# Patient Record
Sex: MALE | Race: WHITE | NOT HISPANIC OR LATINO | Employment: FULL TIME | URBAN - METROPOLITAN AREA
[De-identification: names, ages, dates, MRNs, and addresses within clinical notes are randomized per-mention and may not be internally consistent; named-entity substitution may affect disease eponyms.]

---

## 2017-01-06 ENCOUNTER — TELEPHONE (OUTPATIENT)
Dept: INTERNAL MEDICINE | Facility: CLINIC | Age: 52
End: 2017-01-06

## 2017-01-06 RX ORDER — CLARITHROMYCIN 500 MG/1
500 TABLET, COATED ORAL 2 TIMES DAILY
Qty: 14 TABLET | Refills: 0 | Status: SHIPPED | OUTPATIENT
Start: 2017-01-06 | End: 2017-01-13

## 2017-01-06 NOTE — TELEPHONE ENCOUNTER
----- Message from Omaira Garcias sent at 1/6/2017 11:50 AM EST -----  Contact: [phone]  Patient has been sick and was given a zpak. He is still having the same symptoms and would like something called in

## 2017-01-06 NOTE — TELEPHONE ENCOUNTER
Pharm called stating that the patient cannot take biaxin as this interferes with celexa and could cause heart palpitations. Per TOSIN okay to change to omnicef 300 mg bid for 10 days. Verbal given to pharm and they will fill this for the pt.

## 2017-01-17 ENCOUNTER — OFFICE VISIT (OUTPATIENT)
Dept: INTERNAL MEDICINE | Facility: CLINIC | Age: 52
End: 2017-01-17

## 2017-01-17 VITALS
BODY MASS INDEX: 34.88 KG/M2 | WEIGHT: 222.2 LBS | HEIGHT: 67 IN | SYSTOLIC BLOOD PRESSURE: 124 MMHG | DIASTOLIC BLOOD PRESSURE: 76 MMHG | TEMPERATURE: 97.2 F

## 2017-01-17 DIAGNOSIS — K21.9 GASTROESOPHAGEAL REFLUX DISEASE, ESOPHAGITIS PRESENCE NOT SPECIFIED: ICD-10-CM

## 2017-01-17 DIAGNOSIS — M19.90 ARTHRITIS: ICD-10-CM

## 2017-01-17 DIAGNOSIS — R05.9 COUGH: ICD-10-CM

## 2017-01-17 DIAGNOSIS — I10 ESSENTIAL HYPERTENSION: ICD-10-CM

## 2017-01-17 DIAGNOSIS — F41.8 DEPRESSION WITH ANXIETY: ICD-10-CM

## 2017-01-17 DIAGNOSIS — E11.9 CONTROLLED TYPE 2 DIABETES MELLITUS WITHOUT COMPLICATION, WITHOUT LONG-TERM CURRENT USE OF INSULIN (HCC): Primary | ICD-10-CM

## 2017-01-17 LAB
ALBUMIN SERPL-MCNC: 4.5 G/DL (ref 3.2–4.8)
ALBUMIN/GLOB SERPL: 1.5 G/DL (ref 1.5–2.5)
ALP SERPL-CCNC: 78 U/L (ref 25–100)
ALT SERPL W P-5'-P-CCNC: 32 U/L (ref 7–40)
ANION GAP SERPL CALCULATED.3IONS-SCNC: 16 MMOL/L (ref 3–11)
ARTICHOKE IGE QN: 155 MG/DL (ref 0–130)
AST SERPL-CCNC: 20 U/L (ref 0–33)
BASOPHILS # BLD AUTO: 0.03 10*3/MM3 (ref 0–0.2)
BASOPHILS NFR BLD AUTO: 0.3 % (ref 0–1)
BILIRUB SERPL-MCNC: 0.6 MG/DL (ref 0.3–1.2)
BUN BLD-MCNC: 18 MG/DL (ref 9–23)
BUN/CREAT SERPL: 20 (ref 7–25)
CALCIUM SPEC-SCNC: 10.1 MG/DL (ref 8.7–10.4)
CHLORIDE SERPL-SCNC: 100 MMOL/L (ref 99–109)
CHOLEST SERPL-MCNC: 239 MG/DL (ref 0–200)
CO2 SERPL-SCNC: 25 MMOL/L (ref 20–31)
CREAT BLD-MCNC: 0.9 MG/DL (ref 0.6–1.3)
DEPRECATED RDW RBC AUTO: 45.7 FL (ref 37–54)
EOSINOPHIL # BLD AUTO: 0.1 10*3/MM3 (ref 0.1–0.3)
EOSINOPHIL NFR BLD AUTO: 1.1 % (ref 0–3)
ERYTHROCYTE [DISTWIDTH] IN BLOOD BY AUTOMATED COUNT: 14.1 % (ref 11.3–14.5)
GFR SERPL CREATININE-BSD FRML MDRD: 89 ML/MIN/1.73
GLOBULIN UR ELPH-MCNC: 3 GM/DL
GLUCOSE BLD-MCNC: 192 MG/DL (ref 70–100)
HBA1C MFR BLD: 8.6 %
HCT VFR BLD AUTO: 40.6 % (ref 38.9–50.9)
HDLC SERPL-MCNC: 55 MG/DL (ref 40–60)
HGB BLD-MCNC: 14.2 G/DL (ref 13.1–17.5)
IMM GRANULOCYTES # BLD: 0.12 10*3/MM3 (ref 0–0.03)
IMM GRANULOCYTES NFR BLD: 1.3 % (ref 0–0.6)
LYMPHOCYTES # BLD AUTO: 1.85 10*3/MM3 (ref 0.6–4.8)
LYMPHOCYTES NFR BLD AUTO: 19.5 % (ref 24–44)
MCH RBC QN AUTO: 31.6 PG (ref 27–31)
MCHC RBC AUTO-ENTMCNC: 35 G/DL (ref 32–36)
MCV RBC AUTO: 90.2 FL (ref 80–99)
MONOCYTES # BLD AUTO: 0.86 10*3/MM3 (ref 0–1)
MONOCYTES NFR BLD AUTO: 9.1 % (ref 0–12)
NEUTROPHILS # BLD AUTO: 6.51 10*3/MM3 (ref 1.5–8.3)
NEUTROPHILS NFR BLD AUTO: 68.7 % (ref 41–71)
PLATELET # BLD AUTO: 249 10*3/MM3 (ref 150–450)
PMV BLD AUTO: 10.7 FL (ref 6–12)
POTASSIUM BLD-SCNC: 4.3 MMOL/L (ref 3.5–5.5)
PROT SERPL-MCNC: 7.5 G/DL (ref 5.7–8.2)
RBC # BLD AUTO: 4.5 10*6/MM3 (ref 4.2–5.76)
SODIUM BLD-SCNC: 141 MMOL/L (ref 132–146)
TRIGL SERPL-MCNC: 217 MG/DL (ref 0–150)
TSH SERPL DL<=0.05 MIU/L-ACNC: 0.65 MIU/ML (ref 0.35–5.35)
VIT B12 BLD-MCNC: >2000 PG/ML (ref 211–911)
WBC NRBC COR # BLD: 9.47 10*3/MM3 (ref 3.5–10.8)

## 2017-01-17 PROCEDURE — 99214 OFFICE O/P EST MOD 30 MIN: CPT | Performed by: FAMILY MEDICINE

## 2017-01-17 PROCEDURE — 85025 COMPLETE CBC W/AUTO DIFF WBC: CPT | Performed by: FAMILY MEDICINE

## 2017-01-17 PROCEDURE — 80053 COMPREHEN METABOLIC PANEL: CPT | Performed by: FAMILY MEDICINE

## 2017-01-17 PROCEDURE — 82607 VITAMIN B-12: CPT | Performed by: FAMILY MEDICINE

## 2017-01-17 PROCEDURE — 83036 HEMOGLOBIN GLYCOSYLATED A1C: CPT | Performed by: FAMILY MEDICINE

## 2017-01-17 PROCEDURE — 84443 ASSAY THYROID STIM HORMONE: CPT | Performed by: FAMILY MEDICINE

## 2017-01-17 PROCEDURE — 80061 LIPID PANEL: CPT | Performed by: FAMILY MEDICINE

## 2017-01-17 RX ORDER — LISINOPRIL 10 MG/1
10 TABLET ORAL DAILY
Qty: 30 TABLET | Refills: 5 | Status: SHIPPED | OUTPATIENT
Start: 2017-01-17 | End: 2017-07-26 | Stop reason: SDUPTHER

## 2017-01-17 RX ORDER — CITALOPRAM 20 MG/1
40 TABLET ORAL DAILY
Qty: 60 TABLET | Refills: 5 | Status: SHIPPED | OUTPATIENT
Start: 2017-01-17 | End: 2017-08-23 | Stop reason: SDUPTHER

## 2017-01-17 RX ORDER — MELOXICAM 15 MG/1
15 TABLET ORAL DAILY
Qty: 30 TABLET | Refills: 2 | Status: SHIPPED | OUTPATIENT
Start: 2017-01-17 | End: 2017-08-23 | Stop reason: SDUPTHER

## 2017-01-17 RX ORDER — LANSOPRAZOLE 30 MG/1
30 CAPSULE, DELAYED RELEASE ORAL DAILY
Qty: 30 CAPSULE | Refills: 2 | Status: SHIPPED | OUTPATIENT
Start: 2017-01-17 | End: 2017-06-26

## 2017-01-17 RX ORDER — MONTELUKAST SODIUM 10 MG/1
TABLET ORAL
Qty: 30 TABLET | Refills: 0 | Status: SHIPPED | OUTPATIENT
Start: 2017-01-17 | End: 2017-08-23

## 2017-01-17 NOTE — PROGRESS NOTES
Subjective   Malcolm Costa is a 51 y.o. male.     History of Present Illness   Here for routine 3-4mo recheck. Last seen 9/27/16 for recheck diabetes. Last routine 7/12/16. Labs 11/3/15 demo normal CBC, CMP (glu 161), B12 slightly high 1063, advised to stop supplement) and TSH. Lipid was borderline with , tg 117, HDL 50, - pt advised diet. Had vit D 8.8 ([id number]) and was advised 5000 IU qd. A1C was 6.3. A1C done 3/1/16 was 6.6 and 7/12/16 was 8.6. Lipid done 7/12/16 demo , tg 145, HDL 47m . EKG 7/7/15 was normal other than low voltage (improved from 9/13/14 with ?Q waves).     1. ENDO- diabetes, currently on kombiglyze. No known diabetic sequelae. Last eye exam 6/9/15. Baseline A1C was 7.4 with subsequent A1C at goal 11/3/15 at 6.3 and 3/1/16 at 6.6.Pt does rare FS. However, he was seen 7/2016 with recently elevated sugars 210 fasting, 342 after lunch and 240 after dinner; done the day prior to the visit for the visit; no symptoms. Was continued on kombiglyze with the addition of invokana. On recheck he was doing better: fasting FS improved from 300's to 185-261, between meals 146-322, pp 177-233. Glu trending downward except when pt ate ice cream. Last A1C 7/2016 was 8.6. Was advised to avoid this. Today he reports he stopped doing FS in December. No blurry vision, polydipsia/ polyuria or numbness in his extremities.       2. ORTHO- OA with hand, neck and shoulder pain. Also has right hip pain when driving. Did well with Mobic. Today he reports he is still doing well with complete pain control as long as he takes the mobic qd.      3.CV- HTN and borderline hyperlipid, currently on lisinopril. No known cardio sequelae. Today pt reports he is fasting for labs.    4. PSYCH- depression, anxiety and insomnia. Hx EtOH abuse. Pt now post inpt rehab 2015. No EtOH since 1/2015. Mood has been at goal on celexa 40. Today he reports he is still doing well and is 2yr no EtOH.    5. GI- GERD,  currently on qd prevacid. Has remained on this due to NSAID. Today he reports no breakthru symptoms.    6. RESP- today pt reports he has been sick for about a month. Went to the walk in 1/1/17 and was diagnosed with sinusitis and treated with a zpac. Is still having left ear congestion with muffled hearing and a cough. Phlegm is white. On discussion pt dips tobacco.    The following portions of the patient's history were reviewed and updated as appropriate: current medications, past family history, past medical history, past social history, past surgical history and problem list.    Review of Systems   HENT: Positive for congestion and ear pain (cannot hear out of left ear).    Respiratory: Positive for cough.    Cardiovascular: Negative for chest pain.   Gastrointestinal: Negative for abdominal distention and abdominal pain.   Skin: Negative for color change.   Neurological: Negative for tremors, speech difficulty and headaches.   Psychiatric/Behavioral: Negative for agitation and confusion.   All other systems reviewed and are negative.        Current Outpatient Prescriptions:   •  Canagliflozin (INVOKANA) 300 MG tablet, Take 300 mg by mouth Daily., Disp: 30 tablet, Rfl: 2  •  citalopram (CeleXA) 20 MG tablet, Take 2 tablets by mouth Daily., Disp: 60 tablet, Rfl: 5  •  glucose blood test strip, Use as instructed, Disp: 50 each, Rfl: 12  •  hydrOXYzine (ATARAX) 25 MG tablet, Take 1 tablet by mouth daily as needed., Disp: , Rfl:   •  Lancets (ACCU-CHEK SAFE-T PRO) lancets, For use with glucometer with FS once daily, Disp: 50 each, Rfl: 5  •  lansoprazole (PREVACID) 30 MG capsule, Take 1 capsule by mouth Daily., Disp: 30 capsule, Rfl: 2  •  lisinopril (PRINIVIL,ZESTRIL) 10 MG tablet, Take 1 tablet by mouth Daily., Disp: 30 tablet, Rfl: 5  •  meloxicam (MOBIC) 15 MG tablet, Take 1 tablet by mouth Daily., Disp: 30 tablet, Rfl: 2  •  montelukast (SINGULAIR) 10 MG tablet, Take 1 tab po qd prn cough or congestion, Disp:  "30 tablet, Rfl: 0  •  SAXagliptin-MetFORMIN ER (KOMBIGLYZE XR) 5-1000 MG tablet sustained-release 24 hour, Take 1 tablet by mouth Daily., Disp: 30 tablet, Rfl: 2    Objective     Visit Vitals   • /76   • Temp 97.2 °F (36.2 °C)   • Ht 67\" (170.2 cm)   • Wt 222 lb 3.2 oz (101 kg)   • BMI 34.8 kg/m2       Physical Exam   Constitutional: He is oriented to person, place, and time. He appears well-developed and well-nourished.   HENT:   Right Ear: Tympanic membrane and ear canal normal.   Left Ear: Tympanic membrane and ear canal normal.   Mouth/Throat: Oropharynx is clear and moist.   Eyes: Conjunctivae and EOM are normal. Pupils are equal, round, and reactive to light.   Neck: No thyromegaly present.   Cardiovascular: Normal rate and regular rhythm.    Pulmonary/Chest: Effort normal and breath sounds normal.   Neurological: He is alert and oriented to person, place, and time.   Skin: Skin is warm and dry.   Psychiatric: He has a normal mood and affect.   Vitals reviewed.      Assessment/Plan   Malcolm was seen today for follow-up.    Diagnoses and all orders for this visit:    Controlled type 2 diabetes mellitus without complication, without long-term current use of insulin  -     CBC & Differential  -     Comprehensive Metabolic Panel  -     Lipid Panel  -     Vitamin B12  -     TSH  -     POC Glycosylated Hemoglobin (Hb A1C)  -     Canagliflozin (INVOKANA) 300 MG tablet; Take 300 mg by mouth Daily.  -     lisinopril (PRINIVIL,ZESTRIL) 10 MG tablet; Take 1 tablet by mouth Daily.  -     meloxicam (MOBIC) 15 MG tablet; Take 1 tablet by mouth Daily.  -     SAXagliptin-MetFORMIN ER (KOMBIGLYZE XR) 5-1000 MG tablet sustained-release 24 hour; Take 1 tablet by mouth Daily.    Essential hypertension  -     lisinopril (PRINIVIL,ZESTRIL) 10 MG tablet; Take 1 tablet by mouth Daily.    Arthritis  -     meloxicam (MOBIC) 15 MG tablet; Take 1 tablet by mouth Daily.    Depression with anxiety  -     citalopram (CeleXA) 20 MG " tablet; Take 2 tablets by mouth Daily.    Gastroesophageal reflux disease, esophagitis presence not specified  -     lansoprazole (PREVACID) 30 MG capsule; Take 1 capsule by mouth Daily.    Cough  -     montelukast (SINGULAIR) 10 MG tablet; Take 1 tab po qd prn cough or congestion      1. ENDO- diabetes- will do A1C today but discussed that his sugar may be up some related to the recent URI. Pt is 8.6, same as in July. Discussed that previous was diet and current may be due to the prolonged URI. Will recheck in 3mo. If still over 8, will change his meds  2. CV- HTN- BP at goal. RF lisinopril today. Annual fasting labs today  3. GI- GERD- stable. RF  4. PSYCH- depression with anxiety- still at goal. RF today  5. ORTHO- arthritis- stable. RF mobic today  6. RESP- discussed post infectious cough. Will treat with singulair. Pt advised he needs to stop dip  7. RECHECK- 3mo

## 2017-01-17 NOTE — MR AVS SNAPSHOT
Malcolm Costa   1/17/2017 9:45 AM   Office Visit    Dept Phone:  [phone]   Encounter #:  [id number]    Provider:  Felicia Boothe MD   Department:  [facility] PRIMARY [facility]ARE                Your Full Care Plan              Today's Medication Changes          These changes are accurate as of: 1/17/17 11:22 AM.  If you have any questions, ask your nurse or doctor.               New Medication(s)Ordered:     montelukast 10 MG tablet   Commonly known as:  SINGULAIR   Take 1 tab po qd prn cough or congestion   Started by:  Felicia Boothe MD         Medication(s)that have changed:     lansoprazole 30 MG capsule   Commonly known as:  PREVACID   Take 1 capsule by mouth Daily.   What changed:  Another medication with the same name was removed. Continue taking this medication, and follow the directions you see here.   Changed by:  Felicia Boothe MD         Stop taking medication(s)listed here:     azithromycin 250 MG tablet   Commonly known as:  ZITHROMAX   Stopped by:  Felicia Boothe MD           PredniSONE 10 MG (21) tablet pack   Commonly known as:  DELTASONE   Stopped by:  Felicia Boothe MD                Where to Get Your Medications      These medications were sent to [facility] [address] [place] [address] - [phone]  - [phone] [place] [address]     Phone:  [phone]     Canagliflozin 300 MG tablet    citalopram 20 MG tablet    lansoprazole 30 MG capsule    lisinopril 10 MG tablet    meloxicam 15 MG tablet    montelukast 10 MG tablet    SAXagliptin-MetFORMIN ER 5-1000 MG tablet sustained-release 24 hour                  Your Updated Medication List          This list is accurate as of: 1/17/17 11:22 AM.  Always use your most recent med list.                accu-chek safe-t pro lancets   For use with glucometer with FS once daily       Canagliflozin 300 MG tablet   Commonly known as:   INVOKANA   Take 300 mg by mouth Daily.       citalopram 20 MG tablet   Commonly known as:  CeleXA   Take 2 tablets by mouth Daily.       glucose blood test strip   Use as instructed       hydrOXYzine 25 MG tablet   Commonly known as:  ATARAX       lansoprazole 30 MG capsule   Commonly known as:  PREVACID   Take 1 capsule by mouth Daily.       lisinopril 10 MG tablet   Commonly known as:  PRINIVIL,ZESTRIL   Take 1 tablet by mouth Daily.       meloxicam 15 MG tablet   Commonly known as:  MOBIC   Take 1 tablet by mouth Daily.       montelukast 10 MG tablet   Commonly known as:  SINGULAIR   Take 1 tab po qd prn cough or congestion       SAXagliptin-MetFORMIN ER 5-1000 MG tablet sustained-release 24 hour   Commonly known as:  KOMBIGLYZE XR   Take 1 tablet by mouth Daily.               We Performed the Following     CBC & Differential     CBC Auto Differential     Comprehensive Metabolic Panel     Lipid Panel     POC Glycosylated Hemoglobin (Hb A1C)     TSH     Vitamin B12       You Were Diagnosed With        Codes Comments    Controlled type 2 diabetes mellitus without complication, without long-term current use of insulin    -  Primary ICD-10-CM: E11.9  ICD-9-CM: 250.00     Essential hypertension     ICD-10-CM: I10  ICD-9-CM: 401.9     Arthritis     ICD-10-CM: M19.90  ICD-9-CM: 716.90     Depression with anxiety     ICD-10-CM: F41.8  ICD-9-CM: 300.4     Gastroesophageal reflux disease, esophagitis presence not specified     ICD-10-CM: K21.9  ICD-9-CM: 530.81     Cough     ICD-10-CM: R05  ICD-9-CM: 786.2       Instructions    1. ENDO- diabetes- will do A1C today but discussed that his sugar may be up some related to the recent URI.  2. CV- HTN- BP at goal. RF lisinopril today. Annual fasting labs today  3. GI- GERD- stable. RF  4. PSYCH- depression with anxiety- still at goal. RF today  5. ORTHO- arthritis- stable. RF mobic today  6. RESP- discussed post infectious cough. Will treat with singulair. Pt advised he needs to  "stop dip  7. RECHECK- 3mo     Patient Instructions History      Upcoming Appointments     Visit Type Date Time Department    FOLLOW UP 2017  9:45 AM [facility]      mydeco Signup     [facility] mydeco allows you to send messages to your doctor, view your test results, renew your prescriptions, schedule appointments, and more. To sign up, go to [employer] and click on the Sign Up Now link in the New User? box. Enter your mydeco Activation Code exactly as it appears below along with the last four digits of your Social Security Number and your Date of Birth () to complete the sign-up process. If you do not sign up before the expiration date, you must request a new code.    mydeco Activation Code: [email or web address]  Expires: 2017 11:22 AM    If you have questions, you can email [email or web address] or call [phone] to talk to our mydeco staff. Remember, mydeco is NOT to be used for urgent needs. For medical emergencies, dial 911.               Other Info from Your Visit           Allergies     Codeine      Shellfish-derived Products        Reason for Visit     Follow-up RECHECK DM      Vital Signs     Blood Pressure Temperature Height Weight Body Mass Index Smoking Status    124/76 97.2 °F (36.2 °C) 67\" (170.2 cm) 222 lb 3.2 oz (101 kg) 34.8 kg/m2 Never Smoker      Problems and Diagnoses Noted     Arthritis    Controlled type 2 diabetes mellitus without complication, without long-term current use of insulin    Depression with anxiety    High blood pressure    Acid reflux disease    Cough          Results         "

## 2017-04-20 ENCOUNTER — OFFICE VISIT (OUTPATIENT)
Dept: INTERNAL MEDICINE | Facility: CLINIC | Age: 52
End: 2017-04-20

## 2017-04-20 VITALS
WEIGHT: 219.2 LBS | HEIGHT: 67 IN | DIASTOLIC BLOOD PRESSURE: 82 MMHG | SYSTOLIC BLOOD PRESSURE: 124 MMHG | TEMPERATURE: 97.3 F | BODY MASS INDEX: 34.4 KG/M2

## 2017-04-20 DIAGNOSIS — E11.9 CONTROLLED TYPE 2 DIABETES MELLITUS WITHOUT COMPLICATION, WITHOUT LONG-TERM CURRENT USE OF INSULIN (HCC): Primary | ICD-10-CM

## 2017-04-20 LAB — HBA1C MFR BLD: 5.8 %

## 2017-04-20 PROCEDURE — 83036 HEMOGLOBIN GLYCOSYLATED A1C: CPT | Performed by: FAMILY MEDICINE

## 2017-04-20 PROCEDURE — 99213 OFFICE O/P EST LOW 20 MIN: CPT | Performed by: FAMILY MEDICINE

## 2017-04-20 NOTE — PROGRESS NOTES
Subjective   Malcolm Costa is a 52 y.o. male.     History of Present Illness   Here for routine 3mo recheck. Last seen 1/17/17 for routine.  Labs 1/17/17 demo normal CBC, CMP (except glu 192)and TSH. B12 was >2000 and pt was advised to stop supplement. A1C was 8.6. Lipid demo , tg 217, HDL 55, . A1C done 11/3/15 was 6.3. A1C done 3/1/16 was 6.6 and 7/12/16 was 8.6. Lipid done 7/12/16 demo , tg 145, HDL 47m . EKG 7/7/15 was normal other than low voltage (improved from 9/13/14 with ?Q waves).     1. ENDO- diabetes, currently on kombiglyze and invokana. No known diabetic sequelae. Last eye exam 6/9/15. Pt had been well controlled on kombiglyze with A1C raniging 6.3 to 6.6 until his visit 7/2016 when he had elevated sugars with 210 fasting, 342 after lunch and 240 after dinner abnd A1C 8.6. Was given the addition of invokana but A1C remained 8.6. Home glu were improving except when he ate ice cream. Was advised to work on diet. Today he reports he has working on his diet and exercise. Is using health smoothies tid. Brings log that demo fasting 130-146 (was 165 after easter), between meals , pp . Has lost 3 lb. Last eye exam 9/2016.      2. CV- HTN and borderline hyperlipid, currently on lisinopril. No known cardio sequelae.   3. ORTHO- OA with hand, neck and shoulder pain. Also has right hip pain when driving. Did well with Mobic  4. PSYCH- depression, anxiety and insomnia. Hx EtOH abuse. Pt now post inpt rehab 2015. No EtOH since 1/2015. Mood has been at goal on celexa 40.   5. GI- GERD, currently on qd prevacid. Has remained on this due to NSAID. Today he reports no breakthru symptoms.  6. RESP- allergies. Pt was treated at a walk in 1/1/17 with abx. Improved except for congestion. Was given singulair and advised to stop dipping.     The following portions of the patient's history were reviewed and updated as appropriate: current medications, past family history, past medical  "history, past social history, past surgical history and problem list.    Review of Systems   Cardiovascular: Negative for chest pain.   Gastrointestinal: Negative for abdominal distention and abdominal pain.   Skin: Negative for color change.   Neurological: Negative for tremors, speech difficulty and headaches.   Psychiatric/Behavioral: Negative for agitation and confusion.   All other systems reviewed and are negative.        Current Outpatient Prescriptions:   •  Canagliflozin (INVOKANA) 300 MG tablet, Take 300 mg by mouth Daily., Disp: 30 tablet, Rfl: 2  •  citalopram (CeleXA) 20 MG tablet, Take 2 tablets by mouth Daily., Disp: 60 tablet, Rfl: 5  •  glucose blood test strip, Use as instructed, Disp: 50 each, Rfl: 12  •  hydrOXYzine (ATARAX) 25 MG tablet, Take 1 tablet by mouth daily as needed., Disp: , Rfl:   •  Lancets (ACCU-CHEK SAFE-T PRO) lancets, For use with glucometer with FS once daily, Disp: 50 each, Rfl: 5  •  lansoprazole (PREVACID) 30 MG capsule, Take 1 capsule by mouth Daily., Disp: 30 capsule, Rfl: 2  •  lisinopril (PRINIVIL,ZESTRIL) 10 MG tablet, Take 1 tablet by mouth Daily., Disp: 30 tablet, Rfl: 5  •  meloxicam (MOBIC) 15 MG tablet, Take 1 tablet by mouth Daily., Disp: 30 tablet, Rfl: 2  •  montelukast (SINGULAIR) 10 MG tablet, Take 1 tab po qd prn cough or congestion, Disp: 30 tablet, Rfl: 0  •  SAXagliptin-MetFORMIN ER (KOMBIGLYZE XR) 5-1000 MG tablet sustained-release 24 hour, Take 1 tablet by mouth Daily., Disp: 30 tablet, Rfl: 2    Objective     /82  Temp 97.3 °F (36.3 °C)  Ht 67\" (170.2 cm)  Wt 219 lb 3.2 oz (99.4 kg)  BMI 34.33 kg/m2    Physical Exam   Constitutional: He is oriented to person, place, and time. He appears well-developed and well-nourished.   HENT:   Right Ear: Tympanic membrane and ear canal normal.   Left Ear: Tympanic membrane and ear canal normal.   Mouth/Throat: Oropharynx is clear and moist.   Eyes: Conjunctivae and EOM are normal. Pupils are equal, round, " and reactive to light.   Neck: No thyromegaly present.   Cardiovascular: Normal rate and regular rhythm.    Pulmonary/Chest: Effort normal and breath sounds normal.   Neurological: He is alert and oriented to person, place, and time.   Skin: Skin is warm and dry.   Diabetic foot exam reveals good pulses; normal skin with no skin breaks or rashes; skin dry between the toes, tight touch sensation intact. No onychomycosis.   Psychiatric: He has a normal mood and affect.   Vitals reviewed.      Assessment/Plan   Malcolm was seen today for follow-up.    Diagnoses and all orders for this visit:    Controlled type 2 diabetes mellitus without complication, without long-term current use of insulin  -     POC Glycosylated Hemoglobin (Hb A1C)      1. ENDO- diabetes- discussed his diet with him doing well with current. Will check his A1C today. Expect it is much improved based on his FS. May not be at goal yet as his FS have only been improved for the past mo. Foot exam today good. Pt will continue to do his eye exams every fall. A1C is 5.8.  2. RECHECK- 4mo. Will do A1C and possible in office lipid if DM at goal.

## 2017-04-20 NOTE — PATIENT INSTRUCTIONS
1. ENDO- diabetes- discussed his diet with him doing well with current. Will check his A1C today. Expect it is much improved based on his FS. May not be at goal yet as his FS have only been improved for the past mo. Foot exam today good. Pt will continue to do his eye exams every fall.  2. RECHECK- 4mo. Will do A1C and possible in office lipid if DM at goal.

## 2017-06-02 DIAGNOSIS — E11.9 DIABETES TYPE 2, CONTROLLED (HCC): ICD-10-CM

## 2017-06-05 RX ORDER — SAXAGLIPTIN AND METFORMIN HYDROCHLORIDE 5; 1000 MG/1; MG/1
TABLET, FILM COATED, EXTENDED RELEASE ORAL
Qty: 30 TABLET | Refills: 0 | Status: SHIPPED | OUTPATIENT
Start: 2017-06-05 | End: 2017-07-06 | Stop reason: SDUPTHER

## 2017-06-26 RX ORDER — LANSOPRAZOLE 30 MG/1
CAPSULE, DELAYED RELEASE ORAL
Qty: 30 CAPSULE | Refills: 0 | Status: SHIPPED | OUTPATIENT
Start: 2017-06-26 | End: 2017-08-07 | Stop reason: SDUPTHER

## 2017-07-06 DIAGNOSIS — E11.9 DIABETES TYPE 2, CONTROLLED (HCC): ICD-10-CM

## 2017-07-07 RX ORDER — SAXAGLIPTIN AND METFORMIN HYDROCHLORIDE 5; 1000 MG/1; MG/1
TABLET, FILM COATED, EXTENDED RELEASE ORAL
Qty: 30 TABLET | Refills: 0 | Status: SHIPPED | OUTPATIENT
Start: 2017-07-07 | End: 2017-08-07 | Stop reason: SDUPTHER

## 2017-07-26 DIAGNOSIS — E11.9 CONTROLLED TYPE 2 DIABETES MELLITUS WITHOUT COMPLICATION, WITHOUT LONG-TERM CURRENT USE OF INSULIN (HCC): ICD-10-CM

## 2017-07-26 DIAGNOSIS — M19.90 ARTHRITIS: ICD-10-CM

## 2017-07-26 DIAGNOSIS — I10 ESSENTIAL HYPERTENSION: ICD-10-CM

## 2017-07-26 RX ORDER — LISINOPRIL 10 MG/1
TABLET ORAL
Qty: 30 TABLET | Refills: 0 | Status: SHIPPED | OUTPATIENT
Start: 2017-07-26 | End: 2017-08-23 | Stop reason: SDUPTHER

## 2017-07-26 RX ORDER — MELOXICAM 15 MG/1
TABLET ORAL
Qty: 30 TABLET | Refills: 0 | Status: SHIPPED | OUTPATIENT
Start: 2017-07-26 | End: 2017-08-23

## 2017-08-07 DIAGNOSIS — E11.9 DIABETES TYPE 2, CONTROLLED (HCC): ICD-10-CM

## 2017-08-07 RX ORDER — CANAGLIFLOZIN 300 MG/1
TABLET, FILM COATED ORAL
Qty: 30 TABLET | Refills: 2 | Status: SHIPPED | OUTPATIENT
Start: 2017-08-07 | End: 2017-08-23

## 2017-08-07 RX ORDER — LANSOPRAZOLE 30 MG/1
CAPSULE, DELAYED RELEASE ORAL
Qty: 30 CAPSULE | Refills: 0 | Status: SHIPPED | OUTPATIENT
Start: 2017-08-07 | End: 2021-08-10

## 2017-08-07 RX ORDER — SAXAGLIPTIN AND METFORMIN HYDROCHLORIDE 5; 1000 MG/1; MG/1
TABLET, FILM COATED, EXTENDED RELEASE ORAL
Qty: 30 TABLET | Refills: 0 | Status: SHIPPED | OUTPATIENT
Start: 2017-08-07 | End: 2017-08-23 | Stop reason: SDUPTHER

## 2017-08-23 ENCOUNTER — OFFICE VISIT (OUTPATIENT)
Dept: INTERNAL MEDICINE | Facility: CLINIC | Age: 52
End: 2017-08-23

## 2017-08-23 VITALS
WEIGHT: 217.6 LBS | SYSTOLIC BLOOD PRESSURE: 126 MMHG | HEIGHT: 67 IN | BODY MASS INDEX: 34.15 KG/M2 | DIASTOLIC BLOOD PRESSURE: 76 MMHG | TEMPERATURE: 97.9 F

## 2017-08-23 DIAGNOSIS — E11.9 CONTROLLED TYPE 2 DIABETES MELLITUS WITHOUT COMPLICATION, WITHOUT LONG-TERM CURRENT USE OF INSULIN (HCC): Primary | ICD-10-CM

## 2017-08-23 DIAGNOSIS — M19.90 ARTHRITIS: ICD-10-CM

## 2017-08-23 DIAGNOSIS — K21.9 GASTROESOPHAGEAL REFLUX DISEASE, ESOPHAGITIS PRESENCE NOT SPECIFIED: ICD-10-CM

## 2017-08-23 DIAGNOSIS — F41.8 DEPRESSION WITH ANXIETY: ICD-10-CM

## 2017-08-23 DIAGNOSIS — I10 ESSENTIAL HYPERTENSION: ICD-10-CM

## 2017-08-23 LAB
GLUCOSE BLDC GLUCOMTR-MCNC: 129 MG/DL (ref 70–130)
HBA1C MFR BLD: 5.6 %

## 2017-08-23 PROCEDURE — 90471 IMMUNIZATION ADMIN: CPT | Performed by: FAMILY MEDICINE

## 2017-08-23 PROCEDURE — 90732 PPSV23 VACC 2 YRS+ SUBQ/IM: CPT | Performed by: FAMILY MEDICINE

## 2017-08-23 PROCEDURE — 82570 ASSAY OF URINE CREATININE: CPT | Performed by: FAMILY MEDICINE

## 2017-08-23 PROCEDURE — 99214 OFFICE O/P EST MOD 30 MIN: CPT | Performed by: FAMILY MEDICINE

## 2017-08-23 PROCEDURE — 82043 UR ALBUMIN QUANTITATIVE: CPT | Performed by: FAMILY MEDICINE

## 2017-08-23 PROCEDURE — 83036 HEMOGLOBIN GLYCOSYLATED A1C: CPT | Performed by: FAMILY MEDICINE

## 2017-08-23 PROCEDURE — 82962 GLUCOSE BLOOD TEST: CPT | Performed by: FAMILY MEDICINE

## 2017-08-23 RX ORDER — CITALOPRAM 20 MG/1
40 TABLET ORAL DAILY
Qty: 60 TABLET | Refills: 5 | Status: SHIPPED | OUTPATIENT
Start: 2017-08-23 | End: 2018-03-16 | Stop reason: SDUPTHER

## 2017-08-23 RX ORDER — MELOXICAM 15 MG/1
15 TABLET ORAL DAILY
Qty: 30 TABLET | Refills: 5 | Status: SHIPPED | OUTPATIENT
Start: 2017-08-23 | End: 2018-02-28 | Stop reason: SDUPTHER

## 2017-08-23 RX ORDER — LISINOPRIL 10 MG/1
10 TABLET ORAL DAILY
Qty: 30 TABLET | Refills: 5 | Status: SHIPPED | OUTPATIENT
Start: 2017-08-23 | End: 2018-02-28 | Stop reason: SDUPTHER

## 2017-08-23 NOTE — PATIENT INSTRUCTIONS
1. ENDO- diabetes- will do a glu and A1C in office today. Will do a urine micro albumin. Pneumovax today and then again at 64yo.   2. CV- HTN- BP at goal. RF meds. Will do labs at next routine visit.  3. ORTHO- arthritis- stable at goal. RF mobic.  4. PSYCH- depression with anxiety- discussed stress vs chemical imbalance. Current issues appear to be more situational. Advised to try exercising again as this can help across the board.   5. GI- GERD- stable. Pt reminded to remain at the lowest effective dose.   6. RECHECK- 4mo, fasting routine and CPE

## 2017-08-23 NOTE — PROGRESS NOTES
Subjective   Malcolm Costa is a 52 y.o. male.     History of Present Illness   Here for routine 4mo recheck. Last seen 4/20/17 for 3mo recheck. A1C done 4/20/17 was 5.8.  Labs 1/17/17 demo normal CBC, CMP (except glu 192) and TSH. B12 was >2000 and pt was advised to stop supplement. A1C was 8.6. Lipid demo , tg 217, HDL 55, . A1C done 11/3/15 was 6.3. A1C done 3/1/16 was 6.6 and 7/12/16 was 8.6. Lipid done 7/12/16 demo , tg 145, HDL 47m . EKG 7/7/15 was normal other than low voltage (improved from 9/13/14 with ?Q waves).     1. ENDO- diabetes, currently on kombiglyze and invokana. No known diabetic sequelae. Does eye exams every fall, last 9/2016. Had foot exam 4/20/17.  Pt had been well controlled on kombiglyze with A1C raniging 6.3 to 6.6 until his visit 7/2016 when he had elevated sugars with 210- 342 and A1C 8.6. Was given the addition of invokana but A1C remained 8.6. Home glu were improving except when he ate ice cream. Was advised to work on diet. At his last visit he was working on his diet and exercise. Home FS improved to  with A1C 5.8. Pt had also lost 3 lb. Today he reports no recent FS. No symptoms of blurry vision, muscle weakness of change in urination. He has lost 2 lb.       2. CV- HTN and borderline hyperlipid, currently on lisinopril. No known cardio sequelae. Today pt reports he is doing well with no CP, palpitations, dyspnea or edema.    3. ORTHO- OA with hand, neck and shoulder pain. Also has right hip pain when driving. Did well with Mobic. Today he reports this is still doing well.     4. PSYCH- depression, anxiety and insomnia. Hx EtOH abuse. Pt now post inpt rehab 2015. No EtOH since 1/2015. Mood has been at goal on celexa 40. Today pt reports that for the past couple of mos he has not gone to the gym or eaten as well. Business is slow during the summer and this is part of it. No persistent symptoms and no urge to drink.      5. GI- GERD, currently on qd  "prevacid. Has remained on this due to NSAID. Today he reports no breakthru symptoms. Is not using OTC.     6. RESP- allergies. Pt was treated at a walk in 1/1/17 with abx. Improved except for congestion. Was given singulair and advised to stop dipping. Today he reports the singulair did not help so he stopped it.    The following portions of the patient's history were reviewed and updated as appropriate: current medications, past family history, past medical history, past social history, past surgical history and problem list.    Review of Systems   Cardiovascular: Negative for chest pain.   Gastrointestinal: Negative for abdominal distention and abdominal pain.   Skin: Negative for color change.   Neurological: Negative for tremors, speech difficulty and headaches.   Psychiatric/Behavioral: Negative for agitation and confusion.   All other systems reviewed and are negative.        Current Outpatient Prescriptions:   •  Canagliflozin (INVOKANA) 300 MG tablet, Take 300 mg by mouth Daily., Disp: 30 tablet, Rfl: 3  •  citalopram (CeleXA) 20 MG tablet, Take 2 tablets by mouth Daily., Disp: 60 tablet, Rfl: 5  •  glucose blood test strip, Use as instructed, Disp: 50 each, Rfl: 12  •  hydrOXYzine (ATARAX) 25 MG tablet, Take 1 tablet by mouth daily as needed., Disp: , Rfl:   •  Lancets (ACCU-CHEK SAFE-T PRO) lancets, For use with glucometer with FS once daily, Disp: 50 each, Rfl: 5  •  lansoprazole (PREVACID) 30 MG capsule, TAKE ONE CAPSULE BY MOUTH ONCE DAILY, Disp: 30 capsule, Rfl: 0  •  lisinopril (PRINIVIL,ZESTRIL) 10 MG tablet, Take 1 tablet by mouth Daily., Disp: 30 tablet, Rfl: 5  •  meloxicam (MOBIC) 15 MG tablet, Take 1 tablet by mouth Daily., Disp: 30 tablet, Rfl: 5  •  SAXagliptin-MetFORMIN ER (KOMBIGLYZE XR) 5-1000 MG tablet sustained-release 24 hour, 1 tab po qd, Disp: 30 tablet, Rfl: 0    Objective     /76  Temp 97.9 °F (36.6 °C)  Ht 67\" (170.2 cm)  Wt 217 lb 9.6 oz (98.7 kg)  BMI 34.08 " kg/m2    Physical Exam   Constitutional: He is oriented to person, place, and time. He appears well-developed and well-nourished.   HENT:   Right Ear: Tympanic membrane and ear canal normal.   Left Ear: Tympanic membrane and ear canal normal.   Mouth/Throat: Oropharynx is clear and moist.   Eyes: Conjunctivae and EOM are normal. Pupils are equal, round, and reactive to light.   Neck: No thyromegaly present.   Cardiovascular: Normal rate and regular rhythm.    Pulmonary/Chest: Effort normal and breath sounds normal.   Neurological: He is alert and oriented to person, place, and time.   Skin: Skin is warm and dry.   Psychiatric: He has a normal mood and affect.   Vitals reviewed.      Assessment/Plan   Malcolm was seen today for follow-up.    Diagnoses and all orders for this visit:    Controlled type 2 diabetes mellitus without complication, without long-term current use of insulin  -     Microalbumin / Creatinine Urine Ratio  -     POC Glycosylated Hemoglobin (Hb A1C)  -     POC Glucose Fingerstick  -     Pneumococcal Polysaccharide Vaccine 23-Valent Greater Than or Equal To 1yo Subcutaneous / IM  -     Canagliflozin (INVOKANA) 300 MG tablet; Take 300 mg by mouth Daily.    Essential hypertension  -     lisinopril (PRINIVIL,ZESTRIL) 10 MG tablet; Take 1 tablet by mouth Daily.    Depression with anxiety  -     citalopram (CeleXA) 20 MG tablet; Take 2 tablets by mouth Daily.    Gastroesophageal reflux disease, esophagitis presence not specified    Arthritis  -     meloxicam (MOBIC) 15 MG tablet; Take 1 tablet by mouth Daily.    Other orders  -     SAXagliptin-MetFORMIN ER (KOMBIGLYZE XR) 5-1000 MG tablet sustained-release 24 hour; 1 tab po qd      1. ENDO- diabetes- will do a glu and A1C in office today (129, 5.6). Will do a urine micro albumin. Pneumovax today and then again at 66yo.   2. CV- HTN- BP at goal. RF meds. Will do labs at next routine visit.  3. ORTHO- arthritis- stable at goal. RF mobic.  4. PSYCH-  depression with anxiety- discussed stress vs chemical imbalance. Current issues appear to be more situational. Advised to try exercising again as this can help across the board.   5. GI- GERD- stable. Pt reminded to remain at the lowest effective dose.   6. RECHECK- 4mo, fasting routine and CPE

## 2017-08-25 LAB
CREAT 24H UR-MCNC: 58.1 MG/DL
MICROALB/CRT. RATIO UR: 42 MG/G CREAT (ref 0–30)
MICROALBUMIN UR-MCNC: 24.4 UG/ML

## 2017-10-09 RX ORDER — SAXAGLIPTIN AND METFORMIN HYDROCHLORIDE 5; 1000 MG/1; MG/1
TABLET, FILM COATED, EXTENDED RELEASE ORAL
Qty: 30 TABLET | Refills: 0 | Status: SHIPPED | OUTPATIENT
Start: 2017-10-09 | End: 2017-11-08 | Stop reason: SDUPTHER

## 2017-11-08 RX ORDER — SAXAGLIPTIN AND METFORMIN HYDROCHLORIDE 5; 1000 MG/1; MG/1
TABLET, FILM COATED, EXTENDED RELEASE ORAL
Qty: 30 TABLET | Refills: 0 | Status: SHIPPED | OUTPATIENT
Start: 2017-11-08 | End: 2017-12-09 | Stop reason: SDUPTHER

## 2017-12-11 RX ORDER — SAXAGLIPTIN AND METFORMIN HYDROCHLORIDE 5; 1000 MG/1; MG/1
TABLET, FILM COATED, EXTENDED RELEASE ORAL
Qty: 30 TABLET | Refills: 0 | Status: SHIPPED | OUTPATIENT
Start: 2017-12-11 | End: 2017-12-12 | Stop reason: SDUPTHER

## 2017-12-12 ENCOUNTER — OFFICE VISIT (OUTPATIENT)
Dept: INTERNAL MEDICINE | Facility: CLINIC | Age: 52
End: 2017-12-12

## 2017-12-12 VITALS
HEIGHT: 67 IN | SYSTOLIC BLOOD PRESSURE: 126 MMHG | DIASTOLIC BLOOD PRESSURE: 86 MMHG | BODY MASS INDEX: 35.35 KG/M2 | WEIGHT: 225.2 LBS | TEMPERATURE: 97.3 F

## 2017-12-12 DIAGNOSIS — E11.9 CONTROLLED TYPE 2 DIABETES MELLITUS WITHOUT COMPLICATION, WITHOUT LONG-TERM CURRENT USE OF INSULIN (HCC): ICD-10-CM

## 2017-12-12 DIAGNOSIS — Z00.00 ANNUAL PHYSICAL EXAM: Primary | ICD-10-CM

## 2017-12-12 DIAGNOSIS — F41.8 DEPRESSION WITH ANXIETY: ICD-10-CM

## 2017-12-12 LAB
25(OH)D3 SERPL-MCNC: 18.5 NG/ML
ALBUMIN SERPL-MCNC: 4.9 G/DL (ref 3.2–4.8)
ALBUMIN/GLOB SERPL: 1.6 G/DL (ref 1.5–2.5)
ALP SERPL-CCNC: 78 U/L (ref 25–100)
ALT SERPL W P-5'-P-CCNC: 22 U/L (ref 7–40)
ANION GAP SERPL CALCULATED.3IONS-SCNC: 10 MMOL/L (ref 3–11)
ARTICHOKE IGE QN: 181 MG/DL (ref 0–130)
AST SERPL-CCNC: 24 U/L (ref 0–33)
BASOPHILS # BLD AUTO: 0.03 10*3/MM3 (ref 0–0.2)
BASOPHILS NFR BLD AUTO: 0.3 % (ref 0–1)
BILIRUB SERPL-MCNC: 0.6 MG/DL (ref 0.3–1.2)
BUN BLD-MCNC: 18 MG/DL (ref 9–23)
BUN/CREAT SERPL: 18 (ref 7–25)
CALCIUM SPEC-SCNC: 10.1 MG/DL (ref 8.7–10.4)
CHLORIDE SERPL-SCNC: 100 MMOL/L (ref 99–109)
CHOLEST SERPL-MCNC: 224 MG/DL (ref 0–200)
CO2 SERPL-SCNC: 25 MMOL/L (ref 20–31)
CREAT BLD-MCNC: 1 MG/DL (ref 0.6–1.3)
DEPRECATED RDW RBC AUTO: 45.1 FL (ref 37–54)
EOSINOPHIL # BLD AUTO: 0.08 10*3/MM3 (ref 0–0.3)
EOSINOPHIL NFR BLD AUTO: 0.8 % (ref 0–3)
ERYTHROCYTE [DISTWIDTH] IN BLOOD BY AUTOMATED COUNT: 13.5 % (ref 11.3–14.5)
GFR SERPL CREATININE-BSD FRML MDRD: 78 ML/MIN/1.73
GLOBULIN UR ELPH-MCNC: 3.1 GM/DL
GLUCOSE BLD-MCNC: 186 MG/DL (ref 70–100)
HBA1C MFR BLD: 8 % (ref 4.8–5.6)
HCT VFR BLD AUTO: 45.1 % (ref 38.9–50.9)
HDLC SERPL-MCNC: 50 MG/DL (ref 40–60)
HGB BLD-MCNC: 15.5 G/DL (ref 13.1–17.5)
IMM GRANULOCYTES # BLD: 0.06 10*3/MM3 (ref 0–0.03)
IMM GRANULOCYTES NFR BLD: 0.6 % (ref 0–0.6)
LYMPHOCYTES # BLD AUTO: 1.8 10*3/MM3 (ref 0.6–4.8)
LYMPHOCYTES NFR BLD AUTO: 19.1 % (ref 24–44)
MCH RBC QN AUTO: 31.5 PG (ref 27–31)
MCHC RBC AUTO-ENTMCNC: 34.4 G/DL (ref 32–36)
MCV RBC AUTO: 91.7 FL (ref 80–99)
MONOCYTES # BLD AUTO: 0.73 10*3/MM3 (ref 0–1)
MONOCYTES NFR BLD AUTO: 7.7 % (ref 0–12)
NEUTROPHILS # BLD AUTO: 6.72 10*3/MM3 (ref 1.5–8.3)
NEUTROPHILS NFR BLD AUTO: 71.5 % (ref 41–71)
PLATELET # BLD AUTO: 189 10*3/MM3 (ref 150–450)
PMV BLD AUTO: 10.8 FL (ref 6–12)
POTASSIUM BLD-SCNC: 4.5 MMOL/L (ref 3.5–5.5)
PROT SERPL-MCNC: 8 G/DL (ref 5.7–8.2)
PSA SERPL-MCNC: 0.67 NG/ML (ref 0–4)
RBC # BLD AUTO: 4.92 10*6/MM3 (ref 4.2–5.76)
SODIUM BLD-SCNC: 135 MMOL/L (ref 132–146)
TRIGL SERPL-MCNC: 109 MG/DL (ref 0–150)
TSH SERPL DL<=0.05 MIU/L-ACNC: 0.76 MIU/ML (ref 0.35–5.35)
VIT B12 BLD-MCNC: >2000 PG/ML (ref 211–911)
WBC NRBC COR # BLD: 9.42 10*3/MM3 (ref 3.5–10.8)

## 2017-12-12 PROCEDURE — G0103 PSA SCREENING: HCPCS | Performed by: FAMILY MEDICINE

## 2017-12-12 PROCEDURE — 85025 COMPLETE CBC W/AUTO DIFF WBC: CPT | Performed by: FAMILY MEDICINE

## 2017-12-12 PROCEDURE — 90715 TDAP VACCINE 7 YRS/> IM: CPT | Performed by: FAMILY MEDICINE

## 2017-12-12 PROCEDURE — 82306 VITAMIN D 25 HYDROXY: CPT | Performed by: FAMILY MEDICINE

## 2017-12-12 PROCEDURE — 90686 IIV4 VACC NO PRSV 0.5 ML IM: CPT | Performed by: FAMILY MEDICINE

## 2017-12-12 PROCEDURE — 84443 ASSAY THYROID STIM HORMONE: CPT | Performed by: FAMILY MEDICINE

## 2017-12-12 PROCEDURE — 83036 HEMOGLOBIN GLYCOSYLATED A1C: CPT | Performed by: FAMILY MEDICINE

## 2017-12-12 PROCEDURE — 80061 LIPID PANEL: CPT | Performed by: FAMILY MEDICINE

## 2017-12-12 PROCEDURE — 90471 IMMUNIZATION ADMIN: CPT | Performed by: FAMILY MEDICINE

## 2017-12-12 PROCEDURE — 80053 COMPREHEN METABOLIC PANEL: CPT | Performed by: FAMILY MEDICINE

## 2017-12-12 PROCEDURE — 90472 IMMUNIZATION ADMIN EACH ADD: CPT | Performed by: FAMILY MEDICINE

## 2017-12-12 PROCEDURE — 82607 VITAMIN B-12: CPT | Performed by: FAMILY MEDICINE

## 2017-12-12 PROCEDURE — 99396 PREV VISIT EST AGE 40-64: CPT | Performed by: FAMILY MEDICINE

## 2017-12-12 RX ORDER — BUSPIRONE HYDROCHLORIDE 10 MG/1
TABLET ORAL
Qty: 60 TABLET | Refills: 0 | Status: SHIPPED | OUTPATIENT
Start: 2017-12-12 | End: 2018-04-10 | Stop reason: SDUPTHER

## 2017-12-12 NOTE — PATIENT INSTRUCTIONS
1. CPE- will do fasting screening labs today. Tdap (tetanus with whooping cough) today. Flu shot today.  2. ENDO- diabetes- will check an A1C today. If elevated, pt will work on diet first.  3. PSYCH- mood. Will add buspar as needed for anxiety.  4. RECHECK- 4mo routine

## 2017-12-12 NOTE — PROGRESS NOTES
Subjective   Malcolm Costa is a 52 y.o. male.   History of Present Illness   Here for CPE and routine 4mo. Last seen 8/23/17 for routine 4mo recheck. A1C done 8/23/17 was 5.6, done 4/20/17 was 5.8.  Labs 1/17/17 demo normal CBC, CMP (except glu 192) and TSH. B12 was >2000 and pt was advised to stop supplement. A1C was 8.6. Lipid demo , tg 217, HDL 55, . A1C done 3/1/16 was 6.6 and 7/12/16 was 8.6. EKG 7/7/15 was normal other than low voltage (improved from 9/13/14 with ?Q waves).     1. ENDO- diabetes, currently on kombiglyze and invokana. No known diabetic sequelae. Does eye exams every fall, last 9/2016. Had foot exam 4/20/17.  Had urine micro albumin 8/23/17. Pt had been well controlled on kombiglyze with A1C ranging 6.3 to 6.6 until his visit 7/2016 when he had elevated sugars of 210- 342 and A1C 8.6. Was given the addition of invokana but A1C remained 8.6 until he improved his diet (lost 5lb). Since then A1C was been 5.6- 5.8 with last 8/23/17 being 5.6. Today pt reports he is still following a careful diet or exercising. Has regained some weight. No FS.       2.  PSYCH- depression, anxiety and insomnia. Hx EtOH abuse. Pt now post inpt rehab 2015. No EtOH since 1/2/15. Mood has been at goal on celexa 40. No urge to drink at discussion 8/23/17.  Today pt reports he will get anxious at work at times, especially with the holidays.    3.CV- HTN and borderline hyperlipid, currently on lisinopril. No known cardio sequelae.   4. ORTHO- OA with hand, neck and shoulder pain. Also has right hip pain when driving. Did well with Mobic.   5. GI- GERD, currently on qd prevacid. Has remained on this due to NSAID.     6. CPE- Last done: none recently  CV- risk factors: see above.  GI- no rectal bleeding. No fam hx colon ca. Previous colonoscopy: 2012 with 1 polyp that was benign.  - no prostate or bladder c/o. Has nocturia x1 since 44yo. No ED. Strong fam hx prostate ca.  Last labs: fasting today for  "labs  Immunizations: Last TDaP: due. Pneumovax: 8/23/17. Previous zostavax: none. Flu  New concerns: none    The following portions of the patient's history were reviewed and updated as appropriate: current medications, past family history, past medical history, past social history, past surgical history and problem list.    Review of Systems   Cardiovascular: Negative for chest pain.   Gastrointestinal: Negative for abdominal distention and abdominal pain.   Skin: Negative for color change.   Neurological: Negative for tremors, speech difficulty and headaches.   Psychiatric/Behavioral: Negative for agitation and confusion.   All other systems reviewed and are negative.        Current Outpatient Prescriptions:   •  busPIRone (BUSPAR) 10 MG tablet, 1/2- 1 tab prn anxiety, Disp: 60 tablet, Rfl: 0  •  Canagliflozin (INVOKANA) 300 MG tablet, Take 300 mg by mouth Daily., Disp: 30 tablet, Rfl: 3  •  citalopram (CeleXA) 20 MG tablet, Take 2 tablets by mouth Daily., Disp: 60 tablet, Rfl: 5  •  glucose blood test strip, Use as instructed, Disp: 50 each, Rfl: 12  •  hydrOXYzine (ATARAX) 25 MG tablet, Take 1 tablet by mouth daily as needed., Disp: , Rfl:   •  Lancets (ACCU-CHEK SAFE-T PRO) lancets, For use with glucometer with FS once daily, Disp: 50 each, Rfl: 5  •  lansoprazole (PREVACID) 30 MG capsule, TAKE ONE CAPSULE BY MOUTH ONCE DAILY, Disp: 30 capsule, Rfl: 0  •  lisinopril (PRINIVIL,ZESTRIL) 10 MG tablet, Take 1 tablet by mouth Daily., Disp: 30 tablet, Rfl: 5  •  meloxicam (MOBIC) 15 MG tablet, Take 1 tablet by mouth Daily., Disp: 30 tablet, Rfl: 5  •  SAXagliptin-MetFORMIN ER (KOMBIGLYZE XR) 5-1000 MG tablet sustained-release 24 hour, Take 1 tablet by mouth Daily., Disp: 30 tablet, Rfl: 3      Objective     /86  Temp 97.3 °F (36.3 °C)  Ht 170.2 cm (67\")  Wt 102 kg (225 lb 3.2 oz)  BMI 35.27 kg/m2    Physical Exam   Constitutional: He is oriented to person, place, and time. He appears well-developed and " well-nourished.   HENT:   Head: Normocephalic.   Right Ear: Tympanic membrane and ear canal normal.   Left Ear: Tympanic membrane and ear canal normal.   Mouth/Throat: Oropharynx is clear and moist.   Eyes: Conjunctivae, EOM and lids are normal. Pupils are equal, round, and reactive to light.   Neck: Normal range of motion. Neck supple. No thyromegaly present.   Cardiovascular: Normal rate, regular rhythm and normal heart sounds.    Pulses:       Carotid pulses are 2+ on the right side, and 2+ on the left side.       Femoral pulses are 2+ on the right side, and 2+ on the left side.  Pulmonary/Chest: Effort normal and breath sounds normal.   Abdominal: Soft. Normal appearance and bowel sounds are normal. He exhibits no distension. There is no hepatosplenomegaly. There is no tenderness. Hernia confirmed negative in the right inguinal area and confirmed negative in the left inguinal area.   Musculoskeletal: Normal range of motion. He exhibits no edema or tenderness.   Lymphadenopathy:     He has no cervical adenopathy. No inguinal adenopathy noted on the right or left side.   Neurological: He is alert and oriented to person, place, and time.   Skin: Skin is warm and dry.   Psychiatric: He has a normal mood and affect. His behavior is normal.   Nursing note and vitals reviewed.      Reviewed the following with the patient: Discussion today with patient regarding current guidelines for timing/ frequency of colonoscopy and lab tests (including PSA).     Immunizations discussed today with current recommendations advised for DTaP, Zostavax, Pneumovax and Prevnar 13.    Appropriate diet and stretching discussed with handouts provided.        Assessment/Plan   Malcolm was seen today for annual exam.    Diagnoses and all orders for this visit:    Annual physical exam  -     CBC & Differential  -     Comprehensive Metabolic Panel  -     Lipid Panel  -     Vitamin B12  -     Vitamin D 25 Hydroxy  -     PSA Screen  -     TSH  -      CBC Auto Differential  -     Tdap Vaccine Greater Than or Equal To 8yo IM    Controlled type 2 diabetes mellitus without complication, without long-term current use of insulin  -     Hemoglobin A1c  -     Canagliflozin (INVOKANA) 300 MG tablet; Take 300 mg by mouth Daily.  -     SAXagliptin-MetFORMIN ER (KOMBIGLYZE XR) 5-1000 MG tablet sustained-release 24 hour; Take 1 tablet by mouth Daily.    Depression with anxiety  -     busPIRone (BUSPAR) 10 MG tablet; 1/2- 1 tab prn anxiety    Other orders  -     Flu Vaccine Quad PF 3YR+        1. CPE- will do fasting screening labs today. Tdap (tetanus with whooping cough) and flu shot today.  2. ENDO- diabetes- will check an A1C today. If elevated, pt will work on diet first.  3. RECHECK- 4mo routine

## 2018-02-28 DIAGNOSIS — M19.90 ARTHRITIS: ICD-10-CM

## 2018-02-28 DIAGNOSIS — I10 ESSENTIAL HYPERTENSION: ICD-10-CM

## 2018-02-28 RX ORDER — MELOXICAM 15 MG/1
TABLET ORAL
Qty: 30 TABLET | Refills: 5 | Status: SHIPPED | OUTPATIENT
Start: 2018-02-28 | End: 2018-07-24 | Stop reason: SDUPTHER

## 2018-02-28 RX ORDER — LISINOPRIL 10 MG/1
TABLET ORAL
Qty: 30 TABLET | Refills: 5 | Status: SHIPPED | OUTPATIENT
Start: 2018-02-28 | End: 2018-07-24 | Stop reason: SDUPTHER

## 2018-03-16 DIAGNOSIS — F41.8 DEPRESSION WITH ANXIETY: ICD-10-CM

## 2018-03-19 RX ORDER — CITALOPRAM 20 MG/1
TABLET ORAL
Qty: 60 TABLET | Refills: 5 | Status: SHIPPED | OUTPATIENT
Start: 2018-03-19 | End: 2018-07-24 | Stop reason: SDUPTHER

## 2018-04-10 ENCOUNTER — OFFICE VISIT (OUTPATIENT)
Dept: INTERNAL MEDICINE | Facility: CLINIC | Age: 53
End: 2018-04-10

## 2018-04-10 VITALS
SYSTOLIC BLOOD PRESSURE: 122 MMHG | TEMPERATURE: 97.5 F | HEIGHT: 67 IN | BODY MASS INDEX: 35.79 KG/M2 | WEIGHT: 228 LBS | DIASTOLIC BLOOD PRESSURE: 84 MMHG

## 2018-04-10 DIAGNOSIS — F41.8 DEPRESSION WITH ANXIETY: ICD-10-CM

## 2018-04-10 DIAGNOSIS — E78.00 PURE HYPERCHOLESTEROLEMIA: ICD-10-CM

## 2018-04-10 DIAGNOSIS — E11.9 CONTROLLED TYPE 2 DIABETES MELLITUS WITHOUT COMPLICATION, WITHOUT LONG-TERM CURRENT USE OF INSULIN (HCC): Primary | ICD-10-CM

## 2018-04-10 DIAGNOSIS — I10 ESSENTIAL HYPERTENSION: ICD-10-CM

## 2018-04-10 LAB
CHOLEST SERPL-MCNC: 252 MG/DL
EXPIRATION DATE: ABNORMAL
HBA1C MFR BLD: 7.7 %
HDLC SERPL-MCNC: 41 MG/DL
LDLC SERPL CALC-MCNC: ABNORMAL MG/DL
Lab: ABNORMAL
TRIGL SERPL-MCNC: 472 MG/DL

## 2018-04-10 PROCEDURE — 80061 LIPID PANEL: CPT | Performed by: FAMILY MEDICINE

## 2018-04-10 PROCEDURE — 83036 HEMOGLOBIN GLYCOSYLATED A1C: CPT | Performed by: FAMILY MEDICINE

## 2018-04-10 PROCEDURE — 99214 OFFICE O/P EST MOD 30 MIN: CPT | Performed by: FAMILY MEDICINE

## 2018-04-10 RX ORDER — BUSPIRONE HYDROCHLORIDE 10 MG/1
TABLET ORAL
Qty: 60 TABLET | Refills: 0 | Status: SHIPPED | OUTPATIENT
Start: 2018-04-10 | End: 2019-03-19 | Stop reason: SDUPTHER

## 2018-04-10 NOTE — PATIENT INSTRUCTIONS
1. ENDO- diabetes- A1C today 7.7. Discussed options. Will continue the invokana. Will stop the kombiglyze and start trulicity 1.5. If pt gets nausea, he is to call for a reduction in dose. Will recheck in 3mo. If needed, may eventually restart metformin separately. Pt will get his eye exam done. Will do foot exam today; pt advised to keep his feet warm.  2. CV- HTN, hyperlipid- BP at goal. lipid in office today demo , HDL 41, tg 472. Discussed that we will address his sugar and then recheck this. Lipid diet guidelines provided.  3. PSYCH- depression with anxiety- will RF buspar today. Discussed that if he starts to need this routinely, even after the weather improves, we will increase his celexa. No change in celexa dose at this time.  4. RECHECK- 3mo fasting (lipid and A1C)

## 2018-04-10 NOTE — PROGRESS NOTES
Subjective   Malcolm Costa is a 53 y.o. male.     History of Present Illness   Here for routine 4mo recheck. Last seen 12/12/17 for CPE and routine 4mo. Lab 12/12/17 demo normal CBC, CMP, TSH, B12 (high) and PSA. Had glu 186, A1C 8.0. Lipid demo , tg 109, HDL 50, . Had A1C done 8/23/17 was 5.6, done 4/20/17 was 5.8.  Labs 1/17/17 demo normal CBC, CMP (except glu 192) and TSH. B12 was >2000 and pt was advised to stop supplement. A1C was 8.6. Lipid demo , tg 217, HDL 55, . A1C done 3/1/16 was 6.6 and 7/12/16 was 8.6. EKG 7/7/15 was normal other than low voltage (improved from 9/13/14 with ?Q waves).     1. ENDO- diabetes, currently on kombiglyze and invokana. No known diabetic sequelae. Does eye exams every fall, last 9/2017. Had foot exam 4/20/17.  Had urine micro albumin 8/23/17. Pt had been well controlled on kombiglyze with A1C ranging 6.3 to 6.6 until 7/2016 when he had elevated sugars of 210- 342 and A1C 8.6. Was given the addition of invokana but A1C remained 8.6 until he improved his diet (lost 5lb). A1C then improved to 5.6- 5.8 with one 8/23/17 being 5.6. However, at his last visit he had regained weight and A1C was back up to 8.0. Pt to work on diet again. Today pt reports he has not really been working on his diet.         2.  PSYCH- depression, anxiety and insomnia. Hx EtOH abuse. Pt now post inpt rehab 2015. No EtOH since 1/2/15. Mood has been at goal on celexa 40. No urge to drink at discussion 8/23/17. At his last visit he was getting anxious at work on occasion. Was given buspar to use prn. Today pt reports he has needed the buspar recently due to the weather and sleep issues. Needs a RF; otherwise has felt well on celexa.     3.CV- HTN and previously borderline hyperlipid, currently on lisinopril. No known cardio sequelae. However, at his last visit lipid was up with ; pt to fast for recheck today as he likely will need a statin. Today pt reports he has not worked on  "diet. Both parents have high cholesterol.     4. ORTHO- OA with hand, neck and shoulder pain. Also has right hip pain when driving. Did well with Mobic.   5. GI- GERD, currently on qd prevacid. Has remained on this due to NSAID.     The following portions of the patient's history were reviewed and updated as appropriate: current medications, past family history, past medical history, past social history, past surgical history and problem list.    Review of Systems   Cardiovascular: Negative for chest pain.   Gastrointestinal: Negative for abdominal distention and abdominal pain.   Skin: Negative for color change.   Neurological: Negative for tremors, speech difficulty and headaches.   Psychiatric/Behavioral: Negative for agitation and confusion.   All other systems reviewed and are negative.        Current Outpatient Prescriptions:   •  busPIRone (BUSPAR) 10 MG tablet, 1/2- 1 tab prn anxiety, Disp: 60 tablet, Rfl: 0  •  Canagliflozin (INVOKANA) 300 MG tablet, Take 300 mg by mouth Daily., Disp: 30 tablet, Rfl: 3  •  citalopram (CeleXA) 20 MG tablet, TAKE TWO TABLETS BY MOUTH ONCE DAILY, Disp: 60 tablet, Rfl: 5  •  Dulaglutide 1.5 MG/0.5ML solution pen-injector, Inject 1.5 mg under the skin 1 (One) Time Per Week., Disp: 4 pen, Rfl: 2  •  glucose blood test strip, Use as instructed, Disp: 50 each, Rfl: 12  •  Lancets (ACCU-CHEK SAFE-T PRO) lancets, For use with glucometer with FS once daily, Disp: 50 each, Rfl: 5  •  lansoprazole (PREVACID) 30 MG capsule, TAKE ONE CAPSULE BY MOUTH ONCE DAILY, Disp: 30 capsule, Rfl: 0  •  lisinopril (PRINIVIL,ZESTRIL) 10 MG tablet, TAKE ONE TABLET BY MOUTH ONCE DAILY, Disp: 30 tablet, Rfl: 5  •  meloxicam (MOBIC) 15 MG tablet, TAKE ONE TABLET BY MOUTH ONCE DAILY, Disp: 30 tablet, Rfl: 5    Objective     /84   Temp 97.5 °F (36.4 °C)   Ht 170.2 cm (67\")   Wt 103 kg (228 lb)   BMI 35.71 kg/m²     Physical Exam   Constitutional: He is oriented to person, place, and time. He " appears well-developed and well-nourished.   HENT:   Right Ear: Tympanic membrane and ear canal normal.   Left Ear: Tympanic membrane and ear canal normal.   Mouth/Throat: Oropharynx is clear and moist.   Eyes: Conjunctivae and EOM are normal. Pupils are equal, round, and reactive to light.   Neck: No thyromegaly present.   Cardiovascular: Normal rate and regular rhythm.    Pulmonary/Chest: Effort normal and breath sounds normal.   Neurological: He is alert and oriented to person, place, and time.   Skin: Skin is warm and dry.   Diabetic foot exam reveals good pulses; normal skin with no skin breaks or rashes; skin dry between the toes, light touch sensation intact. No onychomycosis. Feet cold with bluish tint to toes   Psychiatric: He has a normal mood and affect.   Vitals reviewed.      Assessment/Plan   Malcolm was seen today for follow-up.    Diagnoses and all orders for this visit:    Controlled type 2 diabetes mellitus without complication, without long-term current use of insulin  -     Canagliflozin (INVOKANA) 300 MG tablet; Take 300 mg by mouth Daily.  -     Dulaglutide 1.5 MG/0.5ML solution pen-injector; Inject 1.5 mg under the skin 1 (One) Time Per Week.  -     POC Glycosylated Hemoglobin (Hb A1C)    Essential hypertension    Depression with anxiety  -     busPIRone (BUSPAR) 10 MG tablet; 1/2- 1 tab prn anxiety    Pure hypercholesterolemia  -     POC Lipid Panel        1. ENDO- diabetes- A1C today 7.7. Discussed options. Will continue the invokana. Will stop the kombiglyze and start trulicity 1.5. If pt gets nausea, he is to call for a reduction in dose. Will recheck in 3mo. If needed, may eventually restart metformin separately. Pt will get his eye exam done. Will do foot exam today; pt advised to keep his feet warm.  2. CV- HTN, hyperlipid- BP at goal. lipid in office today demo , HDL 41, tg 472. Discussed that we will address his sugar and then recheck this. Lipid diet guidelines provided.  3.  PSYCH- depression with anxiety- will RF buspar today. Discussed that if he starts to need this routinely, even after the weather improves, we will increase his celexa. No change in celexa dose at this time.  4. RECHECK- 3mo fasting (lipid and A1C)

## 2018-04-17 ENCOUNTER — TELEPHONE (OUTPATIENT)
Dept: INTERNAL MEDICINE | Facility: CLINIC | Age: 53
End: 2018-04-17

## 2018-04-18 NOTE — TELEPHONE ENCOUNTER
EL[facility]ABETM advising pt [facility] I told him that I would give him a call as soon as I have more information on that or if he needed samples to give me a call back and let me know.

## 2018-05-01 ENCOUNTER — TELEPHONE (OUTPATIENT)
Dept: INTERNAL MEDICINE | Facility: CLINIC | Age: 53
End: 2018-05-01

## 2018-06-25 DIAGNOSIS — E11.9 CONTROLLED TYPE 2 DIABETES MELLITUS WITHOUT COMPLICATION, WITHOUT LONG-TERM CURRENT USE OF INSULIN (HCC): ICD-10-CM

## 2018-06-25 NOTE — TELEPHONE ENCOUNTER
I spoke with pt and advised him that his Trulicity has finally been approved through insurance. Will send in rx now and pt to keep appt pending in July. Rx faxed to pharmacy.

## 2018-06-30 PROCEDURE — 87329 GIARDIA AG IA: CPT | Performed by: NURSE PRACTITIONER

## 2018-06-30 PROCEDURE — 87046 STOOL CULTR AEROBIC BACT EA: CPT | Performed by: NURSE PRACTITIONER

## 2018-06-30 PROCEDURE — 87493 C DIFF AMPLIFIED PROBE: CPT | Performed by: NURSE PRACTITIONER

## 2018-06-30 PROCEDURE — 87177 OVA AND PARASITES SMEARS: CPT | Performed by: NURSE PRACTITIONER

## 2018-06-30 PROCEDURE — 80074 ACUTE HEPATITIS PANEL: CPT | Performed by: NURSE PRACTITIONER

## 2018-06-30 PROCEDURE — 87045 FECES CULTURE AEROBIC BACT: CPT | Performed by: NURSE PRACTITIONER

## 2018-06-30 PROCEDURE — 87209 SMEAR COMPLEX STAIN: CPT | Performed by: NURSE PRACTITIONER

## 2018-07-02 ENCOUNTER — TELEPHONE (OUTPATIENT)
Dept: INTERNAL MEDICINE | Facility: CLINIC | Age: 53
End: 2018-07-02

## 2018-07-02 ENCOUNTER — TELEPHONE (OUTPATIENT)
Dept: URGENT CARE | Facility: CLINIC | Age: 53
End: 2018-07-02

## 2018-07-02 DIAGNOSIS — IMO0001 UNCONTROLLED TYPE 2 DIABETES MELLITUS WITHOUT COMPLICATION, WITH LONG-TERM CURRENT USE OF INSULIN: ICD-10-CM

## 2018-07-02 RX ORDER — LANCETS 23 GAUGE
EACH MISCELLANEOUS
Qty: 50 EACH | Refills: 5 | Status: SHIPPED | OUTPATIENT
Start: 2018-07-02 | End: 2021-08-12 | Stop reason: SDUPTHER

## 2018-07-02 NOTE — TELEPHONE ENCOUNTER
Pt called stating that he needs refill for lancets and test strips. Will send over whatever rx we have in pt chart.

## 2018-07-05 ENCOUNTER — TELEPHONE (OUTPATIENT)
Dept: URGENT CARE | Facility: CLINIC | Age: 53
End: 2018-07-05

## 2018-07-05 NOTE — TELEPHONE ENCOUNTER
Notified Mr. Costa of stool study results, states symptoms have resolved. Advised if symptoms return follow up with PCP, voiced understanding.

## 2018-07-24 ENCOUNTER — OFFICE VISIT (OUTPATIENT)
Dept: INTERNAL MEDICINE | Facility: CLINIC | Age: 53
End: 2018-07-24

## 2018-07-24 VITALS
BODY MASS INDEX: 35.52 KG/M2 | SYSTOLIC BLOOD PRESSURE: 126 MMHG | WEIGHT: 221 LBS | DIASTOLIC BLOOD PRESSURE: 74 MMHG | TEMPERATURE: 97.8 F | HEIGHT: 66 IN

## 2018-07-24 DIAGNOSIS — E11.9 CONTROLLED TYPE 2 DIABETES MELLITUS WITHOUT COMPLICATION, WITHOUT LONG-TERM CURRENT USE OF INSULIN (HCC): Primary | ICD-10-CM

## 2018-07-24 DIAGNOSIS — E78.00 PURE HYPERCHOLESTEROLEMIA: ICD-10-CM

## 2018-07-24 DIAGNOSIS — I10 ESSENTIAL HYPERTENSION: ICD-10-CM

## 2018-07-24 DIAGNOSIS — M19.90 ARTHRITIS: ICD-10-CM

## 2018-07-24 DIAGNOSIS — F41.8 DEPRESSION WITH ANXIETY: ICD-10-CM

## 2018-07-24 LAB
CHOLEST SERPL-MCNC: 208 MG/DL
EXPIRATION DATE: NORMAL
HBA1C MFR BLD: 6.3 %
HDLC SERPL-MCNC: 46 MG/DL
LDLC SERPL CALC-MCNC: 136 MG/DL
Lab: NORMAL
TRIGL SERPL-MCNC: 133 MG/DL

## 2018-07-24 PROCEDURE — 83036 HEMOGLOBIN GLYCOSYLATED A1C: CPT | Performed by: FAMILY MEDICINE

## 2018-07-24 PROCEDURE — 80061 LIPID PANEL: CPT | Performed by: FAMILY MEDICINE

## 2018-07-24 PROCEDURE — 82570 ASSAY OF URINE CREATININE: CPT | Performed by: FAMILY MEDICINE

## 2018-07-24 PROCEDURE — 99214 OFFICE O/P EST MOD 30 MIN: CPT | Performed by: FAMILY MEDICINE

## 2018-07-24 PROCEDURE — 82043 UR ALBUMIN QUANTITATIVE: CPT | Performed by: FAMILY MEDICINE

## 2018-07-24 RX ORDER — LISINOPRIL 10 MG/1
10 TABLET ORAL DAILY
Qty: 30 TABLET | Refills: 4 | Status: SHIPPED | OUTPATIENT
Start: 2018-07-24 | End: 2019-01-14 | Stop reason: SDUPTHER

## 2018-07-24 RX ORDER — CITALOPRAM 20 MG/1
40 TABLET ORAL DAILY
Qty: 60 TABLET | Refills: 4 | Status: SHIPPED | OUTPATIENT
Start: 2018-07-24 | End: 2019-03-19 | Stop reason: SDUPTHER

## 2018-07-24 RX ORDER — MELOXICAM 15 MG/1
15 TABLET ORAL DAILY
Qty: 30 TABLET | Refills: 4 | Status: SHIPPED | OUTPATIENT
Start: 2018-07-24 | End: 2019-01-14 | Stop reason: SDUPTHER

## 2018-07-24 NOTE — PROGRESS NOTES
Subjective   Malcolm Costa is a 53 y.o. male.     History of Present Illness   Here for 3mo DM. Last seen 4/10/18 for routine 4mo recheck. Last seen 12/12/17 for CPE and routine 4mo. Lab 4/10/18 demo A1C 7.7, , tg 472, HDL 41. Lab 12/12/17 demo normal CBC, CMP, TSH, B12 (high) and PSA. Had glu 186, A1C 8.0. Lipid demo , tg 109, HDL 50, . Had A1C done 8/23/17 was 5.6, done 4/20/17 was 5.8.  Labs 1/17/17 demo normal CBC, CMP (except glu 192) and TSH. B12 was >2000 and pt was advised to stop supplement. A1C was 8.6. Lipid demo , tg 217, HDL 55, . A1C done 3/1/16 was 6.6 and 7/12/16 was 8.6. EKG 7/7/15 was normal other than low voltage (improved from 9/13/14 with ?Q waves).     ENDO- diabetes  1. Date diagnosed:  2. Last eye exam:  9/2017                                                Diabetic Retinopathy: No  3. Last foot exam:   4/20/17                                              Peripheral Neuropathy: No  4. Last Urine Micro albumin:   8/23/17                              Diabetic Nephropathy: No  5. Last A1C: 7.7 on 4/10/18, 8.0 on 12/12/17, 5.6 on 8/23/17, 5.8 on 4/20/17  6. Pneumovax: 8/23/17  Pt currently on invokana and trulicty. Pt had been well controlled on kombiglyze with A1C ranging 6.3 to 6.6 until 7/2016 when he had elevated sugars of 210- 342 and A1C 8.6. Was given the addition of invokana but A1C remained 8.6 until he improved his diet (lost 5 lb). A1C then improved to 5.6- 5.8 until he regained weight and went back up to 8.0-7.7; was changed to invokana and trulicity. Today he reports his FS have been 125-190, lower reads before lunch and higher one after lunch. He has been having prolonged diarrhea and belching; especially since started trulicity. Went to UC and had neg w/u (including Hep A) and then the diarrhea resolved.         2.CV- HTN and previously borderline hyperlipid, currently on lisinopril. No known cardio sequelae. However, at his last visit lipid was up  with . His last lipid demo tg 472 while sugar up and he was advised recheck today. Today pt reports he is fasting.    3.   PSYCH- depression, anxiety and insomnia. Hx EtOH abuse. Pt now post inpt rehab 2015. No EtOH since 1/2/15. Mood has been at goal on celexa 40. No urge to drink at discussion 8/23/17. At his last visit he was getting anxious at work on occasion. Was given buspar to use prn and used it more with “gray days”.   Today he reports he is back down to 20mg on the celexa and doing well.    4. ORTHO- OA with hand, neck and shoulder pain. Also has right hip pain when driving. Did well with Mobic. Today he reports he tried stopping the mobic but had to restart it qd.   5. GI- GERD, currently on qd prevacid. Has remained on this due to NSAID.     The following portions of the patient's history were reviewed and updated as appropriate: current medications, past family history, past medical history, past social history, past surgical history and problem list.    Review of Systems   Cardiovascular: Negative for chest pain.   Gastrointestinal: Negative for abdominal distention and abdominal pain.   Skin: Negative for color change.   Neurological: Negative for tremors, speech difficulty and headaches.   Psychiatric/Behavioral: Negative for agitation and confusion.   All other systems reviewed and are negative.        Current Outpatient Prescriptions:   •  busPIRone (BUSPAR) 10 MG tablet, 1/2- 1 tab prn anxiety, Disp: 60 tablet, Rfl: 0  •  Canagliflozin (INVOKANA) 300 MG tablet, Take 300 mg by mouth Daily., Disp: 30 tablet, Rfl: 4  •  citalopram (CeleXA) 20 MG tablet, Take 2 tablets by mouth Daily., Disp: 60 tablet, Rfl: 4  •  Dulaglutide 1.5 MG/0.5ML solution pen-injector, Inject 1.5 mg under the skin 1 (One) Time Per Week., Disp: 4 pen, Rfl: 4  •  glucose blood test strip, Use as instructed, Disp: 50 each, Rfl: 12  •  Lancets (ACCU-CHEK SAFE-T PRO) lancets, For use with glucometer with FS once daily,  "Disp: 50 each, Rfl: 5  •  lansoprazole (PREVACID) 30 MG capsule, TAKE ONE CAPSULE BY MOUTH ONCE DAILY, Disp: 30 capsule, Rfl: 0  •  lisinopril (PRINIVIL,ZESTRIL) 10 MG tablet, Take 1 tablet by mouth Daily., Disp: 30 tablet, Rfl: 4  •  meloxicam (MOBIC) 15 MG tablet, Take 1 tablet by mouth Daily., Disp: 30 tablet, Rfl: 4    Objective     /74   Temp 97.8 °F (36.6 °C)   Ht 167 cm (65.75\")   Wt 100 kg (221 lb)   BMI 35.94 kg/m²     Physical Exam   Constitutional: He is oriented to person, place, and time. He appears well-developed and well-nourished.   HENT:   Right Ear: Tympanic membrane and ear canal normal.   Left Ear: Tympanic membrane and ear canal normal.   Mouth/Throat: Oropharynx is clear and moist.   Eyes: Pupils are equal, round, and reactive to light. Conjunctivae and EOM are normal.   Neck: No thyromegaly present.   Cardiovascular: Normal rate and regular rhythm.    Pulmonary/Chest: Effort normal and breath sounds normal.   Neurological: He is alert and oriented to person, place, and time.   Skin: Skin is warm and dry.   Diabetic foot exam reveals good pulses; normal skin with no skin breaks or rashes; skin dry between the toes, light touch sensation intact. No onychomycosis.   Psychiatric: He has a normal mood and affect.   Vitals reviewed.      Assessment/Plan   Malcolm was seen today for follow-up.    Diagnoses and all orders for this visit:    Controlled type 2 diabetes mellitus without complication, without long-term current use of insulin (CMS/[facility])  -     Canagliflozin (INVOKANA) 300 MG tablet; Take 300 mg by mouth Daily.  -     Dulaglutide 1.5 MG/0.5ML solution pen-injector; Inject 1.5 mg under the skin 1 (One) Time Per Week.  -     Microalbumin / Creatinine Urine Ratio - Urine, Clean Catch  -     POC Glycosylated Hemoglobin (Hb A1C)    Essential hypertension  -     lisinopril (PRINIVIL,ZESTRIL) 10 MG tablet; Take 1 tablet by mouth Daily.    Depression with anxiety  -     citalopram (CeleXA) 20 " MG tablet; Take 2 tablets by mouth Daily.    Arthritis  -     meloxicam (MOBIC) 15 MG tablet; Take 1 tablet by mouth Daily.    Pure hypercholesterolemia  -     POC Lipid Panel        1. ENDO- diabetes- A1C today 6.3. Foot exam today normal. Urine micro albumin today. Pt encouraged to get his eye exam done. Is given the wallet card for tracking today.  2. CV, HTN- hyperlipid- BP at goal. Recheck in office today with sugar improved demo , tg 133, HDL 46, .  3. PSYCH- depression with anxiety- mood at goal. RF today  4. ORTHO- arthritis. Stable. RF mobic today  5. RECHECK- 5mo CPE and routine

## 2018-07-24 NOTE — PATIENT INSTRUCTIONS
1. ENDO- diabetes- A1C today 6.3. Foot exam today normal. Urine micro albumin today. Pt encouraged to get his eye exam done. Is given the wallet card for tracking today.  2. CV, HTN- hyperlipid- BP at goal. Recheck in office today with sugar improved demo , tg 133, HDL 46, .  3. PSYCH- depression with anxiety- mood at goal. RF today  4. ORTHO- arthritis. Stable. RF mobic today  5. RECHECK- 5mo CPE and routine

## 2018-07-26 LAB
CREAT 24H UR-MCNC: 71.6 MG/DL
MICROALBUMIN UR-MCNC: 31.4 UG/ML
MICROALBUMIN/CREAT UR: 43.9 MG/G CREAT (ref 0–30)

## 2018-12-18 ENCOUNTER — OFFICE VISIT (OUTPATIENT)
Dept: INTERNAL MEDICINE | Facility: CLINIC | Age: 53
End: 2018-12-18

## 2018-12-18 VITALS
TEMPERATURE: 97.6 F | SYSTOLIC BLOOD PRESSURE: 124 MMHG | WEIGHT: 218.8 LBS | HEIGHT: 66 IN | DIASTOLIC BLOOD PRESSURE: 88 MMHG | BODY MASS INDEX: 35.17 KG/M2

## 2018-12-18 DIAGNOSIS — B96.89 ACUTE BACTERIAL SINUSITIS: ICD-10-CM

## 2018-12-18 DIAGNOSIS — J01.90 ACUTE BACTERIAL SINUSITIS: ICD-10-CM

## 2018-12-18 DIAGNOSIS — E11.9 CONTROLLED TYPE 2 DIABETES MELLITUS WITHOUT COMPLICATION, WITHOUT LONG-TERM CURRENT USE OF INSULIN (HCC): Primary | ICD-10-CM

## 2018-12-18 PROCEDURE — 99213 OFFICE O/P EST LOW 20 MIN: CPT | Performed by: FAMILY MEDICINE

## 2018-12-18 RX ORDER — CEPHALEXIN 250 MG/1
250 CAPSULE ORAL 2 TIMES DAILY
Qty: 20 CAPSULE | Refills: 0 | Status: SHIPPED | OUTPATIENT
Start: 2018-12-18 | End: 2019-03-19

## 2018-12-18 NOTE — PATIENT INSTRUCTIONS
1.ENDO- diabetes- will provide with samples of meds to get back on full treatment. Discussed options. If needed, will go to metformin and may consider glyburide. Discussed the concerns of low sugar with the glyburide, especially as he does not eat routinely. Advised to get the Relion glucometer and to do FS at alternating times. If he is not able to remain on current meds, will consider the metformin; will base this on FS.   2. RESP- sinsusitis- advised mucinex for the phlegm. Will also write for keflex which he can start now or in a few days.  3. RECHECK- 3mo CPE and routine

## 2018-12-18 NOTE — PROGRESS NOTES
Subjective   Malcolm Costa is a 53 y.o. male.   History of Present Illness   Here for CPE and routine DM; however pt is currently uninsured and prefers to defer the CPE. Last seen 7/24/18 for 3mo DM. Was seen 12/12/17 for CPE and routine 4mo. Lab 7/24/18 demo A1C 6.3 and lipid with , tg 133, HDL 46, . Lab 4/10/18 demo A1C 7.7, , tg 472, HDL 41. Lab 12/12/17 demo normal CBC, CMP, TSH, B12 (high) and PSA. Had glu 186, A1C 8.0. Lipid demo , tg 109, HDL 50, . Had A1C done 8/23/17 was 5.6, done 4/20/17 was 5.8.  Labs 1/17/17 demo normal CBC, CMP (except glu 192) and TSH. B12 was >2000 and pt was advised to stop supplement. A1C was 8.6. Lipid demo , tg 217, HDL 55, . A1C done 3/1/16 was 6.6 and 7/12/16 was 8.6. EKG 7/7/15 was normal other than low voltage (improved from 9/13/14 with ?Q waves).     ENDO- diabetes  1. Date diagnosed:  2. Last eye exam:  9/2017                                                Diabetic Retinopathy: No  3. Last foot exam:   7/24/18                                              Peripheral Neuropathy: No  4. Last Urine Micro albumin:   7/24/18                              Diabetic Nephropathy: mild  5. Last A1C: 7.7 on 4/10/18, 8.0 on 12/12/17, 5.6 on 8/23/17, 5.8 on 4/20/17  6. Pneumovax: 8/23/17    Pt currently on invokana and trulicty. Pt had been well controlled on kombiglyze with A1C ranging 6.3 to 6.6 until 7/2016 when he had elevated sugars of 210- 342 and A1C 8.6. Was given the addition of invokana but A1C remained 8.6 until he improved his diet (lost 5 lb). A1C then improved to 5.6- 5.8 until he regained weight and went back up to 8.0-7.7; was changed to invokana and trulicity. Today he reports his FS have been 125-190, lower reads before lunch and higher one after lunch. He has been having prolonged diarrhea and belching; especially since started trulicity. Went to UC and had neg w/u (including Hep A) and then the diarrhea resolved. Today pt  reports he has been out of meds for 2mo due to losing his medicaid.      2. RESP- today pt reports he has been sick x2. Was sick for 4 days, got better for 2 days and then came back again 3 days ago. Phlegm is alt clear and yellow.     3. CV- HTN and previously borderline hyperlipid, currently on lisinopril. No known cardio sequelae. Lipid at goal 7/24/18.  4.   PSYCH- depression, anxiety and insomnia. Hx EtOH abuse. Pt now post inpt rehab 2015. No EtOH since 1/2/15. Mood has been at goal on celexa. Was able to reduce from 40 to 20mg in 2018. Has buspar to add seasonally.  5. ORTHO- OA with hand, neck and shoulder pain. Also has right hip pain when driving. Did well with Mobic qd.  6. GI- GERD, currently on qd prevacid. Has remained on this due to NSAID.   7. CPE- previous 12/12/17 DEFERRED TODAY  CV- risk factors: see above.  GI- No fam hx colon ca. Previous colonoscopy: 2012 with 1 polyp that was benign.  - no prostate or bladder c/o. Has nocturia x1 since 44yo. No ED. Strong fam hx prostate ca.  RESP- never a smoker  Immunizations: Last TDaP: 7/24/17. Pneumovax: 8/23/17. Previous zostavax: none. Flu  New concerns: none    The following portions of the patient's history were reviewed and updated as appropriate: current medications, past family history, past medical history, past social history, past surgical history and problem list.    Review of Systems   Cardiovascular: Negative for chest pain.   Gastrointestinal: Negative for abdominal distention and abdominal pain.   Skin: Negative for color change.   Neurological: Negative for tremors, speech difficulty and headaches.   Psychiatric/Behavioral: Negative for agitation and confusion.   All other systems reviewed and are negative.        Current Outpatient Medications:   •  busPIRone (BUSPAR) 10 MG tablet, 1/2- 1 tab prn anxiety, Disp: 60 tablet, Rfl: 0  •  Canagliflozin (INVOKANA) 300 MG tablet, Take 300 mg by mouth Daily., Disp: 30 tablet, Rfl: 4  •   "cephalexin (KEFLEX) 250 MG capsule, Take 1 capsule by mouth 2 (Two) Times a Day., Disp: 20 capsule, Rfl: 0  •  citalopram (CeleXA) 20 MG tablet, Take 2 tablets by mouth Daily., Disp: 60 tablet, Rfl: 4  •  Dulaglutide 1.5 MG/0.5ML solution pen-injector, Inject 1.5 mg under the skin into the appropriate area as directed 1 (One) Time Per Week., Disp: 4 pen, Rfl: 4  •  glucose blood test strip, Use as instructed, Disp: 50 each, Rfl: 12  •  Lancets (ACCU-CHEK SAFE-T PRO) lancets, For use with glucometer with FS once daily, Disp: 50 each, Rfl: 5  •  lansoprazole (PREVACID) 30 MG capsule, TAKE ONE CAPSULE BY MOUTH ONCE DAILY, Disp: 30 capsule, Rfl: 0  •  lisinopril (PRINIVIL,ZESTRIL) 10 MG tablet, Take 1 tablet by mouth Daily., Disp: 30 tablet, Rfl: 4  •  meloxicam (MOBIC) 15 MG tablet, Take 1 tablet by mouth Daily., Disp: 30 tablet, Rfl: 4      Objective     /88   Temp 97.6 °F (36.4 °C)   Ht 167 cm (65.75\")   Wt 99.2 kg (218 lb 12.8 oz)   BMI 35.58 kg/m²     Physical Exam   Constitutional: He is oriented to person, place, and time. He appears well-developed and well-nourished.   HENT:   Head: Normocephalic.   Right Ear: Tympanic membrane and ear canal normal.   Left Ear: Tympanic membrane and ear canal normal.   Mouth/Throat: Oropharynx is clear and moist.   Eyes: Conjunctivae, EOM and lids are normal. Pupils are equal, round, and reactive to light.   Neck: Normal range of motion. Neck supple. No thyromegaly present.   Cardiovascular: Normal rate, regular rhythm and normal heart sounds.   Pulses:       Carotid pulses are 2+ on the right side, and 2+ on the left side.       Femoral pulses are 2+ on the right side, and 2+ on the left side.  Pulmonary/Chest: Effort normal and breath sounds normal.   Abdominal: Soft. Normal appearance and bowel sounds are normal. He exhibits no distension. There is no hepatosplenomegaly. There is no tenderness. Hernia confirmed negative in the right inguinal area and confirmed " negative in the left inguinal area.   Musculoskeletal: Normal range of motion. He exhibits no edema or tenderness.   Lymphadenopathy:     He has no cervical adenopathy. No inguinal adenopathy noted on the right or left side.   Neurological: He is alert and oriented to person, place, and time.   Skin: Skin is warm and dry.   Psychiatric: He has a normal mood and affect. His behavior is normal.   Nursing note and vitals reviewed.      Reviewed the following with the patient: Discussion today with patient regarding current guidelines for timing/ frequency of paps, mammograms, colonoscopy (or cologuard), bone density tests and lab tests.     Immunizations discussed today with current recommendations advised for DTaP, Zostavax, Pneumovax and Prevnar 13.    Appropriate diet and stretching discussed with handouts provided.      Assessment/Plan   Malcolm was seen today for annual exam.    Diagnoses and all orders for this visit:    Controlled type 2 diabetes mellitus without complication, without long-term current use of insulin (CMS/[facility])  -     Canagliflozin (INVOKANA) 300 MG tablet; Take 300 mg by mouth Daily.  -     Dulaglutide 1.5 MG/0.5ML solution pen-injector; Inject 1.5 mg under the skin into the appropriate area as directed 1 (One) Time Per Week.    Acute bacterial sinusitis  -     cephalexin (KEFLEX) 250 MG capsule; Take 1 capsule by mouth 2 (Two) Times a Day.        1.ENDO- diabetes- will provide with samples of meds to get back on full treatment. Discussed options. If needed, will go to metformin and may consider glyburide. Discussed the concerns of low sugar with the glyburide, especially as he does not eat routinely. Advised to get the Relion glucometer and to do FS at alternating times. If he is not able to remain on current meds, will consider the metformin; will base this on FS.   2. RESP- sinsusitis- advised mucinex for the phlegm. Will also write for keflex which he can start now or in a few days.  3.  RECHECK- 3mo CPE and routine

## 2018-12-20 NOTE — TELEPHONE ENCOUNTER
Pt insurance is saying that pt has not tried and failed metformin even though he has been on kombiglyze in the past. Pt given samples of trulicity until I can figure out everything with insurance.   
Pt wants you call him back. It is about getting a pa on a med. Pt said that the med is really working and helping him. Pt wants to no what is the next step if he can not get this med.  
Statement Selected

## 2019-01-14 DIAGNOSIS — M19.90 ARTHRITIS: ICD-10-CM

## 2019-01-14 DIAGNOSIS — I10 ESSENTIAL HYPERTENSION: ICD-10-CM

## 2019-01-14 RX ORDER — LISINOPRIL 10 MG/1
TABLET ORAL
Qty: 30 TABLET | Refills: 4 | Status: SHIPPED | OUTPATIENT
Start: 2019-01-14 | End: 2019-05-14 | Stop reason: SDUPTHER

## 2019-01-14 RX ORDER — MELOXICAM 15 MG/1
TABLET ORAL
Qty: 30 TABLET | Refills: 4 | Status: SHIPPED | OUTPATIENT
Start: 2019-01-14 | End: 2019-05-14 | Stop reason: SDUPTHER

## 2019-03-19 ENCOUNTER — OFFICE VISIT (OUTPATIENT)
Dept: INTERNAL MEDICINE | Facility: CLINIC | Age: 54
End: 2019-03-19

## 2019-03-19 VITALS
DIASTOLIC BLOOD PRESSURE: 76 MMHG | WEIGHT: 214.4 LBS | HEIGHT: 66 IN | TEMPERATURE: 97.6 F | BODY MASS INDEX: 34.46 KG/M2 | SYSTOLIC BLOOD PRESSURE: 138 MMHG

## 2019-03-19 DIAGNOSIS — E11.9 CONTROLLED TYPE 2 DIABETES MELLITUS WITHOUT COMPLICATION, WITHOUT LONG-TERM CURRENT USE OF INSULIN (HCC): Primary | ICD-10-CM

## 2019-03-19 DIAGNOSIS — F41.8 DEPRESSION WITH ANXIETY: ICD-10-CM

## 2019-03-19 PROCEDURE — 99213 OFFICE O/P EST LOW 20 MIN: CPT | Performed by: FAMILY MEDICINE

## 2019-03-19 RX ORDER — CANAGLIFLOZIN 300 MG/1
300 TABLET, FILM COATED ORAL DAILY
Qty: 30 TABLET | Refills: 4 | Status: SHIPPED | OUTPATIENT
Start: 2019-03-19 | End: 2019-09-27

## 2019-03-19 RX ORDER — BUSPIRONE HYDROCHLORIDE 10 MG/1
TABLET ORAL
Qty: 60 TABLET | Refills: 5 | Status: SHIPPED | OUTPATIENT
Start: 2019-03-19 | End: 2019-05-14 | Stop reason: SDUPTHER

## 2019-03-19 RX ORDER — CITALOPRAM 20 MG/1
40 TABLET ORAL DAILY
Qty: 60 TABLET | Refills: 5 | Status: SHIPPED | OUTPATIENT
Start: 2019-03-19 | End: 2019-05-14 | Stop reason: SDUPTHER

## 2019-03-19 RX ORDER — METFORMIN HYDROCHLORIDE 750 MG/1
1500 TABLET, EXTENDED RELEASE ORAL
Qty: 360 TABLET | Refills: 0 | Status: SHIPPED | OUTPATIENT
Start: 2019-03-19 | End: 2019-09-24 | Stop reason: SDUPTHER

## 2019-03-19 NOTE — PROGRESS NOTES
Subjective   Malcolm Costa is a 54 y.o. male.     History of Present Illness   Here for 3mo DM. Last seen 12/18/18, pt was uninusred with CPE deferred then and again today. Was seen 7/24/18 for 3mo DM. Was seen 12/12/17 for CPE and routine 4mo. Lab 7/24/18 demo A1C 6.3 and lipid with , tg 133, HDL 46, . Lab 4/10/18 demo A1C 7.7, , tg 472, HDL 41. Lab 12/12/17 demo normal CBC, CMP, TSH, B12 (high) and PSA. Had glu 186, A1C 8.0. Lipid demo , tg 109, HDL 50, . Had A1C done 8/23/17 was 5.6, done 4/20/17 was 5.8.  Labs 1/17/17 demo normal CBC, CMP (except glu 192) and TSH. B12 was >2000 and pt was advised to stop supplement. A1C was 8.6. Lipid demo , tg 217, HDL 55, . A1C done 3/1/16 was 6.6 and 7/12/16 was 8.6. EKG 7/7/15 was normal other than low voltage (improved from 9/13/14 with ?Q waves).     ENDO- diabetes  1. Date diagnosed:  2. Last eye exam:  9/2017                                                Diabetic Retinopathy: No  3. Last foot exam:   7/24/18                                              Peripheral Neuropathy: No  4. Last Urine Micro albumin:   7/24/18                              Diabetic Nephropathy: mild  5. Last A1C: 7.7 on 4/10/18, 8.0 on 12/12/17, 5.6 on 8/23/17, 5.8 on 4/20/17  6. Pneumovax: 8/23/17     Pt currently on invokana and trulicty. Pt had been well controlled on kombiglyze with A1C ranging 6.3 to 6.6 until 7/2016 when he had elevated sugars of 210- 342 and A1C 8.6. Was given the addition of invokana but A1C remained 8.6 until he improved his diet (lost 5 lb). A1C then improved to 5.6- 5.8 until he regained weight and went back up to 8.0-7.7; was changed to invokana and trulicity, given samples. Today he reports the trulicity made him very sick with N/V. Has lost another 5 lb to 214 lb.      2. CV- HTN and previously borderline hyperlipid, currently on lisinopril. No known cardio sequelae. Lipid at goal 7/24/18.  3. PSYCH- depression, anxiety  and insomnia. Hx EtOH abuse. Pt now post inpt rehab 2015. No EtOH since 1/2/15. Mood has been at goal on celexa. Was able to reduce from 40 to 20mg in 2018. Has buspar to add seasonally.  4. ORTHO- OA with hand, neck and shoulder pain. Also has right hip pain when driving. Did well with Mobic qd.  5. GI- GERD, currently on qd prevacid. Has remained on this due to NSAID.   6. CPE- previous 12/12/17 DEFERRED TODAY  CV- risk factors: see above.  GI- No fam hx colon ca. Previous colonoscopy: 2012 with 1 polyp that was benign.  - no prostate or bladder c/o. Has nocturia x1 since 44yo. No ED. Strong fam hx prostate ca.  RESP- never a smoker  Immunizations: Last TDaP: 7/24/17. Pneumovax: 8/23/17. Previous zostavax: none. Flu  New concerns: none    The following portions of the patient's history were reviewed and updated as appropriate: current medications, past family history, past medical history, past social history, past surgical history and problem list.    Review of Systems   Cardiovascular: Negative for chest pain.   Gastrointestinal: Negative for abdominal distention and abdominal pain.   Skin: Negative for color change.   Neurological: Negative for tremors, speech difficulty and headaches.   Psychiatric/Behavioral: Negative for agitation and confusion.   All other systems reviewed and are negative.        Current Outpatient Medications:   •  busPIRone (BUSPAR) 10 MG tablet, 1/2- 1 tab prn anxiety, Disp: 60 tablet, Rfl: 0  •  Canagliflozin (INVOKANA) 300 MG tablet, Take 300 mg by mouth Daily., Disp: 30 tablet, Rfl: 4  •  citalopram (CeleXA) 20 MG tablet, Take 2 tablets by mouth Daily., Disp: 60 tablet, Rfl: 4  •  glucose blood test strip, Use as instructed, Disp: 50 each, Rfl: 12  •  Lancets (ACCU-CHEK SAFE-T PRO) lancets, For use with glucometer with FS once daily, Disp: 50 each, Rfl: 5  •  lansoprazole (PREVACID) 30 MG capsule, TAKE ONE CAPSULE BY MOUTH ONCE DAILY, Disp: 30 capsule, Rfl: 0  •  lisinopril  "(PRINIVIL,ZESTRIL) 10 MG tablet, TAKE 1 TABLET BY MOUTH ONCE DAILY, Disp: 30 tablet, Rfl: 4  •  meloxicam (MOBIC) 15 MG tablet, TAKE 1 TABLET BY MOUTH ONCE DAILY, Disp: 30 tablet, Rfl: 4  •  metFORMIN ER (GLUCOPHAGE-XR) 750 MG 24 hr tablet, Take 2 tablets by mouth Daily With Breakfast., Disp: 360 tablet, Rfl: 0    Objective     /76   Temp 97.6 °F (36.4 °C)   Ht 167 cm (65.75\")   Wt 97.3 kg (214 lb 6.4 oz)   BMI 34.87 kg/m²     Physical Exam   Constitutional: He is oriented to person, place, and time. He appears well-developed and well-nourished.   HENT:   Right Ear: Tympanic membrane and ear canal normal.   Left Ear: Tympanic membrane and ear canal normal.   Mouth/Throat: Oropharynx is clear and moist.   Eyes: Conjunctivae and EOM are normal. Pupils are equal, round, and reactive to light.   Neck: No thyromegaly present.   Cardiovascular: Normal rate and regular rhythm.   Pulmonary/Chest: Effort normal and breath sounds normal.   Neurological: He is alert and oriented to person, place, and time.   Skin: Skin is warm and dry.   Psychiatric: He has a normal mood and affect.   Vitals reviewed.      Assessment/Plan   Malcolm was seen today for follow-up.    Diagnoses and all orders for this visit:    Controlled type 2 diabetes mellitus without complication, without long-term current use of insulin (CMS/[facility])  -     metFORMIN ER (GLUCOPHAGE-XR) 750 MG 24 hr tablet; Take 2 tablets by mouth Daily With Breakfast.        1. ENDO- diabetes- A1C today 9.3. Will change his meds to affordable and tolerable options. Will use metformin , taken 2 tabs once a day; to start with 1 a day and then increase to 2 a day as able (ie- limiting factor being side effect of diarrhea). Will also write for invokana with samples today and pt to apply for the patient assistance.   2. RECHECK- 2mo diabetes       "

## 2019-03-19 NOTE — PATIENT INSTRUCTIONS
1. ENDO- diabetes- A1C today 9.3. Will change his meds to affordable and tolerable options. Will use metformin , taken 2 tabs once a day; to start with 1 a day and then increase to 2 a day as able (ie- limiting factor being side effect of diarrhea). Will also write for invokana with samples today and pt to apply for the patient assistance.   2. RECHECK- 2mo diabetes

## 2019-05-14 DIAGNOSIS — F41.8 DEPRESSION WITH ANXIETY: ICD-10-CM

## 2019-05-14 DIAGNOSIS — I10 ESSENTIAL HYPERTENSION: ICD-10-CM

## 2019-05-14 DIAGNOSIS — M19.90 ARTHRITIS: ICD-10-CM

## 2019-05-14 RX ORDER — BUSPIRONE HYDROCHLORIDE 10 MG/1
TABLET ORAL
Qty: 60 TABLET | Refills: 5 | Status: SHIPPED | OUTPATIENT
Start: 2019-05-14 | End: 2020-09-14 | Stop reason: SDUPTHER

## 2019-05-14 RX ORDER — CITALOPRAM 20 MG/1
40 TABLET ORAL DAILY
Qty: 60 TABLET | Refills: 5 | Status: SHIPPED | OUTPATIENT
Start: 2019-05-14 | End: 2019-09-24 | Stop reason: SDUPTHER

## 2019-05-14 RX ORDER — LISINOPRIL 10 MG/1
10 TABLET ORAL DAILY
Qty: 30 TABLET | Refills: 4 | Status: SHIPPED | OUTPATIENT
Start: 2019-05-14 | End: 2019-09-24 | Stop reason: SDUPTHER

## 2019-05-14 RX ORDER — MELOXICAM 15 MG/1
15 TABLET ORAL DAILY
Qty: 30 TABLET | Refills: 4 | Status: SHIPPED | OUTPATIENT
Start: 2019-05-14 | End: 2019-09-24 | Stop reason: SDUPTHER

## 2019-09-24 ENCOUNTER — OFFICE VISIT (OUTPATIENT)
Dept: INTERNAL MEDICINE | Facility: CLINIC | Age: 54
End: 2019-09-24

## 2019-09-24 VITALS
RESPIRATION RATE: 20 BRPM | SYSTOLIC BLOOD PRESSURE: 130 MMHG | DIASTOLIC BLOOD PRESSURE: 88 MMHG | TEMPERATURE: 97.4 F | OXYGEN SATURATION: 98 % | HEART RATE: 87 BPM | WEIGHT: 218.38 LBS | HEIGHT: 66 IN | BODY MASS INDEX: 35.1 KG/M2

## 2019-09-24 DIAGNOSIS — E11.9 CONTROLLED TYPE 2 DIABETES MELLITUS WITHOUT COMPLICATION, WITHOUT LONG-TERM CURRENT USE OF INSULIN (HCC): ICD-10-CM

## 2019-09-24 DIAGNOSIS — F33.0 MILD EPISODE OF RECURRENT MAJOR DEPRESSIVE DISORDER (HCC): ICD-10-CM

## 2019-09-24 DIAGNOSIS — I10 ESSENTIAL HYPERTENSION: Primary | ICD-10-CM

## 2019-09-24 DIAGNOSIS — E11.9 TYPE 2 DIABETES MELLITUS WITHOUT COMPLICATION, WITHOUT LONG-TERM CURRENT USE OF INSULIN (HCC): ICD-10-CM

## 2019-09-24 DIAGNOSIS — M19.90 ARTHRITIS: ICD-10-CM

## 2019-09-24 PROCEDURE — 99214 OFFICE O/P EST MOD 30 MIN: CPT | Performed by: NURSE PRACTITIONER

## 2019-09-24 RX ORDER — METFORMIN HYDROCHLORIDE 750 MG/1
1500 TABLET, EXTENDED RELEASE ORAL
Qty: 60 TABLET | Refills: 5 | Status: SHIPPED | OUTPATIENT
Start: 2019-09-24 | End: 2020-09-14 | Stop reason: SDUPTHER

## 2019-09-24 RX ORDER — MELOXICAM 15 MG/1
15 TABLET ORAL DAILY
Qty: 30 TABLET | Refills: 5 | Status: SHIPPED | OUTPATIENT
Start: 2019-09-24 | End: 2020-03-23 | Stop reason: SDUPTHER

## 2019-09-24 RX ORDER — LISINOPRIL 10 MG/1
10 TABLET ORAL DAILY
Qty: 30 TABLET | Refills: 5 | Status: SHIPPED | OUTPATIENT
Start: 2019-09-24 | End: 2020-03-23 | Stop reason: SDUPTHER

## 2019-09-24 RX ORDER — METFORMIN HYDROCHLORIDE 750 MG/1
1500 TABLET, EXTENDED RELEASE ORAL
Qty: 60 TABLET | Refills: 5 | Status: SHIPPED | OUTPATIENT
Start: 2019-09-24 | End: 2019-09-24 | Stop reason: SDUPTHER

## 2019-09-24 RX ORDER — CITALOPRAM 20 MG/1
20 TABLET ORAL DAILY
Qty: 30 TABLET | Refills: 5 | Status: SHIPPED | OUTPATIENT
Start: 2019-09-24 | End: 2020-03-23 | Stop reason: SDUPTHER

## 2019-09-24 NOTE — PROGRESS NOTES
Subjective   Chief Complaint   Patient presents with   • Depression   • Hypertension   • Diabetes      Malcolm Costa is a 54 y.o. male here today for follow up on chronic issues. His diabetes has been stable and doing well in the past. He is taking metformin ER daily. He has not been checking blood sugars at home. He does not have insurance at this time and does not want to do any labs today. He denies any increase in thirst or urination. Blood pressures have been doing well on Lisinopril 10mg. He has been eating heart healthy diet. Depression and anxiety have been stable on celexa. He takes buspar as needed for anxiety.  He does not have to take this very often.  He denies any thoughts of self-harm or harming others.  He has some generalized muscular skeletal tenderness and pain related to osteoarthritis.  He takes meloxicam 15 mg daily that greatly helps his discomfort.  He denies any shortness of air or chest pain.    I have reviewed the following portions of the patient's history and confirmed they are accurate: allergies, current medications, past family history, past medical history, past social history, past surgical history, problem list and ROS     I have personally completed the patient's review of systems.    Review of Systems   Constitutional: Positive for fatigue. Negative for activity change, appetite change, chills, diaphoresis, fever, unexpected weight gain and unexpected weight loss.   HENT: Negative for ear discharge, ear pain, mouth sores, nosebleeds, sinus pressure, sneezing and sore throat.    Eyes: Negative for pain, discharge and itching.   Respiratory: Negative for cough, chest tightness, shortness of breath and wheezing.    Cardiovascular: Negative for chest pain, palpitations and leg swelling.   Gastrointestinal: Negative for abdominal pain, constipation, diarrhea, nausea and vomiting.   Endocrine: Negative for heat intolerance, polydipsia and polyphagia.   Genitourinary: Negative for  "dysuria, flank pain, frequency, hematuria and urgency.   Musculoskeletal: Positive for arthralgias, back pain and myalgias. Negative for gait problem, joint swelling, neck pain and neck stiffness.   Skin: Negative for color change, pallor and rash.   Allergic/Immunologic: Negative for immunocompromised state.   Neurological: Negative for seizures, speech difficulty, weakness and numbness.   Hematological: Negative for adenopathy.   Psychiatric/Behavioral: Negative for agitation, decreased concentration, sleep disturbance and depressed mood. The patient is not nervous/anxious.        Current Outpatient Medications on File Prior to Visit   Medication Sig   • busPIRone (BUSPAR) 10 MG tablet 1/2- 1 tab prn anxiety   • glucose blood test strip Use as instructed   • Lancets (ACCU-CHEK SAFE-T PRO) lancets For use with glucometer with FS once daily   • lansoprazole (PREVACID) 30 MG capsule TAKE ONE CAPSULE BY MOUTH ONCE DAILY   • [DISCONTINUED] INVOKANA 300 MG tablet Take 300 mg by mouth Daily.     No current facility-administered medications on file prior to visit.        Objective   Vitals:    09/24/19 0943   BP: 130/88   BP Location: Left arm   Patient Position: Sitting   Cuff Size: Adult   Pulse: 87   Resp: 20   Temp: 97.4 °F (36.3 °C)   TempSrc: Temporal   SpO2: 98%   Weight: 99.1 kg (218 lb 6 oz)   Height: 167 cm (65.75\")   PainSc: 0-No pain     Body mass index is 35.52 kg/m².    Physical Exam   Constitutional: He is oriented to person, place, and time. He appears well-developed and well-nourished.   HENT:   Head: Normocephalic and atraumatic.   Nose: Nose normal.   Eyes: EOM are normal. Pupils are equal, round, and reactive to light.   Neck: Trachea normal and normal range of motion. No thyromegaly present.   Cardiovascular: Normal rate, regular rhythm and normal heart sounds.   Pulmonary/Chest: Effort normal and breath sounds normal.   Abdominal: Soft. Bowel sounds are normal. There is no tenderness. "   Musculoskeletal: Normal range of motion. He exhibits tenderness.   Neurological: He is alert and oriented to person, place, and time. He has normal strength. GCS eye subscore is 4. GCS verbal subscore is 5. GCS motor subscore is 6.   Skin: Skin is warm and dry.   Psychiatric: He has a normal mood and affect. His speech is normal and behavior is normal. Thought content normal. Cognition and memory are normal.   Vitals reviewed.      Assessment/Plan   Malcolm was seen today for depression, hypertension and diabetes.    Diagnoses and all orders for this visit:    Essential hypertension  Chronic issue stable requiring continued medication management. Will continue heart healthy diet, increase water intake, increase physical activity as tolerated, and get adequate rest.  He will continue lisinopril 10 mg daily.  -     lisinopril (PRINIVIL,ZESTRIL) 10 MG tablet; Take 1 tablet by mouth Daily.    Type 2 diabetes mellitus without complication, without long-term current use of insulin (CMS/HCC)  Chronic issue stable requiring continued medication management. Will continue heart healthy diet, increase water intake, increase physical activity as tolerated, and get adequate rest.  Patient has glucometer and strips at home.  Highly suggested that he periodically take his blood sugars just to check levels.  Highly suggested that he padded pocket for A1c to help monitor sugars.  metFORMIN ER (GLUCOPHAGE-XR) 750 MG 24 hr tablet; Take 2 tablets by mouth Daily With Breakfast.    Mild episode of recurrent major depressive disorder (CMS/HCC)  Chronic issue unstable requiring medication management and monitoring. Will eat well balanced diet, increase water intake, increase physical activity during the day, and get adequate rest. Discussed relaxation and coping skills and exercises. Suggested self referral to behavioral therapist.   -     citalopram (CeleXA) 20 MG tablet; Take 1 tablet by mouth Daily.    Arthritis  Chronic issue unstable  but improves with medication managemnt. Will eat well balanced diet, increase water intake, increase physical activity, and get adequate rest. He will attempt portion control and weight loss.   -     meloxicam (MOBIC) 15 MG tablet; Take 1 tablet by mouth Daily.         Current Outpatient Medications:   •  busPIRone (BUSPAR) 10 MG tablet, 1/2- 1 tab prn anxiety, Disp: 60 tablet, Rfl: 5  •  citalopram (CeleXA) 20 MG tablet, Take 1 tablet by mouth Daily., Disp: 30 tablet, Rfl: 5  •  glucose blood test strip, Use as instructed, Disp: 50 each, Rfl: 12  •  Lancets (ACCU-CHEK SAFE-T PRO) lancets, For use with glucometer with FS once daily, Disp: 50 each, Rfl: 5  •  lansoprazole (PREVACID) 30 MG capsule, TAKE ONE CAPSULE BY MOUTH ONCE DAILY, Disp: 30 capsule, Rfl: 0  •  lisinopril (PRINIVIL,ZESTRIL) 10 MG tablet, Take 1 tablet by mouth Daily., Disp: 30 tablet, Rfl: 5  •  meloxicam (MOBIC) 15 MG tablet, Take 1 tablet by mouth Daily., Disp: 30 tablet, Rfl: 5  •  metFORMIN ER (GLUCOPHAGE-XR) 750 MG 24 hr tablet, Take 2 tablets by mouth Daily With Breakfast., Disp: 60 tablet, Rfl: 5       Plan of care reviewed with the patient at the conclusion of today's visit.  Education was provided regarding diagnosis, management, and any prescribed or recommended OTC medications.  Patient verbalized understanding of and agreement with management plan.     Return in about 6 months (around 3/24/2020), or if symptoms worsen or fail to improve.      Nancy Faith, APRN

## 2019-09-27 PROBLEM — F33.0 MILD EPISODE OF RECURRENT MAJOR DEPRESSIVE DISORDER (HCC): Status: ACTIVE | Noted: 2019-09-27

## 2020-03-23 DIAGNOSIS — M19.90 ARTHRITIS: ICD-10-CM

## 2020-03-23 DIAGNOSIS — F33.0 MILD EPISODE OF RECURRENT MAJOR DEPRESSIVE DISORDER (HCC): ICD-10-CM

## 2020-03-23 DIAGNOSIS — I10 ESSENTIAL HYPERTENSION: ICD-10-CM

## 2020-03-23 RX ORDER — MELOXICAM 15 MG/1
15 TABLET ORAL DAILY
Qty: 30 TABLET | Refills: 5 | Status: SHIPPED | OUTPATIENT
Start: 2020-03-23 | End: 2020-11-20 | Stop reason: SDUPTHER

## 2020-03-23 RX ORDER — CITALOPRAM 20 MG/1
20 TABLET ORAL DAILY
Qty: 30 TABLET | Refills: 5 | Status: SHIPPED | OUTPATIENT
Start: 2020-03-23 | End: 2020-09-14 | Stop reason: SDUPTHER

## 2020-03-23 RX ORDER — LISINOPRIL 10 MG/1
10 TABLET ORAL DAILY
Qty: 30 TABLET | Refills: 5 | Status: SHIPPED | OUTPATIENT
Start: 2020-03-23 | End: 2020-09-14 | Stop reason: SDUPTHER

## 2020-03-23 NOTE — TELEPHONE ENCOUNTER
Patient requested refills of citalopram (CeleXA) 20 MG tablet,  meloxicam (MOBIC) 15 MG tablet, and lisinopril (PRINIVIL,ZESTRIL) 10 MG tablet.    [facility]  in [place] confirmed    Please call and advise. Patient call back [phone]

## 2020-09-14 DIAGNOSIS — F33.0 MILD EPISODE OF RECURRENT MAJOR DEPRESSIVE DISORDER (HCC): ICD-10-CM

## 2020-09-14 DIAGNOSIS — I10 ESSENTIAL HYPERTENSION: ICD-10-CM

## 2020-09-14 DIAGNOSIS — E11.9 CONTROLLED TYPE 2 DIABETES MELLITUS WITHOUT COMPLICATION, WITHOUT LONG-TERM CURRENT USE OF INSULIN (HCC): ICD-10-CM

## 2020-09-14 DIAGNOSIS — F41.8 DEPRESSION WITH ANXIETY: ICD-10-CM

## 2020-09-14 RX ORDER — METFORMIN HYDROCHLORIDE 750 MG/1
1500 TABLET, EXTENDED RELEASE ORAL
Qty: 60 TABLET | Refills: 5 | Status: SHIPPED | OUTPATIENT
Start: 2020-09-14 | End: 2020-09-16 | Stop reason: SDUPTHER

## 2020-09-14 RX ORDER — LISINOPRIL 10 MG/1
10 TABLET ORAL DAILY
Qty: 30 TABLET | Refills: 5 | Status: SHIPPED | OUTPATIENT
Start: 2020-09-14 | End: 2021-03-23 | Stop reason: SDUPTHER

## 2020-09-14 RX ORDER — CITALOPRAM 20 MG/1
20 TABLET ORAL DAILY
Qty: 30 TABLET | Refills: 5 | Status: SHIPPED | OUTPATIENT
Start: 2020-09-14 | End: 2021-03-23

## 2020-09-14 RX ORDER — BUSPIRONE HYDROCHLORIDE 10 MG/1
TABLET ORAL
Qty: 60 TABLET | Refills: 5 | Status: SHIPPED | OUTPATIENT
Start: 2020-09-14 | End: 2021-03-23 | Stop reason: SDUPTHER

## 2020-09-14 NOTE — TELEPHONE ENCOUNTER
Caller: Igor Malcolm BOYD    Relationship: Self    Best call back number: [phone]       Medication needed:   Requested Prescriptions     Pending Prescriptions Disp Refills   • busPIRone (BUSPAR) 10 MG tablet 60 tablet 5     Si/2- 1 tab prn anxiety   • citalopram (CeleXA) 20 MG tablet 30 tablet 5     Sig: Take 1 tablet by mouth Daily.   • lisinopril (PRINIVIL,ZESTRIL) 10 MG tablet 30 tablet 5     Sig: Take 1 tablet by mouth Daily.   • metFORMIN ER (GLUCOPHAGE-XR) 750 MG 24 hr tablet 60 tablet 5     Sig: Take 2 tablets by mouth Daily With Breakfast.       When do you need the refill by: ASAP    What details did the patient provide when requesting the medication: PATIENT IS OUT OF THE metFORMIN ER (GLUCOPHAGE-XR) 750 MG 24 hr tablet    Does the patient have less than a 3 day supply:  [x] Yes  [] No    What is the patient's preferred pharmacy: [address] [place] [address] [phone] [facility] [phone] [facility]

## 2020-09-16 DIAGNOSIS — E11.9 CONTROLLED TYPE 2 DIABETES MELLITUS WITHOUT COMPLICATION, WITHOUT LONG-TERM CURRENT USE OF INSULIN (HCC): ICD-10-CM

## 2020-09-16 RX ORDER — METFORMIN HYDROCHLORIDE 750 MG/1
1500 TABLET, EXTENDED RELEASE ORAL
Qty: 60 TABLET | Refills: 0 | Status: SHIPPED | OUTPATIENT
Start: 2020-09-16 | End: 2021-03-23 | Stop reason: SDUPTHER

## 2020-09-16 RX ORDER — METFORMIN HYDROCHLORIDE 750 MG/1
1500 TABLET, EXTENDED RELEASE ORAL
Qty: 60 TABLET | Refills: 0 | Status: SHIPPED | OUTPATIENT
Start: 2020-09-16 | End: 2020-09-16 | Stop reason: SDUPTHER

## 2020-09-16 NOTE — TELEPHONE ENCOUNTER
Caller: Malcolm Costa    Relationship: Self    Best call back number: [phone]    Medication needed:   Requested Prescriptions     Pending Prescriptions Disp Refills   • metFORMIN ER (GLUCOPHAGE-XR) 750 MG 24 hr tablet 60 tablet 5     Sig: Take 2 tablets by mouth Daily With Breakfast.       When do you need the refill by: 09/16/20    What details did the patient provide when requesting the medication: patient has been out for over a week    Does the patient have less than a 3 day supply:  [x] Yes  [] No    What is the patient's preferred pharmacy: [address] [phone] [facility] [phone]

## 2020-11-20 ENCOUNTER — TELEPHONE (OUTPATIENT)
Dept: INTERNAL MEDICINE | Facility: CLINIC | Age: 55
End: 2020-11-20

## 2020-11-20 DIAGNOSIS — M19.90 ARTHRITIS: ICD-10-CM

## 2020-11-20 RX ORDER — MELOXICAM 15 MG/1
TABLET ORAL
Qty: 30 TABLET | Refills: 0 | OUTPATIENT
Start: 2020-11-20

## 2020-11-20 RX ORDER — MELOXICAM 15 MG/1
15 TABLET ORAL DAILY
Qty: 30 TABLET | Refills: 0 | Status: SHIPPED | OUTPATIENT
Start: 2020-11-20 | End: 2021-03-23 | Stop reason: SDUPTHER

## 2020-11-20 NOTE — TELEPHONE ENCOUNTER
PT CALLED STATED THAT HE CAN NOT GET AN APPT FOR THE MEDICATION REFILL RX  meloxicam (MOBIC) 15 MG tablet, BECAUSE HE DOES NOT HAVE ANY INSURANCE RIGHT NOW.  PT STATED THAT HE HAS BEEN ON RX FOR YEARS, IS COMPLETELY OUT OF THE RX AND IS SUFFERING.    PLEASE ADVISE.  CALL BACK:[phone]      [address]

## 2020-11-20 NOTE — TELEPHONE ENCOUNTER
I called in one month worth but he needs appt for further refills. This medication can hurt your kidneys and he is a diabetic which further puts him at risk. Last kidney function he had done was in 2017. He needs some blood work done asap.

## 2021-03-23 ENCOUNTER — TELEPHONE (OUTPATIENT)
Dept: INTERNAL MEDICINE | Facility: CLINIC | Age: 56
End: 2021-03-23

## 2021-03-23 ENCOUNTER — OFFICE VISIT (OUTPATIENT)
Dept: INTERNAL MEDICINE | Facility: CLINIC | Age: 56
End: 2021-03-23

## 2021-03-23 ENCOUNTER — LAB (OUTPATIENT)
Dept: LAB | Facility: HOSPITAL | Age: 56
End: 2021-03-23

## 2021-03-23 VITALS
BODY MASS INDEX: 37.92 KG/M2 | SYSTOLIC BLOOD PRESSURE: 148 MMHG | HEIGHT: 63 IN | WEIGHT: 214 LBS | DIASTOLIC BLOOD PRESSURE: 98 MMHG | HEART RATE: 100 BPM | RESPIRATION RATE: 18 BRPM | OXYGEN SATURATION: 98 %

## 2021-03-23 DIAGNOSIS — F41.8 DEPRESSION WITH ANXIETY: ICD-10-CM

## 2021-03-23 DIAGNOSIS — E11.9 CONTROLLED TYPE 2 DIABETES MELLITUS WITHOUT COMPLICATION, WITHOUT LONG-TERM CURRENT USE OF INSULIN (HCC): ICD-10-CM

## 2021-03-23 DIAGNOSIS — I10 ESSENTIAL HYPERTENSION: ICD-10-CM

## 2021-03-23 DIAGNOSIS — M19.90 ARTHRITIS: ICD-10-CM

## 2021-03-23 DIAGNOSIS — F41.9 ANXIETY: ICD-10-CM

## 2021-03-23 DIAGNOSIS — F33.0 MILD EPISODE OF RECURRENT MAJOR DEPRESSIVE DISORDER (HCC): ICD-10-CM

## 2021-03-23 DIAGNOSIS — F33.41 RECURRENT MAJOR DEPRESSIVE DISORDER, IN PARTIAL REMISSION (HCC): ICD-10-CM

## 2021-03-23 PROBLEM — E78.00 PURE HYPERCHOLESTEROLEMIA: Chronic | Status: ACTIVE | Noted: 2018-04-10

## 2021-03-23 LAB
DEPRECATED RDW RBC AUTO: 39.2 FL (ref 37–54)
ERYTHROCYTE [DISTWIDTH] IN BLOOD BY AUTOMATED COUNT: 12.1 % (ref 12.3–15.4)
HBA1C MFR BLD: 9.9 %
HCT VFR BLD AUTO: 44.1 % (ref 37.5–51)
HGB BLD-MCNC: 15.9 G/DL (ref 13–17.7)
MCH RBC QN AUTO: 32.1 PG (ref 26.6–33)
MCHC RBC AUTO-ENTMCNC: 36.1 G/DL (ref 31.5–35.7)
MCV RBC AUTO: 89.1 FL (ref 79–97)
PLATELET # BLD AUTO: 247 10*3/MM3 (ref 140–450)
PMV BLD AUTO: 10.9 FL (ref 6–12)
RBC # BLD AUTO: 4.95 10*6/MM3 (ref 4.14–5.8)
WBC # BLD AUTO: 8.95 10*3/MM3 (ref 3.4–10.8)

## 2021-03-23 PROCEDURE — 80061 LIPID PANEL: CPT | Performed by: NURSE PRACTITIONER

## 2021-03-23 PROCEDURE — 85027 COMPLETE CBC AUTOMATED: CPT | Performed by: NURSE PRACTITIONER

## 2021-03-23 PROCEDURE — 82043 UR ALBUMIN QUANTITATIVE: CPT | Performed by: NURSE PRACTITIONER

## 2021-03-23 PROCEDURE — 99214 OFFICE O/P EST MOD 30 MIN: CPT | Performed by: NURSE PRACTITIONER

## 2021-03-23 PROCEDURE — 84443 ASSAY THYROID STIM HORMONE: CPT | Performed by: NURSE PRACTITIONER

## 2021-03-23 PROCEDURE — 80053 COMPREHEN METABOLIC PANEL: CPT | Performed by: NURSE PRACTITIONER

## 2021-03-23 PROCEDURE — 36415 COLL VENOUS BLD VENIPUNCTURE: CPT

## 2021-03-23 PROCEDURE — 83036 HEMOGLOBIN GLYCOSYLATED A1C: CPT | Performed by: NURSE PRACTITIONER

## 2021-03-23 RX ORDER — LISINOPRIL 10 MG/1
10 TABLET ORAL DAILY
Qty: 30 TABLET | Refills: 3 | Status: SHIPPED | OUTPATIENT
Start: 2021-03-23 | End: 2021-04-21

## 2021-03-23 RX ORDER — MELOXICAM 15 MG/1
15 TABLET ORAL DAILY PRN
Qty: 30 TABLET | Refills: 0 | Status: SHIPPED | OUTPATIENT
Start: 2021-03-23 | End: 2021-04-23

## 2021-03-23 RX ORDER — CITALOPRAM 20 MG/1
20 TABLET ORAL DAILY
Qty: 30 TABLET | Refills: 5 | Status: CANCELLED | OUTPATIENT
Start: 2021-03-23

## 2021-03-23 RX ORDER — METFORMIN HYDROCHLORIDE 750 MG/1
1500 TABLET, EXTENDED RELEASE ORAL
Qty: 60 TABLET | Refills: 0 | Status: SHIPPED | OUTPATIENT
Start: 2021-03-23 | End: 2021-04-23

## 2021-03-23 RX ORDER — BUSPIRONE HYDROCHLORIDE 10 MG/1
TABLET ORAL
Qty: 60 TABLET | Refills: 3 | Status: SHIPPED | OUTPATIENT
Start: 2021-03-23 | End: 2021-04-22 | Stop reason: SDUPTHER

## 2021-03-23 RX ORDER — DULOXETIN HYDROCHLORIDE 30 MG/1
30 CAPSULE, DELAYED RELEASE ORAL DAILY
Qty: 30 CAPSULE | Refills: 1 | Status: SHIPPED | OUTPATIENT
Start: 2021-03-23 | End: 2021-04-21 | Stop reason: SDUPTHER

## 2021-03-23 NOTE — ASSESSMENT & PLAN NOTE
Patient's depression is recurrent and is mild without psychosis. Their depression is currently in partial remission and the condition is unchanged. This will be reassessed in 4 weeks. F/U as described:patient was prescribed an antidepressant medicine.  We will stop the Celexa and have him replace that with Cymbalta.  Adverse effects discussed.

## 2021-03-23 NOTE — ASSESSMENT & PLAN NOTE
Hypertension is unchanged.  We will start him back on lisinopril.  Have him monitor blood pressure.  Goal blood pressure less than 130/80.  Blood pressure will be reassessed in 4 weeks.

## 2021-03-23 NOTE — PROGRESS NOTES
Subjective   Malcolm Costa is a 56 y.o. male here to establish care.  Chief Complaint   Patient presents with   • Establish Care   • Foot Pain     bilateral foot pain ankles and aches-4-5 months   • Diabetes     last A1C on 11/28/18=6.3   • anxiety and depression   • Hypertension       Diabetes  He presents for his initial diabetic visit. He has type 2 diabetes mellitus. The initial diagnosis of diabetes was made 5 years ago. His disease course has been worsening. Hypoglycemia symptoms include nervousness/anxiousness. Pertinent negatives for hypoglycemia include no confusion or dizziness. Pertinent negatives for diabetes include no blurred vision, no chest pain, no fatigue, no foot paresthesias, no foot ulcerations, no polydipsia, no polyphagia, no polyuria, no visual change, no weakness and no weight loss. There are no hypoglycemic complications. Symptoms are stable. Diabetic complications include impotence and peripheral neuropathy. Pertinent negatives for diabetic complications include no autonomic neuropathy, CVA, heart disease, nephropathy, PVD or retinopathy. Risk factors for coronary artery disease include diabetes mellitus, dyslipidemia, male sex, hypertension and stress. When asked about current treatments, none (not taking his metformin) were reported. He is compliant with treatment some of the time. He is following a generally unhealthy diet. When asked about meal planning, he reported none. He has not had a previous visit with a dietitian. He rarely participates in exercise. Home blood sugar record trend: pt not moniotoring glucose. Has meter and supplies to do so  An ACE inhibitor/angiotensin II receptor blocker is being taken. He does not see a podiatrist.Eye exam is current (last week ).   he has been out of metformin for 3 months  Eye exam [facility]. last week. With Dr. Llamas.   Has lost about 10 lbs since January 2020 through eating healthier but he is not exercising    Hypertension  -chronic.  Uncontrolled.  He is not currently on his blood pressure medication.  Denies headaches, dizziness, visual disturbances, palpitations chest pain, dyspnea, TIA or CVA symptoms, leg pain/claudication symptoms, and edema.     We will continue meloxicam on a as needed basis.  Arthritis-    Has ankle pain and foot pain. Has some tingling in his feet.   Feels like tendons have been damaged.   Has tried 8 different pairs of shoes without relief. He is on feet all day long.   No injuries or traumas.   Has history of plantar fasciitis.   No swelling.  Toes are starting to be painful  Has chronically taken meloxicam for arthritis pains and reports this typically will work for him.  He has not had recent renal function tests.          Anxiety and depression -chronic.  Uncontrolled.  Currently on Celexa and BuSpar.  Does not feel as though these are working very well for him.  He denies any suicidal ideation.  He is stressed.  His Mother in hospital since Friday and she likely has multiple myeloma.   He feels anxious, worried about multiple different things with difficulty controlling the worry, has trouble relaxing, has some restlessness, irritability, and is afraid something awful may happen.    IRAIDA-7 score is a 10 in office.  -see flowsheet.    PHQ-2 Depression Screening  Little interest or pleasure in doing things? 0   Feeling down, depressed, or hopeless? 1   PHQ-2 Total Score 1       The following portions of the patient's history were reviewed and updated as appropriate: allergies, current medications, past family history, past medical history, past social history, past surgical history and problem list.    Review of Systems   Constitutional: Negative for activity change, appetite change, chills, diaphoresis, fatigue, fever, unexpected weight gain and unexpected weight loss.   HENT: Negative for voice change.    Eyes: Negative for blurred vision, double vision, pain and visual disturbance.   Respiratory:  Negative for cough, chest tightness, shortness of breath and wheezing.    Cardiovascular: Negative for chest pain, palpitations and leg swelling.   Gastrointestinal: Negative for abdominal distention, abdominal pain, constipation, diarrhea, nausea and vomiting.   Endocrine: Negative for cold intolerance, heat intolerance, polydipsia, polyphagia and polyuria.   Genitourinary: Positive for impotence. Negative for difficulty urinating, frequency and urgency.   Musculoskeletal: Positive for arthralgias and gait problem (foot pain  ). Negative for myalgias.   Skin: Negative for color change, dry skin and rash.   Neurological: Negative for dizziness, facial asymmetry, weakness, light-headedness, numbness, headache and confusion.   Hematological: Negative for adenopathy. Does not bruise/bleed easily.   Psychiatric/Behavioral: Positive for stress. Negative for decreased concentration, self-injury, sleep disturbance, suicidal ideas and depressed mood. The patient is nervous/anxious.            Allergies   Allergen Reactions   • Shellfish-Derived Products Anaphylaxis   • Codeine Nausea Only     Not sure of reaction but thinks it is just nausea     Past Medical History:   Diagnosis Date   • Allergic rhinitis    • Anxiety    • Dermatitis 8/2/2016   • Diabetes mellitus (CMS/[facility])    • Diverticulitis    • Gastroesophageal reflux disease 7/11/2016   • GERD (gastroesophageal reflux disease)    • History of alcohol abuse    • Hypertension    • Insomnia 7/11/2016   • Visual impairment 7/11/2016   • Vitamin D deficiency 7/11/2016     Past Surgical History:   Procedure Laterality Date   • APPENDECTOMY  1995   • HERNIA REPAIR  2010   • TONSILLECTOMY  1974     Family History   Problem Relation Age of Onset   • Thyroid disease Other    • Hypertension Mother    • Hypertension Father    • Alcohol abuse Father    • Hypertension Maternal Grandmother    • Hypertension Maternal Grandfather    • Diabetes Paternal Grandmother    • Hypertension  "Paternal Grandmother    • Hypertension Paternal Grandfather      Social History     Socioeconomic History   • Marital status: Single     Spouse name: Not on file   • Number of children: Not on file   • Years of education: Not on file   • Highest education level: Not on file   Tobacco Use   • Smoking status: Never Smoker   • Smokeless tobacco: Never Used   Substance and Sexual Activity   • Alcohol use: No     Comment: sober since 2015   • Drug use: No   • Sexual activity: Yes     Immunization History   Administered Date(s) Administered   • COVID-19 (PFIZER) 03/07/2021   • Flu Vaccine Quad PF >36MO 12/12/2017   • Hepatitis A 11/02/2018   • Pneumococcal Polysaccharide (PPSV23) 08/23/2017   • Tdap 12/12/2017       Current Outpatient Medications:   •  busPIRone (BUSPAR) 10 MG tablet, 1 tab  Daily if needed for anxiety, Disp: 60 tablet, Rfl: 3  •  glucose blood test strip, Use as instructed, Disp: 50 each, Rfl: 12  •  Lancets (ACCU-CHEK SAFE-T PRO) lancets, For use with glucometer with FS once daily, Disp: 50 each, Rfl: 5  •  lansoprazole (PREVACID) 30 MG capsule, TAKE ONE CAPSULE BY MOUTH ONCE DAILY, Disp: 30 capsule, Rfl: 0  •  lisinopril (PRINIVIL,ZESTRIL) 10 MG tablet, Take 1 tablet by mouth Daily., Disp: 30 tablet, Rfl: 3  •  metFORMIN ER (GLUCOPHAGE-XR) 750 MG 24 hr tablet, Take 2 tablets by mouth Daily With Breakfast., Disp: 60 tablet, Rfl: 0  •  DULoxetine (Cymbalta) 30 MG capsule, Take 1 capsule by mouth Daily., Disp: 30 capsule, Rfl: 1  •  meloxicam (MOBIC) 15 MG tablet, Take 1 tablet by mouth Daily As Needed for Moderate Pain . Must have appt for further refills., Disp: 30 tablet, Rfl: 0    Objective   Blood pressure 148/98, pulse 100, resp. rate 18, height 160.8 cm (63.3\"), weight 97.1 kg (214 lb), SpO2 98 %.  Physical Exam  Vitals and nursing note reviewed.   Constitutional:       General: He is not in acute distress.     Appearance: Normal appearance. He is well-developed. He is not diaphoretic.   HENT:      " Head: Normocephalic and atraumatic.   Eyes:      Conjunctiva/sclera: Conjunctivae normal.      Pupils: Pupils are equal, round, and reactive to light.   Neck:      Vascular: No JVD.   Cardiovascular:      Rate and Rhythm: Normal rate and regular rhythm.      Pulses:           Dorsalis pedis pulses are 1+ on the right side and 1+ on the left side.        Posterior tibial pulses are 1+ on the right side and 1+ on the left side.      Heart sounds: Normal heart sounds. No murmur heard.     Pulmonary:      Effort: Pulmonary effort is normal. No respiratory distress.      Breath sounds: Normal breath sounds.   Chest:      Chest wall: No tenderness.   Abdominal:      General: Bowel sounds are normal. There is no distension.      Palpations: Abdomen is soft.      Tenderness: There is no abdominal tenderness.   Musculoskeletal:      Cervical back: Normal range of motion.      Right foot: Normal range of motion. Prominent metatarsal heads present. No deformity.      Left foot: Normal range of motion. Prominent metatarsal heads present. No deformity.   Feet:      Right foot:      Protective Sensation: 8 sites tested. 8 sites sensed.      Skin integrity: Callus and dry skin present. No ulcer, blister, skin breakdown, erythema, warmth or fissure.      Left foot:      Protective Sensation: 8 sites tested. 8 sites sensed.      Skin integrity: Callus and dry skin present. No ulcer, blister, skin breakdown, erythema, warmth or fissure.      Comments: Diabetic Foot Exam Performed and Monofilament Test Performed    Skin:     General: Skin is warm and dry.      Coloration: Skin is not pale.      Findings: No erythema or rash.   Neurological:      Mental Status: He is alert and oriented to person, place, and time.      Coordination: Coordination normal.   Psychiatric:         Speech: Speech normal.         Behavior: Behavior normal.         Thought Content: Thought content normal.         Judgment: Judgment normal.          Assessment/Plan   Diagnoses and all orders for this visit:    1. Essential hypertension  Assessment & Plan:  Hypertension is unchanged.  We will start him back on lisinopril.  Have him monitor blood pressure.  Goal blood pressure less than 130/80.  Blood pressure will be reassessed in 4 weeks.    Orders:  -     lisinopril (PRINIVIL,ZESTRIL) 10 MG tablet; Take 1 tablet by mouth Daily.  Dispense: 30 tablet; Refill: 3  -     CBC (No Diff); Future  -     Comprehensive Metabolic Panel; Future  -     Lipid Panel; Future  -     TSH Rfx On Abnormal To Free T4; Future  -     MicroAlbumin, Urine, Random - Urine, Clean Catch; Future    2. Controlled type 2 diabetes mellitus without complication, without long-term current use of insulin (CMS/[facility])  Assessment & Plan:  Diabetes is worsening.   Reminded to bring in blood sugar diary at next visit.  Dietary recommendations for ADA diet.  Regular aerobic exercise.  Discussed ways to avoid symptomatic hypoglycemia.  Discussed foot care.  Reminded to get yearly retinal exam.  We will get him started back on his Metformin.  We will likely add a second agent once labs have been able to be reviewed.   Will also get her referred to podiatry today.  Diabetes will be reassessed At next appointment..    Orders:  -     metFORMIN ER (GLUCOPHAGE-XR) 750 MG 24 hr tablet; Take 2 tablets by mouth Daily With Breakfast.  Dispense: 60 tablet; Refill: 0  -     POC Glycosylated Hemoglobin (Hb A1C)  -     Ambulatory Referral to Podiatry  -     CBC (No Diff); Future  -     Comprehensive Metabolic Panel; Future  -     Lipid Panel; Future  -     TSH Rfx On Abnormal To Free T4; Future  -     MicroAlbumin, Urine, Random - Urine, Clean Catch; Future    3. Anxiety  Assessment & Plan:  Patient still very anxious/supported by IRAIDA-7 score in office.  We will continue BuSpar.  We will also change her Celexa over to Cymbalta.  Adverse effects and expectations discussed.    Orders:  -     busPIRone (BUSPAR)  10 MG tablet; 1 tab  Daily if needed for anxiety  Dispense: 60 tablet; Refill: 3  -     CBC (No Diff); Future  -     Comprehensive Metabolic Panel; Future  -     Lipid Panel; Future  -     TSH Rfx On Abnormal To Free T4; Future  -     MicroAlbumin, Urine, Random - Urine, Clean Catch; Future    4. Arthritis  Assessment & Plan:  Continue meloxicam on a as needed basis.  Use sparingly.  Adverse effects discussed.  We will recheck renal function today.    Orders:  -     meloxicam (MOBIC) 15 MG tablet; Take 1 tablet by mouth Daily As Needed for Moderate Pain . Must have appt for further refills.  Dispense: 30 tablet; Refill: 0  -     CBC (No Diff); Future  -     Comprehensive Metabolic Panel; Future  -     Lipid Panel; Future  -     TSH Rfx On Abnormal To Free T4; Future  -     MicroAlbumin, Urine, Random - Urine, Clean Catch; Future    5. Recurrent major depressive disorder, in partial remission (CMS/HCC)  Assessment & Plan:  Patient's depression is recurrent and is mild without psychosis. Their depression is currently in partial remission and the condition is unchanged. This will be reassessed in 4 weeks. F/U as described:patient was prescribed an antidepressant medicine.  We will stop the Celexa and have him replace that with Cymbalta.  Adverse effects discussed.    Orders:  -     DULoxetine (Cymbalta) 30 MG capsule; Take 1 capsule by mouth Daily.  Dispense: 30 capsule; Refill: 1  -     CBC (No Diff); Future  -     Comprehensive Metabolic Panel; Future  -     Lipid Panel; Future  -     TSH Rfx On Abnormal To Free T4; Future  -     MicroAlbumin, Urine, Random - Urine, Clean Catch; Future    Other orders  -     Cancel: citalopram (CeleXA) 20 MG tablet; Take 1 tablet by mouth Daily.  Dispense: 30 tablet; Refill: 5             Return in about 4 weeks (around 4/20/2021) for Recheck, collect labs today.  Plan of care discussed with pt. They verbalized understanding and agreement.       * Please note that portions of this  note were completed with a voice recognition program. Efforts were made to edit the dictation but occasionally words are erroneously transcribed.

## 2021-03-23 NOTE — ASSESSMENT & PLAN NOTE
Diabetes is worsening.   Reminded to bring in blood sugar diary at next visit.  Dietary recommendations for ADA diet.  Regular aerobic exercise.  Discussed ways to avoid symptomatic hypoglycemia.  Discussed foot care.  Reminded to get yearly retinal exam.  We will get him started back on his Metformin.  We will likely add a second agent once labs have been able to be reviewed.   Will also get her referred to podiatry today.  Diabetes will be reassessed At next appointment..

## 2021-03-23 NOTE — ASSESSMENT & PLAN NOTE
Patient still very anxious/supported by IRAIDA-7 score in office.  We will continue BuSpar.  We will also change her Celexa over to Cymbalta.  Adverse effects and expectations discussed.

## 2021-03-23 NOTE — ASSESSMENT & PLAN NOTE
Continue meloxicam on a as needed basis.  Use sparingly.  Adverse effects discussed.  We will recheck renal function today.

## 2021-03-23 NOTE — ASSESSMENT & PLAN NOTE
Lipid abnormalities are Unknown but historically elevated.  We will check lipids and will likely start him on statin therapy due to his diabetes.  Await results first..  Nutritional counseling was provided.  Lipids will be reassessed At the next routine follow-up.

## 2021-03-24 LAB
ALBUMIN SERPL-MCNC: 4.7 G/DL (ref 3.5–5.2)
ALBUMIN UR-MCNC: 19.4 MG/DL
ALBUMIN/GLOB SERPL: 1.6 G/DL
ALP SERPL-CCNC: 80 U/L (ref 39–117)
ALT SERPL W P-5'-P-CCNC: 16 U/L (ref 1–41)
ANION GAP SERPL CALCULATED.3IONS-SCNC: 15.4 MMOL/L (ref 5–15)
AST SERPL-CCNC: 10 U/L (ref 1–40)
BILIRUB SERPL-MCNC: 0.4 MG/DL (ref 0–1.2)
BUN SERPL-MCNC: 27 MG/DL (ref 6–20)
BUN/CREAT SERPL: 28.1 (ref 7–25)
CALCIUM SPEC-SCNC: 9.7 MG/DL (ref 8.6–10.5)
CHLORIDE SERPL-SCNC: 101 MMOL/L (ref 98–107)
CHOLEST SERPL-MCNC: 216 MG/DL (ref 0–200)
CO2 SERPL-SCNC: 20.6 MMOL/L (ref 22–29)
CREAT SERPL-MCNC: 0.96 MG/DL (ref 0.76–1.27)
GFR SERPL CREATININE-BSD FRML MDRD: 81 ML/MIN/1.73
GLOBULIN UR ELPH-MCNC: 2.9 GM/DL
GLUCOSE SERPL-MCNC: 334 MG/DL (ref 65–99)
HDLC SERPL-MCNC: 35 MG/DL (ref 40–60)
LDLC SERPL CALC-MCNC: 122 MG/DL (ref 0–100)
LDLC/HDLC SERPL: 3.26 {RATIO}
POTASSIUM SERPL-SCNC: 4.4 MMOL/L (ref 3.5–5.2)
PROT SERPL-MCNC: 7.6 G/DL (ref 6–8.5)
SODIUM SERPL-SCNC: 137 MMOL/L (ref 136–145)
TRIGL SERPL-MCNC: 335 MG/DL (ref 0–150)
TSH SERPL DL<=0.05 MIU/L-ACNC: 1.19 UIU/ML (ref 0.27–4.2)
VLDLC SERPL-MCNC: 59 MG/DL (ref 5–40)

## 2021-03-30 DIAGNOSIS — E11.9 CONTROLLED TYPE 2 DIABETES MELLITUS WITHOUT COMPLICATION, WITHOUT LONG-TERM CURRENT USE OF INSULIN (HCC): Primary | ICD-10-CM

## 2021-03-30 DIAGNOSIS — E78.2 MIXED HYPERLIPIDEMIA: ICD-10-CM

## 2021-03-30 RX ORDER — ATORVASTATIN CALCIUM 10 MG/1
10 TABLET, FILM COATED ORAL DAILY
Qty: 30 TABLET | Refills: 3 | Status: SHIPPED | OUTPATIENT
Start: 2021-03-30 | End: 2021-08-10 | Stop reason: SDUPTHER

## 2021-03-31 ENCOUNTER — TELEPHONE (OUTPATIENT)
Dept: INTERNAL MEDICINE | Facility: CLINIC | Age: 56
End: 2021-03-31

## 2021-04-21 ENCOUNTER — OFFICE VISIT (OUTPATIENT)
Dept: INTERNAL MEDICINE | Facility: CLINIC | Age: 56
End: 2021-04-21

## 2021-04-21 VITALS
OXYGEN SATURATION: 98 % | RESPIRATION RATE: 18 BRPM | TEMPERATURE: 97.1 F | DIASTOLIC BLOOD PRESSURE: 90 MMHG | HEART RATE: 79 BPM | BODY MASS INDEX: 38.98 KG/M2 | SYSTOLIC BLOOD PRESSURE: 158 MMHG | HEIGHT: 63 IN | WEIGHT: 220 LBS

## 2021-04-21 DIAGNOSIS — F33.41 RECURRENT MAJOR DEPRESSIVE DISORDER, IN PARTIAL REMISSION (HCC): ICD-10-CM

## 2021-04-21 DIAGNOSIS — I10 ESSENTIAL HYPERTENSION: ICD-10-CM

## 2021-04-21 DIAGNOSIS — E11.9 CONTROLLED TYPE 2 DIABETES MELLITUS WITHOUT COMPLICATION, WITHOUT LONG-TERM CURRENT USE OF INSULIN (HCC): ICD-10-CM

## 2021-04-21 DIAGNOSIS — M19.90 ARTHRITIS: ICD-10-CM

## 2021-04-21 DIAGNOSIS — F41.9 ANXIETY: ICD-10-CM

## 2021-04-21 DIAGNOSIS — E78.2 MIXED HYPERLIPIDEMIA: ICD-10-CM

## 2021-04-21 DIAGNOSIS — E11.65 UNCONTROLLED TYPE 2 DIABETES MELLITUS WITH HYPERGLYCEMIA (HCC): Primary | ICD-10-CM

## 2021-04-21 LAB — GLUCOSE BLDC GLUCOMTR-MCNC: 442 MG/DL (ref 70–130)

## 2021-04-21 PROCEDURE — 82962 GLUCOSE BLOOD TEST: CPT | Performed by: NURSE PRACTITIONER

## 2021-04-21 PROCEDURE — 99214 OFFICE O/P EST MOD 30 MIN: CPT | Performed by: NURSE PRACTITIONER

## 2021-04-21 RX ORDER — LISINOPRIL 20 MG/1
20 TABLET ORAL DAILY
Qty: 30 TABLET | Refills: 3 | Status: SHIPPED | OUTPATIENT
Start: 2021-04-21 | End: 2021-08-10 | Stop reason: SDUPTHER

## 2021-04-21 RX ORDER — DULOXETIN HYDROCHLORIDE 30 MG/1
30 CAPSULE, DELAYED RELEASE ORAL DAILY
Qty: 30 CAPSULE | Refills: 1 | Status: SHIPPED | OUTPATIENT
Start: 2021-04-21 | End: 2021-06-02

## 2021-04-21 NOTE — PATIENT INSTRUCTIONS
Carbohydrate Counting for Diabetes Mellitus, Adult  Carbohydrate counting is a method of keeping track of how many carbohydrates you eat. Eating carbohydrates naturally increases the amount of sugar (glucose) in the blood. Counting how many carbohydrates you eat improves your blood glucose control, which helps you manage your diabetes.  It is important to know how many carbohydrates you can safely have in each meal. This is different for every person. A dietitian can help you make a meal plan and calculate how many carbohydrates you should have at each meal and snack.  What foods contain carbohydrates?  Carbohydrates are found in the following foods:  · Grains, such as breads and cereals.  · Dried beans and soy products.  · Starchy vegetables, such as potatoes, peas, and corn.  · Fruit and fruit juices.  · Milk and yogurt.  · Sweets and snack foods, such as cake, cookies, candy, chips, and soft drinks.  How do I count carbohydrates in foods?  There are two ways to count carbohydrates in food. You can read food labels or learn standard serving sizes of foods. You can use either of the methods or a combination of both.  Using the Nutrition Facts label  The Nutrition Facts list is included on the labels of almost all packaged foods and beverages in the [place] It includes:  · The serving size.  · Information about nutrients in each serving, including the grams (g) of carbohydrate per serving.  To use the Nutrition Facts:  · Decide how many servings you will have.  · Multiply the number of servings by the number of carbohydrates per serving.  · The resulting number is the total amount of carbohydrates that you will be having.  Learning the standard serving sizes of foods  When you eat carbohydrate foods that are not packaged or do not include Nutrition Facts on the label, you need to measure the servings in order to count the amount of carbohydrates.  · Measure the foods that you will eat with a food scale or measuring  cup, if needed.  · Decide how many standard-size servings you will eat.  · Multiply the number of servings by 15. For foods that contain carbohydrates, one serving equals 15 g of carbohydrates.  ? For example, if you eat 2 cups or 10 oz (300 g) of strawberries, you will have eaten 2 servings and 30 g of carbohydrates (2 servings x 15 g = 30 g).  · For foods that have more than one food mixed, such as soups and casseroles, you must count the carbohydrates in each food that is included.  The following list contains standard serving sizes of common carbohydrate-rich foods. Each of these servings has about 15 g of carbohydrates:  · 1 slice of bread.  · 1 six-inch (15 cm) tortilla.  · ? cup or 2 oz (53 g) cooked rice or pasta.  · ½ cup or 3 oz (85 g) cooked or canned, drained and rinsed beans or lentils.  · ½ cup or 3 oz (85 g) starchy vegetable, such as peas, corn, or squash.  · ½ cup or 4 oz (120 g) hot cereal.  · ½ cup or 3 oz (85 g) boiled or mashed potatoes, or ¼ or 3 oz (85 g) of a large baked potato.  · ½ cup or 4 fl oz (118 mL) fruit juice.  · 1 cup or 8 fl oz (237 mL) milk.  · 1 small or 4 oz (106 g) apple.  · ½ or 2 oz (63 g) of a medium banana.  · 1 cup or 5 oz (150 g) strawberries.  · 3 cups or 1 oz (24 g) popped popcorn.  What is an example of carbohydrate counting?  To calculate the number of carbohydrates in this sample meal, follow the steps shown below.  Sample meal  · 3 oz (85 g) chicken breast.  · ? cup or 4 oz (106 g) brown rice.  · ½ cup or 3 oz (85 g) corn.  · 1 cup or 8 fl oz (237 mL) milk.  · 1 cup or 5 oz (150 g) strawberries with sugar-free whipped topping.  Carbohydrate calculation  1. Identify the foods that contain carbohydrates:  ? Rice.  ? Corn.  ? Milk.  ? Strawberries.  2. Calculate how many servings you have of each food:  ? 2 servings rice.  ? 1 serving corn.  ? 1 serving milk.  ? 1 serving strawberries.  3. Multiply each number of servings by 15 g:  ? 2 servings rice x 15 g = 30  g.  ? 1 serving corn x 15 g = 15 g.  ? 1 serving milk x 15 g = 15 g.  ? 1 serving strawberries x 15 g = 15 g.  4. Add together all of the amounts to find the total grams of carbohydrates eaten:  ? 30 g + 15 g + 15 g + 15 g = 75 g of carbohydrates total.  What are tips for following this plan?  Shopping  · Develop a meal plan and then make a shopping list.  · Buy fresh and frozen vegetables, fresh and frozen fruit, dairy, eggs, beans, lentils, and whole grains.  · Look at food labels. Choose foods that have more fiber and less sugar.  · Avoid processed foods and foods with added sugars.  Meal planning  · Aim to have the same amount of carbohydrates at each meal and for each snack time.  · Plan to have regular, balanced meals and snacks.  Where to find more information  · American Diabetes Association: www.diabetes.org  · Centers for Disease Control and Prevention: www.cdc.gov  Summary  · Carbohydrate counting is a method of keeping track of how many carbohydrates you eat.  · Eating carbohydrates naturally increases the amount of sugar (glucose) in the blood.  · Counting how many carbohydrates you eat improves your blood glucose control, which helps you manage your diabetes.  · A dietitian can help you make a meal plan and calculate how many carbohydrates you should have at each meal and snack.  This information is not intended to replace advice given to you by your health care provider. Make sure you discuss any questions you have with your health care provider.  Document Revised: 12/18/2020 Document Reviewed: 12/18/2020  [employer] Patient Education © 2021 [employer]

## 2021-04-21 NOTE — PROGRESS NOTES
Chief Complaint  Hypertension, Anxiety, Depression, Diabetes (has not been checking glucose very often.  ), and Foot Pain (foot is much better since going back on med)    Subjective          Malcolm Costa presents to [facility] for   History of Present Illness    HTN-uncontrolled.  Not checking BP at home.   Denies headaches, dizziness, visual disturbances, palpitations chest pain, dyspnea, TIA or CVA symptoms, leg pain/claudication symptoms, and edema.     T2DM- dx about 5 yr ago   Was off meds until establishing with me last month  Not checking glucose at home still  Glucose is 442 this am.   Had a smoothie with Ordonez, blueberries, food powder this a.m.  He just statrted the metformin 2 weeks ago. Has tolerated well.   a1c was 9.9% 3/2021    Anxiety and depression-chronic.  At his last visit we switched his medications.  Better since switching to the cymbalta from the Celexa.  Denies SI/HI    Foot pain- better since back on mobic PRN       Allergies   Allergen Reactions   • Shellfish-Derived Products Anaphylaxis   • Codeine Nausea Only     Not sure of reaction but thinks it is just nausea     Current Outpatient Medications on File Prior to Visit   Medication Sig Dispense Refill   • atorvastatin (LIPITOR) 10 MG tablet Take 1 tablet by mouth Daily. 30 tablet 3   • glucose blood test strip Use as instructed 50 each 12   • Lancets (ACCU-CHEK SAFE-T PRO) lancets For use with glucometer with FS once daily 50 each 5   • lansoprazole (PREVACID) 30 MG capsule TAKE ONE CAPSULE BY MOUTH ONCE DAILY 30 capsule 0     No current facility-administered medications on file prior to visit.         The following portions of the patient's history were reviewed and updated as appropriate: allergies, current medications, past family history, past medical history, past social history, past surgical history and problem list and are available for review within electronic record    Objective     Vital Signs:  "  /90   Pulse 79   Temp 97.1 °F (36.2 °C)   Resp 18   Ht 160.8 cm (63.3\")   Wt 99.8 kg (220 lb)   SpO2 98%   BMI 38.60 kg/m²         Physical Exam  Vitals and nursing note reviewed.   Constitutional:       General: He is not in acute distress.     Appearance: Normal appearance. He is well-developed. He is not diaphoretic.   HENT:      Head: Normocephalic and atraumatic.   Eyes:      Conjunctiva/sclera: Conjunctivae normal.      Pupils: Pupils are equal, round, and reactive to light.   Neck:      Vascular: No JVD.   Cardiovascular:      Rate and Rhythm: Normal rate and regular rhythm.      Pulses:           Dorsalis pedis pulses are 1+ on the right side and 1+ on the left side.        Posterior tibial pulses are 1+ on the right side and 1+ on the left side.      Heart sounds: Normal heart sounds. No murmur heard.     Pulmonary:      Effort: Pulmonary effort is normal. No respiratory distress.      Breath sounds: Normal breath sounds.   Chest:      Chest wall: No tenderness.   Abdominal:      General: Bowel sounds are normal. There is no distension.      Palpations: Abdomen is soft.      Tenderness: There is no abdominal tenderness.   Musculoskeletal:      Cervical back: Normal range of motion.   Skin:     General: Skin is warm and dry.      Coloration: Skin is not pale.      Findings: No erythema or rash.   Neurological:      Mental Status: He is alert and oriented to person, place, and time.      Coordination: Coordination normal.   Psychiatric:         Speech: Speech normal.         Behavior: Behavior normal.         Thought Content: Thought content normal.         Judgment: Judgment normal.          Result Review :                         Assessment and Plan      Diagnoses and all orders for this visit:    1. Uncontrolled type 2 diabetes mellitus with hyperglycemia (CMS/HCC) (Primary)  Assessment & Plan:  Diabetes is unchanged.   Reminded to bring in blood sugar diary at next visit.  Dietary " recommendations for ADA diet.  Regular aerobic exercise.  Discussed ways to avoid symptomatic hypoglycemia.  Januvia added.  Continue Metformin.  Diabetes will be reassessed Next regular follow-up.    Orders:  -     POCT Glucose  -     lisinopril (PRINIVIL,ZESTRIL) 20 MG tablet; Take 1 tablet by mouth Daily.  Dispense: 30 tablet; Refill: 3  -     SITagliptin (Januvia) 100 MG tablet; Take 1 tablet by mouth Daily.  Dispense: 30 tablet; Refill: 3    2. Mixed hyperlipidemia    3. Essential hypertension  Assessment & Plan:  Hypertension is unchanged.  Dietary sodium restriction.  Weight loss.  Regular aerobic exercise.  Medication changes per orders.  Blood pressure will be reassessed at the next regular appointment.    Orders:  -     lisinopril (PRINIVIL,ZESTRIL) 20 MG tablet; Take 1 tablet by mouth Daily.  Dispense: 30 tablet; Refill: 3    4. Anxiety  Assessment & Plan:  Symptoms significantly improved since switching medications.  We will plan to continue Cymbalta and BuSpar.  Will reassess at next regular follow-up.    Orders:  -     DULoxetine (Cymbalta) 30 MG capsule; Take 1 capsule by mouth Daily.  Dispense: 30 capsule; Refill: 1    5. Recurrent major depressive disorder, in partial remission (CMS/HCC)  Assessment & Plan:  Patient's depression is recurrent and is mild without psychosis. Their depression is currently in partial remission and the condition is improving with treatment. This will be reassessed at the next regular appointment. F/U as described:patient will continue current medication therapy.    Orders:  -     DULoxetine (Cymbalta) 30 MG capsule; Take 1 capsule by mouth Daily.  Dispense: 30 capsule; Refill: 1    Diabetes education info in AVS and diabetes handbook to pt today.         Follow Up     Patient was given instructions and counseling regarding his condition or for health maintenance advice. Please see specific information pulled into the AVS if appropriate.   Any new medication's adverse  effects have been discussed in detail with patient  Patient was encouraged to keep me informed of any acute changes, lack of improvement, or any new concerning symptoms.    Return in about 2 months (around 6/21/2021).    * Please note that portions of this note were completed with a voice recognition program. Efforts were made to edit the dictation but occasionally words are erroneously transcribed.

## 2021-04-22 NOTE — TELEPHONE ENCOUNTER
Caller: Malcolm Costa    Relationship: Self    Best call back number: [phone]     Medication needed:   Requested Prescriptions     Pending Prescriptions Disp Refills   • meloxicam (MOBIC) 15 MG tablet [Pharmacy Med Name: Meloxicam 15 MG Oral Tablet] 30 tablet 0     Sig: TAKE 1 TABLET BY MOUTH ONCE DAILY. MUST MAKE APPOINTMENT FOR FUTURE REFILLS   • metFORMIN ER (GLUCOPHAGE-XR) 750 MG 24 hr tablet [Pharmacy Med Name: metFORMIN HCl  MG Oral Tablet Extended Release 24 Hour] 60 tablet 0     Sig: TAKE 2 TABLETS BY MOUTH ONCE DAILY WITH BREAKFAST   • busPIRone (BUSPAR) 10 MG tablet 60 tablet 3     Si tab  Daily if needed for anxiety       When do you need the refill by: TODAY    What additional details did the patient provide when requesting the medication: OUT OF MELOXICAM. PHARMACY HAD NOT RECEIVED PRESCRIPTIONS AFTER APPOINTMENT YESTERDAY    ALSO STATED THAT THE MAYELAUVIA WAS GOING TO COST $450 SO WILL NOT BE GETTING THAT AND WOULD LIKE TO KNOW IF THERE IS A LESS EXPENSIVE OPTION THAT HIS INSURANCE WOULD COVER?        Does the patient have less than a 3 day supply:  [x] Yes  [] No    What is the patient's preferred pharmacy: [address] [phone] [facility] [phone] FX

## 2021-04-23 RX ORDER — BUSPIRONE HYDROCHLORIDE 10 MG/1
TABLET ORAL
Qty: 60 TABLET | Refills: 3 | Status: SHIPPED | OUTPATIENT
Start: 2021-04-23 | End: 2021-08-10 | Stop reason: SDUPTHER

## 2021-04-23 RX ORDER — METFORMIN HYDROCHLORIDE 750 MG/1
TABLET, EXTENDED RELEASE ORAL
Qty: 60 TABLET | Refills: 3 | Status: SHIPPED | OUTPATIENT
Start: 2021-04-23 | End: 2021-08-10 | Stop reason: SDUPTHER

## 2021-04-23 RX ORDER — MELOXICAM 15 MG/1
15 TABLET ORAL DAILY PRN
Qty: 30 TABLET | Refills: 3 | Status: SHIPPED | OUTPATIENT
Start: 2021-04-23 | End: 2021-09-07

## 2021-04-23 NOTE — TELEPHONE ENCOUNTER
PN that meds have been sent in.  He asked about the Januvia and the cost.  ZI let him know I will work on a PA.  Until then he may want to check [facility] and check their website to see if there are any discount cards for this. He stated that he would look into this

## 2021-04-30 ENCOUNTER — TELEPHONE (OUTPATIENT)
Dept: INTERNAL MEDICINE | Facility: CLINIC | Age: 56
End: 2021-04-30

## 2021-04-30 RX ORDER — ALOGLIPTIN 25 MG/1
25 TABLET, FILM COATED ORAL DAILY
Qty: 30 TABLET | Refills: 3 | Status: SHIPPED | OUTPATIENT
Start: 2021-04-30 | End: 2021-08-10

## 2021-04-30 NOTE — ASSESSMENT & PLAN NOTE
Diabetes is unchanged.   Reminded to bring in blood sugar diary at next visit.  Dietary recommendations for ADA diet.  Regular aerobic exercise.  Discussed ways to avoid symptomatic hypoglycemia.  Januvia added.  Continue Metformin.  Diabetes will be reassessed Next regular follow-up.

## 2021-04-30 NOTE — TELEPHONE ENCOUNTER
Caller: Malcolm Costa    Relationship: Self    Best call back number:[phone]     What was the call regarding: PATIENT CALLED TO CHECK ON THE STATUS OF A NEW DIABETIC MEDICATION THAT WAS AFFORDABLE.    Do you require a callback: YES

## 2021-04-30 NOTE — ASSESSMENT & PLAN NOTE
Symptoms significantly improved since switching medications.  We will plan to continue Cymbalta and BuSpar.  Will reassess at next regular follow-up.

## 2021-04-30 NOTE — ASSESSMENT & PLAN NOTE
Hypertension is unchanged.  Dietary sodium restriction.  Weight loss.  Regular aerobic exercise.  Medication changes per orders.  Blood pressure will be reassessed at the next regular appointment.

## 2021-05-11 ENCOUNTER — APPOINTMENT (OUTPATIENT)
Dept: GENERAL RADIOLOGY | Facility: HOSPITAL | Age: 56
End: 2021-05-11

## 2021-05-11 ENCOUNTER — HOSPITAL ENCOUNTER (EMERGENCY)
Facility: HOSPITAL | Age: 56
Discharge: HOME | End: 2021-05-11
Attending: EMERGENCY MEDICINE | Admitting: EMERGENCY MEDICINE

## 2021-05-11 VITALS
OXYGEN SATURATION: 95 % | WEIGHT: 205 LBS | SYSTOLIC BLOOD PRESSURE: 145 MMHG | DIASTOLIC BLOOD PRESSURE: 87 MMHG | BODY MASS INDEX: 32.95 KG/M2 | RESPIRATION RATE: 18 BRPM | HEIGHT: 66 IN | HEART RATE: 100 BPM | TEMPERATURE: 99.1 F

## 2021-05-11 DIAGNOSIS — R73.9 HYPERGLYCEMIA: Primary | ICD-10-CM

## 2021-05-11 DIAGNOSIS — S16.1XXA STRAIN OF NECK MUSCLE, INITIAL ENCOUNTER: ICD-10-CM

## 2021-05-11 LAB
ALBUMIN SERPL-MCNC: 4 G/DL (ref 3.5–5.2)
ALBUMIN/GLOB SERPL: 1.5 G/DL
ALP SERPL-CCNC: 78 U/L (ref 39–117)
ALT SERPL W P-5'-P-CCNC: 16 U/L (ref 1–41)
ANION GAP SERPL CALCULATED.3IONS-SCNC: 12 MMOL/L (ref 5–15)
AST SERPL-CCNC: 16 U/L (ref 1–40)
B-OH-BUTYR SERPL-SCNC: 0.63 MMOL/L (ref 0.02–0.27)
BACTERIA UR QL AUTO: ABNORMAL /HPF
BASOPHILS # BLD AUTO: 0.02 10*3/MM3 (ref 0–0.2)
BASOPHILS NFR BLD AUTO: 0.4 % (ref 0–1.5)
BILIRUB SERPL-MCNC: 0.3 MG/DL (ref 0–1.2)
BILIRUB UR QL STRIP: NEGATIVE
BUN SERPL-MCNC: 17 MG/DL (ref 6–20)
BUN/CREAT SERPL: 19.3 (ref 7–25)
CALCIUM SPEC-SCNC: 8.8 MG/DL (ref 8.6–10.5)
CHLORIDE SERPL-SCNC: 97 MMOL/L (ref 98–107)
CLARITY UR: CLEAR
CO2 SERPL-SCNC: 21 MMOL/L (ref 22–29)
COLOR UR: YELLOW
CREAT SERPL-MCNC: 0.88 MG/DL (ref 0.76–1.27)
DEPRECATED RDW RBC AUTO: 42.9 FL (ref 37–54)
EOSINOPHIL # BLD AUTO: 0.03 10*3/MM3 (ref 0–0.4)
EOSINOPHIL NFR BLD AUTO: 0.5 % (ref 0.3–6.2)
ERYTHROCYTE [DISTWIDTH] IN BLOOD BY AUTOMATED COUNT: 13.2 % (ref 12.3–15.4)
FLUAV RNA RESP QL NAA+PROBE: NOT DETECTED
FLUBV RNA RESP QL NAA+PROBE: NOT DETECTED
GFR SERPL CREATININE-BSD FRML MDRD: 90 ML/MIN/1.73
GLOBULIN UR ELPH-MCNC: 2.6 GM/DL
GLUCOSE BLDC GLUCOMTR-MCNC: 240 MG/DL (ref 70–130)
GLUCOSE SERPL-MCNC: 230 MG/DL (ref 65–99)
GLUCOSE UR STRIP-MCNC: ABNORMAL MG/DL
HCT VFR BLD AUTO: 40.1 % (ref 37.5–51)
HGB BLD-MCNC: 13.7 G/DL (ref 13–17.7)
HGB UR QL STRIP.AUTO: NEGATIVE
HOLD SPECIMEN: NORMAL
HYALINE CASTS UR QL AUTO: ABNORMAL /LPF
IMM GRANULOCYTES # BLD AUTO: 0.04 10*3/MM3 (ref 0–0.05)
IMM GRANULOCYTES NFR BLD AUTO: 0.7 % (ref 0–0.5)
KETONES UR QL STRIP: ABNORMAL
LEUKOCYTE ESTERASE UR QL STRIP.AUTO: NEGATIVE
LYMPHOCYTES # BLD AUTO: 0.86 10*3/MM3 (ref 0.7–3.1)
LYMPHOCYTES NFR BLD AUTO: 15.4 % (ref 19.6–45.3)
MCH RBC QN AUTO: 30.3 PG (ref 26.6–33)
MCHC RBC AUTO-ENTMCNC: 34.2 G/DL (ref 31.5–35.7)
MCV RBC AUTO: 88.7 FL (ref 79–97)
MONOCYTES # BLD AUTO: 0.57 10*3/MM3 (ref 0.1–0.9)
MONOCYTES NFR BLD AUTO: 10.2 % (ref 5–12)
NEUTROPHILS NFR BLD AUTO: 4.08 10*3/MM3 (ref 1.7–7)
NEUTROPHILS NFR BLD AUTO: 72.8 % (ref 42.7–76)
NITRITE UR QL STRIP: NEGATIVE
NRBC BLD AUTO-RTO: 0 /100 WBC (ref 0–0.2)
PH UR STRIP.AUTO: <=5 [PH] (ref 5–8)
PLATELET # BLD AUTO: 142 10*3/MM3 (ref 140–450)
PMV BLD AUTO: 10.2 FL (ref 6–12)
POTASSIUM SERPL-SCNC: 4.1 MMOL/L (ref 3.5–5.2)
PROT SERPL-MCNC: 6.6 G/DL (ref 6–8.5)
PROT UR QL STRIP: ABNORMAL
RBC # BLD AUTO: 4.52 10*6/MM3 (ref 4.14–5.8)
RBC # UR: ABNORMAL /HPF
REF LAB TEST METHOD: ABNORMAL
SARS-COV-2 RNA RESP QL NAA+PROBE: NOT DETECTED
SODIUM SERPL-SCNC: 130 MMOL/L (ref 136–145)
SP GR UR STRIP: 1.02 (ref 1–1.03)
SQUAMOUS #/AREA URNS HPF: ABNORMAL /HPF
UROBILINOGEN UR QL STRIP: ABNORMAL
WBC # BLD AUTO: 5.6 10*3/MM3 (ref 3.4–10.8)
WBC UR QL AUTO: ABNORMAL /HPF
WHOLE BLOOD HOLD SPECIMEN: NORMAL
WHOLE BLOOD HOLD SPECIMEN: NORMAL

## 2021-05-11 PROCEDURE — 85025 COMPLETE CBC W/AUTO DIFF WBC: CPT

## 2021-05-11 PROCEDURE — 87636 SARSCOV2 & INF A&B AMP PRB: CPT | Performed by: NURSE PRACTITIONER

## 2021-05-11 PROCEDURE — 96374 THER/PROPH/DIAG INJ IV PUSH: CPT

## 2021-05-11 PROCEDURE — 25010000002 KETOROLAC TROMETHAMINE PER 15 MG: Performed by: NURSE PRACTITIONER

## 2021-05-11 PROCEDURE — 82962 GLUCOSE BLOOD TEST: CPT

## 2021-05-11 PROCEDURE — 99283 EMERGENCY DEPT VISIT LOW MDM: CPT

## 2021-05-11 PROCEDURE — 80053 COMPREHEN METABOLIC PANEL: CPT

## 2021-05-11 PROCEDURE — 71045 X-RAY EXAM CHEST 1 VIEW: CPT

## 2021-05-11 PROCEDURE — 81001 URINALYSIS AUTO W/SCOPE: CPT | Performed by: EMERGENCY MEDICINE

## 2021-05-11 PROCEDURE — 82010 KETONE BODYS QUAN: CPT

## 2021-05-11 RX ORDER — IBUPROFEN 800 MG/1
800 TABLET ORAL
Qty: 15 TABLET | Refills: 0 | Status: SHIPPED | OUTPATIENT
Start: 2021-05-11 | End: 2021-08-10

## 2021-05-11 RX ORDER — SODIUM CHLORIDE 0.9 % (FLUSH) 0.9 %
10 SYRINGE (ML) INJECTION AS NEEDED
Status: DISCONTINUED | OUTPATIENT
Start: 2021-05-11 | End: 2021-05-11 | Stop reason: HOSPADM

## 2021-05-11 RX ORDER — CYCLOBENZAPRINE HCL 10 MG
10 TABLET ORAL 3 TIMES DAILY PRN
Qty: 15 TABLET | Refills: 0 | Status: SHIPPED | OUTPATIENT
Start: 2021-05-11 | End: 2021-05-18

## 2021-05-11 RX ORDER — KETOROLAC TROMETHAMINE 15 MG/ML
15 INJECTION, SOLUTION INTRAMUSCULAR; INTRAVENOUS ONCE
Status: COMPLETED | OUTPATIENT
Start: 2021-05-11 | End: 2021-05-11

## 2021-05-11 RX ADMIN — KETOROLAC TROMETHAMINE 15 MG: 15 INJECTION, SOLUTION INTRAMUSCULAR; INTRAVENOUS at 02:50

## 2021-05-11 RX ADMIN — SODIUM CHLORIDE 1000 ML: 9 INJECTION, SOLUTION INTRAVENOUS at 02:50

## 2021-05-17 ENCOUNTER — LAB (OUTPATIENT)
Dept: LAB | Facility: HOSPITAL | Age: 56
End: 2021-05-17

## 2021-05-17 ENCOUNTER — TELEMEDICINE (OUTPATIENT)
Dept: INTERNAL MEDICINE | Facility: CLINIC | Age: 56
End: 2021-05-17

## 2021-05-17 DIAGNOSIS — R10.9 FLANK PAIN: ICD-10-CM

## 2021-05-17 DIAGNOSIS — R68.83 CHILLS: ICD-10-CM

## 2021-05-17 DIAGNOSIS — R53.81 MALAISE: ICD-10-CM

## 2021-05-17 DIAGNOSIS — R11.0 NAUSEA: ICD-10-CM

## 2021-05-17 DIAGNOSIS — R53.81 MALAISE: Primary | ICD-10-CM

## 2021-05-17 LAB
BILIRUB BLD-MCNC: NEGATIVE MG/DL
CLARITY, POC: CLEAR
COLOR UR: ABNORMAL
EXPIRATION DATE: NORMAL
FLUAV AG NPH QL: NEGATIVE
FLUBV AG NPH QL: NEGATIVE
GLUCOSE UR STRIP-MCNC: NEGATIVE MG/DL
INTERNAL CONTROL: NORMAL
KETONES UR QL: ABNORMAL
LEUKOCYTE EST, POC: NEGATIVE
Lab: 2780
NITRITE UR-MCNC: NEGATIVE MG/ML
PH UR: 6 [PH] (ref 5–8)
PROT UR STRIP-MCNC: ABNORMAL MG/DL
RBC # UR STRIP: NEGATIVE /UL
SP GR UR: 1.03 (ref 1–1.03)
UROBILINOGEN UR QL: NORMAL

## 2021-05-17 PROCEDURE — 87804 INFLUENZA ASSAY W/OPTIC: CPT | Performed by: NURSE PRACTITIONER

## 2021-05-17 PROCEDURE — U0004 COV-19 TEST NON-CDC HGH THRU: HCPCS | Performed by: NURSE PRACTITIONER

## 2021-05-17 PROCEDURE — 81003 URINALYSIS AUTO W/O SCOPE: CPT | Performed by: NURSE PRACTITIONER

## 2021-05-17 PROCEDURE — 99213 OFFICE O/P EST LOW 20 MIN: CPT | Performed by: NURSE PRACTITIONER

## 2021-05-17 RX ORDER — ONDANSETRON 4 MG/1
4 TABLET, ORALLY DISINTEGRATING ORAL EVERY 8 HOURS PRN
Qty: 30 TABLET | Refills: 0 | Status: SHIPPED | OUTPATIENT
Start: 2021-05-17 | End: 2021-08-10

## 2021-05-17 NOTE — PROGRESS NOTES
You have chosen to receive care through a telehealth visit.  Do you consent to use a video/audio connection for your medical care today? Yes  This was an audio and video enabled telemedicine encounter. Difficulty with video so portions of visit completed via telephone    Subjective   Malcolm Costa is a 56 y.o. male.     Chief Complaint   Patient presents with   • Illness       History of Present Illness      went to ER last Sunday at [facility] for complaints of elevated glucose, flank pain, myalgias, neck pain, and headache.  He is also had chills but has not had fever.  He has no shortness of breath or chest pain.  He did have negative covid test. He is not sure if he has had any covid exposures.     Me that he feels horrible with body aches and lower right side/back pain   He is really nauseated and feels like he is going to pass out at times.   feels lethargic. Sleeping more. Feels better after sleeping.   fever of 99 went to ER but has not checked since then   Has some foul/strong oder to urine   no cough  Some mild diarrhea.       The following portions of the patient's history were reviewed and updated as appropriate: allergies, current medications, past family history, past medical history, past social history, past surgical history and problem list.        Review of Systems   Constitutional: Positive for chills, fatigue and fever. Negative for appetite change (decreased) and unexpected weight loss.   Eyes: Negative for blurred vision, double vision, pain and visual disturbance.   Respiratory: Negative for cough, chest tightness, shortness of breath and wheezing.    Cardiovascular: Negative for chest pain, palpitations and leg swelling.   Gastrointestinal: Positive for diarrhea and nausea. Negative for abdominal pain, constipation and vomiting.   Genitourinary: Positive for flank pain. Negative for difficulty urinating, dysuria, frequency and urgency.   Musculoskeletal: Positive for back pain and  myalgias. Negative for arthralgias.   Skin: Negative for color change and rash.   Neurological: Negative for dizziness, weakness, light-headedness, headache and confusion.   Hematological: Negative for adenopathy. Does not bruise/bleed easily.           No outpatient medications have been marked as taking for the 5/17/21 encounter (Telemedicine) with Sita Chase APRN.     Allergies   Allergen Reactions   • Shellfish-Derived Products Anaphylaxis   • Codeine Nausea Only     Not sure of reaction but thinks it is just nausea           Objective   Physical Exam   Constitutional: He is oriented to person, place, and time. No distress.   Cardiovascular: Normal pulse (patient directed exam).      Pulmonary/Chest:  No respiratory distress.  Neurological: He is alert and oriented to person, place, and time.   Psychiatric: He is attentive.         There were no vitals filed for this visit.  There is no height or weight on file to calculate BMI.        Assessment/Plan   Diagnoses and all orders for this visit:    1. Malaise (Primary)  -     COVID-19 PCR, [facility], NP SWAB IN [employer] VIRAL TRANSPORT MEDIA 24-30 HR TAT - Swab, Nasopharynx; Future  -     POCT Influenza A/B  -     POC Urinalysis Dipstick, Automated  -     azithromycin (Zithromax Z-Brenton) 250 MG tablet; Take 2 tablets the first day, then 1 tablet daily for 4 days.  Dispense: 6 tablet; Refill: 0    2. Nausea  -     ondansetron ODT (Zofran ODT) 4 MG disintegrating tablet; Place 1 tablet on the tongue Every 8 (Eight) Hours As Needed for Nausea or Vomiting.  Dispense: 30 tablet; Refill: 0  -     COVID-19 PCR, [facility], NP SWAB IN [employer] VIRAL TRANSPORT MEDIA 24-30 HR TAT - Swab, Nasopharynx; Future  -     POCT Influenza A/B  -     POC Urinalysis Dipstick, Automated  -     azithromycin (Zithromax Z-Brenton) 250 MG tablet; Take 2 tablets the first day, then 1 tablet daily for 4 days.  Dispense: 6 tablet; Refill: 0    3. Chills  -     COVID-19 PCR, [facility], NP SWAB IN  [employer] VIRAL TRANSPORT MEDIA 24-30 HR TAT - Swab, Nasopharynx; Future  -     POCT Influenza A/B  -     POC Urinalysis Dipstick, Automated  -     azithromycin (Zithromax Z-Brenton) 250 MG tablet; Take 2 tablets the first day, then 1 tablet daily for 4 days.  Dispense: 6 tablet; Refill: 0    4. Flank pain  -     POC Urinalysis Dipstick, Automated      Urine dip, Covid test, and flu swab were all normal.  We will treat with as needed Zofran.   I called the patient the day after the visit to further evaluate him.  He is starting to feel slightly better.  He is tolerating more p.o. intake.  He has not checked his temperature but does still have some occasional chills.  But He feels as though he needs antibiotics so we will go ahead and send in a azithromycin to do some broad-spectrum coverage for likely bacterial infection.  If he is not feeling better in the next couple days he will let me know and we can evaluate further.  If symptoms worsen at all he will let me know immediately or present to the ER.  Encourage rest, fluids, and he can take his as needed meloxicam for the back pain.  Advised to stop cyclobenzaprine as this can augment the effects of Cymbalta.           Return if symptoms worsen or fail to improve.    I have reviewed the limitations of a telehealth visit (such as lack of a physical exam and unable to obtain vital signs) and advised the patient that they may need to follow up for an office visit should their symptoms or concerns persist, worsen, or change.  Patient was encouraged to keep me informed of any acute changes, lack of improvement, or any new concerning symptoms.   I discussed my findings,recommendations, and plan of care was with the patient. They verbalized understanding and agreement.        * Please note that portions of this note were completed with a voice recognition program. Efforts were made to edit the dictation but occasionally words are erroneously transcribed.

## 2021-05-18 ENCOUNTER — TELEPHONE (OUTPATIENT)
Dept: INTERNAL MEDICINE | Facility: CLINIC | Age: 56
End: 2021-05-18

## 2021-05-18 LAB — SARS-COV-2 RNA NOSE QL NAA+PROBE: NOT DETECTED

## 2021-05-18 RX ORDER — AZITHROMYCIN 250 MG/1
TABLET, FILM COATED ORAL
Qty: 6 TABLET | Refills: 0 | Status: SHIPPED | OUTPATIENT
Start: 2021-05-18 | End: 2021-06-02

## 2021-05-18 NOTE — TELEPHONE ENCOUNTER
----- Message from JOANA Ferris sent at 5/18/2021  1:01 PM EDT -----  Please let him know that his urine did not show any acute infection and he was negative for flu.  See other result message.

## 2021-05-18 NOTE — TELEPHONE ENCOUNTER
----- Message from JOANA Ferirs sent at 5/18/2021  1:00 PM EDT -----  Please let him know that his Covid test was negative.  How is he feeling now?  What symptoms?

## 2021-06-02 ENCOUNTER — TELEMEDICINE (OUTPATIENT)
Dept: INTERNAL MEDICINE | Facility: CLINIC | Age: 56
End: 2021-06-02

## 2021-06-02 DIAGNOSIS — F33.41 RECURRENT MAJOR DEPRESSIVE DISORDER, IN PARTIAL REMISSION (HCC): Primary | ICD-10-CM

## 2021-06-02 DIAGNOSIS — I10 ESSENTIAL HYPERTENSION: ICD-10-CM

## 2021-06-02 DIAGNOSIS — E11.65 UNCONTROLLED TYPE 2 DIABETES MELLITUS WITH HYPERGLYCEMIA (HCC): ICD-10-CM

## 2021-06-02 DIAGNOSIS — R11.2 NAUSEA AND VOMITING, INTRACTABILITY OF VOMITING NOT SPECIFIED, UNSPECIFIED VOMITING TYPE: ICD-10-CM

## 2021-06-02 PROBLEM — K57.92 DIVERTICULITIS OF INTESTINE: Status: ACTIVE | Noted: 2021-06-02

## 2021-06-02 PROBLEM — R21 CUTANEOUS ERUPTION: Status: ACTIVE | Noted: 2021-06-02

## 2021-06-02 PROBLEM — IMO0001 BLUES: Status: ACTIVE | Noted: 2021-06-02

## 2021-06-02 PROCEDURE — 99214 OFFICE O/P EST MOD 30 MIN: CPT | Performed by: PHYSICIAN ASSISTANT

## 2021-06-02 RX ORDER — CITALOPRAM 20 MG/1
20 TABLET ORAL DAILY
Qty: 30 TABLET | Refills: 2 | Status: SHIPPED | OUTPATIENT
Start: 2021-06-02 | End: 2021-08-10 | Stop reason: SDUPTHER

## 2021-06-02 RX ORDER — GLIPIZIDE 5 MG/1
5 TABLET ORAL EVERY MORNING
Qty: 30 TABLET | Refills: 0 | Status: SHIPPED | OUTPATIENT
Start: 2021-06-02 | End: 2021-08-10 | Stop reason: SDUPTHER

## 2021-06-02 NOTE — ASSESSMENT & PLAN NOTE
Hypertension is improving with treatment.  Continue current treatment regimen.  Blood pressure will be reassessed at the next regular appointment. Will cont lisinopril 20mg at this time, should get BP cuff at home and monitor.

## 2021-06-02 NOTE — ASSESSMENT & PLAN NOTE
Diabetes is unchanged.   Regular aerobic exercise.  Discussed ways to avoid symptomatic hypoglycemia.  Medication changes per orders.  Diabetes will be reassessed at f/u on 6.23.21.  We will DC alogliptin due to cost and possible side effects.  Will start glipizide as it should be very affordable.

## 2021-06-02 NOTE — ASSESSMENT & PLAN NOTE
Patient's depression is recurrent and is mild without psychosis. Their depression is currently in partial remission and the condition is improving with treatment. This will be reassessed at the next regular appointment. F/U as described:patient was prescribed an antidepressant medicine.   We will stop Cymbalta, restart Celexa 20mg

## 2021-06-02 NOTE — PROGRESS NOTES
You have chosen to receive care through a telehealth visit.  Do you consent to use a audio/video connection for your medical care today? Yes    This was an audio enabled telemedicine encounter.     Chief Complaint  Nausea    Subjective          History of Present Illness  Malcolm Costa presents to [facility] for   Nausea:  Started Cymbalta end of April along with Alogliptin. He thinks one or both of these meds may be to blame for his sx of nausea, fatigue, dizziness.  Nausea is coming and going.  There will be some days he is not nauseated at all and other days he feels like he cannot get out of bed.  He was having tingling in his hands and feet, bad nightmares with the Cymbalta, he stopped it 4 days ago and those symptoms have improved but now he has random shocks through his head since stopping Cymbalta.  He went to urgent treatment yesterday and they gave him Phenergan.  He has not vomited at all today and actually feels pretty good.  He is staying hydrated. He does not have any headaches.  He does not wake up with nausea, it does not wake him from sleep. No CP/SOA.  No blood in his vomit, no diarrhea, not having stomach pain.      Dep/Anx:  Feels like he did fine on the Celexa, we only switched him to Cymbalta to see if it would help with his neuropathy.  He has since restarted on Mobic and feels like that is controlling the pain in his feet just fine and he would like to restart Celexa.  He feels more foggy and unfocused since starting the Cymbalta and stopping his Celexa.  He was having a lot of fatigue and tiredness but that is improved some since stopping Cymbalta a few days ago.      DMII:  His sugars have been okay, they are around 200 which is good for him.  We started alogliptin recently but it was $200 for a bottle.  He paid that and start taking it but is wondering if that is causing some of his symptoms.  He would like to try a different medication for his diabetes that  might be cheaper.    HTN:  We recently increased lisinopril, he is not sure if that could be causing some of his symptoms.  When he has been seen for these problems his blood pressure is never too low.  He does not have a home blood pressure cuff but plans on getting 1 so he can monitor his blood pressure. No CP/SOA.      Review of Systems   Constitutional: Positive for fatigue. Negative for fever and unexpected weight loss.   Respiratory: Negative for cough, shortness of breath and wheezing.    Cardiovascular: Negative for chest pain and palpitations.   Gastrointestinal: Positive for nausea, vomiting and GERD. Negative for abdominal pain, constipation, diarrhea and indigestion.   Genitourinary: Negative for frequency, hematuria and urgency.   Musculoskeletal: Negative for neck pain and neck stiffness.   Neurological: Positive for dizziness and light-headedness. Negative for seizures, syncope, speech difficulty, numbness and headache.   Psychiatric/Behavioral: Positive for sleep disturbance and depressed mood. Negative for self-injury and suicidal ideas. The patient is nervous/anxious.        The following portions of the patient's history were reviewed and updated as appropriate: allergies, current medications, past family history, past medical history, past social history, past surgical history and problem list.    Allergies   Allergen Reactions   • Shellfish-Derived Products Anaphylaxis   • Codeine Nausea Only     Not sure of reaction but thinks it is just nausea     Current Outpatient Medications on File Prior to Visit   Medication Sig Dispense Refill   • Alogliptin Benzoate 25 MG tablet Take 1 tablet by mouth Daily. 30 tablet 3   • atorvastatin (LIPITOR) 10 MG tablet Take 1 tablet by mouth Daily. 30 tablet 3   • busPIRone (BUSPAR) 10 MG tablet 1 tab  Daily if needed for anxiety 60 tablet 3   • glucose blood test strip Use as instructed 50 each 12   • ibuprofen (ADVIL,MOTRIN) 800 MG tablet Take 1 tablet by mouth  3 (Three) Times a Day With Meals. 15 tablet 0   • Lancets (ACCU-CHEK SAFE-T PRO) lancets For use with glucometer with FS once daily 50 each 5   • lansoprazole (PREVACID) 30 MG capsule TAKE ONE CAPSULE BY MOUTH ONCE DAILY 30 capsule 0   • lisinopril (PRINIVIL,ZESTRIL) 20 MG tablet Take 1 tablet by mouth Daily. 30 tablet 3   • meloxicam (MOBIC) 15 MG tablet Take 1 tablet by mouth Daily As Needed for Moderate Pain . 30 tablet 3   • metFORMIN ER (GLUCOPHAGE-XR) 750 MG 24 hr tablet TAKE 2 TABLETS BY MOUTH ONCE DAILY WITH BREAKFAST 60 tablet 3   • ondansetron ODT (Zofran ODT) 4 MG disintegrating tablet Place 1 tablet on the tongue Every 8 (Eight) Hours As Needed for Nausea or Vomiting. 30 tablet 0   • promethazine (PHENERGAN) 25 MG suppository Insert 1 suppository into the rectum Every 4 (Four) Hours As Needed for Nausea or Vomiting for up to 2 days. 12 suppository 0   • promethazine (PHENERGAN) 25 MG tablet Take 1 tablet by mouth Every 6 (Six) Hours As Needed for Nausea or Vomiting for up to 2 days. 8 tablet 0   • [DISCONTINUED] azithromycin (Zithromax Z-Brenton) 250 MG tablet Take 2 tablets the first day, then 1 tablet daily for 4 days. 6 tablet 0   • [DISCONTINUED] DULoxetine (Cymbalta) 30 MG capsule Take 1 capsule by mouth Daily. 30 capsule 1     No current facility-administered medications on file prior to visit.     New Medications Ordered This Visit   Medications   • glipizide (Glucotrol) 5 MG tablet     Sig: Take 1 tablet by mouth Every Morning.     Dispense:  30 tablet     Refill:  0   • citalopram (CeleXA) 20 MG tablet     Sig: Take 1 tablet by mouth Daily.     Dispense:  30 tablet     Refill:  2       Social History     Tobacco Use   Smoking Status Never Smoker   Smokeless Tobacco Never Used        Objective   There were no vitals filed for this visit.  There is no height or weight on file to calculate BMI.    Physical Exam   Constitutional: No distress.   Pulmonary/Chest: Effort normal. No stridor.  He no audible  wheeze...  Neurological: He is alert.   Psychiatric: He has a normal mood and affect.       Result Review :{ Labs  Result Review  Imaging  Med Tab  Media :23}        Assessment and Plan    Diagnoses and all orders for this visit:    1. Recurrent major depressive disorder, in partial remission (CMS/HCC) (Primary)  Assessment & Plan:  Patient's depression is recurrent and is mild without psychosis. Their depression is currently in partial remission and the condition is improving with treatment. This will be reassessed at the next regular appointment. F/U as described:patient was prescribed an antidepressant medicine.   We will stop Cymbalta, restart Celexa 20mg    Orders:  -     citalopram (CeleXA) 20 MG tablet; Take 1 tablet by mouth Daily.  Dispense: 30 tablet; Refill: 2    2. Essential hypertension  Assessment & Plan:  Hypertension is improving with treatment.  Continue current treatment regimen.  Blood pressure will be reassessed at the next regular appointment. Will cont lisinopril 20mg at this time, should get BP cuff at home and monitor.      3. Uncontrolled type 2 diabetes mellitus with hyperglycemia (CMS/[facility])  Assessment & Plan:  Diabetes is unchanged.   Regular aerobic exercise.  Discussed ways to avoid symptomatic hypoglycemia.  Medication changes per orders.  Diabetes will be reassessed at f/u on 6.23.21.  We will DC alogliptin due to cost and possible side effects.  Will start glipizide as it should be very affordable.    Orders:  -     glipizide (Glucotrol) 5 MG tablet; Take 1 tablet by mouth Every Morning.  Dispense: 30 tablet; Refill: 0    4. Nausea and vomiting, intractability of vomiting not specified, unspecified vomiting type  Assessment & Plan:  We will stop Cymbalta, will stop alogliptin.  Stay hydrated, monitor sugar and blood pressure daily.  Can take antinausea medicine as needed.  If not having improvement in the next week needs to come back for follow-up sooner than next scheduled  follow-up.       Will change meds, adv to f/u if not seeing improvement in symptoms as he may need a physical exam and further work up with labs/imaging.         Follow Up   Return for Next scheduled follow up.     This visit has been rescheduled as a phone visit to comply with patient safety concerns in accordance with CDC recommendations. Total time of discussion was 23 minutes.    Follow up if symptoms worsen or persist or has new or concerning symptoms, go to ER for severe symptoms.   I discussed my findings, recommendations, and plan of care with the patient. Reviewed common medication effects and side effects and to report side effects immediately. Encouraged medication compliance and the importance of keeping scheduled follow up appointments. Pt verbalized understanding and agreement.    If labs or images are ordered we will contact you with the results within the next week.  If you have not heard from us after a week please call our office to inquire about the results.     I have reviewed the limitations of a telehealth visit (such as lack of a physical exam and unable to obtain vital signs) and advised the patient that they may need to follow up for an office visit should their symptoms or concerns persist, worsen, or change.    Kym Costa PA-C    * Please note that portions of this note may have been completed with a voice recognition program. Efforts were made to edit the dictation but occasionally words are erroneously transcribed.

## 2021-06-02 NOTE — ASSESSMENT & PLAN NOTE
We will stop Cymbalta, will stop alogliptin.  Stay hydrated, monitor sugar and blood pressure daily.  Can take antinausea medicine as needed.  If not having improvement in the next week needs to come back for follow-up sooner than next scheduled follow-up.

## 2021-06-16 NOTE — PATIENT INSTRUCTIONS
1. ENDO- diabetes- will do A1C today but discussed that his sugar may be up some related to the recent URI. Pt is 8.6, same as in July. Discussed that previous was diet and current may be due to the prolonged URI. Will recheck in 3mo. If still over 8, will change his meds  2. CV- HTN- BP at goal. RF lisinopril today. Annual fasting labs today  3. GI- GERD- stable. RF  4. PSYCH- depression with anxiety- still at goal. RF today  5. ORTHO- arthritis- stable. RF mobic today  6. RESP- discussed post infectious cough. Will treat with singulair. Pt advised he needs to stop dip  7. RECHECK- 3mo   Normal rate, regular rhythm.  Heart sounds S1, S2.  No murmurs, rubs or gallops.

## 2021-08-10 ENCOUNTER — OFFICE VISIT (OUTPATIENT)
Dept: INTERNAL MEDICINE | Facility: CLINIC | Age: 56
End: 2021-08-10

## 2021-08-10 VITALS
HEART RATE: 93 BPM | SYSTOLIC BLOOD PRESSURE: 134 MMHG | HEIGHT: 66 IN | RESPIRATION RATE: 16 BRPM | TEMPERATURE: 97.1 F | OXYGEN SATURATION: 99 % | WEIGHT: 217.8 LBS | DIASTOLIC BLOOD PRESSURE: 80 MMHG | BODY MASS INDEX: 35 KG/M2

## 2021-08-10 DIAGNOSIS — F33.41 RECURRENT MAJOR DEPRESSIVE DISORDER, IN PARTIAL REMISSION (HCC): ICD-10-CM

## 2021-08-10 DIAGNOSIS — F41.9 ANXIETY: ICD-10-CM

## 2021-08-10 DIAGNOSIS — E78.2 MIXED HYPERLIPIDEMIA: ICD-10-CM

## 2021-08-10 DIAGNOSIS — E11.65 UNCONTROLLED TYPE 2 DIABETES MELLITUS WITH HYPERGLYCEMIA (HCC): Primary | ICD-10-CM

## 2021-08-10 DIAGNOSIS — I10 ESSENTIAL HYPERTENSION: ICD-10-CM

## 2021-08-10 LAB
EXPIRATION DATE: NORMAL
HBA1C MFR BLD: 10.6 %
Lab: NORMAL

## 2021-08-10 PROCEDURE — 83036 HEMOGLOBIN GLYCOSYLATED A1C: CPT | Performed by: NURSE PRACTITIONER

## 2021-08-10 PROCEDURE — 99214 OFFICE O/P EST MOD 30 MIN: CPT | Performed by: NURSE PRACTITIONER

## 2021-08-10 RX ORDER — ATORVASTATIN CALCIUM 10 MG/1
10 TABLET, FILM COATED ORAL DAILY
Qty: 30 TABLET | Refills: 3 | Status: SHIPPED | OUTPATIENT
Start: 2021-08-10 | End: 2022-01-24 | Stop reason: SDUPTHER

## 2021-08-10 RX ORDER — INSULIN GLARGINE 100 [IU]/ML
10 INJECTION, SOLUTION SUBCUTANEOUS NIGHTLY
Qty: 10 ML | Refills: 2 | Status: SHIPPED | OUTPATIENT
Start: 2021-08-10 | End: 2021-08-13

## 2021-08-10 RX ORDER — GLIPIZIDE 5 MG/1
5 TABLET ORAL EVERY MORNING
Qty: 30 TABLET | Refills: 3 | Status: SHIPPED | OUTPATIENT
Start: 2021-08-10 | End: 2021-12-21

## 2021-08-10 RX ORDER — OMEPRAZOLE 20 MG/1
20 CAPSULE, DELAYED RELEASE ORAL DAILY
COMMUNITY
End: 2022-01-24 | Stop reason: SDUPTHER

## 2021-08-10 RX ORDER — BUSPIRONE HYDROCHLORIDE 10 MG/1
TABLET ORAL
Qty: 60 TABLET | Refills: 3 | Status: SHIPPED | OUTPATIENT
Start: 2021-08-10 | End: 2021-09-03 | Stop reason: SDUPTHER

## 2021-08-10 RX ORDER — METFORMIN HYDROCHLORIDE 750 MG/1
1500 TABLET, EXTENDED RELEASE ORAL
Qty: 60 TABLET | Refills: 3 | Status: SHIPPED | OUTPATIENT
Start: 2021-08-10 | End: 2022-01-24 | Stop reason: SDUPTHER

## 2021-08-10 RX ORDER — CITALOPRAM 20 MG/1
20 TABLET ORAL DAILY
Qty: 30 TABLET | Refills: 3 | Status: SHIPPED | OUTPATIENT
Start: 2021-08-10 | End: 2022-01-24 | Stop reason: SDUPTHER

## 2021-08-10 RX ORDER — LISINOPRIL 20 MG/1
20 TABLET ORAL DAILY
Qty: 30 TABLET | Refills: 3 | Status: SHIPPED | OUTPATIENT
Start: 2021-08-10 | End: 2021-10-05 | Stop reason: SDUPTHER

## 2021-08-10 NOTE — PROGRESS NOTES
"  Malcolm Costa presents to [facility] for     Chief Complaint  Chief Complaint   Patient presents with   • Diabetes     checking glucose at home, levels are elevated   • Hypertension   • Anxiety   • Depression         Subjective        History of Present Illness    Patient here today to follow-up for type 2 DIABETES.    Diagnosed about 5 years ago.  Medications:   Could not afford the alogliptin so he never got it.  He did follow-up with GARRY Barba who switched him to glipizide, has been out of it for 2 weeks.  Patient states they are otherwise compliant with medications and denies adverse effects.   Patient does check glucose at home. Running > 200  Current diet: in general, an \"unhealthy\" diet,   current exercise: none.   Denies headaches, dizziness, visual disturbances, chest pain, dyspnea, polyuria, polyphagia, paraesthesias, and hypoglycemic episodes.     Hypertension-chronic.  Well-controlled.  Currently on lisinopril.  Compliant with dosing and denies adverse effects.  Denies headaches, dizziness, visual disturbances, palpitations chest pain, dyspnea, TIA or CVA symptoms, leg pain/claudication symptoms, and edema.    Anxiety/depression-symptoms well managed with BuSpar and Celexa.  He had some problems with the Cymbalta.  He feels as though this caused some nausea, fatigue, and dizziness.  He did follow-up with GARRY Barba who switched him over to Celexa.  He has done well since making this transition.  No HI/SI.        Review of Systems   Constitutional: Negative for appetite change, diaphoresis, fatigue, fever, unexpected weight gain and unexpected weight loss.   Eyes: Negative for blurred vision, double vision, pain and visual disturbance.   Respiratory: Negative for cough, chest tightness, shortness of breath and wheezing.    Cardiovascular: Negative for chest pain, palpitations and leg swelling.   Gastrointestinal: Negative for abdominal distention, abdominal " pain, constipation, diarrhea, nausea and vomiting.   Endocrine: Positive for polydipsia and polyuria. Negative for polyphagia.   Genitourinary: Negative for difficulty urinating, frequency and urgency.   Musculoskeletal: Negative for arthralgias and myalgias.   Skin: Negative for color change and rash.   Neurological: Negative for dizziness, facial asymmetry, weakness, light-headedness, numbness, headache and confusion.   Hematological: Negative for adenopathy. Does not bruise/bleed easily.   Psychiatric/Behavioral: Positive for depressed mood. Negative for sleep disturbance. The patient is nervous/anxious.          Allergies   Allergen Reactions   • Shellfish-Derived Products Anaphylaxis   • Codeine Nausea Only     Not sure of reaction but thinks it is just nausea     Current Outpatient Medications on File Prior to Visit   Medication Sig Dispense Refill   • glucose blood test strip Use as instructed 50 each 12   • Lancets (ACCU-CHEK SAFE-T PRO) lancets For use with glucometer with FS once daily 50 each 5   • meloxicam (MOBIC) 15 MG tablet Take 1 tablet by mouth Daily As Needed for Moderate Pain . 30 tablet 3   • omeprazole (priLOSEC) 20 MG capsule Take 20 mg by mouth Daily.     • [DISCONTINUED] atorvastatin (LIPITOR) 10 MG tablet Take 1 tablet by mouth Daily. 30 tablet 3   • [DISCONTINUED] busPIRone (BUSPAR) 10 MG tablet 1 tab  Daily if needed for anxiety 60 tablet 3   • [DISCONTINUED] citalopram (CeleXA) 20 MG tablet Take 1 tablet by mouth Daily. 30 tablet 2   • [DISCONTINUED] glipizide (Glucotrol) 5 MG tablet Take 1 tablet by mouth Every Morning. 30 tablet 0   • [DISCONTINUED] lisinopril (PRINIVIL,ZESTRIL) 20 MG tablet Take 1 tablet by mouth Daily. 30 tablet 3   • [DISCONTINUED] metFORMIN ER (GLUCOPHAGE-XR) 750 MG 24 hr tablet TAKE 2 TABLETS BY MOUTH ONCE DAILY WITH BREAKFAST 60 tablet 3   • [DISCONTINUED] Alogliptin Benzoate 25 MG tablet Take 1 tablet by mouth Daily. 30 tablet 3   • [DISCONTINUED] ibuprofen  (ADVIL,MOTRIN) 800 MG tablet Take 1 tablet by mouth 3 (Three) Times a Day With Meals. 15 tablet 0   • [DISCONTINUED] lansoprazole (PREVACID) 30 MG capsule TAKE ONE CAPSULE BY MOUTH ONCE DAILY 30 capsule 0   • [DISCONTINUED] ondansetron ODT (Zofran ODT) 4 MG disintegrating tablet Place 1 tablet on the tongue Every 8 (Eight) Hours As Needed for Nausea or Vomiting. 30 tablet 0     No current facility-administered medications on file prior to visit.         The following portions of the patient's history were reviewed and updated as appropriate: allergies, current medications, past family history, past medical history, past social history, past surgical history and problem list and are available for review within electronic record    Objective     Result Review :     The following data was reviewed by: JOANA Lai on 08/10/2021:  CMP    CMP 3/23/21 5/11/21   Glucose 334 (A) 230 (A)   BUN 27 (A) 17   Creatinine 0.96 0.88   eGFR Non African Am 81 90   Sodium 137 130 (A)   Potassium 4.4 4.1   Chloride 101 97 (A)   Calcium 9.7 8.8   Albumin 4.70 4.00   Total Bilirubin 0.4 0.3   Alkaline Phosphatase 80 78   AST (SGOT) 10 16   ALT (SGPT) 16 16   (A) Abnormal value            CBC    CBC 3/23/21 5/11/21   WBC 8.95 5.60   RBC 4.95 4.52   Hemoglobin 15.9 13.7   Hematocrit 44.1 40.1   MCV 89.1 88.7   MCH 32.1 30.3   MCHC 36.1 (A) 34.2   RDW 12.1 (A) 13.2   Platelets 247 142   (A) Abnormal value            Lipid Panel    Lipid Panel 3/23/21   Total Cholesterol 216 (A)   Triglycerides 335 (A)   HDL Cholesterol 35 (A)   VLDL Cholesterol 59 (A)   LDL Cholesterol  122 (A)   LDL/HDL Ratio 3.26   (A) Abnormal value            TSH    TSH 3/23/21   TSH 1.190           A1C Last 3 Results    HGBA1C Last 3 Results 3/23/21 8/10/21   Hemoglobin A1C 9.9 10.6           Microalbumin    Microalbumin 3/23/21   Microalbumin, Urine 19.4                      Vital Signs:   /80   Pulse 93   Temp 97.1 °F (36.2 °C) (Infrared)   Resp 16  "  Ht 167.6 cm (65.98\")   Wt 98.8 kg (217 lb 12.8 oz)   SpO2 99%   BMI 35.17 kg/m²         Physical Exam  Vitals and nursing note reviewed.   Constitutional:       General: He is not in acute distress.     Appearance: Normal appearance. He is well-developed. He is not diaphoretic.   HENT:      Head: Normocephalic and atraumatic.   Eyes:      Conjunctiva/sclera: Conjunctivae normal.      Pupils: Pupils are equal, round, and reactive to light.   Neck:      Vascular: No JVD.   Cardiovascular:      Rate and Rhythm: Normal rate and regular rhythm.      Pulses:           Dorsalis pedis pulses are 1+ on the right side and 1+ on the left side.        Posterior tibial pulses are 1+ on the right side and 1+ on the left side.      Heart sounds: Normal heart sounds. No murmur heard.     Pulmonary:      Effort: Pulmonary effort is normal. No respiratory distress.      Breath sounds: Normal breath sounds.   Chest:      Chest wall: No tenderness.   Abdominal:      General: Bowel sounds are normal. There is no distension.      Palpations: Abdomen is soft.      Tenderness: There is no abdominal tenderness.   Musculoskeletal:      Cervical back: Normal range of motion.   Skin:     General: Skin is warm and dry.      Coloration: Skin is not pale.      Findings: No erythema or rash.   Neurological:      Mental Status: He is alert and oriented to person, place, and time.      Coordination: Coordination normal.   Psychiatric:         Speech: Speech normal.         Behavior: Behavior normal.         Thought Content: Thought content normal.         Judgment: Judgment normal.                 Assessment and Plan      Diagnoses and all orders for this visit:    1. Uncontrolled type 2 diabetes mellitus with hyperglycemia (CMS/[facility]) (Primary)  -     Cancel: POC Glycosylated Hemoglobin (Hb A1C)  -     glipizide (Glucotrol) 5 MG tablet; Take 1 tablet by mouth Every Morning.  Dispense: 30 tablet; Refill: 3  -     lisinopril (PRINIVIL,ZESTRIL) 20 " "MG tablet; Take 1 tablet by mouth Daily.  Dispense: 30 tablet; Refill: 3  -     metFORMIN ER (GLUCOPHAGE-XR) 750 MG 24 hr tablet; Take 2 tablets by mouth Daily With Breakfast.  Dispense: 60 tablet; Refill: 3  -     POC Glycosylated Hemoglobin (Hb A1C)  -     Ambulatory Referral to Endocrinology  -     insulin glargine (Lantus) 100 UNIT/ML injection; Inject 10 Units under the skin into the appropriate area as directed Every Night.  Dispense: 10 mL; Refill: 2  -     Insulin Syringe-Needle U-100 28G X 5/16\" 1 ML misc; 1 Device Every Night.  Dispense: 100 each; Refill: 3  Diabetes is uncontrolled.  We will continue the glipizide, Metformin, and add on Lantus.  I did discuss the potential for hypoglycemia with using glipizide and insulin together.  If he does not eat his meal he will hold his glipizide.  He is to monitor his fasting blood glucose daily and keep a log of this to bring back to his follow-up in 2 weeks.  He tells me that he knows what he needs to do diet and exercise wise.  He denies to see diabetes educator at this time.  Discussed the potential complications of long-term uncontrolled diabetes.  Provided with/reviewed [facility] diabetes handbook.  2. Mixed hyperlipidemia  -     atorvastatin (LIPITOR) 10 MG tablet; Take 1 tablet by mouth Daily.  Dispense: 30 tablet; Refill: 3  Stable.  Continue statin.  Low-fat low-cholesterol diet.  3. Anxiety  -     busPIRone (BUSPAR) 10 MG tablet; 1 tab  Daily if needed for anxiety  Dispense: 60 tablet; Refill: 3  Symptoms well managed.  Continue BuSpar  4. Recurrent major depressive disorder, in partial remission (CMS/[facility])  -     citalopram (CeleXA) 20 MG tablet; Take 1 tablet by mouth Daily.  Dispense: 30 tablet; Refill: 3  Depression is significantly improved.  It is recurrent and without psychosis.  He is doing well on the citalopram.  We will plan to continue with that.  We will reevaluate at next regular follow-up.  5. Essential hypertension  -     lisinopril " (PRINIVIL,ZESTRIL) 20 MG tablet; Take 1 tablet by mouth Daily.  Dispense: 30 tablet; Refill: 3  Hypertension is well controlled.  Continue lisinopril at current dose.    Follow Up     Patient was given instructions and counseling regarding his condition or for health maintenance advice. Please see specific information pulled into the AVS if appropriate.   Any new medication's adverse effects have been discussed in detail with patient  Patient was encouraged to keep me informed of any acute changes, lack of improvement, or any new concerning symptoms.    Return in about 2 weeks (around 8/24/2021) for Recheck and fasting labs .          * Please note that portions of this note were completed with a voice recognition program. Efforts were made to edit the dictation but occasionally words are erroneously transcribed.

## 2021-08-12 ENCOUNTER — TELEPHONE (OUTPATIENT)
Dept: INTERNAL MEDICINE | Facility: CLINIC | Age: 56
End: 2021-08-12

## 2021-08-12 DIAGNOSIS — E11.65 UNCONTROLLED TYPE 2 DIABETES MELLITUS WITH HYPERGLYCEMIA (HCC): ICD-10-CM

## 2021-08-12 RX ORDER — LANCETS 23 GAUGE
EACH MISCELLANEOUS
Qty: 50 EACH | Refills: 5 | Status: SHIPPED | OUTPATIENT
Start: 2021-08-12 | End: 2021-08-12

## 2021-08-12 NOTE — TELEPHONE ENCOUNTER
Called and spoke to pharmacy, [facility] pharmacy tech states that it isn't the syringes it is the Lantus insulin. Asked [facility] to switch to the Lantus Solostar to see if it is any cheaper. Asked him to have patient contact us if medication is still to expensive once the new prescription has been ran through insurance. He verbalized understanding and had no further questions.

## 2021-08-12 NOTE — TELEPHONE ENCOUNTER
PATIENT NEEDS A REFILL FOR HIS Lancets (ACCU-CHEK SAFE-T PRO) lancets    CONTACT:[phone]     PREFERRED PHARMACY: [address] - [phone] [facility] [phone]

## 2021-08-12 NOTE — TELEPHONE ENCOUNTER
Pharmacy Name:  [facility]         Pharmacy representative phone number: [phone]    What medication are you calling in regards to: ACCU CHECK SAFETY PRO LANCETS     What question does the pharmacy have: THE PHARMACY STATES THAT THE LANCETS ARE NOT COVERED AND COSTS OVER 2000 DOLLARS     Who is the provider that prescribed the medication: UTE LEONG

## 2021-08-12 NOTE — TELEPHONE ENCOUNTER
"PATIENT SAID THAT HIS MEDICATION Insulin Syringe-Needle U-100 28G X 5/16\" 1 ML misc IS TOO EXPENSIVE AND HE WANTS TO SEE IF THERE ARE ANY OTHER OPTIONS.    CONTACT:[phone]  " sponge only

## 2021-08-13 ENCOUNTER — TELEPHONE (OUTPATIENT)
Dept: INTERNAL MEDICINE | Facility: CLINIC | Age: 56
End: 2021-08-13

## 2021-08-13 DIAGNOSIS — E11.65 UNCONTROLLED TYPE 2 DIABETES MELLITUS WITH HYPERGLYCEMIA: Primary | Chronic | ICD-10-CM

## 2021-08-13 RX ORDER — INSULIN GLARGINE 100 [IU]/ML
10 INJECTION, SOLUTION SUBCUTANEOUS NIGHTLY
Qty: 3 ML | Refills: 2 | Status: SHIPPED | OUTPATIENT
Start: 2021-08-13 | End: 2021-08-13

## 2021-08-13 RX ORDER — HUMAN INSULIN 100 [IU]/ML
10 INJECTION, SUSPENSION SUBCUTANEOUS NIGHTLY
Qty: 10 ML | Refills: 0 | Status: SHIPPED | OUTPATIENT
Start: 2021-08-13 | End: 2021-09-03 | Stop reason: SDUPTHER

## 2021-08-13 NOTE — TELEPHONE ENCOUNTER
Pharmacy states lantus was about $204  basaglar wasn't covered  novolin n is $24.88-Lantus and Basaglar were discontinued.    Patient notified that new prescription for insulin was sent to the pharmacy.  He has test strips at home.  Patient will call when he needs new test strips.

## 2021-08-13 NOTE — TELEPHONE ENCOUNTER
Lantus is insulin and when I spoke to pharmacy yesterday it was almost $2,000 to . Please see if alternative can be sent in.

## 2021-08-13 NOTE — TELEPHONE ENCOUNTER
Caller: Malcolm Costa    Relationship: Self    Best call back number: [phone]    What is the best time to reach you: ANYTIME    Who are you requesting to speak with (clinical staff, provider,  specific staff member): PROVIDER    What was the call regarding: PATIENT IS HAVING TROUBLE GETTING HIS INSULIN AND TEST STRIPS. PATIENT WOULD LIKE TO DISCUSS ASAP    Do you require a callback: YES

## 2021-09-03 ENCOUNTER — OFFICE VISIT (OUTPATIENT)
Dept: INTERNAL MEDICINE | Facility: CLINIC | Age: 56
End: 2021-09-03

## 2021-09-03 ENCOUNTER — LAB (OUTPATIENT)
Dept: LAB | Facility: HOSPITAL | Age: 56
End: 2021-09-03

## 2021-09-03 VITALS
HEIGHT: 66 IN | HEART RATE: 84 BPM | OXYGEN SATURATION: 99 % | BODY MASS INDEX: 36 KG/M2 | DIASTOLIC BLOOD PRESSURE: 82 MMHG | SYSTOLIC BLOOD PRESSURE: 130 MMHG | WEIGHT: 224 LBS | TEMPERATURE: 97.5 F | RESPIRATION RATE: 18 BRPM

## 2021-09-03 DIAGNOSIS — E78.2 MIXED HYPERLIPIDEMIA: ICD-10-CM

## 2021-09-03 DIAGNOSIS — F41.9 ANXIETY: ICD-10-CM

## 2021-09-03 DIAGNOSIS — I10 ESSENTIAL HYPERTENSION: ICD-10-CM

## 2021-09-03 DIAGNOSIS — E11.65 UNCONTROLLED TYPE 2 DIABETES MELLITUS WITH HYPERGLYCEMIA (HCC): ICD-10-CM

## 2021-09-03 DIAGNOSIS — E11.65 UNCONTROLLED TYPE 2 DIABETES MELLITUS WITH HYPERGLYCEMIA (HCC): Primary | ICD-10-CM

## 2021-09-03 LAB
ALBUMIN SERPL-MCNC: 4.5 G/DL (ref 3.5–5.2)
ALBUMIN/GLOB SERPL: 1.6 G/DL
ALP SERPL-CCNC: 69 U/L (ref 39–117)
ALT SERPL W P-5'-P-CCNC: 14 U/L (ref 1–41)
ANION GAP SERPL CALCULATED.3IONS-SCNC: 10.8 MMOL/L (ref 5–15)
AST SERPL-CCNC: 13 U/L (ref 1–40)
BILIRUB SERPL-MCNC: 0.5 MG/DL (ref 0–1.2)
BUN SERPL-MCNC: 21 MG/DL (ref 6–20)
BUN/CREAT SERPL: 20.2 (ref 7–25)
CALCIUM SPEC-SCNC: 9.5 MG/DL (ref 8.6–10.5)
CHLORIDE SERPL-SCNC: 100 MMOL/L (ref 98–107)
CHOLEST SERPL-MCNC: 163 MG/DL (ref 0–200)
CO2 SERPL-SCNC: 25.2 MMOL/L (ref 22–29)
CREAT SERPL-MCNC: 1.04 MG/DL (ref 0.76–1.27)
DEPRECATED RDW RBC AUTO: 42.9 FL (ref 37–54)
ERYTHROCYTE [DISTWIDTH] IN BLOOD BY AUTOMATED COUNT: 13.1 % (ref 12.3–15.4)
EXPIRATION DATE: ABNORMAL
GFR SERPL CREATININE-BSD FRML MDRD: 74 ML/MIN/1.73
GLOBULIN UR ELPH-MCNC: 2.9 GM/DL
GLUCOSE BLDC GLUCOMTR-MCNC: 267 MG/DL (ref 70–130)
GLUCOSE SERPL-MCNC: 241 MG/DL (ref 65–99)
HCT VFR BLD AUTO: 40 % (ref 37.5–51)
HDLC SERPL-MCNC: 42 MG/DL (ref 40–60)
HGB BLD-MCNC: 13.8 G/DL (ref 13–17.7)
LDLC SERPL CALC-MCNC: 93 MG/DL (ref 0–100)
LDLC/HDLC SERPL: 2.11 {RATIO}
Lab: ABNORMAL
MCH RBC QN AUTO: 31.3 PG (ref 26.6–33)
MCHC RBC AUTO-ENTMCNC: 34.5 G/DL (ref 31.5–35.7)
MCV RBC AUTO: 90.7 FL (ref 79–97)
PLATELET # BLD AUTO: 173 10*3/MM3 (ref 140–450)
PMV BLD AUTO: 9.9 FL (ref 6–12)
POTASSIUM SERPL-SCNC: 4.6 MMOL/L (ref 3.5–5.2)
PROT SERPL-MCNC: 7.4 G/DL (ref 6–8.5)
RBC # BLD AUTO: 4.41 10*6/MM3 (ref 4.14–5.8)
SODIUM SERPL-SCNC: 136 MMOL/L (ref 136–145)
TRIGL SERPL-MCNC: 161 MG/DL (ref 0–150)
VLDLC SERPL-MCNC: 28 MG/DL (ref 5–40)
WBC # BLD AUTO: 5.07 10*3/MM3 (ref 3.4–10.8)

## 2021-09-03 PROCEDURE — 99214 OFFICE O/P EST MOD 30 MIN: CPT | Performed by: NURSE PRACTITIONER

## 2021-09-03 PROCEDURE — 80061 LIPID PANEL: CPT | Performed by: NURSE PRACTITIONER

## 2021-09-03 PROCEDURE — 80053 COMPREHEN METABOLIC PANEL: CPT | Performed by: NURSE PRACTITIONER

## 2021-09-03 PROCEDURE — 85027 COMPLETE CBC AUTOMATED: CPT | Performed by: NURSE PRACTITIONER

## 2021-09-03 RX ORDER — HUMAN INSULIN 100 [IU]/ML
INJECTION, SUSPENSION SUBCUTANEOUS
Qty: 10 ML | Refills: 2 | Status: SHIPPED | OUTPATIENT
Start: 2021-09-03 | End: 2021-10-05

## 2021-09-03 RX ORDER — BUSPIRONE HYDROCHLORIDE 10 MG/1
10 TABLET ORAL 2 TIMES DAILY
Qty: 60 TABLET | Refills: 3 | Status: SHIPPED | OUTPATIENT
Start: 2021-09-03 | End: 2022-01-24 | Stop reason: SDUPTHER

## 2021-09-03 NOTE — PROGRESS NOTES
Malcolm Costa presents to [facility] for     Chief Complaint  Chief Complaint   Patient presents with   • Diabetes     has been checking at home, brought log with him         Subjective        History of Present Illness    Diabetes- type 2,   glipizide, metformin,  Started on lantus 2 weeks ago.  Lantus was too expensive so he was changed over to NPH 10 units.  Glucose averaging 218-331 fasting in am.   Diet: has cut back on sugar intake  Lab Results   Component Value Date    HGBA1C 10.6 08/10/2021   Currently on BuSpar.    HTN-chronic.  BP improving with lisinopril.    Hyperlipidemia-chronic.  On atorvastatin now.  Tolerating well.      Anxious and stressed-a lot of stress at work.  Feels anxious quite often.  Denies feeling depressed.  No HI/SI.  Currently on buspar. Does not feel like it has done much to help yet      Review of Systems   Constitutional: Negative for fatigue, fever and unexpected weight loss.   Eyes: Negative for blurred vision, double vision, pain and visual disturbance.   Respiratory: Negative for cough, chest tightness, shortness of breath and wheezing.    Cardiovascular: Negative for chest pain, palpitations and leg swelling.   Gastrointestinal: Negative for abdominal pain, constipation, diarrhea, nausea and vomiting.   Genitourinary: Negative for difficulty urinating, frequency and urgency.   Musculoskeletal: Negative for arthralgias and myalgias.   Skin: Negative for color change and rash.   Neurological: Negative for dizziness, weakness, light-headedness, headache and confusion.   Hematological: Negative for adenopathy. Does not bruise/bleed easily.   Psychiatric/Behavioral: Positive for stress. The patient is nervous/anxious.          Allergies   Allergen Reactions   • Shellfish-Derived Products Anaphylaxis   • Codeine Nausea Only     Not sure of reaction but thinks it is just nausea     Current Outpatient Medications on File Prior to Visit   Medication Sig  "Dispense Refill   • atorvastatin (LIPITOR) 10 MG tablet Take 1 tablet by mouth Daily. 30 tablet 3   • citalopram (CeleXA) 20 MG tablet Take 1 tablet by mouth Daily. 30 tablet 3   • glipizide (Glucotrol) 5 MG tablet Take 1 tablet by mouth Every Morning. 30 tablet 3   • glucose blood test strip Use as instructed 50 each 12   • Insulin Syringe-Needle U-100 28G X 5/16\" 1 ML misc 1 Device Every Night. 100 each 3   • Lancets (accu-chek soft touch) lancets Used to test blood sugar for Diabetes E11.65 test up to twice a day 100 each 12   • lisinopril (PRINIVIL,ZESTRIL) 20 MG tablet Take 1 tablet by mouth Daily. 30 tablet 3   • meloxicam (MOBIC) 15 MG tablet Take 1 tablet by mouth Daily As Needed for Moderate Pain . 30 tablet 3   • metFORMIN ER (GLUCOPHAGE-XR) 750 MG 24 hr tablet Take 2 tablets by mouth Daily With Breakfast. 60 tablet 3   • omeprazole (priLOSEC) 20 MG capsule Take 20 mg by mouth Daily.     • [DISCONTINUED] busPIRone (BUSPAR) 10 MG tablet 1 tab  Daily if needed for anxiety 60 tablet 3   • [DISCONTINUED] insulin NPH (NovoLIN N) 100 UNIT/ML injection Inject 10 Units under the skin into the appropriate area as directed Every Night. 10 mL 0     No current facility-administered medications on file prior to visit.         The following portions of the patient's history were reviewed and updated as appropriate: allergies, current medications, past family history, past medical history, past social history, past surgical history and problem list and are available for review within electronic record    Objective     Result Review :                    Vital Signs:   /82   Pulse 84   Temp 97.5 °F (36.4 °C) (Infrared)   Resp 18   Ht 167.6 cm (65.98\")   Wt 102 kg (224 lb)   SpO2 99%   BMI 36.17 kg/m²         Physical Exam  Vitals and nursing note reviewed.   Constitutional:       General: He is not in acute distress.     Appearance: Normal appearance. He is well-developed. He is not diaphoretic.   HENT:      " Head: Normocephalic and atraumatic.   Eyes:      Conjunctiva/sclera: Conjunctivae normal.      Pupils: Pupils are equal, round, and reactive to light.   Neck:      Vascular: No JVD.   Cardiovascular:      Rate and Rhythm: Normal rate and regular rhythm.      Pulses:           Dorsalis pedis pulses are 2+ on the right side and 2+ on the left side.        Posterior tibial pulses are 2+ on the right side and 2+ on the left side.      Heart sounds: Normal heart sounds. No murmur heard.     Pulmonary:      Effort: Pulmonary effort is normal. No respiratory distress.      Breath sounds: Normal breath sounds.   Chest:      Chest wall: No tenderness.   Abdominal:      General: Bowel sounds are normal. There is no distension.      Palpations: Abdomen is soft.      Tenderness: There is no abdominal tenderness.   Musculoskeletal:      Cervical back: Normal range of motion.   Skin:     General: Skin is warm and dry.      Coloration: Skin is not pale.      Findings: No erythema or rash.   Neurological:      Mental Status: He is alert and oriented to person, place, and time.      Coordination: Coordination normal.   Psychiatric:         Speech: Speech normal.         Behavior: Behavior normal.         Thought Content: Thought content normal.         Judgment: Judgment normal.                 Assessment and Plan      Diagnoses and all orders for this visit:    1. Uncontrolled type 2 diabetes mellitus with hyperglycemia (CMS/HCC) (Primary)  -     POCT Glucose  -     CBC (No Diff); Future  -     Comprehensive Metabolic Panel; Future  -     Lipid Panel; Future  -     insulin NPH (NovoLIN N) 100 UNIT/ML injection; 10 units with am meal and 5 units with pm meal  Dispense: 10 mL; Refill: 2  Uncontrolled.   glucose 267 in office today.   increae N to 10 u am and 5 u pm.   ADA diet.     2. Mixed hyperlipidemia  -     CBC (No Diff); Future  -     Comprehensive Metabolic Panel; Future  -     Lipid Panel; Future  Low fat low cholesterol  diet.   Continue statin  3. Essential hypertension  -     CBC (No Diff); Future  -     Comprehensive Metabolic Panel; Future  -     Lipid Panel; Future  Improving . Continue lisinopril  4. Anxiety  -     busPIRone (BUSPAR) 10 MG tablet; Take 1 tablet by mouth 2 (two) times a day.  Dispense: 60 tablet; Refill: 3    Uncontrolled,  Increase buspar to 10mg  BID    Follow Up     Patient was given instructions and counseling regarding his condition or for health maintenance advice. Please see specific information pulled into the AVS if appropriate.   Any new medication's adverse effects have been discussed in detail with patient  Patient was encouraged to keep me informed of any acute changes, lack of improvement, or any new concerning symptoms.    No follow-ups on file.          * Please note that portions of this note were completed with a voice recognition program. Efforts were made to edit the dictation but occasionally words are erroneously transcribed.

## 2021-09-07 DIAGNOSIS — M19.90 ARTHRITIS: ICD-10-CM

## 2021-09-07 RX ORDER — MELOXICAM 15 MG/1
TABLET ORAL
Qty: 30 TABLET | Refills: 0 | Status: SHIPPED | OUTPATIENT
Start: 2021-09-07 | End: 2021-10-08

## 2021-09-13 ENCOUNTER — TELEPHONE (OUTPATIENT)
Dept: INTERNAL MEDICINE | Facility: CLINIC | Age: 56
End: 2021-09-13

## 2021-09-13 NOTE — TELEPHONE ENCOUNTER
----- Message from JOANA Ferris sent at 9/13/2021  7:54 AM EDT -----  Please let him know that his labs show improvement in his cholesterol.  His glucose is still elevated but I expect this to start coming down as we continue to adjust diabetic medications.  Have him continue to work on diabetic diet and increasing physical activity

## 2021-10-05 ENCOUNTER — OFFICE VISIT (OUTPATIENT)
Dept: INTERNAL MEDICINE | Facility: CLINIC | Age: 56
End: 2021-10-05

## 2021-10-05 VITALS
BODY MASS INDEX: 36.67 KG/M2 | TEMPERATURE: 97.5 F | DIASTOLIC BLOOD PRESSURE: 90 MMHG | HEART RATE: 95 BPM | RESPIRATION RATE: 18 BRPM | HEIGHT: 66 IN | WEIGHT: 228.2 LBS | OXYGEN SATURATION: 99 % | SYSTOLIC BLOOD PRESSURE: 140 MMHG

## 2021-10-05 DIAGNOSIS — F41.9 ANXIETY: ICD-10-CM

## 2021-10-05 DIAGNOSIS — E11.65 UNCONTROLLED TYPE 2 DIABETES MELLITUS WITH HYPERGLYCEMIA (HCC): Primary | ICD-10-CM

## 2021-10-05 DIAGNOSIS — I10 ESSENTIAL HYPERTENSION: ICD-10-CM

## 2021-10-05 DIAGNOSIS — Z23 FLU VACCINE NEED: ICD-10-CM

## 2021-10-05 LAB
EXPIRATION DATE: ABNORMAL
GLUCOSE BLDC GLUCOMTR-MCNC: 241 MG/DL (ref 70–130)
Lab: ABNORMAL

## 2021-10-05 PROCEDURE — 82947 ASSAY GLUCOSE BLOOD QUANT: CPT | Performed by: NURSE PRACTITIONER

## 2021-10-05 PROCEDURE — 99214 OFFICE O/P EST MOD 30 MIN: CPT | Performed by: NURSE PRACTITIONER

## 2021-10-05 PROCEDURE — 90471 IMMUNIZATION ADMIN: CPT | Performed by: NURSE PRACTITIONER

## 2021-10-05 PROCEDURE — 90686 IIV4 VACC NO PRSV 0.5 ML IM: CPT | Performed by: NURSE PRACTITIONER

## 2021-10-05 RX ORDER — LISINOPRIL 40 MG/1
40 TABLET ORAL DAILY
Qty: 30 TABLET | Refills: 4 | Status: SHIPPED | OUTPATIENT
Start: 2021-10-05 | End: 2022-01-24 | Stop reason: SDUPTHER

## 2021-10-05 RX ORDER — HUMAN INSULIN 100 [IU]/ML
INJECTION, SUSPENSION SUBCUTANEOUS
Qty: 10 ML | Refills: 2
Start: 2021-10-05 | End: 2022-01-24 | Stop reason: SDUPTHER

## 2021-10-05 NOTE — PROGRESS NOTES
Malcolm Costa presents to [facility] for     Chief Complaint  Chief Complaint   Patient presents with   • Diabetes     1 month follow-up, better since medication change but still over 200   • Anxiety         Subjective        History of Present Illness    T2 Diabetes   increased NPH to 10u am and 5 u pm.   a1c was 10.6% 8/10/2021  He tells me his glucose has been running mainly in the 200s.  He has had a regular 300 and been about 170 280 on 2 occasions.  He denies any hypoglycemic episodes.  Glucose 241 in office today.     Anxiety-related to the stress at work.  Work is still pretty stressful  Increased buspar at last visit. He is not taking the second dose every day.  he feels as though his anxiety is doing well with this.  He would like to continue taking it daily with occasionally taking the second dose and does not feel like he needs to increase the dose      HTN-    not checking BP at home   Currently on lisinopril 20 mg daily.  Compliant with dosing and denies adverse effects.  Denies headaches, dizziness, visual disturbances, palpitations, chest pain, dyspnea, TIA or CVA symptoms, leg pain/claudication symptoms, and edema.      Health Maintenance:   Due for flu vaccination-I discussed this with him and he would like to proceed with it today.    Review of Systems   Constitutional: Negative for fatigue, fever and unexpected weight loss.   Eyes: Negative for blurred vision, double vision, pain and visual disturbance.   Respiratory: Negative for cough, chest tightness, shortness of breath and wheezing.    Cardiovascular: Negative for chest pain, palpitations and leg swelling.   Gastrointestinal: Negative for abdominal pain, constipation, diarrhea, nausea and vomiting.   Genitourinary: Negative for difficulty urinating, frequency and urgency.   Musculoskeletal: Negative for arthralgias and myalgias.   Skin: Negative for color change and rash.   Neurological: Negative for dizziness,  "weakness, light-headedness, headache and confusion.   Hematological: Negative for adenopathy. Does not bruise/bleed easily.   Psychiatric/Behavioral: Positive for stress. The patient is nervous/anxious.          Allergies   Allergen Reactions   • Shellfish-Derived Products Anaphylaxis   • Codeine Nausea Only     Not sure of reaction but thinks it is just nausea     Current Outpatient Medications on File Prior to Visit   Medication Sig Dispense Refill   • atorvastatin (LIPITOR) 10 MG tablet Take 1 tablet by mouth Daily. 30 tablet 3   • busPIRone (BUSPAR) 10 MG tablet Take 1 tablet by mouth 2 (two) times a day. 60 tablet 3   • citalopram (CeleXA) 20 MG tablet Take 1 tablet by mouth Daily. 30 tablet 3   • glipizide (Glucotrol) 5 MG tablet Take 1 tablet by mouth Every Morning. 30 tablet 3   • glucose blood test strip Use as instructed 50 each 12   • Insulin Syringe-Needle U-100 28G X 5/16\" 1 ML misc 1 Device Every Night. 100 each 3   • Lancets (accu-chek soft touch) lancets Used to test blood sugar for Diabetes E11.65 test up to twice a day 100 each 12   • meloxicam (MOBIC) 15 MG tablet TAKE 1 TABLET BY MOUTH ONCE DAILY AS NEEDED FOR MODERATE PAIN 30 tablet 0   • metFORMIN ER (GLUCOPHAGE-XR) 750 MG 24 hr tablet Take 2 tablets by mouth Daily With Breakfast. 60 tablet 3   • omeprazole (priLOSEC) 20 MG capsule Take 20 mg by mouth Daily.     • [DISCONTINUED] insulin NPH (NovoLIN N) 100 UNIT/ML injection 10 units with am meal and 5 units with pm meal 10 mL 2   • [DISCONTINUED] lisinopril (PRINIVIL,ZESTRIL) 20 MG tablet Take 1 tablet by mouth Daily. 30 tablet 3     No current facility-administered medications on file prior to visit.         The following portions of the patient's history were reviewed and updated as appropriate: allergies, current medications, past family history, past medical history, past social history, past surgical history and problem list and are available for review within electronic " "record    Objective     Result Review :     The following data was reviewed by: JOANA Lai on 10/05/2021:  CMP    CMP 3/23/21 5/11/21 9/3/21   Glucose 334 (A) 230 (A) 241 (A)   BUN 27 (A) 17 21 (A)   Creatinine 0.96 0.88 1.04   eGFR Non African Am 81 90 74   Sodium 137 130 (A) 136   Potassium 4.4 4.1 4.6   Chloride 101 97 (A) 100   Calcium 9.7 8.8 9.5   Albumin 4.70 4.00 4.50   Total Bilirubin 0.4 0.3 0.5   Alkaline Phosphatase 80 78 69   AST (SGOT) 10 16 13   ALT (SGPT) 16 16 14   (A) Abnormal value            CBC    CBC 3/23/21 5/11/21 9/3/21   WBC 8.95 5.60 5.07   RBC 4.95 4.52 4.41   Hemoglobin 15.9 13.7 13.8   Hematocrit 44.1 40.1 40.0   MCV 89.1 88.7 90.7   MCH 32.1 30.3 31.3   MCHC 36.1 (A) 34.2 34.5   RDW 12.1 (A) 13.2 13.1   Platelets 247 142 173   (A) Abnormal value            A1C Last 3 Results    HGBA1C Last 3 Results 3/23/21 8/10/21   Hemoglobin A1C 9.9 10.6           Microalbumin    Microalbumin 3/23/21   Microalbumin, Urine 19.4                      Vital Signs:   /90   Pulse 95   Temp 97.5 °F (36.4 °C) (Infrared)   Resp 18   Ht 167.6 cm (65.98\")   Wt 104 kg (228 lb 3.2 oz)   SpO2 99%   BMI 36.85 kg/m²         Physical Exam  Vitals and nursing note reviewed.   Constitutional:       Appearance: Normal appearance. He is well-developed.   HENT:      Head: Normocephalic and atraumatic.   Eyes:      Conjunctiva/sclera: Conjunctivae normal.      Pupils: Pupils are equal, round, and reactive to light.   Cardiovascular:      Rate and Rhythm: Normal rate and regular rhythm.      Heart sounds: Normal heart sounds.   Pulmonary:      Effort: Pulmonary effort is normal. No respiratory distress.      Breath sounds: Normal breath sounds.   Abdominal:      General: Bowel sounds are normal. There is no distension.      Palpations: Abdomen is soft.      Tenderness: There is no abdominal tenderness.   Musculoskeletal:      Cervical back: Normal range of motion.   Skin:     General: Skin is warm " and dry.   Neurological:      Mental Status: He is alert and oriented to person, place, and time.   Psychiatric:         Attention and Perception: Attention and perception normal.         Mood and Affect: Mood and affect normal.         Speech: Speech normal.         Behavior: Behavior normal.         Thought Content: Thought content normal.         Judgment: Judgment normal.                 Assessment and Plan      Diagnoses and all orders for this visit:    1. Uncontrolled type 2 diabetes mellitus with hyperglycemia (HCC) (Primary)  -     POC Glucose, Blood  -     insulin NPH (NovoLIN N) 100 UNIT/ML injection; 15 units with am meal and 10 units with pm meal  Dispense: 10 mL; Refill: 2  -     lisinopril (PRINIVIL,ZESTRIL) 40 MG tablet; Take 1 tablet by mouth Daily.  Dispense: 30 tablet; Refill: 4  Diabetes is still uncontrolled based on glucose readings.  We will increase his NPH to 15 units in the morning and 10 units in the evening.  ADA diet.  Recheck A1c in 1 month.  2. Essential hypertension  -     lisinopril (PRINIVIL,ZESTRIL) 40 MG tablet; Take 1 tablet by mouth Daily.  Dispense: 30 tablet; Refill: 4  Hypertension is uncontrolled.  Will increase lisinopril to 40 mg daily.  Goal BP less than 130/80.  3. Anxiety  Doing well with the BuSpar.  Continue current dosing  4. Flu vaccine need  -     FluLaval/Fluarix >6 Months ([id number])        Follow Up     Patient was given instructions and counseling regarding his condition or for health maintenance advice. Please see specific information pulled into the AVS if appropriate.   Any new medication's adverse effects have been discussed in detail with patient  Patient was encouraged to keep me informed of any acute changes, lack of improvement, or any new concerning symptoms.    Return in about 4 weeks (around 11/2/2021) for chronic condition follow up.          * Please note that portions of this note were completed with a voice recognition program. Efforts were made  to edit the dictation but occasionally words are erroneously transcribed.

## 2021-10-07 DIAGNOSIS — M19.90 ARTHRITIS: ICD-10-CM

## 2021-10-08 RX ORDER — MELOXICAM 15 MG/1
TABLET ORAL
Qty: 30 TABLET | Refills: 0 | Status: SHIPPED | OUTPATIENT
Start: 2021-10-08 | End: 2021-11-30

## 2021-11-29 DIAGNOSIS — M19.90 ARTHRITIS: ICD-10-CM

## 2021-11-30 RX ORDER — MELOXICAM 15 MG/1
TABLET ORAL
Qty: 30 TABLET | Refills: 0 | Status: SHIPPED | OUTPATIENT
Start: 2021-11-30 | End: 2021-12-30

## 2021-11-30 NOTE — TELEPHONE ENCOUNTER
Rx Refill Note  Requested Prescriptions     Pending Prescriptions Disp Refills   • meloxicam (MOBIC) 15 MG tablet [Pharmacy Med Name: Meloxicam 15 MG Oral Tablet] 30 tablet 0     Sig: TAKE 1 TABLET BY MOUTH ONCE DAILY AS NEEDED FOR MODERATE PAIN      Last office visit with prescribing clinician: 10/5/2021      Next office visit with prescribing clinician: Visit date not found     Last Fill Date: 10/08/2021  Quantity: 30  Refills: 0    April TIA Nicole MA  11/30/21, 07:55 EST

## 2021-12-21 DIAGNOSIS — E11.65 UNCONTROLLED TYPE 2 DIABETES MELLITUS WITH HYPERGLYCEMIA (HCC): ICD-10-CM

## 2021-12-21 RX ORDER — GLIPIZIDE 5 MG/1
TABLET ORAL
Qty: 30 TABLET | Refills: 0 | Status: SHIPPED | OUTPATIENT
Start: 2021-12-21 | End: 2022-01-24 | Stop reason: SDUPTHER

## 2021-12-21 NOTE — TELEPHONE ENCOUNTER
Rx Refill Note  Requested Prescriptions     Pending Prescriptions Disp Refills   • glipizide (GLUCOTROL) 5 MG tablet [Pharmacy Med Name: glipiZIDE 5 MG Oral Tablet] 30 tablet 0     Sig: TAKE 1 TABLET BY MOUTH ONCE DAILY IN THE MORNING      Last office visit with prescribing clinician: 10/5/2021      Next office visit with prescribing clinician: Visit date not found     Last Fill Date: 08/10/2021  Quantity: 30  Refills: 3    April TIA Nicole MA  12/21/21, 10:51 EST     Patient was to follow up in one month from last OV to recheck A1C, appointments made and cancelled.

## 2021-12-30 DIAGNOSIS — M19.90 ARTHRITIS: ICD-10-CM

## 2021-12-30 RX ORDER — MELOXICAM 15 MG/1
TABLET ORAL
Qty: 30 TABLET | Refills: 0 | Status: SHIPPED | OUTPATIENT
Start: 2021-12-30 | End: 2022-01-24 | Stop reason: SDUPTHER

## 2022-01-24 ENCOUNTER — TELEMEDICINE (OUTPATIENT)
Dept: INTERNAL MEDICINE | Facility: CLINIC | Age: 57
End: 2022-01-24

## 2022-01-24 VITALS — SYSTOLIC BLOOD PRESSURE: 130 MMHG | DIASTOLIC BLOOD PRESSURE: 70 MMHG | TEMPERATURE: 98 F

## 2022-01-24 DIAGNOSIS — E11.65 UNCONTROLLED TYPE 2 DIABETES MELLITUS WITH HYPERGLYCEMIA: Primary | ICD-10-CM

## 2022-01-24 DIAGNOSIS — I10 ESSENTIAL HYPERTENSION: ICD-10-CM

## 2022-01-24 DIAGNOSIS — F41.9 ANXIETY: ICD-10-CM

## 2022-01-24 DIAGNOSIS — E78.00 PURE HYPERCHOLESTEROLEMIA: ICD-10-CM

## 2022-01-24 DIAGNOSIS — F33.41 RECURRENT MAJOR DEPRESSIVE DISORDER, IN PARTIAL REMISSION: ICD-10-CM

## 2022-01-24 DIAGNOSIS — M19.90 ARTHRITIS: ICD-10-CM

## 2022-01-24 DIAGNOSIS — K21.9 CHRONIC GERD: ICD-10-CM

## 2022-01-24 PROCEDURE — 99214 OFFICE O/P EST MOD 30 MIN: CPT | Performed by: NURSE PRACTITIONER

## 2022-01-24 RX ORDER — MELOXICAM 15 MG/1
15 TABLET ORAL DAILY PRN
Qty: 90 TABLET | Refills: 1 | Status: SHIPPED | OUTPATIENT
Start: 2022-01-24 | End: 2022-01-28 | Stop reason: SDUPTHER

## 2022-01-24 RX ORDER — GLIPIZIDE 5 MG/1
5 TABLET ORAL EVERY MORNING
Qty: 90 TABLET | Refills: 1 | Status: SHIPPED | OUTPATIENT
Start: 2022-01-24 | End: 2022-01-28 | Stop reason: SDUPTHER

## 2022-01-24 RX ORDER — LISINOPRIL 40 MG/1
40 TABLET ORAL DAILY
Qty: 90 TABLET | Refills: 1 | Status: SHIPPED | OUTPATIENT
Start: 2022-01-24 | End: 2022-01-28 | Stop reason: SDUPTHER

## 2022-01-24 RX ORDER — CITALOPRAM 20 MG/1
20 TABLET ORAL DAILY
Qty: 90 TABLET | Refills: 1 | Status: SHIPPED | OUTPATIENT
Start: 2022-01-24 | End: 2022-01-28 | Stop reason: SDUPTHER

## 2022-01-24 RX ORDER — OMEPRAZOLE 20 MG/1
20 CAPSULE, DELAYED RELEASE ORAL DAILY
Qty: 90 CAPSULE | Refills: 1 | Status: SHIPPED | OUTPATIENT
Start: 2022-01-24 | End: 2022-01-28 | Stop reason: SDUPTHER

## 2022-01-24 RX ORDER — HUMAN INSULIN 100 [IU]/ML
INJECTION, SUSPENSION SUBCUTANEOUS
Qty: 30 ML | Refills: 1
Start: 2022-01-24 | End: 2022-01-28 | Stop reason: SDUPTHER

## 2022-01-24 RX ORDER — ATORVASTATIN CALCIUM 10 MG/1
10 TABLET, FILM COATED ORAL DAILY
Qty: 90 TABLET | Refills: 1 | Status: SHIPPED | OUTPATIENT
Start: 2022-01-24 | End: 2022-01-28 | Stop reason: SDUPTHER

## 2022-01-24 RX ORDER — BUSPIRONE HYDROCHLORIDE 10 MG/1
10 TABLET ORAL 2 TIMES DAILY
Qty: 180 TABLET | Refills: 1 | Status: SHIPPED | OUTPATIENT
Start: 2022-01-24 | End: 2022-01-28 | Stop reason: SDUPTHER

## 2022-01-24 RX ORDER — METFORMIN HYDROCHLORIDE 750 MG/1
1500 TABLET, EXTENDED RELEASE ORAL
Qty: 180 TABLET | Refills: 1 | Status: SHIPPED | OUTPATIENT
Start: 2022-01-24 | End: 2022-01-28 | Stop reason: SDUPTHER

## 2022-01-24 NOTE — PROGRESS NOTES
You have chosen to receive care through a telehealth visit.  Do you consent to use a video/audio connection for your medical care today? Yes  This was an audio and video enabled telemedicine encounter.  Patient location : home    Subjective   Malcolm Costa is a 56 y.o. male.     Chief Complaint   Patient presents with   • Diabetes   • Anxiety   • Hypertension   • Hyperlipidemia       History of Present Illness       Type 2 diabetes-chronic.  At his last visit we increased NPH to 15u am and 10 u pm.   a1c was 10.6% 8/10/2021  He tells me his glucose has been running mainly less than 200 but staying in the upper 100s routinely.    At his last visit his glucoses were reading greater than 200 up to 300 so this is definitely improving.  He denies any hypoglycemic episodes.  Diet is unhealthy.  Exercise is minimal     Anxiety-related to the stress at work.  He reports that typically most of his stress comes in January and he feels like once he gets through January things will improve even more.  He is on citalopram and BuSpar.  He is only taking the BuSpar once a day and feels as though this is working well for him.  Denies HI/SI.    Hypertension-chronic.  BP running 130/70 at home.  He is on lisinopril.  He reports compliance and denies any adverse effects.  He denies any dizziness, visual disturbances, headaches, chest pain, dyspnea, palpitations, edema or or leg pain.       Hyperlipidemia-chronic.  On atorvastatin now.  Tolerating well.    GERD-chronic.  On PPI therapy.  Symptoms well controlled as long as he takes this.    Uses meloxicam-  If he runs out of the meloxicam   has a lot of foot pain and neuropathy.    The following portions of the patient's history were reviewed and updated as appropriate: allergies, current medications, past family history, past medical history, past social history, past surgical history and problem list.        Review of Systems   Constitutional: Negative for fatigue, fever and unexpected  "weight loss.   Eyes: Negative for blurred vision, double vision, pain and visual disturbance.   Respiratory: Negative for cough, chest tightness, shortness of breath and wheezing.    Cardiovascular: Negative for chest pain, palpitations and leg swelling.   Gastrointestinal: Negative for abdominal pain, constipation, diarrhea, nausea and vomiting.   Genitourinary: Negative for difficulty urinating, frequency and urgency.   Musculoskeletal: Positive for arthralgias ( Bilateral foot). Negative for myalgias.   Skin: Negative for color change and rash.   Neurological: Negative for dizziness, weakness, light-headedness, headache and confusion.   Hematological: Negative for adenopathy. Does not bruise/bleed easily.   Psychiatric/Behavioral: Positive for stress. The patient is nervous/anxious.            Outpatient Medications Marked as Taking for the 1/24/22 encounter (Telemedicine) with Sita Chase APRN   Medication Sig Dispense Refill   • atorvastatin (LIPITOR) 10 MG tablet Take 1 tablet by mouth Daily. 90 tablet 1   • busPIRone (BUSPAR) 10 MG tablet Take 1 tablet by mouth 2 (Two) Times a Day. 180 tablet 1   • citalopram (CeleXA) 20 MG tablet Take 1 tablet by mouth Daily. 90 tablet 1   • glipizide (GLUCOTROL) 5 MG tablet Take 1 tablet by mouth Every Morning. 90 tablet 1   • glucose blood test strip Use as instructed 50 each 12   • insulin NPH (NovoLIN N) 100 UNIT/ML injection 15 units with am meal and 15 units with pm meal 30 mL 1   • Insulin Syringe-Needle U-100 28G X 5/16\" 1 ML misc 1 Device 2 (Two) Times a Day. 300 each 3   • Lancets (accu-chek soft touch) lancets Used to test blood sugar for Diabetes E11.65 test up to twice a day 100 each 12   • lisinopril (PRINIVIL,ZESTRIL) 40 MG tablet Take 1 tablet by mouth Daily. 90 tablet 1   • meloxicam (MOBIC) 15 MG tablet Take 1 tablet by mouth Daily As Needed for Moderate Pain . 90 tablet 1   • metFORMIN ER (GLUCOPHAGE-XR) 750 MG 24 hr tablet Take 2 tablets by mouth " "Daily With Breakfast. 180 tablet 1   • omeprazole (priLOSEC) 20 MG capsule Take 1 capsule by mouth Daily. 90 capsule 1   • [DISCONTINUED] atorvastatin (LIPITOR) 10 MG tablet Take 1 tablet by mouth Daily. 30 tablet 3   • [DISCONTINUED] busPIRone (BUSPAR) 10 MG tablet Take 1 tablet by mouth 2 (two) times a day. 60 tablet 3   • [DISCONTINUED] citalopram (CeleXA) 20 MG tablet Take 1 tablet by mouth Daily. 30 tablet 3   • [DISCONTINUED] glipizide (GLUCOTROL) 5 MG tablet TAKE 1 TABLET BY MOUTH ONCE DAILY IN THE MORNING 30 tablet 0   • [DISCONTINUED] insulin NPH (NovoLIN N) 100 UNIT/ML injection 15 units with am meal and 10 units with pm meal 10 mL 2   • [DISCONTINUED] Insulin Syringe-Needle U-100 28G X 5/16\" 1 ML misc 1 Device Every Night. 100 each 3   • [DISCONTINUED] lisinopril (PRINIVIL,ZESTRIL) 40 MG tablet Take 1 tablet by mouth Daily. 30 tablet 4   • [DISCONTINUED] meloxicam (MOBIC) 15 MG tablet TAKE 1 TABLET BY MOUTH ONCE DAILY AS NEEDED FOR MODERATE PAIN 30 tablet 0   • [DISCONTINUED] metFORMIN ER (GLUCOPHAGE-XR) 750 MG 24 hr tablet Take 2 tablets by mouth Daily With Breakfast. 60 tablet 3   • [DISCONTINUED] omeprazole (priLOSEC) 20 MG capsule Take 20 mg by mouth Daily.       Allergies   Allergen Reactions   • Shellfish-Derived Products Anaphylaxis   • Codeine Nausea Only     Not sure of reaction but thinks it is just nausea       Lab Results   Component Value Date    HGBA1C 10.6 08/10/2021     Lab Results   Component Value Date    GLUCOSE 241 (H) 09/03/2021    BUN 21 (H) 09/03/2021    CREATININE 1.04 09/03/2021    EGFRIFNONA 74 09/03/2021    BCR 20.2 09/03/2021    K 4.6 09/03/2021    CO2 25.2 09/03/2021    CALCIUM 9.5 09/03/2021    ALBUMIN 4.50 09/03/2021    AST 13 09/03/2021    ALT 14 09/03/2021     Lab Results   Component Value Date    CHOL 163 09/03/2021    CHLPL 192 11/03/2015    TRIG 161 (H) 09/03/2021    HDL 42 09/03/2021    LDL 93 09/03/2021         Objective   Physical Exam   Constitutional: He is " "oriented to person, place, and time. He appears well-developed and well-nourished. No distress.   HENT:   Head: Normocephalic and atraumatic.   Right Ear: External ear normal.   Left Ear: External ear normal.   Mouth/Throat: Oropharynx is clear and moist.   Eyes: Right eye exhibits no discharge. Left eye exhibits no discharge. No scleral icterus.   Neck: Neck normal appearance.  Cardiovascular: Normal pulse (patient directed exam).      Pulmonary/Chest: Effort normal. No accessory muscle usage.  No respiratory distress.No use of oxygen by nasal cannula  Abdominal: Abdomen appears normal.   Neurological: He is alert and oriented to person, place, and time.   Skin: Skin is dry. He is not diaphoretic. No erythema. No pallor.   Psychiatric: He has a normal mood and affect. His speech is normal and behavior is normal. Judgment normal. He is attentive.         Vitals:    01/24/22 0903   BP: 130/70   Temp: 98 °F (36.7 °C)     There is no height or weight on file to calculate BMI.        Assessment/Plan   Diagnoses and all orders for this visit:    1. Uncontrolled type 2 diabetes mellitus with hyperglycemia (HCC) (Primary)  -     Hemoglobin A1c; Future  -     Lipid Panel; Future  -     Comprehensive Metabolic Panel; Future  -     glipizide (GLUCOTROL) 5 MG tablet; Take 1 tablet by mouth Every Morning.  Dispense: 90 tablet; Refill: 1  -     insulin NPH (NovoLIN N) 100 UNIT/ML injection; 15 units with am meal and 15 units with pm meal  Dispense: 30 mL; Refill: 1  -     metFORMIN ER (GLUCOPHAGE-XR) 750 MG 24 hr tablet; Take 2 tablets by mouth Daily With Breakfast.  Dispense: 180 tablet; Refill: 1  -     lisinopril (PRINIVIL,ZESTRIL) 40 MG tablet; Take 1 tablet by mouth Daily.  Dispense: 90 tablet; Refill: 1  -     Insulin Syringe-Needle U-100 28G X 5/16\" 1 ML misc; 1 Device 2 (Two) Times a Day.  Dispense: 300 each; Refill: 3  Diabetes is uncontrolled based off last A1c and home glucose readings.  We will increase his NPH to 15 " units twice daily.  Continue Metformin, and glipizide.  Encouraged ADA diet and routine physical activity.  He will stop by the office for labs sometime this week  2. Essential hypertension  -     Hemoglobin A1c; Future  -     Lipid Panel; Future  -     Comprehensive Metabolic Panel; Future  -     lisinopril (PRINIVIL,ZESTRIL) 40 MG tablet; Take 1 tablet by mouth Daily.  Dispense: 90 tablet; Refill: 1  Well-controlled per home BP readings.  Continue lisinopril.  Recheck labs as above.  3. Pure hypercholesterolemia  -     Hemoglobin A1c; Future  -     Lipid Panel; Future  -     Comprehensive Metabolic Panel; Future  -     atorvastatin (LIPITOR) 10 MG tablet; Take 1 tablet by mouth Daily.  Dispense: 90 tablet; Refill: 1  Stable on last labs with LDL of 93.  Will continue statin therapy  4. Anxiety  -     Hemoglobin A1c; Future  -     Comprehensive Metabolic Panel; Future  -     busPIRone (BUSPAR) 10 MG tablet; Take 1 tablet by mouth 2 (Two) Times a Day.  Dispense: 180 tablet; Refill: 1  -     citalopram (CeleXA) 20 MG tablet; Take 1 tablet by mouth Daily.  Dispense: 90 tablet; Refill: 1  Symptoms stable.  Continue citalopram and BuSpar.  5. Recurrent major depressive disorder, in partial remission (HCC)  -     Hemoglobin A1c; Future  -     Comprehensive Metabolic Panel; Future  -     citalopram (CeleXA) 20 MG tablet; Take 1 tablet by mouth Daily.  Dispense: 90 tablet; Refill: 1  Symptoms stable.  Continue citalopram.  6. Arthritis  -     meloxicam (MOBIC) 15 MG tablet; Take 1 tablet by mouth Daily As Needed for Moderate Pain .  Dispense: 90 tablet; Refill: 1  Meloxicam as needed.  Recheck renal function with labs.    7. Chronic GERD  -     omeprazole (priLOSEC) 20 MG capsule; Take 1 capsule by mouth Daily.  Dispense: 90 capsule; Refill: 1  Symptoms stable.  Continue PPI therapy.  May consider changing over to Pepcid at follow-up.           Return in about 3 months (around 4/24/2022) for chronic condition follow up,  and needs to return to office for labs ordered.    I have reviewed the limitations of a telehealth visit (such as lack of a physical exam and unable to obtain vital signs) and advised the patient that they may need to follow up for an office visit should their symptoms or concerns persist, worsen, or change.  Patient was encouraged to keep me informed of any acute changes, lack of improvement, or any new concerning symptoms.   I discussed my findings,recommendations, and plan of care was with the patient. They verbalized understanding and agreement.    Dictated Utilizing Dragon Dictation   Please note that portions of this note were completed with a voice recognition program.   Part of this note may be an electronic transcription/translation of spoken language to printed text using the Dragon Dictation System.

## 2022-01-28 DIAGNOSIS — F33.41 RECURRENT MAJOR DEPRESSIVE DISORDER, IN PARTIAL REMISSION: ICD-10-CM

## 2022-01-28 DIAGNOSIS — M19.90 ARTHRITIS: ICD-10-CM

## 2022-01-28 DIAGNOSIS — K21.9 CHRONIC GERD: ICD-10-CM

## 2022-01-28 DIAGNOSIS — E78.00 PURE HYPERCHOLESTEROLEMIA: ICD-10-CM

## 2022-01-28 DIAGNOSIS — I10 ESSENTIAL HYPERTENSION: ICD-10-CM

## 2022-01-28 DIAGNOSIS — F41.9 ANXIETY: ICD-10-CM

## 2022-01-28 DIAGNOSIS — E11.65 UNCONTROLLED TYPE 2 DIABETES MELLITUS WITH HYPERGLYCEMIA: ICD-10-CM

## 2022-01-28 RX ORDER — METFORMIN HYDROCHLORIDE 750 MG/1
1500 TABLET, EXTENDED RELEASE ORAL
Qty: 180 TABLET | Refills: 1 | Status: SHIPPED | OUTPATIENT
Start: 2022-01-28 | End: 2022-07-25 | Stop reason: SDUPTHER

## 2022-01-28 RX ORDER — MELOXICAM 15 MG/1
15 TABLET ORAL DAILY PRN
Qty: 90 TABLET | Refills: 1 | Status: SHIPPED | OUTPATIENT
Start: 2022-01-28 | End: 2022-09-02

## 2022-01-28 RX ORDER — ATORVASTATIN CALCIUM 10 MG/1
10 TABLET, FILM COATED ORAL DAILY
Qty: 90 TABLET | Refills: 1 | Status: SHIPPED | OUTPATIENT
Start: 2022-01-28 | End: 2022-04-25 | Stop reason: SDUPTHER

## 2022-01-28 RX ORDER — OMEPRAZOLE 20 MG/1
20 CAPSULE, DELAYED RELEASE ORAL DAILY
Qty: 90 CAPSULE | Refills: 1 | Status: SHIPPED | OUTPATIENT
Start: 2022-01-28

## 2022-01-28 RX ORDER — CITALOPRAM 20 MG/1
20 TABLET ORAL DAILY
Qty: 90 TABLET | Refills: 1 | Status: SHIPPED | OUTPATIENT
Start: 2022-01-28 | End: 2022-07-25 | Stop reason: SDUPTHER

## 2022-01-28 RX ORDER — LISINOPRIL 40 MG/1
40 TABLET ORAL DAILY
Qty: 90 TABLET | Refills: 1 | Status: SHIPPED | OUTPATIENT
Start: 2022-01-28 | End: 2022-07-25 | Stop reason: SDUPTHER

## 2022-01-28 RX ORDER — GLIPIZIDE 5 MG/1
5 TABLET ORAL EVERY MORNING
Qty: 90 TABLET | Refills: 1 | Status: SHIPPED | OUTPATIENT
Start: 2022-01-28 | End: 2022-07-25 | Stop reason: SDUPTHER

## 2022-01-28 RX ORDER — HUMAN INSULIN 100 [IU]/ML
INJECTION, SUSPENSION SUBCUTANEOUS
Qty: 30 ML | Refills: 1
Start: 2022-01-28 | End: 2022-05-13

## 2022-01-28 RX ORDER — BUSPIRONE HYDROCHLORIDE 10 MG/1
10 TABLET ORAL 2 TIMES DAILY
Qty: 180 TABLET | Refills: 1 | Status: SHIPPED | OUTPATIENT
Start: 2022-01-28 | End: 2022-07-25 | Stop reason: SDUPTHER

## 2022-01-28 NOTE — TELEPHONE ENCOUNTER
Called and spoke with patient, informed him that I did send over medications to the pharmacy again. He verbalized understanding and had no further questions.

## 2022-01-28 NOTE — TELEPHONE ENCOUNTER
Patient called stated pharmacy never received refills. Escibe states Pharmacy transmittion failed. Needs meds resent

## 2022-04-21 ENCOUNTER — LAB (OUTPATIENT)
Dept: LAB | Facility: HOSPITAL | Age: 57
End: 2022-04-21

## 2022-04-21 DIAGNOSIS — I10 ESSENTIAL HYPERTENSION: ICD-10-CM

## 2022-04-21 DIAGNOSIS — E11.65 UNCONTROLLED TYPE 2 DIABETES MELLITUS WITH HYPERGLYCEMIA: ICD-10-CM

## 2022-04-21 DIAGNOSIS — E78.00 PURE HYPERCHOLESTEROLEMIA: ICD-10-CM

## 2022-04-21 DIAGNOSIS — F41.9 ANXIETY: ICD-10-CM

## 2022-04-21 DIAGNOSIS — F33.41 RECURRENT MAJOR DEPRESSIVE DISORDER, IN PARTIAL REMISSION: ICD-10-CM

## 2022-04-21 LAB
ALBUMIN SERPL-MCNC: 5 G/DL (ref 3.5–5.2)
ALBUMIN/GLOB SERPL: 1.8 G/DL
ALP SERPL-CCNC: 57 U/L (ref 39–117)
ALT SERPL W P-5'-P-CCNC: 18 U/L (ref 1–41)
ANION GAP SERPL CALCULATED.3IONS-SCNC: 13.3 MMOL/L (ref 5–15)
AST SERPL-CCNC: 15 U/L (ref 1–40)
BILIRUB SERPL-MCNC: 0.7 MG/DL (ref 0–1.2)
BUN SERPL-MCNC: 28 MG/DL (ref 6–20)
BUN/CREAT SERPL: 22.8 (ref 7–25)
CALCIUM SPEC-SCNC: 9.4 MG/DL (ref 8.6–10.5)
CHLORIDE SERPL-SCNC: 99 MMOL/L (ref 98–107)
CHOLEST SERPL-MCNC: 178 MG/DL (ref 0–200)
CO2 SERPL-SCNC: 22.7 MMOL/L (ref 22–29)
CREAT SERPL-MCNC: 1.23 MG/DL (ref 0.76–1.27)
EGFRCR SERPLBLD CKD-EPI 2021: 68.5 ML/MIN/1.73
GLOBULIN UR ELPH-MCNC: 2.8 GM/DL
GLUCOSE SERPL-MCNC: 260 MG/DL (ref 65–99)
HBA1C MFR BLD: 9.6 % (ref 4.8–5.6)
HDLC SERPL-MCNC: 39 MG/DL (ref 40–60)
LDLC SERPL CALC-MCNC: 94 MG/DL (ref 0–100)
LDLC/HDLC SERPL: 2.19 {RATIO}
POTASSIUM SERPL-SCNC: 4.2 MMOL/L (ref 3.5–5.2)
PROT SERPL-MCNC: 7.8 G/DL (ref 6–8.5)
SODIUM SERPL-SCNC: 135 MMOL/L (ref 136–145)
TRIGL SERPL-MCNC: 268 MG/DL (ref 0–150)
VLDLC SERPL-MCNC: 45 MG/DL (ref 5–40)

## 2022-04-21 PROCEDURE — 83036 HEMOGLOBIN GLYCOSYLATED A1C: CPT | Performed by: NURSE PRACTITIONER

## 2022-04-21 PROCEDURE — 80053 COMPREHEN METABOLIC PANEL: CPT | Performed by: NURSE PRACTITIONER

## 2022-04-21 PROCEDURE — 80061 LIPID PANEL: CPT | Performed by: NURSE PRACTITIONER

## 2022-04-25 ENCOUNTER — OFFICE VISIT (OUTPATIENT)
Dept: INTERNAL MEDICINE | Facility: CLINIC | Age: 57
End: 2022-04-25

## 2022-04-25 ENCOUNTER — LAB (OUTPATIENT)
Dept: LAB | Facility: HOSPITAL | Age: 57
End: 2022-04-25

## 2022-04-25 VITALS
TEMPERATURE: 97.3 F | BODY MASS INDEX: 36.45 KG/M2 | WEIGHT: 226.8 LBS | HEART RATE: 91 BPM | DIASTOLIC BLOOD PRESSURE: 88 MMHG | HEIGHT: 66 IN | SYSTOLIC BLOOD PRESSURE: 150 MMHG | RESPIRATION RATE: 16 BRPM | OXYGEN SATURATION: 96 %

## 2022-04-25 DIAGNOSIS — E11.65 UNCONTROLLED TYPE 2 DIABETES MELLITUS WITH HYPERGLYCEMIA: Primary | ICD-10-CM

## 2022-04-25 DIAGNOSIS — K21.9 CHRONIC GERD: ICD-10-CM

## 2022-04-25 DIAGNOSIS — I10 ESSENTIAL HYPERTENSION: ICD-10-CM

## 2022-04-25 DIAGNOSIS — E78.00 PURE HYPERCHOLESTEROLEMIA: ICD-10-CM

## 2022-04-25 DIAGNOSIS — F41.9 ANXIETY: ICD-10-CM

## 2022-04-25 PROCEDURE — 99214 OFFICE O/P EST MOD 30 MIN: CPT | Performed by: NURSE PRACTITIONER

## 2022-04-25 PROCEDURE — 82043 UR ALBUMIN QUANTITATIVE: CPT | Performed by: NURSE PRACTITIONER

## 2022-04-25 PROCEDURE — 82570 ASSAY OF URINE CREATININE: CPT | Performed by: NURSE PRACTITIONER

## 2022-04-25 RX ORDER — AMLODIPINE BESYLATE 5 MG/1
5 TABLET ORAL DAILY
Qty: 90 TABLET | Refills: 1 | Status: SHIPPED | OUTPATIENT
Start: 2022-04-25 | End: 2022-05-17

## 2022-04-25 RX ORDER — ATORVASTATIN CALCIUM 10 MG/1
10 TABLET, FILM COATED ORAL DAILY
Qty: 90 TABLET | Refills: 1 | Status: SHIPPED | OUTPATIENT
Start: 2022-04-25 | End: 2022-07-25 | Stop reason: SDUPTHER

## 2022-04-25 RX ORDER — DULAGLUTIDE 1.5 MG/.5ML
1.5 INJECTION, SOLUTION SUBCUTANEOUS WEEKLY
Qty: 4 PEN | Refills: 3 | Status: SHIPPED | OUTPATIENT
Start: 2022-04-25 | End: 2022-07-25 | Stop reason: SDUPTHER

## 2022-04-26 LAB
ALBUMIN UR-MCNC: 62.1 MG/DL
CREAT UR-MCNC: 99.5 MG/DL
MICROALBUMIN/CREAT UR: 624.1 MG/G

## 2022-05-13 DIAGNOSIS — E11.65 UNCONTROLLED TYPE 2 DIABETES MELLITUS WITH HYPERGLYCEMIA: ICD-10-CM

## 2022-05-13 RX ORDER — HUMAN INSULIN 100 [IU]/ML
INJECTION, SUSPENSION SUBCUTANEOUS
Qty: 10 ML | Refills: 0 | Status: SHIPPED | OUTPATIENT
Start: 2022-05-13 | End: 2022-07-25 | Stop reason: SDUPTHER

## 2022-05-13 NOTE — TELEPHONE ENCOUNTER
Rx Refill Note  Requested Prescriptions     Pending Prescriptions Disp Refills   • insulin NPH (NovoLIN N ReliOn) 100 UNIT/ML injection [Pharmacy Med Name: NovoLIN N ReliOn 100 UNIT/ML Subcutaneous Suspension] 10 mL 0     Sig: INJECT 10 UNITS WITH MORNING MEAL AND 5 UNITS WITH EVENING MEAL      Last filled: 01/18/2022  Last office visit with prescribing clinician: 4/25/2022      Next office visit with prescribing clinician: 7/25/2022 April TIA Nicole MA  05/13/22, 11:16 EDT

## 2022-05-17 ENCOUNTER — CLINICAL SUPPORT (OUTPATIENT)
Dept: INTERNAL MEDICINE | Facility: CLINIC | Age: 57
End: 2022-05-17

## 2022-05-17 ENCOUNTER — TELEPHONE (OUTPATIENT)
Dept: INTERNAL MEDICINE | Facility: CLINIC | Age: 57
End: 2022-05-17

## 2022-05-17 VITALS — SYSTOLIC BLOOD PRESSURE: 142 MMHG | DIASTOLIC BLOOD PRESSURE: 76 MMHG

## 2022-05-17 RX ORDER — AMLODIPINE BESYLATE 10 MG/1
10 TABLET ORAL DAILY
Qty: 30 TABLET | Refills: 2 | Status: SHIPPED | OUTPATIENT
Start: 2022-05-17 | End: 2022-07-25 | Stop reason: SDUPTHER

## 2022-05-17 NOTE — TELEPHONE ENCOUNTER
Lets increase the amlodipine to 10 mg daily and see how he does with this over a few weeks.    new script sent. Please call and d/c old refills.

## 2022-07-25 ENCOUNTER — OFFICE VISIT (OUTPATIENT)
Dept: INTERNAL MEDICINE | Facility: CLINIC | Age: 57
End: 2022-07-25

## 2022-07-25 VITALS
HEIGHT: 66 IN | OXYGEN SATURATION: 98 % | TEMPERATURE: 98 F | HEART RATE: 87 BPM | RESPIRATION RATE: 18 BRPM | WEIGHT: 218 LBS | SYSTOLIC BLOOD PRESSURE: 132 MMHG | BODY MASS INDEX: 35.03 KG/M2 | DIASTOLIC BLOOD PRESSURE: 84 MMHG

## 2022-07-25 DIAGNOSIS — E78.00 PURE HYPERCHOLESTEROLEMIA: ICD-10-CM

## 2022-07-25 DIAGNOSIS — E11.9 TYPE 2 DIABETES MELLITUS WITHOUT COMPLICATION, WITHOUT LONG-TERM CURRENT USE OF INSULIN: Primary | ICD-10-CM

## 2022-07-25 DIAGNOSIS — I10 ESSENTIAL HYPERTENSION: ICD-10-CM

## 2022-07-25 DIAGNOSIS — K21.9 CHRONIC GERD: ICD-10-CM

## 2022-07-25 DIAGNOSIS — F41.9 ANXIETY: ICD-10-CM

## 2022-07-25 DIAGNOSIS — F33.41 RECURRENT MAJOR DEPRESSIVE DISORDER, IN PARTIAL REMISSION: ICD-10-CM

## 2022-07-25 DIAGNOSIS — Z23 NEED FOR VACCINATION: ICD-10-CM

## 2022-07-25 LAB
EXPIRATION DATE: NORMAL
HBA1C MFR BLD: 5.5 %
Lab: NORMAL

## 2022-07-25 PROCEDURE — 99214 OFFICE O/P EST MOD 30 MIN: CPT | Performed by: NURSE PRACTITIONER

## 2022-07-25 PROCEDURE — 91305 COVID-19 (PFIZER) 12+ YRS: CPT | Performed by: NURSE PRACTITIONER

## 2022-07-25 PROCEDURE — 90471 IMMUNIZATION ADMIN: CPT | Performed by: NURSE PRACTITIONER

## 2022-07-25 PROCEDURE — 0051A COVID-19 (PFIZER) 12+ YRS: CPT | Performed by: NURSE PRACTITIONER

## 2022-07-25 PROCEDURE — 90677 PCV20 VACCINE IM: CPT | Performed by: NURSE PRACTITIONER

## 2022-07-25 PROCEDURE — 83036 HEMOGLOBIN GLYCOSYLATED A1C: CPT | Performed by: NURSE PRACTITIONER

## 2022-07-25 RX ORDER — HUMAN INSULIN 100 [IU]/ML
INJECTION, SUSPENSION SUBCUTANEOUS
Qty: 10 ML | Refills: 0 | Status: SHIPPED | OUTPATIENT
Start: 2022-07-25

## 2022-07-25 RX ORDER — ATORVASTATIN CALCIUM 10 MG/1
10 TABLET, FILM COATED ORAL DAILY
Qty: 90 TABLET | Refills: 1 | Status: SHIPPED | OUTPATIENT
Start: 2022-07-25

## 2022-07-25 RX ORDER — LISINOPRIL 40 MG/1
40 TABLET ORAL DAILY
Qty: 90 TABLET | Refills: 1 | Status: SHIPPED | OUTPATIENT
Start: 2022-07-25

## 2022-07-25 RX ORDER — BUSPIRONE HYDROCHLORIDE 10 MG/1
10 TABLET ORAL 2 TIMES DAILY
Qty: 180 TABLET | Refills: 1 | Status: SHIPPED | OUTPATIENT
Start: 2022-07-25

## 2022-07-25 RX ORDER — DULAGLUTIDE 1.5 MG/.5ML
1.5 INJECTION, SOLUTION SUBCUTANEOUS WEEKLY
Qty: 4 PEN | Refills: 3 | Status: SHIPPED | OUTPATIENT
Start: 2022-07-25

## 2022-07-25 RX ORDER — GLIPIZIDE 5 MG/1
5 TABLET ORAL EVERY MORNING
Qty: 90 TABLET | Refills: 1 | Status: SHIPPED | OUTPATIENT
Start: 2022-07-25

## 2022-07-25 RX ORDER — AMLODIPINE BESYLATE 10 MG/1
10 TABLET ORAL DAILY
Qty: 90 TABLET | Refills: 1 | Status: SHIPPED | OUTPATIENT
Start: 2022-07-25

## 2022-07-25 RX ORDER — CITALOPRAM 20 MG/1
20 TABLET ORAL DAILY
Qty: 90 TABLET | Refills: 1 | Status: SHIPPED | OUTPATIENT
Start: 2022-07-25 | End: 2023-01-16

## 2022-07-25 RX ORDER — METFORMIN HYDROCHLORIDE 750 MG/1
1500 TABLET, EXTENDED RELEASE ORAL
Qty: 180 TABLET | Refills: 1 | Status: SHIPPED | OUTPATIENT
Start: 2022-07-25

## 2022-08-10 ENCOUNTER — PATIENT MESSAGE (OUTPATIENT)
Dept: INTERNAL MEDICINE | Facility: CLINIC | Age: 57
End: 2022-08-10

## 2022-08-18 NOTE — TELEPHONE ENCOUNTER
Called and spoke with patient, informed him that his shipment has arrived. He verbalized understanding and had no further questions.

## 2022-09-02 DIAGNOSIS — M19.90 ARTHRITIS: ICD-10-CM

## 2022-09-02 RX ORDER — MELOXICAM 15 MG/1
TABLET ORAL
Qty: 30 TABLET | Refills: 0 | Status: SHIPPED | OUTPATIENT
Start: 2022-09-02 | End: 2022-10-14

## 2022-09-02 NOTE — TELEPHONE ENCOUNTER
Rx Refill Note  Requested Prescriptions     Pending Prescriptions Disp Refills   • meloxicam (MOBIC) 15 MG tablet [Pharmacy Med Name: Meloxicam 15 MG Oral Tablet] 90 tablet 0     Sig: TAKE 1 TABLET BY MOUTH ONCE DAILY AS NEEDED FOR MODERATE PAIN      Last filled: 01/28/2022  Last office visit with prescribing clinician: 7/25/2022      Next office visit with prescribing clinician: 10/25/2022     April TIA Nicole MA  09/02/22, 11:19 EDT

## 2022-10-14 DIAGNOSIS — M19.90 ARTHRITIS: ICD-10-CM

## 2022-10-14 RX ORDER — MELOXICAM 15 MG/1
TABLET ORAL
Qty: 30 TABLET | Refills: 0 | Status: SHIPPED | OUTPATIENT
Start: 2022-10-14 | End: 2022-11-28

## 2022-10-14 NOTE — TELEPHONE ENCOUNTER
Rx Refill Note  Requested Prescriptions     Pending Prescriptions Disp Refills   • meloxicam (MOBIC) 15 MG tablet [Pharmacy Med Name: Meloxicam 15 MG Oral Tablet] 30 tablet 0     Sig: TAKE 1 TABLET BY MOUTH ONCE DAILY AS NEEDED FOR MODERATE PAIN      Last filled:09/02/2022  Last office visit with prescribing clinician: 7/25/2022      Next office visit with prescribing clinician: 10/25/2022     April TIA Nicole MA  10/14/22, 11:15 EDT

## 2022-11-28 DIAGNOSIS — M19.90 ARTHRITIS: ICD-10-CM

## 2022-11-28 RX ORDER — MELOXICAM 15 MG/1
TABLET ORAL
Qty: 30 TABLET | Refills: 0 | Status: SHIPPED | OUTPATIENT
Start: 2022-11-28 | End: 2023-01-16

## 2022-11-28 NOTE — TELEPHONE ENCOUNTER
Rx Refill Note  Requested Prescriptions     Pending Prescriptions Disp Refills   • meloxicam (MOBIC) 15 MG tablet [Pharmacy Med Name: Meloxicam 15 MG Oral Tablet] 30 tablet 0     Sig: TAKE 1 TABLET BY MOUTH ONCE DAILY AS NEEDED FOR MODERATE PAIN      Last filled:  Last office visit with prescribing clinician: 7/25/2022      Next office visit with prescribing clinician: Visit date not found     April TIA Nicole MA  11/28/22, 09:39 EST

## 2022-12-01 ENCOUNTER — TELEPHONE (OUTPATIENT)
Dept: INTERNAL MEDICINE | Facility: CLINIC | Age: 57
End: 2022-12-01

## 2022-12-01 NOTE — TELEPHONE ENCOUNTER
Patient has been notified his trulicity from [facility] is ready to be picked up.  He said will  today.

## 2023-01-15 ENCOUNTER — TELEPHONE (OUTPATIENT)
Dept: INTERNAL MEDICINE | Facility: CLINIC | Age: 58
End: 2023-01-15
Payer: COMMERCIAL

## 2023-01-15 DIAGNOSIS — F41.9 ANXIETY: ICD-10-CM

## 2023-01-15 DIAGNOSIS — M19.90 ARTHRITIS: ICD-10-CM

## 2023-01-15 DIAGNOSIS — F33.41 RECURRENT MAJOR DEPRESSIVE DISORDER, IN PARTIAL REMISSION: ICD-10-CM

## 2023-01-16 RX ORDER — CITALOPRAM 20 MG/1
TABLET ORAL
Qty: 90 TABLET | Refills: 0 | Status: SHIPPED | OUTPATIENT
Start: 2023-01-16

## 2023-01-16 RX ORDER — MELOXICAM 15 MG/1
TABLET ORAL
Qty: 30 TABLET | Refills: 0 | Status: SHIPPED | OUTPATIENT
Start: 2023-01-16

## 2023-01-16 NOTE — TELEPHONE ENCOUNTER
Patient has been notified to contact the pharmacy on when the scripts will be ready for .  He verbalized understanding.

## 2023-01-16 NOTE — TELEPHONE ENCOUNTER
Pt called an scheduled appt. I let pt know that request for refill has been sent. Pt would like to know when refill request will go through.

## 2023-01-16 NOTE — TELEPHONE ENCOUNTER
Refill request meloxicam   Last refill 11/28/22    Citalopram   Last refill 7/25/22  Last visit 7/25/22  Called pt to inform an appt is needed pt scheduled and appt and stated he is out of meds and needs enough to get him to his appt

## 2023-03-01 ENCOUNTER — TELEPHONE (OUTPATIENT)
Dept: INTERNAL MEDICINE | Facility: CLINIC | Age: 58
End: 2023-03-01
Payer: COMMERCIAL

## 2023-03-01 NOTE — TELEPHONE ENCOUNTER
Called and spoke with patient, informed him that his Trulicity arrived today and is ready for . Medication is in the Fridge in [address].

## 2023-05-19 DIAGNOSIS — I10 ESSENTIAL HYPERTENSION: ICD-10-CM

## 2023-05-19 DIAGNOSIS — E11.9 TYPE 2 DIABETES MELLITUS WITHOUT COMPLICATION, WITHOUT LONG-TERM CURRENT USE OF INSULIN: ICD-10-CM

## 2023-05-19 RX ORDER — LISINOPRIL 40 MG/1
TABLET ORAL
Qty: 30 TABLET | Refills: 0 | Status: SHIPPED | OUTPATIENT
Start: 2023-05-19

## 2023-05-19 NOTE — TELEPHONE ENCOUNTER
Rx Refill Note  Requested Prescriptions     Pending Prescriptions Disp Refills   • lisinopril (PRINIVIL,ZESTRIL) 40 MG tablet [Pharmacy Med Name: Lisinopril 40 MG Oral Tablet] 90 tablet 0     Sig: Take 1 tablet by mouth once daily      Last office visit with prescribing clinician: 7/25/2022     Next office visit with prescribing clinician: 6/20/2023   {    Pina Tejeda MA  05/19/23, 13:55 EDT

## 2023-05-19 NOTE — TELEPHONE ENCOUNTER
Spoke with pt and tried to get pt scheduled for his physical but pt states he is going to lose his insurance and requested office visit for HTN refill.

## 2023-10-02 ENCOUNTER — HOSPITAL ENCOUNTER (EMERGENCY)
Facility: HOSPITAL | Age: 58
Discharge: HOME | End: 2023-10-02
Attending: EMERGENCY MEDICINE | Admitting: EMERGENCY MEDICINE
Payer: MEDICAID

## 2023-10-02 VITALS
RESPIRATION RATE: 18 BRPM | TEMPERATURE: 97.9 F | OXYGEN SATURATION: 95 % | WEIGHT: 205 LBS | HEART RATE: 79 BPM | BODY MASS INDEX: 32.95 KG/M2 | HEIGHT: 66 IN | SYSTOLIC BLOOD PRESSURE: 139 MMHG | DIASTOLIC BLOOD PRESSURE: 82 MMHG

## 2023-10-02 DIAGNOSIS — E11.65 TYPE 2 DIABETES MELLITUS WITH HYPERGLYCEMIA, UNSPECIFIED WHETHER LONG TERM INSULIN USE: Primary | ICD-10-CM

## 2023-10-02 DIAGNOSIS — E11.9 TYPE 2 DIABETES MELLITUS WITHOUT COMPLICATION, WITHOUT LONG-TERM CURRENT USE OF INSULIN: ICD-10-CM

## 2023-10-02 DIAGNOSIS — I10 ESSENTIAL HYPERTENSION: ICD-10-CM

## 2023-10-02 DIAGNOSIS — G43.009 MIGRAINE WITHOUT AURA AND WITHOUT STATUS MIGRAINOSUS, NOT INTRACTABLE: ICD-10-CM

## 2023-10-02 DIAGNOSIS — E83.42 HYPOMAGNESEMIA: ICD-10-CM

## 2023-10-02 LAB
ALBUMIN SERPL-MCNC: 3.7 G/DL (ref 3.5–5.2)
ALBUMIN/GLOB SERPL: 1 G/DL
ALP SERPL-CCNC: 92 U/L (ref 39–117)
ALT SERPL W P-5'-P-CCNC: 12 U/L (ref 1–41)
ANION GAP SERPL CALCULATED.3IONS-SCNC: 14 MMOL/L (ref 5–15)
AST SERPL-CCNC: 12 U/L (ref 1–40)
ATMOSPHERIC PRESS: ABNORMAL MM[HG]
B-OH-BUTYR SERPL-SCNC: 1.04 MMOL/L (ref 0.02–0.27)
BACTERIA UR QL AUTO: ABNORMAL /HPF
BASE EXCESS BLDV CALC-SCNC: -2.4 MMOL/L (ref -2–2)
BASOPHILS # BLD AUTO: 0.03 10*3/MM3 (ref 0–0.2)
BASOPHILS NFR BLD AUTO: 0.3 % (ref 0–1.5)
BDY SITE: ABNORMAL
BILIRUB SERPL-MCNC: 0.7 MG/DL (ref 0–1.2)
BILIRUB UR QL STRIP: NEGATIVE
BODY TEMPERATURE: 37 C
BUN SERPL-MCNC: 14 MG/DL (ref 6–20)
BUN/CREAT SERPL: 13.2 (ref 7–25)
CALCIUM SPEC-SCNC: 8.6 MG/DL (ref 8.6–10.5)
CHLORIDE SERPL-SCNC: 96 MMOL/L (ref 98–107)
CLARITY UR: CLEAR
CO2 BLDA-SCNC: 23.7 MMOL/L (ref 22–33)
CO2 SERPL-SCNC: 20 MMOL/L (ref 22–29)
COHGB MFR BLD: 1.5 %
COLOR UR: YELLOW
CREAT SERPL-MCNC: 1.06 MG/DL (ref 0.76–1.27)
D-LACTATE SERPL-SCNC: 1.5 MMOL/L (ref 0.5–2)
DEPRECATED RDW RBC AUTO: 41.6 FL (ref 37–54)
EGFRCR SERPLBLD CKD-EPI 2021: 81.3 ML/MIN/1.73
EOSINOPHIL # BLD AUTO: 0.04 10*3/MM3 (ref 0–0.4)
EOSINOPHIL NFR BLD AUTO: 0.4 % (ref 0.3–6.2)
EPAP: 0
ERYTHROCYTE [DISTWIDTH] IN BLOOD BY AUTOMATED COUNT: 13 % (ref 12.3–15.4)
GLOBULIN UR ELPH-MCNC: 3.6 GM/DL
GLUCOSE BLDC GLUCOMTR-MCNC: 230 MG/DL (ref 70–130)
GLUCOSE SERPL-MCNC: 227 MG/DL (ref 65–99)
GLUCOSE UR STRIP-MCNC: ABNORMAL MG/DL
HCO3 BLDV-SCNC: 22.5 MMOL/L (ref 22–28)
HCT VFR BLD AUTO: 40.7 % (ref 37.5–51)
HGB BLD-MCNC: 14.2 G/DL (ref 13–17.7)
HGB BLDA-MCNC: 14.9 G/DL (ref 13.5–17.5)
HGB UR QL STRIP.AUTO: ABNORMAL
HYALINE CASTS UR QL AUTO: ABNORMAL /LPF
IMM GRANULOCYTES # BLD AUTO: 0.05 10*3/MM3 (ref 0–0.05)
IMM GRANULOCYTES NFR BLD AUTO: 0.5 % (ref 0–0.5)
INHALED O2 CONCENTRATION: 21 %
IPAP: 0
KETONES UR QL STRIP: ABNORMAL
LEUKOCYTE ESTERASE UR QL STRIP.AUTO: NEGATIVE
LYMPHOCYTES # BLD AUTO: 1.8 10*3/MM3 (ref 0.7–3.1)
LYMPHOCYTES NFR BLD AUTO: 18.9 % (ref 19.6–45.3)
MAGNESIUM SERPL-MCNC: 1.5 MG/DL (ref 1.6–2.6)
MCH RBC QN AUTO: 30.7 PG (ref 26.6–33)
MCHC RBC AUTO-ENTMCNC: 34.9 G/DL (ref 31.5–35.7)
MCV RBC AUTO: 87.9 FL (ref 79–97)
METHGB BLD QL: 0.4 %
MODALITY: ABNORMAL
MONOCYTES # BLD AUTO: 1.02 10*3/MM3 (ref 0.1–0.9)
MONOCYTES NFR BLD AUTO: 10.7 % (ref 5–12)
NEUTROPHILS NFR BLD AUTO: 6.58 10*3/MM3 (ref 1.7–7)
NEUTROPHILS NFR BLD AUTO: 69.2 % (ref 42.7–76)
NITRITE UR QL STRIP: NEGATIVE
NRBC BLD AUTO-RTO: 0 /100 WBC (ref 0–0.2)
OXYHGB MFR BLDV: 61 %
PAW @ PEAK INSP FLOW SETTING VENT: 0 CMH2O
PCO2 BLDV: 38.5 MM HG (ref 41–51)
PH BLDV: 7.38 PH UNITS (ref 7.31–7.41)
PH UR STRIP.AUTO: 5.5 [PH] (ref 5–8)
PLATELET # BLD AUTO: 170 10*3/MM3 (ref 140–450)
PMV BLD AUTO: 10.4 FL (ref 6–12)
PO2 BLDV: 33.4 MM HG (ref 27–53)
POTASSIUM SERPL-SCNC: 3.9 MMOL/L (ref 3.5–5.2)
PROT SERPL-MCNC: 7.3 G/DL (ref 6–8.5)
PROT UR QL STRIP: ABNORMAL
RBC # BLD AUTO: 4.63 10*6/MM3 (ref 4.14–5.8)
RBC # UR STRIP: ABNORMAL /HPF
REF LAB TEST METHOD: ABNORMAL
SODIUM SERPL-SCNC: 130 MMOL/L (ref 136–145)
SP GR UR STRIP: 1.03 (ref 1–1.03)
SQUAMOUS #/AREA URNS HPF: ABNORMAL /HPF
TOTAL RATE: 0 BREATHS/MINUTE
UROBILINOGEN UR QL STRIP: ABNORMAL
WBC # UR STRIP: ABNORMAL /HPF
WBC NRBC COR # BLD: 9.52 10*3/MM3 (ref 3.4–10.8)

## 2023-10-02 PROCEDURE — 25010000002 METOCLOPRAMIDE PER 10 MG: Performed by: EMERGENCY MEDICINE

## 2023-10-02 PROCEDURE — 96374 THER/PROPH/DIAG INJ IV PUSH: CPT

## 2023-10-02 PROCEDURE — 80053 COMPREHEN METABOLIC PANEL: CPT | Performed by: EMERGENCY MEDICINE

## 2023-10-02 PROCEDURE — 25010000002 DIPHENHYDRAMINE PER 50 MG: Performed by: EMERGENCY MEDICINE

## 2023-10-02 PROCEDURE — 83735 ASSAY OF MAGNESIUM: CPT | Performed by: EMERGENCY MEDICINE

## 2023-10-02 PROCEDURE — 83605 ASSAY OF LACTIC ACID: CPT | Performed by: EMERGENCY MEDICINE

## 2023-10-02 PROCEDURE — 85025 COMPLETE CBC W/AUTO DIFF WBC: CPT | Performed by: EMERGENCY MEDICINE

## 2023-10-02 PROCEDURE — 96375 TX/PRO/DX INJ NEW DRUG ADDON: CPT

## 2023-10-02 PROCEDURE — 82948 REAGENT STRIP/BLOOD GLUCOSE: CPT

## 2023-10-02 PROCEDURE — 25010000002 KETOROLAC TROMETHAMINE PER 15 MG: Performed by: EMERGENCY MEDICINE

## 2023-10-02 PROCEDURE — 99283 EMERGENCY DEPT VISIT LOW MDM: CPT

## 2023-10-02 PROCEDURE — 82010 KETONE BODYS QUAN: CPT | Performed by: EMERGENCY MEDICINE

## 2023-10-02 PROCEDURE — 82805 BLOOD GASES W/O2 SATURATION: CPT

## 2023-10-02 PROCEDURE — 81001 URINALYSIS AUTO W/SCOPE: CPT | Performed by: EMERGENCY MEDICINE

## 2023-10-02 RX ORDER — AMLODIPINE BESYLATE 10 MG/1
10 TABLET ORAL DAILY
Qty: 90 TABLET | Refills: 0 | Status: SHIPPED | OUTPATIENT
Start: 2023-10-02

## 2023-10-02 RX ORDER — METOCLOPRAMIDE HYDROCHLORIDE 5 MG/ML
10 INJECTION INTRAMUSCULAR; INTRAVENOUS ONCE
Status: COMPLETED | OUTPATIENT
Start: 2023-10-02 | End: 2023-10-02

## 2023-10-02 RX ORDER — LISINOPRIL 40 MG/1
40 TABLET ORAL DAILY
Qty: 30 TABLET | Refills: 0 | Status: SHIPPED | OUTPATIENT
Start: 2023-10-02

## 2023-10-02 RX ORDER — DULAGLUTIDE 1.5 MG/.5ML
1.5 INJECTION, SOLUTION SUBCUTANEOUS WEEKLY
Qty: 2 ML | Refills: 0 | Status: SHIPPED | OUTPATIENT
Start: 2023-10-02 | End: 2023-11-01

## 2023-10-02 RX ORDER — DIPHENHYDRAMINE HYDROCHLORIDE 50 MG/ML
25 INJECTION INTRAMUSCULAR; INTRAVENOUS ONCE
Status: COMPLETED | OUTPATIENT
Start: 2023-10-02 | End: 2023-10-02

## 2023-10-02 RX ORDER — KETOROLAC TROMETHAMINE 15 MG/ML
15 INJECTION, SOLUTION INTRAMUSCULAR; INTRAVENOUS ONCE
Status: COMPLETED | OUTPATIENT
Start: 2023-10-02 | End: 2023-10-02

## 2023-10-02 RX ADMIN — METOCLOPRAMIDE 10 MG: 5 INJECTION, SOLUTION INTRAMUSCULAR; INTRAVENOUS at 03:07

## 2023-10-02 RX ADMIN — DIPHENHYDRAMINE HYDROCHLORIDE 25 MG: 50 INJECTION INTRAMUSCULAR; INTRAVENOUS at 03:08

## 2023-10-02 RX ADMIN — MAGNESIUM OXIDE TAB 400 MG (241.3 MG ELEMENTAL MG) 400 MG: 400 (241.3 MG) TAB at 04:46

## 2023-10-02 RX ADMIN — KETOROLAC TROMETHAMINE 15 MG: 15 INJECTION, SOLUTION INTRAMUSCULAR; INTRAVENOUS at 03:07

## 2023-10-02 RX ADMIN — SODIUM CHLORIDE, POTASSIUM CHLORIDE, SODIUM LACTATE AND CALCIUM CHLORIDE 1000 ML: 600; 310; 30; 20 INJECTION, SOLUTION INTRAVENOUS at 03:07

## 2023-10-02 NOTE — ED PROVIDER NOTES
Subjective   History of Present Illness  Patient presents for evaluation of multiple complaints.  Primary complaint is a headache, predominantly left-sided and retro-orbital and throbbing in nature there is gradual onset 3 days ago.  It is improved by sleep but returns.  No other aggravating or alleviating factors are noted.  No trauma.  No fevers or chills.  Patient has had some nausea without vomiting.  He has had some polyuria and polydipsia, likely related to his second complaint which is running out of his diabetic medication Trulicity.  Reports his last dose was approximately 1 month ago.  He has not been checking his blood sugars.  He does not have abdominal pain today.    History provided by:  Patient    Review of Systems    Past Medical History:   Diagnosis Date    Allergic rhinitis     Anxiety     Dermatitis 8/2/2016    Diabetes mellitus     Diverticulitis     Gastroesophageal reflux disease 7/11/2016    GERD (gastroesophageal reflux disease)     History of alcohol abuse     Hypertension     Insomnia 7/11/2016    Visual impairment 7/11/2016    Vitamin D deficiency 7/11/2016       Allergies   Allergen Reactions    Shellfish-Derived Products Anaphylaxis    Codeine Nausea Only     Not sure of reaction but thinks it is just nausea       Past Surgical History:   Procedure Laterality Date    APPENDECTOMY  1995    HERNIA REPAIR  2010    TONSILLECTOMY  1974       Family History   Problem Relation Age of Onset    Thyroid disease Other     Hypertension Mother     Multiple myeloma Mother     Hypertension Father     Alcohol abuse Father     Hypertension Maternal Grandmother     Hypertension Maternal Grandfather     Diabetes Paternal Grandmother     Hypertension Paternal Grandmother     Hypertension Paternal Grandfather        Social History     Socioeconomic History    Marital status: Single   Tobacco Use    Smoking status: Never    Smokeless tobacco: Never   Vaping Use    Vaping Use: Never used   Substance and Sexual  Activity    Alcohol use: No     Comment: sober since 2015    Drug use: No    Sexual activity: Yes           Objective   Physical Exam  Constitutional:       General: He is not in acute distress.  HENT:      Head: Normocephalic and atraumatic.   Eyes:      Conjunctiva/sclera: Conjunctivae normal.      Pupils: Pupils are equal, round, and reactive to light.   Cardiovascular:      Rate and Rhythm: Normal rate and regular rhythm.      Pulses: Normal pulses.      Heart sounds: No murmur heard.    No gallop.   Pulmonary:      Effort: Pulmonary effort is normal. No respiratory distress.   Abdominal:      General: Abdomen is flat. There is no distension.      Tenderness: There is no abdominal tenderness.   Musculoskeletal:         General: No swelling or deformity. Normal range of motion.      Cervical back: Normal range of motion. No rigidity.   Skin:     General: Skin is warm and dry.      Capillary Refill: Capillary refill takes less than 2 seconds.   Neurological:      General: No focal deficit present.      Mental Status: He is alert and oriented to person, place, and time.      Comments: GCS 15.  Cranial Nerves II-XII intact without deficit.  Strength 5/5 in the bilateral upper extremities.  Strength 5/5 in the bilateral lower extremities.  Sensation to light touch intact throughout.  Cerebellar function intact via finger-nose-finger.     Psychiatric:         Mood and Affect: Mood normal.         Behavior: Behavior normal.       Procedures           ED Course                                           Medical Decision Making  For patient's headache consider dehydration, tension headache, migraine headache.  Emergent pathology such as intracranial bleeding or meningitis are considered but felt to be less likely given patient's history and examination.  Will evaluate for diabetic ketoacidosis as well with laboratory evaluation.  IV fluids, headache cocktail was administered.    Patient was reassessed.  He is feeling  significantly improved after fluids and medications.  He is not in diabetic ketoacidosis.  He is appropriate for outpatient management.  He was given refills of his high blood pressure and diabetic medications.  He was counseled to follow-up with his primary care doctor.  He was discharged in good condition    Problems Addressed:  Migraine without aura and without status migrainosus, not intractable: complicated acute illness or injury  Type 2 diabetes mellitus with hyperglycemia, unspecified whether long term insulin use: complicated acute illness or injury    Amount and/or Complexity of Data Reviewed  Labs: ordered.     Details: Laboratory work-up independently interpreted by myself is notable for mild hypomagnesemia which was repleted orally, hyperglycemia, no acidemia or elevated anion gap    Risk  OTC drugs.  Prescription drug management.  Diagnosis or treatment significantly limited by social determinants of health.        Final diagnoses:   Type 2 diabetes mellitus with hyperglycemia, unspecified whether long term insulin use   Migraine without aura and without status migrainosus, not intractable   Hypomagnesemia       ED Disposition  ED Disposition       ED Disposition   Discharge    Condition   Stable    Comment   --             Recent Results (from the past 24 hour(s))   Urinalysis With Microscopic If Indicated (No Culture) - Urine, Clean Catch    Collection Time: 10/02/23  2:54 AM    Specimen: Urine, Clean Catch   Result Value Ref Range    Color, UA Yellow Yellow, Straw    Appearance, UA Clear Clear    pH, UA 5.5 5.0 - 8.0    Specific Gravity, UA 1.028 1.001 - 1.030    Glucose, UA >=1000 mg/dL (3+) (A) Negative    Ketones, UA 15 mg/dL (1+) (A) Negative    Bilirubin, UA Negative Negative    Blood, UA Small (1+) (A) Negative    Protein, UA >=300 mg/dL (3+) (A) Negative    Leuk Esterase, UA Negative Negative    Nitrite, UA Negative Negative    Urobilinogen, UA 1.0 E.U./dL 0.2 - 1.0 E.U./dL   Urinalysis,  Microscopic Only - Urine, Clean Catch    Collection Time: 10/02/23  2:54 AM    Specimen: Urine, Clean Catch   Result Value Ref Range    RBC, UA 3-6 (A) None Seen, 0-2 /HPF    WBC, UA 0-2 None Seen, 0-2 /HPF    Bacteria, UA None Seen None Seen, Trace /HPF    Squamous Epithelial Cells, UA 0-2 None Seen, 0-2 /HPF    Hyaline Casts, UA 13-20 0 - 6 /LPF    Methodology Automated Microscopy    Comprehensive Metabolic Panel    Collection Time: 10/02/23  2:59 AM    Specimen: Blood   Result Value Ref Range    Glucose 227 (H) 65 - 99 mg/dL    BUN 14 6 - 20 mg/dL    Creatinine 1.06 0.76 - 1.27 mg/dL    Sodium 130 (L) 136 - 145 mmol/L    Potassium 3.9 3.5 - 5.2 mmol/L    Chloride 96 (L) 98 - 107 mmol/L    CO2 20.0 (L) 22.0 - 29.0 mmol/L    Calcium 8.6 8.6 - 10.5 mg/dL    Total Protein 7.3 6.0 - 8.5 g/dL    Albumin 3.7 3.5 - 5.2 g/dL    ALT (SGPT) 12 1 - 41 U/L    AST (SGOT) 12 1 - 40 U/L    Alkaline Phosphatase 92 39 - 117 U/L    Total Bilirubin 0.7 0.0 - 1.2 mg/dL    Globulin 3.6 gm/dL    A/G Ratio 1.0 g/dL    BUN/Creatinine Ratio 13.2 7.0 - 25.0    Anion Gap 14.0 5.0 - 15.0 mmol/L    eGFR 81.3 >60.0 mL/min/1.73   Lactic Acid, Plasma    Collection Time: 10/02/23  2:59 AM    Specimen: Blood   Result Value Ref Range    Lactate 1.5 0.5 - 2.0 mmol/L   Magnesium    Collection Time: 10/02/23  2:59 AM    Specimen: Blood   Result Value Ref Range    Magnesium 1.5 (L) 1.6 - 2.6 mg/dL   CBC Auto Differential    Collection Time: 10/02/23  2:59 AM    Specimen: Blood   Result Value Ref Range    WBC 9.52 3.40 - 10.80 10*3/mm3    RBC 4.63 4.14 - 5.80 10*6/mm3    Hemoglobin 14.2 13.0 - 17.7 g/dL    Hematocrit 40.7 37.5 - 51.0 %    MCV 87.9 79.0 - 97.0 fL    MCH 30.7 26.6 - 33.0 pg    MCHC 34.9 31.5 - 35.7 g/dL    RDW 13.0 12.3 - 15.4 %    RDW-SD 41.6 37.0 - 54.0 fl    MPV 10.4 6.0 - 12.0 fL    Platelets 170 140 - 450 10*3/mm3    Neutrophil % 69.2 42.7 - 76.0 %    Lymphocyte % 18.9 (L) 19.6 - 45.3 %    Monocyte % 10.7 5.0 - 12.0 %    Eosinophil %  0.4 0.3 - 6.2 %    Basophil % 0.3 0.0 - 1.5 %    Immature Grans % 0.5 0.0 - 0.5 %    Neutrophils, Absolute 6.58 1.70 - 7.00 10*3/mm3    Lymphocytes, Absolute 1.80 0.70 - 3.10 10*3/mm3    Monocytes, Absolute 1.02 (H) 0.10 - 0.90 10*3/mm3    Eosinophils, Absolute 0.04 0.00 - 0.40 10*3/mm3    Basophils, Absolute 0.03 0.00 - 0.20 10*3/mm3    Immature Grans, Absolute 0.05 0.00 - 0.05 10*3/mm3    nRBC 0.0 0.0 - 0.2 /100 WBC   POC Glucose Once    Collection Time: 10/02/23  3:03 AM    Specimen: Blood   Result Value Ref Range    Glucose 230 (H) 70 - 130 mg/dL   Blood Gas, Venous With Co-Ox    Collection Time: 10/02/23  3:04 AM    Specimen: Venous Blood   Result Value Ref Range    Site OTHER     pH, Venous 7.376 7.310 - 7.410 pH Units    pCO2, Venous 38.5 (L) 41.0 - 51.0 mm Hg    pO2, Venous 33.4 27.0 - 53.0 mm Hg    HCO3, Venous 22.5 22.0 - 28.0 mmol/L    Base Excess, Venous -2.4 (L) -2.0 - 2.0 mmol/L    Hemoglobin, Blood Gas 14.9 13.5 - 17.5 g/dL    Oxyhemoglobin Venous 61.0 %    Methemoglobin Venous 0.4 %    Carboxyhemoglobin Venous 1.5 %    CO2 Content 23.7 22 - 33 mmol/L    Temperature 37.0 C    Barometric Pressure for Blood Gas      Modality Room Air     FIO2 21 %    Rate 0 Breaths/minute    PIP 0 cmH2O    IPAP 0     EPAP 0      Note: In addition to lab results from this visit, the labs listed above may include labs taken at another facility or during a different encounter within the last 24 hours. Please correlate lab times with ED admission and discharge times for further clarification of the services performed during this visit.    No orders to display     Vitals:    10/02/23 0244 10/02/23 0300 10/02/23 0330 10/02/23 0400   BP:  151/99 173/92 159/90   BP Location:       Patient Position:       Pulse: 97 96 86 81   Resp:       Temp:       TempSrc:       SpO2: 93% 93% 94% 92%   Weight:       Height:         Medications   magnesium oxide (MAG-OX) tablet 400 mg (has no administration in time range)   lactated ringers  bolus 1,000 mL (1,000 mL Intravenous New Bag 10/2/23 0307)   metoclopramide (REGLAN) injection 10 mg (10 mg Intravenous Given 10/2/23 0307)   ketorolac (TORADOL) injection 15 mg (15 mg Intravenous Given 10/2/23 0307)   diphenhydrAMINE (BENADRYL) injection 25 mg (25 mg Intravenous Given 10/2/23 0308)     ECG/EMG Results (last 24 hours)       ** No results found for the last 24 hours. **          No orders to display           No follow-up provider specified.       Where to Get Your Medications        These medications were sent to [address] [facility] - [phone]  - [phone] [place] [address] [facility] [address]      Phone: [phone]   amLODIPine 10 MG tablet  lisinopril 40 MG tablet  Trulicity 1.5 MG/0.5ML solution pen-injector          Medication List      No changes were made to your prescriptions during this visit.            Joaquin Quigley MD  10/02/23 4393

## 2023-10-02 NOTE — DISCHARGE INSTRUCTIONS
Refills for your medication Trulicity, lisinopril, amlodipine were sent to your pharmacy.  Follow-up with your primary doctor in the next 1 week.  Return to the ER as needed for new or worsening symptoms

## 2024-01-31 PROCEDURE — 99285 EMERGENCY DEPT VISIT HI MDM: CPT

## 2024-01-31 RX ORDER — SODIUM CHLORIDE 9 MG/ML
10 INJECTION, SOLUTION INTRAMUSCULAR; INTRAVENOUS; SUBCUTANEOUS AS NEEDED
Status: DISCONTINUED | OUTPATIENT
Start: 2024-01-31 | End: 2024-02-04 | Stop reason: HOSPADM

## 2024-02-01 ENCOUNTER — ANESTHESIA (OUTPATIENT)
Dept: PERIOP | Facility: HOSPITAL | Age: 59
End: 2024-02-01
Payer: COMMERCIAL

## 2024-02-01 ENCOUNTER — APPOINTMENT (OUTPATIENT)
Dept: CT IMAGING | Facility: HOSPITAL | Age: 59
End: 2024-02-01
Payer: COMMERCIAL

## 2024-02-01 ENCOUNTER — HOSPITAL ENCOUNTER (OUTPATIENT)
Facility: HOSPITAL | Age: 59
Discharge: HOME | End: 2024-02-04
Attending: EMERGENCY MEDICINE | Admitting: STUDENT IN AN ORGANIZED HEALTH CARE EDUCATION/TRAINING PROGRAM
Payer: COMMERCIAL

## 2024-02-01 ENCOUNTER — APPOINTMENT (OUTPATIENT)
Dept: MRI IMAGING | Facility: HOSPITAL | Age: 59
End: 2024-02-01
Payer: COMMERCIAL

## 2024-02-01 ENCOUNTER — ANESTHESIA EVENT (OUTPATIENT)
Dept: PERIOP | Facility: HOSPITAL | Age: 59
End: 2024-02-01
Payer: COMMERCIAL

## 2024-02-01 ENCOUNTER — APPOINTMENT (OUTPATIENT)
Dept: GENERAL RADIOLOGY | Facility: HOSPITAL | Age: 59
End: 2024-02-01
Payer: COMMERCIAL

## 2024-02-01 DIAGNOSIS — E11.9 TYPE 2 DIABETES MELLITUS WITHOUT COMPLICATION, WITHOUT LONG-TERM CURRENT USE OF INSULIN: ICD-10-CM

## 2024-02-01 DIAGNOSIS — K21.9 CHRONIC GERD: ICD-10-CM

## 2024-02-01 DIAGNOSIS — R79.89 ELEVATED LFTS: ICD-10-CM

## 2024-02-01 DIAGNOSIS — Z79.4 TYPE 2 DIABETES MELLITUS WITH HYPERGLYCEMIA, WITH LONG-TERM CURRENT USE OF INSULIN: ICD-10-CM

## 2024-02-01 DIAGNOSIS — R74.8 ELEVATED LIVER ENZYMES: Primary | ICD-10-CM

## 2024-02-01 DIAGNOSIS — K31.89 GASTRIC NODULE: ICD-10-CM

## 2024-02-01 DIAGNOSIS — Z91.199 H/O NONCOMPLIANCE WITH MEDICAL TREATMENT, PRESENTING HAZARDS TO HEALTH: ICD-10-CM

## 2024-02-01 DIAGNOSIS — E11.65 TYPE 2 DIABETES MELLITUS WITH HYPERGLYCEMIA, WITH LONG-TERM CURRENT USE OF INSULIN: ICD-10-CM

## 2024-02-01 DIAGNOSIS — R11.0 NAUSEA: ICD-10-CM

## 2024-02-01 DIAGNOSIS — I10 ELEVATED BLOOD PRESSURE READING WITH DIAGNOSIS OF HYPERTENSION: ICD-10-CM

## 2024-02-01 DIAGNOSIS — R10.11 RUQ ABDOMINAL PAIN: ICD-10-CM

## 2024-02-01 DIAGNOSIS — F41.9 ANXIETY: ICD-10-CM

## 2024-02-01 DIAGNOSIS — I10 ESSENTIAL HYPERTENSION: ICD-10-CM

## 2024-02-01 DIAGNOSIS — F33.41 RECURRENT MAJOR DEPRESSIVE DISORDER, IN PARTIAL REMISSION: ICD-10-CM

## 2024-02-01 DIAGNOSIS — E66.9 OBESITY (BMI 30-39.9): ICD-10-CM

## 2024-02-01 DIAGNOSIS — E78.00 PURE HYPERCHOLESTEROLEMIA: ICD-10-CM

## 2024-02-01 DIAGNOSIS — K81.0 ACUTE CHOLECYSTITIS: ICD-10-CM

## 2024-02-01 DIAGNOSIS — R19.7 DIARRHEA, UNSPECIFIED TYPE: ICD-10-CM

## 2024-02-01 LAB
ALBUMIN SERPL-MCNC: 3.9 G/DL (ref 3.5–5.2)
ALBUMIN SERPL-MCNC: 4.1 G/DL (ref 3.5–5.2)
ALBUMIN/GLOB SERPL: 1.3 G/DL
ALBUMIN/GLOB SERPL: 1.5 G/DL
ALP SERPL-CCNC: 218 U/L (ref 39–117)
ALP SERPL-CCNC: 226 U/L (ref 39–117)
ALT SERPL W P-5'-P-CCNC: 138 U/L (ref 1–41)
ALT SERPL W P-5'-P-CCNC: 167 U/L (ref 1–41)
ANION GAP SERPL CALCULATED.3IONS-SCNC: 12 MMOL/L (ref 5–15)
ANION GAP SERPL CALCULATED.3IONS-SCNC: 16 MMOL/L (ref 5–15)
AST SERPL-CCNC: 284 U/L (ref 1–40)
AST SERPL-CCNC: 290 U/L (ref 1–40)
BACTERIA UR QL AUTO: NORMAL /HPF
BASOPHILS # BLD AUTO: 0.02 10*3/MM3 (ref 0–0.2)
BASOPHILS # BLD AUTO: 0.03 10*3/MM3 (ref 0–0.2)
BASOPHILS NFR BLD AUTO: 0.2 % (ref 0–1.5)
BASOPHILS NFR BLD AUTO: 0.3 % (ref 0–1.5)
BILIRUB SERPL-MCNC: 3.3 MG/DL (ref 0–1.2)
BILIRUB SERPL-MCNC: 4.1 MG/DL (ref 0–1.2)
BILIRUB UR QL STRIP: NEGATIVE
BUN SERPL-MCNC: 9 MG/DL (ref 6–20)
BUN SERPL-MCNC: 9 MG/DL (ref 6–20)
BUN/CREAT SERPL: 9.3 (ref 7–25)
BUN/CREAT SERPL: 9.4 (ref 7–25)
CALCIUM SPEC-SCNC: 8.4 MG/DL (ref 8.6–10.5)
CALCIUM SPEC-SCNC: 9.2 MG/DL (ref 8.6–10.5)
CHLORIDE SERPL-SCNC: 100 MMOL/L (ref 98–107)
CHLORIDE SERPL-SCNC: 96 MMOL/L (ref 98–107)
CLARITY UR: CLEAR
CO2 SERPL-SCNC: 22 MMOL/L (ref 22–29)
CO2 SERPL-SCNC: 23 MMOL/L (ref 22–29)
COLOR UR: YELLOW
CREAT SERPL-MCNC: 0.96 MG/DL (ref 0.76–1.27)
CREAT SERPL-MCNC: 0.97 MG/DL (ref 0.76–1.27)
D-LACTATE SERPL-SCNC: 2 MMOL/L (ref 0.5–2)
DEPRECATED RDW RBC AUTO: 39.8 FL (ref 37–54)
DEPRECATED RDW RBC AUTO: 39.9 FL (ref 37–54)
EGFRCR SERPLBLD CKD-EPI 2021: 90.5 ML/MIN/1.73
EGFRCR SERPLBLD CKD-EPI 2021: 91.6 ML/MIN/1.73
EOSINOPHIL # BLD AUTO: 0.03 10*3/MM3 (ref 0–0.4)
EOSINOPHIL # BLD AUTO: 0.03 10*3/MM3 (ref 0–0.4)
EOSINOPHIL NFR BLD AUTO: 0.3 % (ref 0.3–6.2)
EOSINOPHIL NFR BLD AUTO: 0.4 % (ref 0.3–6.2)
ERYTHROCYTE [DISTWIDTH] IN BLOOD BY AUTOMATED COUNT: 12.4 % (ref 12.3–15.4)
ERYTHROCYTE [DISTWIDTH] IN BLOOD BY AUTOMATED COUNT: 12.6 % (ref 12.3–15.4)
GLOBULIN UR ELPH-MCNC: 2.6 GM/DL
GLOBULIN UR ELPH-MCNC: 3.2 GM/DL
GLUCOSE BLDC GLUCOMTR-MCNC: 261 MG/DL (ref 70–130)
GLUCOSE BLDC GLUCOMTR-MCNC: 291 MG/DL (ref 70–130)
GLUCOSE BLDC GLUCOMTR-MCNC: 295 MG/DL (ref 70–130)
GLUCOSE BLDC GLUCOMTR-MCNC: 325 MG/DL (ref 70–130)
GLUCOSE BLDC GLUCOMTR-MCNC: 350 MG/DL (ref 70–130)
GLUCOSE BLDC GLUCOMTR-MCNC: 356 MG/DL (ref 70–130)
GLUCOSE SERPL-MCNC: 327 MG/DL (ref 65–99)
GLUCOSE SERPL-MCNC: 430 MG/DL (ref 65–99)
GLUCOSE UR STRIP-MCNC: ABNORMAL MG/DL
HBA1C MFR BLD: 11.6 % (ref 4.8–5.6)
HCT VFR BLD AUTO: 38 % (ref 37.5–51)
HCT VFR BLD AUTO: 44.4 % (ref 37.5–51)
HGB BLD-MCNC: 13.4 G/DL (ref 13–17.7)
HGB BLD-MCNC: 15.4 G/DL (ref 13–17.7)
HGB UR QL STRIP.AUTO: ABNORMAL
HOLD SPECIMEN: NORMAL
HYALINE CASTS UR QL AUTO: NORMAL /LPF
IMM GRANULOCYTES # BLD AUTO: 0.05 10*3/MM3 (ref 0–0.05)
IMM GRANULOCYTES # BLD AUTO: 0.06 10*3/MM3 (ref 0–0.05)
IMM GRANULOCYTES NFR BLD AUTO: 0.6 % (ref 0–0.5)
IMM GRANULOCYTES NFR BLD AUTO: 0.6 % (ref 0–0.5)
INR PPP: 1.02 (ref 0.89–1.12)
KETONES UR QL STRIP: ABNORMAL
LEUKOCYTE ESTERASE UR QL STRIP.AUTO: NEGATIVE
LIPASE SERPL-CCNC: 113 U/L (ref 13–60)
LYMPHOCYTES # BLD AUTO: 0.96 10*3/MM3 (ref 0.7–3.1)
LYMPHOCYTES # BLD AUTO: 1.17 10*3/MM3 (ref 0.7–3.1)
LYMPHOCYTES NFR BLD AUTO: 14.4 % (ref 19.6–45.3)
LYMPHOCYTES NFR BLD AUTO: 9.9 % (ref 19.6–45.3)
MAGNESIUM SERPL-MCNC: 1.5 MG/DL (ref 1.6–2.6)
MCH RBC QN AUTO: 30.6 PG (ref 26.6–33)
MCH RBC QN AUTO: 31.2 PG (ref 26.6–33)
MCHC RBC AUTO-ENTMCNC: 34.7 G/DL (ref 31.5–35.7)
MCHC RBC AUTO-ENTMCNC: 35.3 G/DL (ref 31.5–35.7)
MCV RBC AUTO: 88.3 FL (ref 79–97)
MCV RBC AUTO: 88.4 FL (ref 79–97)
MONOCYTES # BLD AUTO: 0.64 10*3/MM3 (ref 0.1–0.9)
MONOCYTES # BLD AUTO: 0.67 10*3/MM3 (ref 0.1–0.9)
MONOCYTES NFR BLD AUTO: 6.9 % (ref 5–12)
MONOCYTES NFR BLD AUTO: 7.9 % (ref 5–12)
NEUTROPHILS NFR BLD AUTO: 6.24 10*3/MM3 (ref 1.7–7)
NEUTROPHILS NFR BLD AUTO: 7.95 10*3/MM3 (ref 1.7–7)
NEUTROPHILS NFR BLD AUTO: 76.5 % (ref 42.7–76)
NEUTROPHILS NFR BLD AUTO: 82 % (ref 42.7–76)
NITRITE UR QL STRIP: NEGATIVE
NRBC BLD AUTO-RTO: 0 /100 WBC (ref 0–0.2)
NRBC BLD AUTO-RTO: 0 /100 WBC (ref 0–0.2)
PH UR STRIP.AUTO: 5.5 [PH] (ref 5–8)
PLATELET # BLD AUTO: 160 10*3/MM3 (ref 140–450)
PLATELET # BLD AUTO: 186 10*3/MM3 (ref 140–450)
PMV BLD AUTO: 10.5 FL (ref 6–12)
PMV BLD AUTO: 10.7 FL (ref 6–12)
POTASSIUM SERPL-SCNC: 4.4 MMOL/L (ref 3.5–5.2)
POTASSIUM SERPL-SCNC: 4.7 MMOL/L (ref 3.5–5.2)
PROT SERPL-MCNC: 6.5 G/DL (ref 6–8.5)
PROT SERPL-MCNC: 7.3 G/DL (ref 6–8.5)
PROT UR QL STRIP: ABNORMAL
PROTHROMBIN TIME: 13.5 SECONDS (ref 12.2–14.5)
QT INTERVAL: 378 MS
QTC INTERVAL: 464 MS
RBC # BLD AUTO: 4.3 10*6/MM3 (ref 4.14–5.8)
RBC # BLD AUTO: 5.03 10*6/MM3 (ref 4.14–5.8)
RBC # UR STRIP: NORMAL /HPF
REF LAB TEST METHOD: NORMAL
SODIUM SERPL-SCNC: 134 MMOL/L (ref 136–145)
SODIUM SERPL-SCNC: 135 MMOL/L (ref 136–145)
SP GR UR STRIP: 1.04 (ref 1–1.03)
SQUAMOUS #/AREA URNS HPF: NORMAL /HPF
UROBILINOGEN UR QL STRIP: ABNORMAL
WBC # UR STRIP: NORMAL /HPF
WBC NRBC COR # BLD AUTO: 8.15 10*3/MM3 (ref 3.4–10.8)
WBC NRBC COR # BLD AUTO: 9.7 10*3/MM3 (ref 3.4–10.8)
WHOLE BLOOD HOLD COAG: NORMAL
WHOLE BLOOD HOLD SPECIMEN: NORMAL

## 2024-02-01 PROCEDURE — 87040 BLOOD CULTURE FOR BACTERIA: CPT | Performed by: PHYSICIAN ASSISTANT

## 2024-02-01 PROCEDURE — 25810000003 SODIUM CHLORIDE 0.9 % SOLUTION: Performed by: INTERNAL MEDICINE

## 2024-02-01 PROCEDURE — 25810000003 SODIUM CHLORIDE 0.9 % SOLUTION 500 ML FLEX CONT: Performed by: STUDENT IN AN ORGANIZED HEALTH CARE EDUCATION/TRAINING PROGRAM

## 2024-02-01 PROCEDURE — 25510000001 IOPAMIDOL 61 % SOLUTION: Performed by: EMERGENCY MEDICINE

## 2024-02-01 PROCEDURE — 25010000002 HYDROMORPHONE PER 4 MG

## 2024-02-01 PROCEDURE — 80053 COMPREHEN METABOLIC PANEL: CPT | Performed by: INTERNAL MEDICINE

## 2024-02-01 PROCEDURE — 25010000002 ONDANSETRON PER 1 MG: Performed by: INTERNAL MEDICINE

## 2024-02-01 PROCEDURE — 25010000002 ALBUMIN HUMAN 5% PER 50 ML: Performed by: NURSE ANESTHETIST, CERTIFIED REGISTERED

## 2024-02-01 PROCEDURE — 25810000003 SODIUM CHLORIDE PER 500 ML: Performed by: STUDENT IN AN ORGANIZED HEALTH CARE EDUCATION/TRAINING PROGRAM

## 2024-02-01 PROCEDURE — 93010 ELECTROCARDIOGRAM REPORT: CPT | Performed by: INTERNAL MEDICINE

## 2024-02-01 PROCEDURE — 25810000003 SODIUM CHLORIDE 0.9 % SOLUTION 500 ML FLEX CONT: Performed by: INTERNAL MEDICINE

## 2024-02-01 PROCEDURE — P9041 ALBUMIN (HUMAN),5%, 50ML: HCPCS | Performed by: NURSE ANESTHETIST, CERTIFIED REGISTERED

## 2024-02-01 PROCEDURE — 74177 CT ABD & PELVIS W/CONTRAST: CPT

## 2024-02-01 PROCEDURE — 25810000003 SODIUM CHLORIDE 0.9 % SOLUTION: Performed by: EMERGENCY MEDICINE

## 2024-02-01 PROCEDURE — 25010000002 LABETALOL 5 MG/ML SOLUTION

## 2024-02-01 PROCEDURE — 82948 REAGENT STRIP/BLOOD GLUCOSE: CPT

## 2024-02-01 PROCEDURE — 25010000002 BUPIVACAINE 0.25 % SOLUTION: Performed by: STUDENT IN AN ORGANIZED HEALTH CARE EDUCATION/TRAINING PROGRAM

## 2024-02-01 PROCEDURE — 63710000001 INSULIN DETEMIR PER 5 UNITS: Performed by: STUDENT IN AN ORGANIZED HEALTH CARE EDUCATION/TRAINING PROGRAM

## 2024-02-01 PROCEDURE — G0378 HOSPITAL OBSERVATION PER HR: HCPCS

## 2024-02-01 PROCEDURE — 25810000003 LACTATED RINGERS PER 1000 ML: Performed by: ANESTHESIOLOGY

## 2024-02-01 PROCEDURE — 25010000002 FENTANYL CITRATE (PF) 100 MCG/2ML SOLUTION: Performed by: NURSE ANESTHETIST, CERTIFIED REGISTERED

## 2024-02-01 PROCEDURE — 96365 THER/PROPH/DIAG IV INF INIT: CPT

## 2024-02-01 PROCEDURE — 83690 ASSAY OF LIPASE: CPT | Performed by: EMERGENCY MEDICINE

## 2024-02-01 PROCEDURE — 25010000002 PIPERACILLIN SOD-TAZOBACTAM PER 1 G: Performed by: STUDENT IN AN ORGANIZED HEALTH CARE EDUCATION/TRAINING PROGRAM

## 2024-02-01 PROCEDURE — 25010000002 LABETALOL 5 MG/ML SOLUTION: Performed by: STUDENT IN AN ORGANIZED HEALTH CARE EDUCATION/TRAINING PROGRAM

## 2024-02-01 PROCEDURE — 25010000002 MAGNESIUM SULFATE 2 GM/50ML SOLUTION: Performed by: INTERNAL MEDICINE

## 2024-02-01 PROCEDURE — 36415 COLL VENOUS BLD VENIPUNCTURE: CPT

## 2024-02-01 PROCEDURE — 74181 MRI ABDOMEN W/O CONTRAST: CPT

## 2024-02-01 PROCEDURE — 25810000003 LACTATED RINGERS PER 1000 ML: Performed by: STUDENT IN AN ORGANIZED HEALTH CARE EDUCATION/TRAINING PROGRAM

## 2024-02-01 PROCEDURE — 83735 ASSAY OF MAGNESIUM: CPT | Performed by: INTERNAL MEDICINE

## 2024-02-01 PROCEDURE — 25010000002 HYDRALAZINE PER 20 MG

## 2024-02-01 PROCEDURE — 81001 URINALYSIS AUTO W/SCOPE: CPT | Performed by: EMERGENCY MEDICINE

## 2024-02-01 PROCEDURE — 96366 THER/PROPH/DIAG IV INF ADDON: CPT

## 2024-02-01 PROCEDURE — 25010000002 FENTANYL CITRATE (PF) 50 MCG/ML SOLUTION

## 2024-02-01 PROCEDURE — 83605 ASSAY OF LACTIC ACID: CPT | Performed by: EMERGENCY MEDICINE

## 2024-02-01 PROCEDURE — 96375 TX/PRO/DX INJ NEW DRUG ADDON: CPT

## 2024-02-01 PROCEDURE — 74300 X-RAY BILE DUCTS/PANCREAS: CPT

## 2024-02-01 PROCEDURE — 99223 1ST HOSP IP/OBS HIGH 75: CPT | Performed by: INTERNAL MEDICINE

## 2024-02-01 PROCEDURE — 85610 PROTHROMBIN TIME: CPT | Performed by: INTERNAL MEDICINE

## 2024-02-01 PROCEDURE — 80053 COMPREHEN METABOLIC PANEL: CPT | Performed by: EMERGENCY MEDICINE

## 2024-02-01 PROCEDURE — 96361 HYDRATE IV INFUSION ADD-ON: CPT

## 2024-02-01 PROCEDURE — 96376 TX/PRO/DX INJ SAME DRUG ADON: CPT

## 2024-02-01 PROCEDURE — 88304 TISSUE EXAM BY PATHOLOGIST: CPT | Performed by: STUDENT IN AN ORGANIZED HEALTH CARE EDUCATION/TRAINING PROGRAM

## 2024-02-01 PROCEDURE — 25010000002 PIPERACILLIN SOD-TAZOBACTAM PER 1 G: Performed by: INTERNAL MEDICINE

## 2024-02-01 PROCEDURE — 63710000001 INSULIN REGULAR HUMAN PER 5 UNITS: Performed by: INTERNAL MEDICINE

## 2024-02-01 PROCEDURE — 25010000002 LABETALOL 5 MG/ML SOLUTION: Performed by: INTERNAL MEDICINE

## 2024-02-01 PROCEDURE — 25010000002 LABETALOL 5 MG/ML SOLUTION: Performed by: NURSE ANESTHETIST, CERTIFIED REGISTERED

## 2024-02-01 PROCEDURE — 25010000002 ONDANSETRON PER 1 MG: Performed by: ANESTHESIOLOGY

## 2024-02-01 PROCEDURE — 93005 ELECTROCARDIOGRAM TRACING: CPT | Performed by: ANESTHESIOLOGY

## 2024-02-01 PROCEDURE — 25010000002 PIPERACILLIN SOD-TAZOBACTAM PER 1 G: Performed by: PHYSICIAN ASSISTANT

## 2024-02-01 PROCEDURE — 25010000002 ONDANSETRON PER 1 MG: Performed by: STUDENT IN AN ORGANIZED HEALTH CARE EDUCATION/TRAINING PROGRAM

## 2024-02-01 PROCEDURE — 63710000001 INSULIN LISPRO (HUMAN) PER 5 UNITS: Performed by: ANESTHESIOLOGY

## 2024-02-01 PROCEDURE — 83036 HEMOGLOBIN GLYCOSYLATED A1C: CPT | Performed by: INTERNAL MEDICINE

## 2024-02-01 PROCEDURE — 25010000002 PROPOFOL 10 MG/ML EMULSION: Performed by: NURSE ANESTHETIST, CERTIFIED REGISTERED

## 2024-02-01 PROCEDURE — 25010000002 HYDROMORPHONE 1 MG/ML SOLUTION: Performed by: EMERGENCY MEDICINE

## 2024-02-01 PROCEDURE — 85025 COMPLETE CBC W/AUTO DIFF WBC: CPT | Performed by: INTERNAL MEDICINE

## 2024-02-01 PROCEDURE — 63710000001 INSULIN REGULAR HUMAN PER 5 UNITS: Performed by: STUDENT IN AN ORGANIZED HEALTH CARE EDUCATION/TRAINING PROGRAM

## 2024-02-01 PROCEDURE — 96367 TX/PROPH/DG ADDL SEQ IV INF: CPT

## 2024-02-01 PROCEDURE — 25010000002 DEXAMETHASONE PER 1 MG: Performed by: NURSE ANESTHETIST, CERTIFIED REGISTERED

## 2024-02-01 PROCEDURE — 25010000002 SUGAMMADEX 500 MG/5ML SOLUTION: Performed by: ANESTHESIOLOGY

## 2024-02-01 PROCEDURE — 25010000002 ONDANSETRON PER 1 MG

## 2024-02-01 PROCEDURE — 25810000003 SODIUM CHLORIDE 0.9 % SOLUTION: Performed by: STUDENT IN AN ORGANIZED HEALTH CARE EDUCATION/TRAINING PROGRAM

## 2024-02-01 PROCEDURE — 25010000002 ONDANSETRON PER 1 MG: Performed by: EMERGENCY MEDICINE

## 2024-02-01 PROCEDURE — 85025 COMPLETE CBC W/AUTO DIFF WBC: CPT | Performed by: EMERGENCY MEDICINE

## 2024-02-01 PROCEDURE — 25010000002 HYDROMORPHONE PER 4 MG: Performed by: INTERNAL MEDICINE

## 2024-02-01 DEVICE — CLIPAPPLR M/ ENDO LIGACLIP ROT 10MM MD/LG: Type: IMPLANTABLE DEVICE | Site: ABDOMEN | Status: FUNCTIONAL

## 2024-02-01 RX ORDER — ONDANSETRON 2 MG/ML
4 INJECTION INTRAMUSCULAR; INTRAVENOUS EVERY 6 HOURS PRN
Status: DISCONTINUED | OUTPATIENT
Start: 2024-02-01 | End: 2024-02-04 | Stop reason: HOSPADM

## 2024-02-01 RX ORDER — SODIUM CHLORIDE, SODIUM LACTATE, POTASSIUM CHLORIDE, CALCIUM CHLORIDE 600; 310; 30; 20 MG/100ML; MG/100ML; MG/100ML; MG/100ML
9 INJECTION, SOLUTION INTRAVENOUS CONTINUOUS
Status: DISCONTINUED | OUTPATIENT
Start: 2024-02-01 | End: 2024-02-04 | Stop reason: HOSPADM

## 2024-02-01 RX ORDER — FAMOTIDINE 10 MG/ML
20 INJECTION, SOLUTION INTRAVENOUS ONCE
Status: COMPLETED | OUTPATIENT
Start: 2024-02-01 | End: 2024-02-01

## 2024-02-01 RX ORDER — PROMETHAZINE HYDROCHLORIDE 25 MG/1
25 TABLET ORAL ONCE AS NEEDED
Status: DISCONTINUED | OUTPATIENT
Start: 2024-02-01 | End: 2024-02-01 | Stop reason: HOSPADM

## 2024-02-01 RX ORDER — SODIUM CHLORIDE 0.9 % (FLUSH) 0.9 %
10 SYRINGE (ML) INJECTION EVERY 12 HOURS SCHEDULED
Status: DISCONTINUED | OUTPATIENT
Start: 2024-02-01 | End: 2024-02-01 | Stop reason: HOSPADM

## 2024-02-01 RX ORDER — NITROGLYCERIN 0.4 MG/1
0.4 TABLET SUBLINGUAL
Status: DISCONTINUED | OUTPATIENT
Start: 2024-02-01 | End: 2024-02-04 | Stop reason: HOSPADM

## 2024-02-01 RX ORDER — LABETALOL HYDROCHLORIDE 5 MG/ML
INJECTION, SOLUTION INTRAVENOUS AS NEEDED
Status: DISCONTINUED | OUTPATIENT
Start: 2024-02-01 | End: 2024-02-01 | Stop reason: SURG

## 2024-02-01 RX ORDER — PANTOPRAZOLE SODIUM 40 MG/10ML
40 INJECTION, POWDER, LYOPHILIZED, FOR SOLUTION INTRAVENOUS
Status: DISCONTINUED | OUTPATIENT
Start: 2024-02-01 | End: 2024-02-01

## 2024-02-01 RX ORDER — LIDOCAINE HYDROCHLORIDE 10 MG/ML
0.5 INJECTION, SOLUTION EPIDURAL; INFILTRATION; INTRACAUDAL; PERINEURAL ONCE AS NEEDED
Status: DISCONTINUED | OUTPATIENT
Start: 2024-02-01 | End: 2024-02-01 | Stop reason: HOSPADM

## 2024-02-01 RX ORDER — BUPIVACAINE HYDROCHLORIDE 2.5 MG/ML
INJECTION, SOLUTION INFILTRATION; PERINEURAL AS NEEDED
Status: DISCONTINUED | OUTPATIENT
Start: 2024-02-01 | End: 2024-02-01 | Stop reason: HOSPADM

## 2024-02-01 RX ORDER — MAGNESIUM SULFATE HEPTAHYDRATE 40 MG/ML
2 INJECTION, SOLUTION INTRAVENOUS
Status: DISPENSED | OUTPATIENT
Start: 2024-02-01 | End: 2024-02-01

## 2024-02-01 RX ORDER — DROPERIDOL 2.5 MG/ML
0.62 INJECTION, SOLUTION INTRAMUSCULAR; INTRAVENOUS ONCE AS NEEDED
Status: DISCONTINUED | OUTPATIENT
Start: 2024-02-01 | End: 2024-02-01 | Stop reason: HOSPADM

## 2024-02-01 RX ORDER — ONDANSETRON 2 MG/ML
4 INJECTION INTRAMUSCULAR; INTRAVENOUS ONCE AS NEEDED
Status: COMPLETED | OUTPATIENT
Start: 2024-02-01 | End: 2024-02-01

## 2024-02-01 RX ORDER — SODIUM CHLORIDE 9 MG/ML
INJECTION, SOLUTION INTRAVENOUS AS NEEDED
Status: DISCONTINUED | OUTPATIENT
Start: 2024-02-01 | End: 2024-02-01 | Stop reason: HOSPADM

## 2024-02-01 RX ORDER — ALBUMIN, HUMAN INJ 5% 5 %
SOLUTION INTRAVENOUS CONTINUOUS PRN
Status: DISCONTINUED | OUTPATIENT
Start: 2024-02-01 | End: 2024-02-01 | Stop reason: SURG

## 2024-02-01 RX ORDER — SODIUM CHLORIDE 0.9 % (FLUSH) 0.9 %
10 SYRINGE (ML) INJECTION AS NEEDED
Status: DISCONTINUED | OUTPATIENT
Start: 2024-02-01 | End: 2024-02-01 | Stop reason: HOSPADM

## 2024-02-01 RX ORDER — MORPHINE SULFATE 2 MG/ML
1 INJECTION, SOLUTION INTRAMUSCULAR; INTRAVENOUS EVERY 4 HOURS PRN
Status: DISCONTINUED | OUTPATIENT
Start: 2024-02-01 | End: 2024-02-04 | Stop reason: HOSPADM

## 2024-02-01 RX ORDER — PROMETHAZINE HYDROCHLORIDE 25 MG/1
25 SUPPOSITORY RECTAL ONCE AS NEEDED
Status: DISCONTINUED | OUTPATIENT
Start: 2024-02-01 | End: 2024-02-01 | Stop reason: HOSPADM

## 2024-02-01 RX ORDER — DROPERIDOL 2.5 MG/ML
0.62 INJECTION, SOLUTION INTRAMUSCULAR; INTRAVENOUS
Status: DISCONTINUED | OUTPATIENT
Start: 2024-02-01 | End: 2024-02-01 | Stop reason: HOSPADM

## 2024-02-01 RX ORDER — HYDROMORPHONE HYDROCHLORIDE 1 MG/ML
0.5 INJECTION, SOLUTION INTRAMUSCULAR; INTRAVENOUS; SUBCUTANEOUS
Status: DISCONTINUED | OUTPATIENT
Start: 2024-02-01 | End: 2024-02-01

## 2024-02-01 RX ORDER — PROPOFOL 10 MG/ML
VIAL (ML) INTRAVENOUS AS NEEDED
Status: DISCONTINUED | OUTPATIENT
Start: 2024-02-01 | End: 2024-02-01 | Stop reason: SURG

## 2024-02-01 RX ORDER — SODIUM CHLORIDE 0.9 % (FLUSH) 0.9 %
10 SYRINGE (ML) INJECTION AS NEEDED
Status: DISCONTINUED | OUTPATIENT
Start: 2024-02-01 | End: 2024-02-04 | Stop reason: HOSPADM

## 2024-02-01 RX ORDER — SODIUM CHLORIDE 9 MG/ML
40 INJECTION, SOLUTION INTRAVENOUS AS NEEDED
Status: DISCONTINUED | OUTPATIENT
Start: 2024-02-01 | End: 2024-02-01 | Stop reason: HOSPADM

## 2024-02-01 RX ORDER — MEPERIDINE HYDROCHLORIDE 25 MG/ML
12.5 INJECTION INTRAMUSCULAR; INTRAVENOUS; SUBCUTANEOUS
Status: DISCONTINUED | OUTPATIENT
Start: 2024-02-01 | End: 2024-02-01 | Stop reason: HOSPADM

## 2024-02-01 RX ORDER — HYDROCODONE BITARTRATE AND ACETAMINOPHEN 5; 325 MG/1; MG/1
1 TABLET ORAL ONCE AS NEEDED
Status: DISCONTINUED | OUTPATIENT
Start: 2024-02-01 | End: 2024-02-01 | Stop reason: HOSPADM

## 2024-02-01 RX ORDER — HYDROMORPHONE HYDROCHLORIDE 1 MG/ML
0.5 INJECTION, SOLUTION INTRAMUSCULAR; INTRAVENOUS; SUBCUTANEOUS
Status: DISCONTINUED | OUTPATIENT
Start: 2024-02-01 | End: 2024-02-01 | Stop reason: HOSPADM

## 2024-02-01 RX ORDER — ROCURONIUM BROMIDE 10 MG/ML
INJECTION, SOLUTION INTRAVENOUS AS NEEDED
Status: DISCONTINUED | OUTPATIENT
Start: 2024-02-01 | End: 2024-02-01 | Stop reason: SURG

## 2024-02-01 RX ORDER — LABETALOL HYDROCHLORIDE 5 MG/ML
10 INJECTION, SOLUTION INTRAVENOUS EVERY 6 HOURS PRN
Status: DISCONTINUED | OUTPATIENT
Start: 2024-02-01 | End: 2024-02-04 | Stop reason: HOSPADM

## 2024-02-01 RX ORDER — ONDANSETRON 2 MG/ML
4 INJECTION INTRAMUSCULAR; INTRAVENOUS ONCE
Status: COMPLETED | OUTPATIENT
Start: 2024-02-01 | End: 2024-02-01

## 2024-02-01 RX ORDER — DEXTROSE MONOHYDRATE 25 G/50ML
25 INJECTION, SOLUTION INTRAVENOUS
Status: DISCONTINUED | OUTPATIENT
Start: 2024-02-01 | End: 2024-02-04 | Stop reason: HOSPADM

## 2024-02-01 RX ORDER — NALOXONE HCL 0.4 MG/ML
0.4 VIAL (ML) INJECTION
Status: DISCONTINUED | OUTPATIENT
Start: 2024-02-01 | End: 2024-02-01

## 2024-02-01 RX ORDER — NALOXONE HCL 0.4 MG/ML
0.4 VIAL (ML) INJECTION AS NEEDED
Status: DISCONTINUED | OUTPATIENT
Start: 2024-02-01 | End: 2024-02-01 | Stop reason: HOSPADM

## 2024-02-01 RX ORDER — SODIUM CHLORIDE 9 MG/ML
40 INJECTION, SOLUTION INTRAVENOUS AS NEEDED
Status: DISCONTINUED | OUTPATIENT
Start: 2024-02-01 | End: 2024-02-04 | Stop reason: HOSPADM

## 2024-02-01 RX ORDER — LIDOCAINE HYDROCHLORIDE 10 MG/ML
INJECTION, SOLUTION EPIDURAL; INFILTRATION; INTRACAUDAL; PERINEURAL AS NEEDED
Status: DISCONTINUED | OUTPATIENT
Start: 2024-02-01 | End: 2024-02-01 | Stop reason: SURG

## 2024-02-01 RX ORDER — MIDAZOLAM HYDROCHLORIDE 1 MG/ML
1 INJECTION INTRAMUSCULAR; INTRAVENOUS
Status: DISCONTINUED | OUTPATIENT
Start: 2024-02-01 | End: 2024-02-01 | Stop reason: HOSPADM

## 2024-02-01 RX ORDER — IBUPROFEN 600 MG/1
1 TABLET ORAL ONCE
Status: DISCONTINUED | OUTPATIENT
Start: 2024-02-01 | End: 2024-02-01 | Stop reason: HOSPADM

## 2024-02-01 RX ORDER — HYDRALAZINE HYDROCHLORIDE 20 MG/ML
20 INJECTION INTRAMUSCULAR; INTRAVENOUS EVERY 6 HOURS PRN
Status: DISCONTINUED | OUTPATIENT
Start: 2024-02-01 | End: 2024-02-04 | Stop reason: HOSPADM

## 2024-02-01 RX ORDER — LABETALOL HYDROCHLORIDE 5 MG/ML
5 INJECTION, SOLUTION INTRAVENOUS
Status: DISCONTINUED | OUTPATIENT
Start: 2024-02-01 | End: 2024-02-01 | Stop reason: HOSPADM

## 2024-02-01 RX ORDER — NICOTINE POLACRILEX 4 MG
15 LOZENGE BUCCAL
Status: DISCONTINUED | OUTPATIENT
Start: 2024-02-01 | End: 2024-02-04 | Stop reason: HOSPADM

## 2024-02-01 RX ORDER — DEXAMETHASONE SODIUM PHOSPHATE 4 MG/ML
INJECTION, SOLUTION INTRA-ARTICULAR; INTRALESIONAL; INTRAMUSCULAR; INTRAVENOUS; SOFT TISSUE AS NEEDED
Status: DISCONTINUED | OUTPATIENT
Start: 2024-02-01 | End: 2024-02-01 | Stop reason: SURG

## 2024-02-01 RX ORDER — NALOXONE HCL 0.4 MG/ML
0.4 VIAL (ML) INJECTION
Status: DISCONTINUED | OUTPATIENT
Start: 2024-02-01 | End: 2024-02-04 | Stop reason: HOSPADM

## 2024-02-01 RX ORDER — IBUPROFEN 600 MG/1
1 TABLET ORAL
Status: DISCONTINUED | OUTPATIENT
Start: 2024-02-01 | End: 2024-02-04 | Stop reason: HOSPADM

## 2024-02-01 RX ORDER — SODIUM CHLORIDE 0.9 % (FLUSH) 0.9 %
3 SYRINGE (ML) INJECTION EVERY 12 HOURS SCHEDULED
Status: DISCONTINUED | OUTPATIENT
Start: 2024-02-01 | End: 2024-02-01 | Stop reason: HOSPADM

## 2024-02-01 RX ORDER — FAMOTIDINE 20 MG/1
20 TABLET, FILM COATED ORAL ONCE
Status: CANCELLED | OUTPATIENT
Start: 2024-02-01 | End: 2024-02-01

## 2024-02-01 RX ORDER — HYDRALAZINE HYDROCHLORIDE 20 MG/ML
5 INJECTION INTRAMUSCULAR; INTRAVENOUS
Status: DISCONTINUED | OUTPATIENT
Start: 2024-02-01 | End: 2024-02-01 | Stop reason: HOSPADM

## 2024-02-01 RX ORDER — FENTANYL CITRATE 50 UG/ML
50 INJECTION, SOLUTION INTRAMUSCULAR; INTRAVENOUS
Status: DISCONTINUED | OUTPATIENT
Start: 2024-02-01 | End: 2024-02-01 | Stop reason: HOSPADM

## 2024-02-01 RX ORDER — PANTOPRAZOLE SODIUM 40 MG/10ML
40 INJECTION, POWDER, LYOPHILIZED, FOR SOLUTION INTRAVENOUS ONCE
Status: COMPLETED | OUTPATIENT
Start: 2024-02-01 | End: 2024-02-01

## 2024-02-01 RX ORDER — ONDANSETRON 2 MG/ML
INJECTION INTRAMUSCULAR; INTRAVENOUS AS NEEDED
Status: DISCONTINUED | OUTPATIENT
Start: 2024-02-01 | End: 2024-02-01 | Stop reason: SURG

## 2024-02-01 RX ORDER — FENTANYL CITRATE 50 UG/ML
INJECTION, SOLUTION INTRAMUSCULAR; INTRAVENOUS AS NEEDED
Status: DISCONTINUED | OUTPATIENT
Start: 2024-02-01 | End: 2024-02-01 | Stop reason: SURG

## 2024-02-01 RX ORDER — SODIUM CHLORIDE 0.9 % (FLUSH) 0.9 %
10 SYRINGE (ML) INJECTION EVERY 12 HOURS SCHEDULED
Status: DISCONTINUED | OUTPATIENT
Start: 2024-02-01 | End: 2024-02-04 | Stop reason: HOSPADM

## 2024-02-01 RX ORDER — IPRATROPIUM BROMIDE AND ALBUTEROL SULFATE 2.5; .5 MG/3ML; MG/3ML
3 SOLUTION RESPIRATORY (INHALATION) ONCE AS NEEDED
Status: DISCONTINUED | OUTPATIENT
Start: 2024-02-01 | End: 2024-02-01 | Stop reason: HOSPADM

## 2024-02-01 RX ORDER — SODIUM CHLORIDE 9 MG/ML
100 INJECTION, SOLUTION INTRAVENOUS CONTINUOUS
Status: DISCONTINUED | OUTPATIENT
Start: 2024-02-01 | End: 2024-02-04 | Stop reason: HOSPADM

## 2024-02-01 RX ORDER — INSULIN LISPRO 100 [IU]/ML
8 INJECTION, SOLUTION INTRAVENOUS; SUBCUTANEOUS ONCE
Status: COMPLETED | OUTPATIENT
Start: 2024-02-01 | End: 2024-02-01

## 2024-02-01 RX ORDER — SODIUM CHLORIDE 0.9 % (FLUSH) 0.9 %
3-10 SYRINGE (ML) INJECTION AS NEEDED
Status: DISCONTINUED | OUTPATIENT
Start: 2024-02-01 | End: 2024-02-01 | Stop reason: HOSPADM

## 2024-02-01 RX ADMIN — SODIUM CHLORIDE 1000 ML: 9 INJECTION, SOLUTION INTRAVENOUS at 01:35

## 2024-02-01 RX ADMIN — Medication 10 ML: at 20:41

## 2024-02-01 RX ADMIN — PIPERACILLIN SODIUM AND TAZOBACTAM SODIUM 3.38 G: 3; .375 INJECTION, SOLUTION INTRAVENOUS at 03:15

## 2024-02-01 RX ADMIN — INSULIN HUMAN 4 UNITS: 100 INJECTION, SOLUTION PARENTERAL at 09:40

## 2024-02-01 RX ADMIN — ONDANSETRON 4 MG: 2 INJECTION INTRAMUSCULAR; INTRAVENOUS at 19:19

## 2024-02-01 RX ADMIN — DEXAMETHASONE SODIUM PHOSPHATE 8 MG: 4 INJECTION, SOLUTION INTRAMUSCULAR; INTRAVENOUS at 16:00

## 2024-02-01 RX ADMIN — LIDOCAINE HYDROCHLORIDE 50 MG: 10 INJECTION, SOLUTION EPIDURAL; INFILTRATION; INTRACAUDAL; PERINEURAL at 16:00

## 2024-02-01 RX ADMIN — INSULIN HUMAN 5 UNITS: 100 INJECTION, SOLUTION PARENTERAL at 03:16

## 2024-02-01 RX ADMIN — ONDANSETRON 4 MG: 2 INJECTION INTRAMUSCULAR; INTRAVENOUS at 12:24

## 2024-02-01 RX ADMIN — LABETALOL HYDROCHLORIDE 5 MG: 5 INJECTION, SOLUTION INTRAVENOUS at 19:29

## 2024-02-01 RX ADMIN — PROPOFOL 200 MG: 10 INJECTION, EMULSION INTRAVENOUS at 16:00

## 2024-02-01 RX ADMIN — ROCURONIUM BROMIDE 10 MG: 10 SOLUTION INTRAVENOUS at 16:49

## 2024-02-01 RX ADMIN — Medication 10 ML: at 09:05

## 2024-02-01 RX ADMIN — ONDANSETRON 4 MG: 2 INJECTION INTRAMUSCULAR; INTRAVENOUS at 17:46

## 2024-02-01 RX ADMIN — HYDRALAZINE HYDROCHLORIDE 5 MG: 20 INJECTION INTRAMUSCULAR; INTRAVENOUS at 20:01

## 2024-02-01 RX ADMIN — MAGNESIUM SULFATE IN WATER FOR 2 G: 40 INJECTION INTRAVENOUS at 12:27

## 2024-02-01 RX ADMIN — MAGNESIUM SULFATE IN WATER FOR 2 G: 40 INJECTION INTRAVENOUS at 09:34

## 2024-02-01 RX ADMIN — INSULIN DETEMIR 15 UNITS: 100 INJECTION, SOLUTION SUBCUTANEOUS at 21:23

## 2024-02-01 RX ADMIN — ALBUMIN (HUMAN): 12.5 INJECTION, SOLUTION INTRAVENOUS at 16:52

## 2024-02-01 RX ADMIN — HYDROMORPHONE HYDROCHLORIDE 0.5 MG: 1 INJECTION, SOLUTION INTRAMUSCULAR; INTRAVENOUS; SUBCUTANEOUS at 20:06

## 2024-02-01 RX ADMIN — IOPAMIDOL 85 ML: 612 INJECTION, SOLUTION INTRAVENOUS at 01:31

## 2024-02-01 RX ADMIN — SODIUM CHLORIDE 100 ML/HR: 9 INJECTION, SOLUTION INTRAVENOUS at 20:40

## 2024-02-01 RX ADMIN — HYDROMORPHONE HYDROCHLORIDE 0.5 MG: 1 INJECTION, SOLUTION INTRAMUSCULAR; INTRAVENOUS; SUBCUTANEOUS at 12:23

## 2024-02-01 RX ADMIN — HYDROMORPHONE HYDROCHLORIDE 0.5 MG: 1 INJECTION, SOLUTION INTRAMUSCULAR; INTRAVENOUS; SUBCUTANEOUS at 19:28

## 2024-02-01 RX ADMIN — INSULIN LISPRO 8 UNITS: 100 INJECTION, SOLUTION INTRAVENOUS; SUBCUTANEOUS at 19:46

## 2024-02-01 RX ADMIN — FENTANYL CITRATE 50 MCG: 50 INJECTION, SOLUTION INTRAMUSCULAR; INTRAVENOUS at 19:52

## 2024-02-01 RX ADMIN — LABETALOL HYDROCHLORIDE 5 MG: 5 INJECTION, SOLUTION INTRAVENOUS at 18:08

## 2024-02-01 RX ADMIN — PIPERACILLIN SODIUM AND TAZOBACTAM SODIUM 3.38 G: 3; .375 INJECTION, SOLUTION INTRAVENOUS at 21:22

## 2024-02-01 RX ADMIN — ONDANSETRON 4 MG: 2 INJECTION INTRAMUSCULAR; INTRAVENOUS at 03:28

## 2024-02-01 RX ADMIN — SUGAMMADEX 250 MG: 100 INJECTION, SOLUTION INTRAVENOUS at 18:16

## 2024-02-01 RX ADMIN — LABETALOL HYDROCHLORIDE 10 MG: 5 INJECTION, SOLUTION INTRAVENOUS at 06:28

## 2024-02-01 RX ADMIN — SODIUM CHLORIDE 100 ML/HR: 9 INJECTION, SOLUTION INTRAVENOUS at 03:16

## 2024-02-01 RX ADMIN — PIPERACILLIN SODIUM AND TAZOBACTAM SODIUM 3.38 G: 3; .375 INJECTION, SOLUTION INTRAVENOUS at 14:15

## 2024-02-01 RX ADMIN — ONDANSETRON 4 MG: 2 INJECTION INTRAMUSCULAR; INTRAVENOUS at 01:36

## 2024-02-01 RX ADMIN — SODIUM CHLORIDE, POTASSIUM CHLORIDE, SODIUM LACTATE AND CALCIUM CHLORIDE: 600; 310; 30; 20 INJECTION, SOLUTION INTRAVENOUS at 16:52

## 2024-02-01 RX ADMIN — ROCURONIUM BROMIDE 50 MG: 10 SOLUTION INTRAVENOUS at 16:00

## 2024-02-01 RX ADMIN — SODIUM CHLORIDE 40 ML: 9 INJECTION, SOLUTION INTRAVENOUS at 09:57

## 2024-02-01 RX ADMIN — INSULIN HUMAN 6 UNITS: 100 INJECTION, SOLUTION PARENTERAL at 21:24

## 2024-02-01 RX ADMIN — FENTANYL CITRATE 100 MCG: 50 INJECTION, SOLUTION INTRAMUSCULAR; INTRAVENOUS at 16:00

## 2024-02-01 RX ADMIN — SODIUM CHLORIDE 100 ML/HR: 9 INJECTION, SOLUTION INTRAVENOUS at 09:05

## 2024-02-01 RX ADMIN — INSULIN HUMAN 4 UNITS: 100 INJECTION, SOLUTION PARENTERAL at 06:27

## 2024-02-01 RX ADMIN — FAMOTIDINE 20 MG: 10 INJECTION, SOLUTION INTRAVENOUS at 14:06

## 2024-02-01 RX ADMIN — Medication 10 ML: at 03:16

## 2024-02-01 RX ADMIN — FENTANYL CITRATE 50 MCG: 50 INJECTION, SOLUTION INTRAMUSCULAR; INTRAVENOUS at 18:58

## 2024-02-01 RX ADMIN — HYDROMORPHONE HYDROCHLORIDE 0.5 MG: 1 INJECTION, SOLUTION INTRAMUSCULAR; INTRAVENOUS; SUBCUTANEOUS at 06:28

## 2024-02-01 RX ADMIN — PANTOPRAZOLE SODIUM 40 MG: 40 INJECTION, POWDER, FOR SOLUTION INTRAVENOUS at 01:35

## 2024-02-01 RX ADMIN — HYDROMORPHONE HYDROCHLORIDE 1 MG: 1 INJECTION, SOLUTION INTRAMUSCULAR; INTRAVENOUS; SUBCUTANEOUS at 01:36

## 2024-02-01 RX ADMIN — FENTANYL CITRATE 100 MCG: 50 INJECTION, SOLUTION INTRAMUSCULAR; INTRAVENOUS at 16:52

## 2024-02-01 RX ADMIN — SODIUM CHLORIDE, POTASSIUM CHLORIDE, SODIUM LACTATE AND CALCIUM CHLORIDE 9 ML/HR: 600; 310; 30; 20 INJECTION, SOLUTION INTRAVENOUS at 14:06

## 2024-02-01 RX ADMIN — LABETALOL HYDROCHLORIDE 5 MG: 5 INJECTION, SOLUTION INTRAVENOUS at 16:59

## 2024-02-01 NOTE — CONSULTS
Diabetes Education    Patient Name:  Malcolm Costa  YOB: 1965  MRN: [id number]  Admit Date:  2/1/2024        Diabetes ed consult rec'd, chart reviewed. Defer complete assessment at this time, as pt states he is feeling groggy from pain medicine and is awaiting surgery he thinks this afternoon.    We did briefly discuss his diabetes self-mgmt at home, which has been compromised recently due to lapse in insurance for approx 2 months. Has been rx glipizide, metformin, and reli-on insulin as well as Dagoberto 2 CGM, but has been unable to obtain for the last couple of months due to aforementioned. Pt states he has insurance now, however. Discussed his current A1c 11.2 and reasons for it being elevated. Has a blood glucose meter at home to use. Feels comfortable with injecting insulin.     Plan to see pt tomorrow to further assess educational needs. Please call [phone] with interval needs or concerns. Thank you.     TTS reviewing chart, coordinating care, and in education at bedside w/ pt approx 15 min.       Electronically signed by:  Pau Mujica RN  02/01/24 10:46 EST

## 2024-02-01 NOTE — ED NOTES
" Malcolm Costa    Nursing Report ED to Floor:  Mental status: anox4  Ambulatory status: independent  Oxygen Therapy:  ra  Cardiac Rhythm: nsr  Admitted from: ed  Safety Concerns:  n/a  Social Issues: n/a  ED Room #:  08    ED Nurse Phone Extension - [phone] or may call [phone].      HPI:   Chief Complaint   Patient presents with    Abdominal Pain       Past Medical History:  Past Medical History:   Diagnosis Date    Allergic rhinitis     Anxiety     Dermatitis 8/2/2016    Diabetes mellitus     Diverticulitis     Gastroesophageal reflux disease 7/11/2016    GERD (gastroesophageal reflux disease)     History of alcohol abuse     Hypertension     Insomnia 7/11/2016    Visual impairment 7/11/2016    Vitamin D deficiency 7/11/2016        Past Surgical History:  Past Surgical History:   Procedure Laterality Date    APPENDECTOMY  1995    HERNIA REPAIR  2010    TONSILLECTOMY  1974        Admitting Doctor:   Cosme Rosado III, DO    Consulting Provider(s):  Consults       Date and Time Order Name Status Description    2/1/2024  2:37 AM Inpatient General Surgery Consult               Admitting Diagnosis:   There were no encounter diagnoses.    Most Recent Vitals:   Vitals:    01/31/24 2243 02/01/24 0136 02/01/24 0200 02/01/24 0318   BP: (!) 188/104 (!) 189/101 (!) 181/95 (!) 172/104   BP Location: Right arm      Patient Position: Sitting      Pulse: 114 105 98 101   Resp: 18      Temp: 97.7 °F (36.5 °C)      TempSrc: Oral      SpO2: 96% 97% 94% 94%   Weight: 99.8 kg (220 lb)      Height: 165.1 cm (65\")          Active LDAs/IV Access:   Lines, Drains & Airways       Active LDAs       Name Placement date Placement time Site Days    Peripheral IV 02/01/24 0040 Right Antecubital 02/01/24  0040  Antecubital  less than 1                    Labs (abnormal labs have a star):   Labs Reviewed   COMPREHENSIVE METABOLIC PANEL - Abnormal; Notable for the following components:       Result Value    Glucose 430 (*)     Sodium 134 (*)     " Chloride 96 (*)     ALT (SGPT) 138 (*)     AST (SGOT) 284 (*)     Alkaline Phosphatase 226 (*)     Total Bilirubin 3.3 (*)     Anion Gap 16.0 (*)     All other components within normal limits    Narrative:     GFR Normal >60  Chronic Kidney Disease <60  Kidney Failure <15     LIPASE - Abnormal; Notable for the following components:    Lipase 113 (*)     All other components within normal limits   URINALYSIS W/ MICROSCOPIC IF INDICATED (NO CULTURE) - Abnormal; Notable for the following components:    Specific Gravity, UA 1.044 (*)     Glucose, UA >=1000 mg/dL (3+) (*)     Ketones, UA 15 mg/dL (1+) (*)     Blood, UA Small (1+) (*)     Protein, UA >=300 mg/dL (3+) (*)     All other components within normal limits   CBC WITH AUTO DIFFERENTIAL - Abnormal; Notable for the following components:    Neutrophil % 82.0 (*)     Lymphocyte % 9.9 (*)     Immature Grans % 0.6 (*)     Neutrophils, Absolute 7.95 (*)     Immature Grans, Absolute 0.06 (*)     All other components within normal limits   POCT GLUCOSE FINGERSTICK - Abnormal; Notable for the following components:    Glucose 325 (*)     All other components within normal limits   LACTIC ACID, PLASMA - Normal   BLOOD CULTURE   BLOOD CULTURE   URINALYSIS, MICROSCOPIC ONLY   RAINBOW DRAW    Narrative:     The following orders were created for panel order [place] Draw.  Procedure                               Abnormality         Status                     ---------                               -----------         ------                     Green Top (Gel)[[id number]]                                  Final result               Lavender Top[[id number]]                                     Final result               Gold Top - [facility][[id number]]                                   Final result               Garcia Top[[id number]]                                         In process                 Light Blue Top[[id number]]                                   Final result                 Please  view results for these tests on the individual orders.   CBC WITH AUTO DIFFERENTIAL   COMPREHENSIVE METABOLIC PANEL   PROTIME-INR   MAGNESIUM   HEMOGLOBIN A1C   POCT GLUCOSE FINGERSTICK   POCT GLUCOSE FINGERSTICK   POCT GLUCOSE FINGERSTICK   POCT GLUCOSE FINGERSTICK   POCT GLUCOSE FINGERSTICK   CBC AND DIFFERENTIAL    Narrative:     The following orders were created for panel order CBC & Differential.  Procedure                               Abnormality         Status                     ---------                               -----------         ------                     CBC Auto Differential[[id number]]        Abnormal            Final result                 Please view results for these tests on the individual orders.   GREEN TOP   LAVENDER TOP   GOLD TOP - SST   LIGHT BLUE TOP   GRAY TOP       Meds Given in ED:   Medications   Sodium Chloride (PF) 0.9 % 10 mL (has no administration in time range)   sodium chloride 0.9 % flush 10 mL (has no administration in time range)   insulin regular (humuLIN R,novoLIN R) injection 10 Units (0 Units Subcutaneous Hold 2/1/24 0301)   sodium chloride 0.9 % flush 10 mL (10 mL Intravenous Given 2/1/24 0316)   sodium chloride 0.9 % flush 10 mL (has no administration in time range)   sodium chloride 0.9 % infusion 40 mL (has no administration in time range)   nitroglycerin (NITROSTAT) SL tablet 0.4 mg (has no administration in time range)   sodium chloride 0.9 % infusion (100 mL/hr Intravenous New Bag 2/1/24 0316)   Potassium Replacement - Follow Nurse / BPA Driven Protocol (has no administration in time range)   Magnesium Standard Dose Replacement - Follow Nurse / BPA Driven Protocol (has no administration in time range)   Phosphorus Replacement - Follow Nurse / BPA Driven Protocol (has no administration in time range)   Calcium Replacement - Follow Nurse / BPA Driven Protocol (has no administration in time range)   morphine injection 1 mg (has no administration in time range)      And   naloxone (NARCAN) injection 0.4 mg (has no administration in time range)   HYDROmorphone (DILAUDID) injection 0.5 mg (has no administration in time range)     And   naloxone (NARCAN) injection 0.4 mg (has no administration in time range)   ondansetron (ZOFRAN) injection 4 mg (4 mg Intravenous Given 2/1/24 0328)   dextrose (GLUTOSE) oral gel 15 g (has no administration in time range)   dextrose (D50W) (25 g/50 mL) IV injection 25 g (has no administration in time range)   glucagon (GLUCAGEN) injection 1 mg (has no administration in time range)   insulin regular (humuLIN R,novoLIN R) injection 2-7 Units (5 Units Subcutaneous Given 2/1/24 0316)   labetalol (NORMODYNE,TRANDATE) injection 10 mg (has no administration in time range)   sodium chloride 0.9 % bolus 1,000 mL (0 mL Intravenous Stopped 2/1/24 0317)   HYDROmorphone (DILAUDID) injection 1 mg (1 mg Intravenous Given 2/1/24 0136)   ondansetron (ZOFRAN) injection 4 mg (4 mg Intravenous Given 2/1/24 0136)   pantoprazole (PROTONIX) injection 40 mg (40 mg Intravenous Given 2/1/24 0135)   iopamidol (ISOVUE-300) 61 % injection 100 mL (85 mL Intravenous Given 2/1/24 0131)   piperacillin-tazobactam (ZOSYN) 3.375 g in iso-osmotic dextrose 50 ml (premix) (0 g Intravenous Stopped 2/1/24 0409)     sodium chloride, 100 mL/hr, Last Rate: 100 mL/hr (02/01/24 0316)

## 2024-02-01 NOTE — ANESTHESIA POSTPROCEDURE EVALUATION
Patient: Malcolm Costa    Procedure Summary       Date: 02/01/24 Room / Location:  [facility]    Anesthesia Start: 1554 Anesthesia Stop: 1838    Procedure: CHOLECYSTECTOMY LAPAROSCOPIC WITH INTRAOPERATIVE CHOLANGIOGRAM, UMBILICAL HERNIA REPAIR (Abdomen) Diagnosis:     Surgeons: Adam Ramos MD Provider: Danie Kohli MD    Anesthesia Type: general ASA Status: 3            Anesthesia Type: general    Vitals  Vitals Value Taken Time   BP     Temp     Pulse 78 02/01/24 1838   Resp     SpO2 88 % 02/01/24 1838   Vitals shown include unfiled device data.        Post Anesthesia Care and Evaluation    Patient location during evaluation: PACU  Patient participation: complete - patient participated  Level of consciousness: awake and alert  Pain management: adequate    Airway patency: patent  Anesthetic complications: No anesthetic complications  PONV Status: none  Cardiovascular status: hemodynamically stable and acceptable  Respiratory status: nonlabored ventilation, acceptable and nasal cannula  Hydration status: acceptable

## 2024-02-01 NOTE — OP NOTE
General Surgery Operative Note    Patient Name:  Malcolm Costa  YOB: 1965  [id number]     Date of Surgery:  2/1/2024       Pre-op Diagnosis:   Acute cholecystitis  Chronically incarcerated umbilical hernia    Post-op Diagnosis:   same    Procedure:   CHOLECYSTECTOMY LAPAROSCOPIC WITH INTRAOPERATIVE CHOLANGIOGRAM, UMBILICAL HERNIA REPAIR    Surgeon: Adam Ramos MD MS    Assistant:   None    Anesthesia: General      Estimated Blood Loss: 50 mL    Complications: None apparent    Implants:    Implant Name Type Inv. Item Serial No.  Lot No. LRB No. Used Action   CLIPAPPLR M/ ENDO LIGACLIP ROT 10MM MD/LG - [id number] Implant CLIPAPPLR M/ ENDO LIGACLIP ROT 10MM MD/LG  [employer]  [employer]  N/A 1 Implanted       Specimen:          A. gallbladder        Findings: Acutely inflamed gallbladder. IOC obtained and showed trifurcation in the liver with filling of the duodenum. Wide mouth cystic duct.    Indications:  Mr. Malcolm Costa is a 58 year old gentleman presenting with acute cholecystitis and possible choledocholithiasis. There was a small possible stone seen on CT at the ampulla, however, there is no evidence of this and no biliary dilatation on MRCP. His hepatic panel abnormalities could be related to a small stone or potentially inflammation from cholecystitis. He also has a mildly elevated lipase but does not have symptoms (nausea, severe epigastric pain) classic for pancreatitis, and no radiographic features of pancreatitis. At this time, he does not meet the criteria for a diagnostis of pancreatitis. The slight elevation may be related to the small impacted stone at the ampulla.     Given these findings, I recommended proceeding to the OR for laparoscopic cholecystectomy with intraoperative cholangiogram. Should stones be identified, they will attempt to be cleared, however, patient may need post-operative ERCP for clearance.     Description of Procedure:   After informed  consent was obtained, patient identification verified, and pre-operative checklist completed, the patient was brought to the Operating Room and placed comfortably in the supine position on the operating table.  Preoperatively the patient had received Zosyn.    General anesthesia was administered without complication and the patient was intubated without difficulty.  The patient’s abdomen was clipped, prepped with Chloraprep solution, and draped in the usual sterile fashion.    After an appropriate surgical pause and time-out was completed, a Veress needle was inserted in the left upper quadrant. However, initial entry was unsuccessful. A 5 mm incision was made in the right abdomen. The fascia was elevated and the Veress was inserted intra-abdominally. Position was confirmed with saline drop test and low opening pressure. The abdomen was insufflated to 15 mmHg. A 5 mm port was inserted in the RUQ using the Optiview technique. The abdomen was inspected and no iatrogenic injury was identified. Additional ports were placed under direct visualization in the following locations: a 5 mm in the right abdomen, a 12 mm port through the umbilical hernia, and a 12 mm port through the epigastrium. Prior to the umbilical port insertion, a large amount of chronically adherent omentum was reduced from the patient's umbilical hernia.     The table was placed in the reverse Trendelenburg position with the right side elevated.Filmy adhesions between the gallbladder, omentum, duodenum and transverse colon were lysed sharply.  The dome of the gallbladder was grasped with an atraumatic grasper passed through the lateral port and retracted over the dome of the liver.  The infundibulum was also grasped with an atraumatic grasper through the mid-clavicular port and retracted towards the right lower quadrant. There was extensive acute inflammation which was taken down bluntly with mild diffuse oozing. This maneuver  eventually exposed  Calot’s triangle.  The cystic duct and cystic artery were identified and circumferentially dissected.  A critical view was obtained. The artery was clipped twice proximally and once distally and divided.     The cystic duct was large in caliber, thus a silk tie was used to tie the infundibulum proximally. The cystic duct was then clipped at its junction and a small ductotomy was made. A cholangiogram catheter was then placed within the duct, and on-table cholangiography under fluoroscopy was obtained. There was a small leak around the cholangiocatheter, but adequate images were still able to be taken. This demonstrated a patent cystic duct, common hepatic duct, right hepatic duct and sectoral branches, left hepatic duct and sectoral branches, and common bile duct without filling defects and good flow into the duodenum.  The cystic duct was then doubly clipped and divided close to the gallbladder. The stump was ensared and ligated with an endoloop.    The gallbladder was then dissected from its peritoneal attachments by electrocautery.  Adequate hemostasis was checked and achieved and the gallbladder was removed using an endoscopic retrieval bag placed through the 12 mm port.  The gallbladder and contents were passed off the operative field and sent to Pathology as a permanent specimen. The gallbladder fossa was copiously irrigated with warm sterile saline solution and adequate hemostasis was again checked and achieved.  There was no evidence of bleeding of the gallbladder fossa or cystic artery or leakage of bile from the cystic duct stump.    The fascia at the 12 mm port site was closed with an interrupted 0-Vicryl suture using an endoclose device. The umbilical port site was closed in an open fashion after first excising the extensive hernia sac and remaining incarcerated omentum. The final defect was approximately 2 cm consistent with preoperative imaging. This was closed with interrupted 0 Vicryl sutures.  Secondary trocars were removed under direct vision and no bleeding was noted.  The laparoscope was withdrawn and the umbilical trocar removed.  The abdomen was allowed to collapse. The skin was closed with subcuticular sutures of 4-0 monocryl.  Steri-strips and appropriate dressings were placed.     All sponge and needle counts were correct times two at the completion of the procedure. The patient recovered from anesthesia, was extubated in the operating room and was transported to the PACU in stable condition.      Adam Ramos MD     Date: 2/1/2024  Time: 18:23 EST

## 2024-02-01 NOTE — CONSULTS
General Surgery Consultation Note    Date of Service: 2/1/2024  Malcolm Costa  [id number]  1965      Referring Provider: Sun Galvin MD    Location of Consult: ER     Reason for Consultation: Acute cholecystitis       History of Present Illness:  I am seeing, Malcolm Costa, in consultation for Sun Galvin MD regarding acute cholecystitis.    Mr. Costa is a 58 year old gentleman with a history of DM2, GERD, prior ETOH use, and anxiety presenting with an 18 hour history of sudden onset RUQ. He describes it as sharp, episodic, with radiation to the back. Denies fevers/chills, nausea/vomiting. He first started having GI issues about 2 months ago with alternating constipation and diarrhea. He also admitted to occasional bloating and mild RUQ abdominal pain which was post-prandial. None of these prior episodes were as severe as this most recent attack. Denies jaundice, acholic stool, dark urine, and other symptoms. He has not eaten in 1-2 days.    CT A/P - acute cholecystitis with cholelithiasis. A small stone is possibly seen at the level of the ampulla. No evidence of pancreatitis.    Transaminitis 290/167, total bilirubin 4.1, leukocytosis of 8.2, Lipase elevated at 113.     MRCP - hepatic steatosis, diffuse gallbladder wall thickening with tiny stones in the gallbladder neck consistent with acute cholecystitis. No evidence of choledocholithiasis. No pancreatic or biliary ductal dilatation.    Problems Addressed this Visit       * (Principal) Acute cholecystitis - Primary     Other Visit Diagnoses       RUQ abdominal pain        Nausea        Diarrhea, unspecified type        Elevated LFTs        Type 2 diabetes mellitus with hyperglycemia, with long-term current use of insulin        Relevant Medications    insulin regular (humuLIN R,novoLIN R) injection 10 Units    dextrose (GLUTOSE) oral gel 15 g    glucagon (GLUCAGEN) injection 1 mg    insulin regular (humuLIN R,novoLIN R) injection 2-7 Units     Elevated blood pressure reading with diagnosis of hypertension        Relevant Medications    labetalol (NORMODYNE,TRANDATE) injection 10 mg    Obesity (BMI 30-39.9)        H/O noncompliance with medical treatment, presenting hazards to health              Diagnoses         Codes Comments    Acute cholecystitis    -  Primary ICD-10-CM: K81.0  ICD-9-CM: 575.0     RUQ abdominal pain     ICD-10-CM: R10.11  ICD-9-CM: 789.01     Nausea     ICD-10-CM: R11.0  ICD-9-CM: 787.02     Diarrhea, unspecified type     ICD-10-CM: R19.7  ICD-9-CM: 787.91     Elevated LFTs     ICD-10-CM: R79.89  ICD-9-CM: 790.6     Type 2 diabetes mellitus with hyperglycemia, with long-term current use of insulin     ICD-10-CM: E11.65, Z79.4  ICD-9-CM: 250.00, 790.29, V58.67     Elevated blood pressure reading with diagnosis of hypertension     ICD-10-CM: I10  ICD-9-CM: 401.9     Obesity (BMI 30-39.9)     ICD-10-CM: E66.9  ICD-9-CM: 278.00     H/O noncompliance with medical treatment, presenting hazards to health     ICD-10-CM: Z91.199  ICD-9-CM: V15.81             Past Medical History:   Diagnosis Date    Allergic rhinitis     Anxiety     Dermatitis 8/2/2016    Diabetes mellitus     Diverticulitis     Gastroesophageal reflux disease 7/11/2016    GERD (gastroesophageal reflux disease)     History of alcohol abuse     Hypertension     Insomnia 7/11/2016    Visual impairment 7/11/2016    Vitamin D deficiency 7/11/2016       Past Surgical History:    APPENDECTOMY    HERNIA REPAIR    TONSILLECTOMY       Allergies   Allergen Reactions    Shellfish-Derived Products Anaphylaxis    Codeine Nausea Only     Not sure of reaction but thinks it is just nausea       No current facility-administered medications on file prior to encounter.     Current Outpatient Medications on File Prior to Encounter   Medication Sig Dispense Refill    amLODIPine (NORVASC) 10 MG tablet Take 1 tablet by mouth Daily. 90 tablet 0    atorvastatin (LIPITOR) 10 MG tablet Take 1 tablet  "by mouth Daily. 90 tablet 1    busPIRone (BUSPAR) 10 MG tablet Take 1 tablet by mouth 2 (Two) Times a Day. 180 tablet 1    citalopram (CeleXA) 20 MG tablet Take 1 tablet by mouth once daily 90 tablet 0    Continuous Blood Gluc  (FreeStyle Dagoberto 2 Kent) device 1 Device Take As Directed. 1 each 0    Continuous Blood Gluc Sensor (FreeStyle Dagoberto 2 Sensor) misc 1 Device Every 14 (Fourteen) Days. 2 each 11    glipizide (GLUCOTROL) 5 MG tablet Take 1 tablet by mouth Every Morning. 90 tablet 1    glucose blood test strip Use as instructed 50 each 12    insulin NPH (NovoLIN N ReliOn) 100 UNIT/ML injection INJECT 10 UNITS WITH MORNING MEAL AND 5 UNITS WITH EVENING MEAL 10 mL 0    Insulin Syringe-Needle U-100 28G X 5/16\" 1 ML misc 1 Device 2 (Two) Times a Day. 300 each 3    Lancets (accu-chek soft touch) lancets Used to test blood sugar for Diabetes E11.65 test up to twice a day 100 each 12    lisinopril (PRINIVIL,ZESTRIL) 40 MG tablet Take 1 tablet by mouth Daily. 30 tablet 0    meloxicam (MOBIC) 15 MG tablet TAKE 1 TABLET BY MOUTH ONCE DAILY AS NEEDED FOR MODERATE PAIN 30 tablet 0    metFORMIN ER (GLUCOPHAGE-XR) 750 MG 24 hr tablet Take 2 tablets by mouth Daily With Breakfast. 180 tablet 1    omeprazole (priLOSEC) 20 MG capsule Take 1 capsule by mouth Daily. 90 capsule 1         Current Facility-Administered Medications:     Calcium Replacement - Follow Nurse / BPA Driven Protocol, , Does not apply, PRN, Cosme Rosado III, DO    dextrose (D50W) (25 g/50 mL) IV injection 25 g, 25 g, Intravenous, Q15 Min PRN, Cosme Rosado III, DO    dextrose (GLUTOSE) oral gel 15 g, 15 g, Oral, Q15 Min PRN, Cosme Rosado III, DO    glucagon (GLUCAGEN) injection 1 mg, 1 mg, Intramuscular, Q15 Min PRN, Cosme Rosado III, DO    HYDROmorphone (DILAUDID) injection 0.5 mg, 0.5 mg, Intravenous, Q2H PRN, 0.5 mg at 02/01/24 0628 **AND** naloxone (NARCAN) injection 0.4 mg, 0.4 mg, Intravenous, Q5 Min " PRN, Cosme Rosado III, DO    insulin regular (humuLIN R,novoLIN R) injection 10 Units, 10 Units, Subcutaneous, Once, Claudette Morfin PA-C    insulin regular (humuLIN R,novoLIN R) injection 2-7 Units, 2-7 Units, Subcutaneous, Q6H, Cosme Rosado III, DO, 4 Units at 02/01/24 0627    labetalol (NORMODYNE,TRANDATE) injection 10 mg, 10 mg, Intravenous, Q6H PRN, Cosme Rosado III, DO, 10 mg at 02/01/24 0628    Magnesium Standard Dose Replacement - Follow Nurse / BPA Driven Protocol, , Does not apply, PRN, Cosme Rosado III, DO    magnesium sulfate 2g/50 mL (PREMIX) infusion, 2 g, Intravenous, Q2H, Sun Galvin MD    morphine injection 1 mg, 1 mg, Intravenous, Q4H PRN **AND** naloxone (NARCAN) injection 0.4 mg, 0.4 mg, Intravenous, Q5 Min PRN, Cosme Rosado III, DO    nitroglycerin (NITROSTAT) SL tablet 0.4 mg, 0.4 mg, Sublingual, Q5 Min PRN, Cosme Rosado III, DO    ondansetron (ZOFRAN) injection 4 mg, 4 mg, Intravenous, Q6H PRN, Cosme Rosado III, DO, 4 mg at 02/01/24 0328    Phosphorus Replacement - Follow Nurse / BPA Driven Protocol, , Does not apply, PRN, Cosme Rosado III, DO    Potassium Replacement - Follow Nurse / BPA Driven Protocol, , Does not apply, PRN, Cosme Rosado III, DO    Sodium Chloride (PF) 0.9 % 10 mL, 10 mL, Intravenous, PRN, Debbie Bean MD    [COMPLETED] Insert Peripheral IV, , , Once **AND** sodium chloride 0.9 % flush 10 mL, 10 mL, Intravenous, PRN, Debbie Bean MD    sodium chloride 0.9 % flush 10 mL, 10 mL, Intravenous, Q12H, Cosme Rosado III, DO, 10 mL at 02/01/24 0316    sodium chloride 0.9 % flush 10 mL, 10 mL, Intravenous, PRN, Cosme Rosado III, DO    sodium chloride 0.9 % infusion 40 mL, 40 mL, Intravenous, PRN, Cosme Rosado III, DO    sodium chloride 0.9 % infusion, 100 mL/hr, Intravenous, Continuous, Cosme Rosado III, DO, Last Rate: 100  "mL/hr at 02/01/24 0516, 100 mL/hr at 02/01/24 0516    Current Outpatient Medications:     amLODIPine (NORVASC) 10 MG tablet, Take 1 tablet by mouth Daily., Disp: 90 tablet, Rfl: 0    atorvastatin (LIPITOR) 10 MG tablet, Take 1 tablet by mouth Daily., Disp: 90 tablet, Rfl: 1    busPIRone (BUSPAR) 10 MG tablet, Take 1 tablet by mouth 2 (Two) Times a Day., Disp: 180 tablet, Rfl: 1    citalopram (CeleXA) 20 MG tablet, Take 1 tablet by mouth once daily, Disp: 90 tablet, Rfl: 0    Continuous Blood Gluc  (FreeStyle Dagoberto 2 Gibson) device, 1 Device Take As Directed., Disp: 1 each, Rfl: 0    Continuous Blood Gluc Sensor (FreeStyle Dagoberto 2 Sensor) misc, 1 Device Every 14 (Fourteen) Days., Disp: 2 each, Rfl: 11    glipizide (GLUCOTROL) 5 MG tablet, Take 1 tablet by mouth Every Morning., Disp: 90 tablet, Rfl: 1    glucose blood test strip, Use as instructed, Disp: 50 each, Rfl: 12    insulin NPH (NovoLIN N ReliOn) 100 UNIT/ML injection, INJECT 10 UNITS WITH MORNING MEAL AND 5 UNITS WITH EVENING MEAL, Disp: 10 mL, Rfl: 0    Insulin Syringe-Needle U-100 28G X 5/16\" 1 ML misc, 1 Device 2 (Two) Times a Day., Disp: 300 each, Rfl: 3    Lancets (accu-chek soft touch) lancets, Used to test blood sugar for Diabetes E11.65 test up to twice a day, Disp: 100 each, Rfl: 12    lisinopril (PRINIVIL,ZESTRIL) 40 MG tablet, Take 1 tablet by mouth Daily., Disp: 30 tablet, Rfl: 0    meloxicam (MOBIC) 15 MG tablet, TAKE 1 TABLET BY MOUTH ONCE DAILY AS NEEDED FOR MODERATE PAIN, Disp: 30 tablet, Rfl: 0    metFORMIN ER (GLUCOPHAGE-XR) 750 MG 24 hr tablet, Take 2 tablets by mouth Daily With Breakfast., Disp: 180 tablet, Rfl: 1    omeprazole (priLOSEC) 20 MG capsule, Take 1 capsule by mouth Daily., Disp: 90 capsule, Rfl: 1    Family History   Problem Relation Age of Onset    Thyroid disease Other     Hypertension Mother     Multiple myeloma Mother     Hypertension Father     Alcohol abuse Father     Hypertension Maternal Grandmother     " "Hypertension Maternal Grandfather     Diabetes Paternal Grandmother     Hypertension Paternal Grandmother     Hypertension Paternal Grandfather      Social History     Socioeconomic History    Marital status: Single   Tobacco Use    Smoking status: Never    Smokeless tobacco: Never   Vaping Use    Vaping Use: Never used   Substance and Sexual Activity    Alcohol use: No     Comment: sober since 2015    Drug use: No    Sexual activity: Yes       Review of Systems:  Per HPI, otherwise the 12 point review of systems is negative.    /94 (BP Location: Left arm, Patient Position: Lying)   Pulse 99   Temp 98.4 °F (36.9 °C) (Oral)   Resp 16   Ht 165.1 cm (65\")   Wt 89.8 kg (198 lb)   SpO2 93%   BMI 32.95 kg/m²   Body mass index is 32.95 kg/m².    General: alert, oriented x 3, well-appearing, no acute distress  HEENT: normocephalic, atraumatic, sclerae anicteric, external ears normal   Cardiovascular: Regular rate and rhythm  Pulmonary: breathing comfortably on room air, no respiratory distress  Gastrointestinal: soft, tender to palpation in the RUQ and epigastrium, non-distended, no peritoneal signs, well-healed open appendectomy incision, soft, non-reducible fat-containing incisional hernia at umbilicus, prior umbilical hernia repair scar below umbilicus is well-healing  Lymph: No lymphadenopathy  Extremity: no clubbing, cyanosis, edema  Neuro: cognitively intact, CN grossly intact, no focal deficits  Psych: appropriate mood and affect    CBC  Results from last 7 days   Lab Units 02/01/24  0620   WBC 10*3/mm3 8.15   HEMOGLOBIN g/dL 13.4   HEMATOCRIT % 38.0   PLATELETS 10*3/mm3 160       CMP  Results from last 7 days   Lab Units 02/01/24  0620   SODIUM mmol/L 135*   POTASSIUM mmol/L 4.4   CHLORIDE mmol/L 100   CO2 mmol/L 23.0   BUN mg/dL 9   CREATININE mg/dL 0.96   CALCIUM mg/dL 8.4*   BILIRUBIN mg/dL 4.1*   ALK PHOS U/L 218*   ALT (SGPT) U/L 167*   AST (SGOT) U/L 290*   GLUCOSE mg/dL 327* "       Radiology  Imaging Results (Last 72 Hours)       Procedure Component Value Units Date/Time    MRI abdomen wo contrast mrcp [[id number]] Collected: 02/01/24 0425     Updated: 02/01/24 0431    Narrative:      MRI ABDOMEN WO CONTRAST MRCP    Date of Exam: 2/1/2024 3:41 AM EST    Indication: Pancreatitis, acute, severe.     Comparison: 2/1/2024.    Technique:  Routine multiplanar/multisequence images of the abdomen were obtained with MRCP sequences without contrast administration.      Findings:  Diffuse gallbladder wall thickening is present. Tiny stones are present within the gallbladder neck. Cystic duct and common bile duct appear normal in caliber. No filling defects identified. Calcifications seen on CT images within the region of the   ampulla is not appreciated on this study. Pancreas appears unremarkable. No evidence of ductal dilatation. No evidence of surrounding edema. Liver demonstrates low T1 signal changes consistent with steatosis. No significant free fluid identified. No   focal lesions. No evidence of hydronephrosis.      Impression:      Impression:  1.Diffuse gallbladder wall thickening with tiny stones present within the gallbladder neck consistent with acute cholecystitis. No evidence of choledocholithiasis. No evidence of pancreatic or biliary ductal dilatation. No significant inflammatory   changes identified surrounding the pancreas. Tiny calcification seen within the region of the ampulla on the previous study is not appreciated on this study likely related to modality.  2.Hepatic steatosis.        Electronically Signed: Symone Hope MD    2/1/2024 4:28 AM EST    Workstation ID: [id number]    CT Abdomen Pelvis With Contrast [[id number]] Collected: 02/01/24 0135     Updated: 02/01/24 0142    Narrative:      CT ABDOMEN PELVIS W CONTRAST    Date of Exam: 2/1/2024 1:25 AM EST    Indication: upper abd pain, nausea, stool changes.    Comparison: None available.    Technique: Axial CT images were  obtained of the abdomen and pelvis following the uneventful intravenous administration of intravenous contrast. Reconstructed coronal and sagittal images were also obtained. Automated exposure control and iterative   construction methods were used.      Findings:  Lung Bases:     The visualized lung bases and lower mediastinal structures are unremarkable.    Liver:  The liver appears diffusely hypodense consistent with steatosis.. No focal lesions.    Biliary/Gallbladder:    The gallbladder is distended. Tiny stones are present within the gallbladder neck. Diffuse wall thickening is present with hyperenhancement of the mucosa. No evidence of biliary ductal dilatation. No biliary duct stones.. The biliary tree is nondilated.   There is a tiny focal calcification present within the ampulla (series 900 image 49). This measures approximately 2 to 3 mm.    Spleen:  Spleen is normal in size and CT density.    Pancreas:    Pancreas is normal. There is no evidence of pancreatic mass or peripancreatic fluid.    Kidneys:    Kidneys are normal in size. There are no stones or hydronephrosis.    Adrenals:    Adrenal glands are unremarkable.    Retroperitoneal/Lymph Nodes/Vasculature:    No retroperitoneal adenopathy is identified.    Gastrointestinal/Mesentery:    Fat-containing ventral wall hernia present. No evidence of bowel involvement. No additional hernia identified. No significant stool burden. The bowel loops are non-dilated without wall thickening or mass. The appendix is not well visualized. No secondary   findings of acute appendicitis.. No evidence of obstruction. No free air. No mesenteric fluid collections identified.    Bladder:    The bladder is normal.    Genital:     Unremarkable          Bony Structures:     Visualized bony structures are consistent with the patient's age.        Impression:      Impression:  1.Findings consistent with acute cholecystitis. No evidence of biliary ductal dilatation or duct  stones identified however there does hypertrophy a tiny stone within the ampulla measuring 2 to 3 mm best seen on the coronal images.  2.Hepatic steatosis.  3.Ancillary findings as described above.        Electronically Signed: Symone Hope MD    2/1/2024 1:39 AM EST    Workstation ID: [id number]              Assessment:  Mr. Malcolm Costa is a 58 year old gentleman presenting with acute cholecystitis and possible choledocholithiasis. There was a small possible stone seen on CT at the ampulla, however, there is no evidence of this and no biliary dilatation on MRCP. His hepatic panel abnormalities could be related to a small stone or potentially inflammation from cholecystitis. He also has a mildly elevated lipase but does not have symptoms (nausea, severe epigastric pain) classic for pancreatitis, and no radiographic features of pancreatitis. At this time, he does not meet the criteria for a diagnostis of pancreatitis. The slight elevation may be related to the small impacted stone at the ampulla.    Given these findings, I recommended proceeding to the OR for laparoscopic cholecystectomy with intraoperative cholangiogram. Should stones be identified, they will attempt to be cleared, however, patient may need post-operative ERCP for clearance.     The risks and benefits of the procedure were discussed including, but not limited to: bleeding, infection, injury to adjacent structures, need for re-intervention (operative intervention, drain placement, etc.), and medical complications due to the patient's underlying co-morbidities and the risks of general anesthesia. The risks of bile leak, common duct injury, need for a drain, and conversion to open were reviewed which are specific to this procedure. I admitted that due to his uncontrolled diabetes, he was slightly higher risk for a complication.     The patient wishes to proceed and consented for surgery. All of the patient's questions were answered.       Plan:  -  Laparoscopic cholecystectomy with intraoperative cholangiogram  - Continue Zosyn  - NPO, IVF  - Recommend more frequent q4hr BS checks due to his uncontrolled hyperglycemia to attempt to have good glycemic control perioperatively  - Consult diabetes educator  - Consult social work regarding medications after discharge  - Appreciate medicine assistance with patient management    Adam Ramos MD  02/01/24  08:51 EST

## 2024-02-01 NOTE — H&P
"    [facility] Medicine Services  HISTORY AND PHYSICAL    Patient Name: Malcolm Costa  : 1965  MRN: [id number]  Primary Care Physician: Sita Chase APRN  Date of admission: 2024      Subjective   Subjective     Chief Complaint:  Abdominal pain    HPI:  Malcolm Costa is a 58 y.o. male who states that around 3 PM on  he had onset of right upper quadrant abdominal pain.  He describes it as sharp, radiating \"straight through to my back,\" and can cite no exacerbating or alleviating factors.  He states he had \"1 episode of this last week,\" but it resolved on its own before recurring on Wednesday.  He denies any nausea or vomiting.  No fever or chills.  Of note, he states he has been without insurance for the last 2 months and has had none of his home medications.  Workup in the ED included CT abdomen/pelvis, the radiology read of which mentions findings consistent with acute cholecystitis, tiny stone within the ampulla measuring 2-3 mm, no evidence of biliary ductal dilatation or duct stones; labs obtained in the ED did show elevated LFTs with cholestatic pattern.  He denies chest pain, shortness of breath, slurred speech/facial droop, bowel habit change, dizziness/lightheadedness, visual changes, or syncope.  His medical history is significant for hypertension, type 2 diabetes, GERD, and depression.    Personal History     Past Medical History:   Diagnosis Date    Allergic rhinitis     Anxiety     Dermatitis 2016    Diabetes mellitus     Diverticulitis     Gastroesophageal reflux disease 2016    GERD (gastroesophageal reflux disease)     History of alcohol abuse     Hypertension     Insomnia 2016    Visual impairment 2016    Vitamin D deficiency 2016           Past Surgical History:   Procedure Laterality Date    APPENDECTOMY      HERNIA REPAIR  2010    TONSILLECTOMY  1974       Family History: family history includes Alcohol abuse in his " father; Diabetes in his paternal grandmother; Hypertension in his father, maternal grandfather, maternal grandmother, mother, paternal grandfather, and paternal grandmother; Multiple myeloma in his mother; Thyroid disease in an other family member.     Social History:  reports that he has never smoked. He has never used smokeless tobacco. He reports that he does not drink alcohol and does not use drugs.  He states he is 8 years sober from alcohol.  Social History     Social History Narrative    Not on file       Medications:  Available home medication information reviewed.  FreeStyle Dagoberto 2 [place], FreeStyle Dagoberto 2 Sensor, Insulin Syringe-Needle U-100, accu-chek soft touch, amLODIPine, atorvastatin, busPIRone, citalopram, glipizide, glucose blood, insulin NPH, lisinopril, meloxicam, metFORMIN ER, and omeprazole    Allergies   Allergen Reactions    Shellfish-Derived Products Anaphylaxis    Codeine Nausea Only     Not sure of reaction but thinks it is just nausea       Objective   Objective     Vital Signs:   Temp:  [97.7 °F (36.5 °C)] 97.7 °F (36.5 °C)  Heart Rate:  [] 98  Resp:  [18] 18  BP: (181-189)/() 181/95       Physical Exam   Constitutional: Awake, alert, NAD, pleasant.  Eyes: PERRLA, sclerae anicteric, no conjunctival injection  HENT: NCAT, mucous membranes moist  Neck: Supple, no thyromegaly, no lymphadenopathy, trachea midline  Respiratory: Clear to auscultation bilaterally, nonlabored respirations   Cardiovascular: RRR, no murmurs, rubs, or gallops, palpable pedal pulses bilaterally  Gastrointestinal: Positive bowel sounds, soft, RUQ and epigastric tenderness, otherwise nontender, no peritoneal signs, nondistended  Musculoskeletal: No bilateral ankle edema, no clubbing or cyanosis to extremities  Psychiatric: Appropriate affect, cooperative  Neurologic: Oriented x 3, strength symmetric in all extremities, Cranial Nerves grossly intact to confrontation, speech clear  Skin: No rashes, normal  preet.    Result Review:  I have personally reviewed the results from the time of this admission to 2/1/2024 03:22 EST and agree with these findings:  [x]  Laboratory list / accordion  []  Microbiology  [x]  Radiology  []  EKG/Telemetry   []  Cardiology/Vascular   []  Pathology  []  Old records  []  Other:  Most notable findings include: Reviewed labs and radiology report from CT abdomen/pelvis.      LAB RESULTS:      Lab 02/01/24  0039   WBC 9.70   HEMOGLOBIN 15.4   HEMATOCRIT 44.4   PLATELETS 186   NEUTROS ABS 7.95*   IMMATURE GRANS (ABS) 0.06*   LYMPHS ABS 0.96   MONOS ABS 0.67   EOS ABS 0.03   MCV 88.3   LACTATE 2.0         Lab 02/01/24  0039   SODIUM 134*   POTASSIUM 4.7   CHLORIDE 96*   CO2 22.0   ANION GAP 16.0*   BUN 9   CREATININE 0.97   EGFR 90.5   GLUCOSE 430*   CALCIUM 9.2         Lab 02/01/24  0039   TOTAL PROTEIN 7.3   ALBUMIN 4.1   GLOBULIN 3.2   ALT (SGPT) 138*   AST (SGOT) 284*   BILIRUBIN 3.3*   ALK PHOS 226*   LIPASE 113*                     UA          10/2/2023    02:54 2/1/2024    00:33   Urinalysis   Squamous Epithelial Cells, UA 0-2  None Seen    Specific Gravity, UA 1.028  1.044    Ketones, UA 15 mg/dL (1+)  15 mg/dL (1+)    Blood, UA Small (1+)  Small (1+)    Leukocytes, UA Negative  Negative    Nitrite, UA Negative  Negative    RBC, UA 3-6  0-2    WBC, UA 0-2  0-2    Bacteria, UA None Seen  None Seen        Microbiology Results (last 10 days)       ** No results found for the last 240 hours. **            CT Abdomen Pelvis With Contrast    Result Date: 2/1/2024  CT ABDOMEN PELVIS W CONTRAST Date of Exam: 2/1/2024 1:25 AM EST Indication: upper abd pain, nausea, stool changes. Comparison: None available. Technique: Axial CT images were obtained of the abdomen and pelvis following the uneventful intravenous administration of intravenous contrast. Reconstructed coronal and sagittal images were also obtained. Automated exposure control and iterative construction methods were used. Findings:  Lung Bases:   The visualized lung bases and lower mediastinal structures are unremarkable. Liver: The liver appears diffusely hypodense consistent with steatosis.. No focal lesions. Biliary/Gallbladder:  The gallbladder is distended. Tiny stones are present within the gallbladder neck. Diffuse wall thickening is present with hyperenhancement of the mucosa. No evidence of biliary ductal dilatation. No biliary duct stones.. The biliary tree is nondilated. There is a tiny focal calcification present within the ampulla (series 900 image 49). This measures approximately 2 to 3 mm. Spleen: Spleen is normal in size and CT density. Pancreas:  Pancreas is normal. There is no evidence of pancreatic mass or peripancreatic fluid. Kidneys:  Kidneys are normal in size. There are no stones or hydronephrosis. Adrenals:  Adrenal glands are unremarkable. Retroperitoneal/Lymph Nodes/Vasculature:  No retroperitoneal adenopathy is identified. Gastrointestinal/Mesentery:  Fat-containing ventral wall hernia present. No evidence of bowel involvement. No additional hernia identified. No significant stool burden. The bowel loops are non-dilated without wall thickening or mass. The appendix is not well visualized. No secondary  findings of acute appendicitis.. No evidence of obstruction. No free air. No mesenteric fluid collections identified. Bladder:  The bladder is normal. Genital:   Unremarkable       Bony Structures:   Visualized bony structures are consistent with the patient's age.     Impression: Impression: 1.Findings consistent with acute cholecystitis. No evidence of biliary ductal dilatation or duct stones identified however there does hypertrophy a tiny stone within the ampulla measuring 2 to 3 mm best seen on the coronal images. 2.Hepatic steatosis. 3.Ancillary findings as described above. Electronically Signed: Symone Hope MD  2/1/2024 1:39 AM EST  Workstation ID: [id number]         Assessment & Plan   Assessment & Plan        Acute cholecystitis    58 M with acute cholecystitis and pancreatitis    Acute cholecystitis  Elevated LFTs  Pancreatitis  - NPO.  - MRCP.  - If he has CBD dilatation on MRCP, will request GI consult for ERCP.  - General surgery consult for possible cholecystectomy.  - Pain control with IV agents needed.  - Nausea control with IV agents as needed.  - IVF with 0.9 NS.    ADDENDUM 2/1/2024 0437: No choledocholithiasis on MRCP.    Hypertension  - He has not had his home medications for the last 2 months.  - Will add as needed IV labetalol for now.    Type 2 diabetes with hyperglycemia  - N.p.o. for now.  - IVF with 0.9 NS.  - Check HbA1c.  - SSI with scheduled fingerstick checks while the patient is n.p.o., also hypoglycemia coverage.    GERD  - Received dose of IV Protonix in the ED.  - Will defer further PPI therapy for now.    Depression  - He states he will be able to resume his antidepressant as outpatient.  - No acute issues; will request case management consult to assist with obtaining home meds.        Total time spent: 83 minutes  Time spent includes time reviewing chart, face-to-face time, counseling patient/family/caregiver, ordering medications/tests/procedures, communicating with other health care professionals, documenting clinical information in the electronic health record, and coordination of care.       DVT prophylaxis:  scds          CODE STATUS:  full  Code Status and Medical Interventions:   Ordered at: 02/01/24 0237     Level Of Support Discussed With:    Patient     Code Status (Patient has no pulse and is not breathing):    CPR (Attempt to Resuscitate)     Medical Interventions (Patient has pulse or is breathing):    Full Support       Expected Discharge   solitario Rosado,III, DO  02/01/24

## 2024-02-01 NOTE — PROGRESS NOTES
Nonbillable note.  Patient was admitted after midnight    Summary:  58 years old male with past medical history of essential hypertension, type 2 diabetes, GERD and depression who presented to the hospital with right upper quadrant pain and found to have acute cholecystitis    Acute cholecystitis  Elevated LFTs  Patient presented with right upper quadrant abdominal pain of sudden onset.  Pain is colicky in nature.  CT abdomen is concerning for acute cholecystitis MRCP confirmed the presence of acute cholecystitis, with no evidence of choledocholithiasis  Plan for lap reese today by Dr. Ramos/general surgery  As needed analgesia  Monitor CMP with morning lab    Essential hypertension  Not compliant with home meds  As needed IV hydralazine  Will probably need initiation of hypertensive medication upon discharge    Type 2 diabetes  A1c 11.6%  Insulin Levemir and SSI  Will need insulin upon discharge given his markedly evaded A1c    Dyslipidemia  Holding statins given his elevated LFTs    GERD  Depression

## 2024-02-01 NOTE — ED PROVIDER NOTES
"Subjective   History of Present Illness  This is a 58-year-old male that presents the ER with onset of epigastric and right upper quadrant pain that started approximately 7 hours ago.  Pain is a \"steady pain\" with radiation to the right upper back.  Patient reports nausea but denies any vomiting.  Patient had a bowel movement today but said it was like passing \"small flakes\".  He has had recent history of diarrhea alternating with constipation and reports change in stool caliber for 2 months.  He denies any fever or chills.  Pain is worsened with eating almost anything as well as movement.  He also has been having increased abdominal distention with symptoms of indigestion as well as increased belching and flatus.  Previous abdominal surgeries include appendectomy and umbilical hernia repair.  Last colonoscopy was approximately 10 years ago and patient had benign colon polyps removed as well as diverticulosis.  Patient denies any alcohol use.  Patient denies dysuria, urgency, or frequency.  Patient has past medical history of obesity with BMI of 36, diverticulosis, GERD, type 2 diabetes mellitus, insulin manage, anxiety, hypertension, and GERD.  Patient says he lost his health insurance last fall and he has not had any of his medications for hypertension or diabetes for the last 4 months.  He just got his new health insurance initiated.    History provided by:  Patient  Abdominal Pain  Pain location:  Epigastric and RUQ  Pain quality comment:  \"steady pain\"  Pain radiates to:  Back (Right upper back)  Onset quality:  Sudden  Duration:  7 hours  Timing:  Constant  Progression:  Unchanged  Chronicity:  New  Context: previous surgery (appendectomy, umbilical hernia. Last colonoscopy 10 years; benign colon polyps and diverticulosis.)    Context: not alcohol use    Context comment:  Recent diarrhea alternating with constipation. Pt reports passing \"small flakes today\". Increased epigastric and RUQ abd pain. Nausea " without vomiting. Food makes sxs worse.  Relieved by:  Nothing  Worsened by:  Eating  Ineffective treatments:  OTC medications and antacids (Rx for Omeprazole, tums/rolaids, Immodium for recent diarrhea)  Associated symptoms: anorexia, belching, constipation, diarrhea, flatus and nausea    Associated symptoms: no chest pain, no chills, no cough, no dysuria, no fatigue, no fever, no hematemesis, no hematochezia, no hematuria, no melena, no shortness of breath, no sore throat and no vomiting    Risk factors: obesity    Risk factors: no alcohol abuse and has not had multiple surgeries        Review of Systems   Constitutional:  Positive for appetite change. Negative for chills, fatigue and fever.   HENT: Negative.  Negative for congestion and sore throat.    Respiratory: Negative.  Negative for cough and shortness of breath.         Non-smoker   Cardiovascular:  Negative for chest pain.   Gastrointestinal:  Positive for abdominal distention (Increased abdominal distention/bloating.), abdominal pain, anorexia, constipation, diarrhea, flatus and nausea. Negative for hematemesis, hematochezia, melena and vomiting.        History of GERD. Recent diarrhea/constipation. Last colonoscopy was 10 years ago; colon polyps. Increased epigastric and RUQ abd pain with radiation to back since 1500 this afternoon. Change in stool caliber x 2 months.   Genitourinary: Negative.  Negative for dysuria, flank pain, frequency, hematuria and urgency.   Musculoskeletal:  Positive for back pain (upper back pain).   Neurological: Negative.  Negative for dizziness, syncope, light-headedness and headaches.   All other systems reviewed and are negative.      Past Medical History:   Diagnosis Date    Allergic rhinitis     Anxiety     Dermatitis 8/2/2016    Diabetes mellitus     Diverticulitis     Gastroesophageal reflux disease 7/11/2016    GERD (gastroesophageal reflux disease)     History of alcohol abuse     Hypertension     Insomnia 7/11/2016     Visual impairment 7/11/2016    Vitamin D deficiency 7/11/2016       Allergies   Allergen Reactions    Shellfish-Derived Products Anaphylaxis    Codeine Nausea Only     Not sure of reaction but thinks it is just nausea       Past Surgical History:   Procedure Laterality Date    APPENDECTOMY  1995    HERNIA REPAIR  2010    TONSILLECTOMY  1974       Family History   Problem Relation Age of Onset    Thyroid disease Other     Hypertension Mother     Multiple myeloma Mother     Hypertension Father     Alcohol abuse Father     Hypertension Maternal Grandmother     Hypertension Maternal Grandfather     Diabetes Paternal Grandmother     Hypertension Paternal Grandmother     Hypertension Paternal Grandfather        Social History     Socioeconomic History    Marital status: Single   Tobacco Use    Smoking status: Never    Smokeless tobacco: Never   Vaping Use    Vaping Use: Never used   Substance and Sexual Activity    Alcohol use: No     Comment: sober since 2015    Drug use: No    Sexual activity: Yes           Objective   Physical Exam  Vitals and nursing note reviewed.   Constitutional:       General: He is not in acute distress.     Appearance: Normal appearance. He is obese. He is not ill-appearing, toxic-appearing or diaphoretic.      Comments: Patient appears uncomfortable secondary to abdominal pain.  Nontoxic.  Obesity with BMI of 36   HENT:      Head: Normocephalic and atraumatic.      Nose: Nose normal.      Mouth/Throat:      Mouth: Mucous membranes are moist.      Pharynx: Oropharynx is clear.      Comments: Oral mucous membranes are still moist  Eyes:      Extraocular Movements: Extraocular movements intact.      Conjunctiva/sclera: Conjunctivae normal.      Pupils: Pupils are equal, round, and reactive to light.   Cardiovascular:      Rate and Rhythm: Regular rhythm. Tachycardia present. No extrasystoles are present.     Pulses: Normal pulses.      Heart sounds: Normal heart sounds. No murmur heard.      Comments: Mild tachycardia.  No ectopy.  No pedal edema to lower extremities  Pulmonary:      Effort: Pulmonary effort is normal.      Breath sounds: Normal breath sounds.      Comments: Lungs are clear to auscultation bilaterally  Abdominal:      General: Bowel sounds are normal. There is no distension.      Palpations: Abdomen is soft.      Tenderness: There is abdominal tenderness in the right upper quadrant and epigastric area. There is guarding. There is no right CVA tenderness, left CVA tenderness or rebound. Positive signs include Costa's sign. Negative signs include McBurney's sign.      Comments: Patient has central obesity.  Active bowel sounds in all 4 quadrants.  No distention or rigidity.  Moderate tenderness to epigastric region and moderate tenderness to right upper quadrant with involuntary guarding.  Positive Costa's sign.  No lower abdominal tenderness.  Abdominal exam is nonsurgical.   Musculoskeletal:         General: Normal range of motion.      Cervical back: Normal range of motion and neck supple.      Right lower leg: No edema.      Left lower leg: No edema.   Skin:     General: Skin is warm and dry.   Neurological:      General: No focal deficit present.      Mental Status: He is alert and oriented to person, place, and time.      Cranial Nerves: Cranial nerves 2-12 are intact.      Sensory: Sensation is intact.      Motor: Motor function is intact.      Coordination: Coordination is intact.      Comments: Neuro intact and nonfocal         Procedures           ED Course  ED Course as of 02/01/24 0549   Thu Feb 01, 2024   0108 Pt lost health insurance last fall and has not taken routine meds for HTN and DM for 4 months. [FC]   0546 Patient is afebrile but blood pressure is 188/104 upon arrival.  Patient was also mildly tachycardic.  O2 sat is 96% on room air.  Patient's CBC showed white blood cell count of 9000.  Differential showed 82% neutrophils.  Chemistries reveal serum glucose of 430.   Patient has been out of his insulin for 4 months due to lack of health insurance.  BUN and creatinine were 9 and 0.97.  LFTs showed , , alk phos 226 and total bilirubin 3.3.  Last chemistries from 4 months ago revealed normal LFTs.  Lactic acid is 2.0.  Lipase is 113.  Urinalysis revealed 0-2 white blood cells, no bacteria, glucosuria, 15 mg/dL of ketones, proteinuria, negative nitrite and negative leukocytes.  CT imaging of the abdomen/pelvis with contrast reveals findings consistent with acute cholecystitis.  No evidence of biliary ductal dilatation or duct stones.  There is hypertrophy and a tiny stone within the ampulla measuring 2 to 3 mm best seen on coronal images.  Hepatic steatosis.  We gave aggressive IV fluids, Dilaudid, Zofran, and I initiated Zosyn after blood cultures x 2 sets.  Discussed admission with hospitalist team, Dr. Schmitz, and she was agreeable to admission.  I also ordered 10 units of regular insulin subcutaneous due to serum glucose of 430.  Repeat Accu-Chek was 325 and is trending downward.  I updated patient and father at the bedside on all results and need for admission for cholecystitis.  Pain is stable at this time and patient was appreciative of our care.  Last blood pressure was 158/88.  Patient ready for admission. [FC]      ED Course User Index  [FC] Claudette Morfin PA-C                                 Recent Results (from the past 24 hour(s))   Urinalysis With Microscopic If Indicated (No Culture) - Urine, Clean Catch    Collection Time: 02/01/24 12:33 AM    Specimen: Urine, Clean Catch   Result Value Ref Range    Color, UA Yellow Yellow, Straw    Appearance, UA Clear Clear    pH, UA 5.5 5.0 - 8.0    Specific Gravity, UA 1.044 (H) 1.001 - 1.030    Glucose, UA >=1000 mg/dL (3+) (A) Negative    Ketones, UA 15 mg/dL (1+) (A) Negative    Bilirubin, UA Negative Negative    Blood, UA Small (1+) (A) Negative    Protein, UA >=300 mg/dL (3+) (A) Negative    Leuk Esterase, UA  Negative Negative    Nitrite, UA Negative Negative    Urobilinogen, UA 0.2 E.U./dL 0.2 - 1.0 E.U./dL   Urinalysis, Microscopic Only - Urine, Clean Catch    Collection Time: 02/01/24 12:33 AM    Specimen: Urine, Clean Catch   Result Value Ref Range    RBC, UA 0-2 None Seen, 0-2 /HPF    WBC, UA 0-2 None Seen, 0-2 /HPF    Bacteria, UA None Seen None Seen, Trace /HPF    Squamous Epithelial Cells, UA None Seen None Seen, 0-2 /HPF    Hyaline Casts, UA 0-6 0 - 6 /LPF    Methodology Automated Microscopy    Comprehensive Metabolic Panel    Collection Time: 02/01/24 12:39 AM    Specimen: Blood   Result Value Ref Range    Glucose 430 (C) 65 - 99 mg/dL    BUN 9 6 - 20 mg/dL    Creatinine 0.97 0.76 - 1.27 mg/dL    Sodium 134 (L) 136 - 145 mmol/L    Potassium 4.7 3.5 - 5.2 mmol/L    Chloride 96 (L) 98 - 107 mmol/L    CO2 22.0 22.0 - 29.0 mmol/L    Calcium 9.2 8.6 - 10.5 mg/dL    Total Protein 7.3 6.0 - 8.5 g/dL    Albumin 4.1 3.5 - 5.2 g/dL    ALT (SGPT) 138 (H) 1 - 41 U/L    AST (SGOT) 284 (H) 1 - 40 U/L    Alkaline Phosphatase 226 (H) 39 - 117 U/L    Total Bilirubin 3.3 (H) 0.0 - 1.2 mg/dL    Globulin 3.2 gm/dL    A/G Ratio 1.3 g/dL    BUN/Creatinine Ratio 9.3 7.0 - 25.0    Anion Gap 16.0 (H) 5.0 - 15.0 mmol/L    eGFR 90.5 >60.0 mL/min/1.73   Lipase    Collection Time: 02/01/24 12:39 AM    Specimen: Blood   Result Value Ref Range    Lipase 113 (H) 13 - 60 U/L   Lactic Acid, Plasma    Collection Time: 02/01/24 12:39 AM    Specimen: Blood   Result Value Ref Range    Lactate 2.0 0.5 - 2.0 mmol/L   Green Top (Gel)    Collection Time: 02/01/24 12:39 AM   Result Value Ref Range    Extra Tube Hold for add-ons.    Lavender Top    Collection Time: 02/01/24 12:39 AM   Result Value Ref Range    Extra Tube hold for add-on    Gold Top - SST    Collection Time: 02/01/24 12:39 AM   Result Value Ref Range    Extra Tube Hold for add-ons.    Gray Top    Collection Time: 02/01/24 12:39 AM   Result Value Ref Range    Extra Tube Hold for add-ons.     Light Blue Top    Collection Time: 02/01/24 12:39 AM   Result Value Ref Range    Extra Tube Hold for add-ons.    CBC Auto Differential    Collection Time: 02/01/24 12:39 AM    Specimen: Blood   Result Value Ref Range    WBC 9.70 3.40 - 10.80 10*3/mm3    RBC 5.03 4.14 - 5.80 10*6/mm3    Hemoglobin 15.4 13.0 - 17.7 g/dL    Hematocrit 44.4 37.5 - 51.0 %    MCV 88.3 79.0 - 97.0 fL    MCH 30.6 26.6 - 33.0 pg    MCHC 34.7 31.5 - 35.7 g/dL    RDW 12.4 12.3 - 15.4 %    RDW-SD 39.8 37.0 - 54.0 fl    MPV 10.7 6.0 - 12.0 fL    Platelets 186 140 - 450 10*3/mm3    Neutrophil % 82.0 (H) 42.7 - 76.0 %    Lymphocyte % 9.9 (L) 19.6 - 45.3 %    Monocyte % 6.9 5.0 - 12.0 %    Eosinophil % 0.3 0.3 - 6.2 %    Basophil % 0.3 0.0 - 1.5 %    Immature Grans % 0.6 (H) 0.0 - 0.5 %    Neutrophils, Absolute 7.95 (H) 1.70 - 7.00 10*3/mm3    Lymphocytes, Absolute 0.96 0.70 - 3.10 10*3/mm3    Monocytes, Absolute 0.67 0.10 - 0.90 10*3/mm3    Eosinophils, Absolute 0.03 0.00 - 0.40 10*3/mm3    Basophils, Absolute 0.03 0.00 - 0.20 10*3/mm3    Immature Grans, Absolute 0.06 (H) 0.00 - 0.05 10*3/mm3    nRBC 0.0 0.0 - 0.2 /100 WBC   POC Glucose Once    Collection Time: 02/01/24  2:56 AM    Specimen: Blood   Result Value Ref Range    Glucose 325 (H) 70 - 130 mg/dL     Note: In addition to lab results from this visit, the labs listed above may include labs taken at another facility or during a different encounter within the last 24 hours. Please correlate lab times with ED admission and discharge times for further clarification of the services performed during this visit.    MRI abdomen wo contrast mrcp   Final Result   Impression:   1.Diffuse gallbladder wall thickening with tiny stones present within the gallbladder neck consistent with acute cholecystitis. No evidence of choledocholithiasis. No evidence of pancreatic or biliary ductal dilatation. No significant inflammatory    changes identified surrounding the pancreas. Tiny calcification seen within  the region of the ampulla on the previous study is not appreciated on this study likely related to modality.   2.Hepatic steatosis.            Electronically Signed: Symone Hope MD     2/1/2024 4:28 AM EST     Workstation ID: [id number]      CT Abdomen Pelvis With Contrast   Final Result   Impression:   1.Findings consistent with acute cholecystitis. No evidence of biliary ductal dilatation or duct stones identified however there does hypertrophy a tiny stone within the ampulla measuring 2 to 3 mm best seen on the coronal images.   2.Hepatic steatosis.   3.Ancillary findings as described above.            Electronically Signed: Symone Hope MD     2/1/2024 1:39 AM EST     Workstation ID: [id number]        Vitals:    02/01/24 0441 02/01/24 0442 02/01/24 0444 02/01/24 0500   BP:    158/88   Pulse: 103 101 101 101   Resp:       Temp:       TempSrc:       SpO2:  95%  93%   Weight:       Height:         Medications   Sodium Chloride (PF) 0.9 % 10 mL (has no administration in time range)   sodium chloride 0.9 % flush 10 mL (has no administration in time range)   insulin regular (humuLIN R,novoLIN R) injection 10 Units (0 Units Subcutaneous Hold 2/1/24 0301)   sodium chloride 0.9 % flush 10 mL (10 mL Intravenous Given 2/1/24 0316)   sodium chloride 0.9 % flush 10 mL (has no administration in time range)   sodium chloride 0.9 % infusion 40 mL (has no administration in time range)   nitroglycerin (NITROSTAT) SL tablet 0.4 mg (has no administration in time range)   sodium chloride 0.9 % infusion (100 mL/hr Intravenous Currently Infusing 2/1/24 4911)   Potassium Replacement - Follow Nurse / BPA Driven Protocol (has no administration in time range)   Magnesium Standard Dose Replacement - Follow Nurse / BPA Driven Protocol (has no administration in time range)   Phosphorus Replacement - Follow Nurse / BPA Driven Protocol (has no administration in time range)   Calcium Replacement - Follow Nurse / BPA Driven Protocol (has no  administration in time range)   morphine injection 1 mg (has no administration in time range)     And   naloxone (NARCAN) injection 0.4 mg (has no administration in time range)   HYDROmorphone (DILAUDID) injection 0.5 mg (has no administration in time range)     And   naloxone (NARCAN) injection 0.4 mg (has no administration in time range)   ondansetron (ZOFRAN) injection 4 mg (4 mg Intravenous Given 2/1/24 0328)   dextrose (GLUTOSE) oral gel 15 g (has no administration in time range)   dextrose (D50W) (25 g/50 mL) IV injection 25 g (has no administration in time range)   glucagon (GLUCAGEN) injection 1 mg (has no administration in time range)   insulin regular (humuLIN R,novoLIN R) injection 2-7 Units (5 Units Subcutaneous Given 2/1/24 0316)   labetalol (NORMODYNE,TRANDATE) injection 10 mg (has no administration in time range)   sodium chloride 0.9 % bolus 1,000 mL (0 mL Intravenous Stopped 2/1/24 0317)   HYDROmorphone (DILAUDID) injection 1 mg (1 mg Intravenous Given 2/1/24 0136)   ondansetron (ZOFRAN) injection 4 mg (4 mg Intravenous Given 2/1/24 0136)   pantoprazole (PROTONIX) injection 40 mg (40 mg Intravenous Given 2/1/24 0135)   iopamidol (ISOVUE-300) 61 % injection 100 mL (85 mL Intravenous Given 2/1/24 0131)   piperacillin-tazobactam (ZOSYN) 3.375 g in iso-osmotic dextrose 50 ml (premix) (0 g Intravenous Stopped 2/1/24 0409)     ECG/EMG Results (last 24 hours)       ** No results found for the last 24 hours. **          No orders to display                   Medical Decision Making  Amount and/or Complexity of Data Reviewed  Labs: ordered.  Radiology: ordered.    Risk  OTC drugs.  Prescription drug management.  Decision regarding hospitalization.        Final diagnoses:   Acute cholecystitis   RUQ abdominal pain   Nausea   Diarrhea, unspecified type   Elevated LFTs   Type 2 diabetes mellitus with hyperglycemia, with long-term current use of insulin   Elevated blood pressure reading with diagnosis of  hypertension   Obesity (BMI 30-39.9)   H/O noncompliance with medical treatment, presenting hazards to health       ED Disposition  ED Disposition       ED Disposition   Decision to Admit    Condition   --    Comment   Level of Care: Telemetry [5]   Diagnosis: Acute cholecystitis [575.0.ICD-9-CM]   Admitting Physician: GARY PURCELL III [[id number]]   Attending Physician: GARY PURCELL III [[id number]]   Bed Request Comments: tele obs (not CDU)                 No follow-up provider specified.       Medication List      No changes were made to your prescriptions during this visit.            Claudette Morfin PA-C  02/01/24 0549

## 2024-02-01 NOTE — ANESTHESIA PROCEDURE NOTES
Airway  Urgency: elective    Date/Time: 2/1/2024 4:03 PM  Airway not difficult    General Information and Staff    Patient location during procedure: OR  CRNA/CAA: Christiano Morse CRNA    Indications and Patient Condition  Indications for airway management: airway protection    Preoxygenated: yes  MILS not maintained throughout  Mask difficulty assessment: 2 - vent by mask + OA or adjuvant +/- NMBA    Final Airway Details  Final airway type: endotracheal airway      Successful airway: ETT  Cuffed: yes   Successful intubation technique: direct laryngoscopy  Facilitating devices/methods: intubating stylet  Endotracheal tube insertion site: oral  Blade: Rich  Blade size: 4  ETT size (mm): 8.0  Cormack-Lehane Classification: grade I - full view of glottis  Placement verified by: chest auscultation and capnometry   Cuff volume (mL): 10  Measured from: lips  ETT/EBT  to lips (cm): 23  Number of attempts at approach: 1  Assessment: lips, teeth, and gum same as pre-op and atraumatic intubation    Additional Comments  Negative epigastric sounds, Breath sound equal bilaterally with symmetric chest rise and fall

## 2024-02-02 ENCOUNTER — APPOINTMENT (OUTPATIENT)
Dept: GENERAL RADIOLOGY | Facility: HOSPITAL | Age: 59
End: 2024-02-02
Payer: COMMERCIAL

## 2024-02-02 ENCOUNTER — ANESTHESIA (OUTPATIENT)
Dept: GASTROENTEROLOGY | Facility: HOSPITAL | Age: 59
End: 2024-02-02
Payer: COMMERCIAL

## 2024-02-02 ENCOUNTER — ANESTHESIA EVENT (OUTPATIENT)
Dept: GASTROENTEROLOGY | Facility: HOSPITAL | Age: 59
End: 2024-02-02
Payer: COMMERCIAL

## 2024-02-02 PROBLEM — R74.8 ELEVATED LIVER ENZYMES: Status: ACTIVE | Noted: 2024-01-31

## 2024-02-02 LAB
ALBUMIN SERPL-MCNC: 3.3 G/DL (ref 3.5–5.2)
ALBUMIN SERPL-MCNC: 3.6 G/DL (ref 3.5–5.2)
ALBUMIN/GLOB SERPL: 1.2 G/DL
ALBUMIN/GLOB SERPL: 1.3 G/DL
ALP SERPL-CCNC: 278 U/L (ref 39–117)
ALP SERPL-CCNC: 295 U/L (ref 39–117)
ALT SERPL W P-5'-P-CCNC: 199 U/L (ref 1–41)
ALT SERPL W P-5'-P-CCNC: 200 U/L (ref 1–41)
ANION GAP SERPL CALCULATED.3IONS-SCNC: 15 MMOL/L (ref 5–15)
ANION GAP SERPL CALCULATED.3IONS-SCNC: 17 MMOL/L (ref 5–15)
AST SERPL-CCNC: 198 U/L (ref 1–40)
AST SERPL-CCNC: 199 U/L (ref 1–40)
BASOPHILS # BLD AUTO: 0 10*3/MM3 (ref 0–0.2)
BASOPHILS NFR BLD AUTO: 0 % (ref 0–1.5)
BILIRUB SERPL-MCNC: 6.9 MG/DL (ref 0–1.2)
BILIRUB SERPL-MCNC: 7.1 MG/DL (ref 0–1.2)
BUN SERPL-MCNC: 13 MG/DL (ref 6–20)
BUN SERPL-MCNC: 14 MG/DL (ref 6–20)
BUN/CREAT SERPL: 12.6 (ref 7–25)
BUN/CREAT SERPL: 16.1 (ref 7–25)
CALCIUM SPEC-SCNC: 8 MG/DL (ref 8.6–10.5)
CALCIUM SPEC-SCNC: 8.2 MG/DL (ref 8.6–10.5)
CHLORIDE SERPL-SCNC: 101 MMOL/L (ref 98–107)
CHLORIDE SERPL-SCNC: 104 MMOL/L (ref 98–107)
CO2 SERPL-SCNC: 18 MMOL/L (ref 22–29)
CO2 SERPL-SCNC: 19 MMOL/L (ref 22–29)
CREAT SERPL-MCNC: 0.87 MG/DL (ref 0.76–1.27)
CREAT SERPL-MCNC: 1.03 MG/DL (ref 0.76–1.27)
DEPRECATED RDW RBC AUTO: 42.2 FL (ref 37–54)
EGFRCR SERPLBLD CKD-EPI 2021: 83.7 ML/MIN/1.73
EGFRCR SERPLBLD CKD-EPI 2021: 99.4 ML/MIN/1.73
EOSINOPHIL # BLD AUTO: 0 10*3/MM3 (ref 0–0.4)
EOSINOPHIL NFR BLD AUTO: 0 % (ref 0.3–6.2)
ERYTHROCYTE [DISTWIDTH] IN BLOOD BY AUTOMATED COUNT: 12.9 % (ref 12.3–15.4)
GLOBULIN UR ELPH-MCNC: 2.6 GM/DL
GLOBULIN UR ELPH-MCNC: 2.9 GM/DL
GLUCOSE BLDC GLUCOMTR-MCNC: 231 MG/DL (ref 70–130)
GLUCOSE BLDC GLUCOMTR-MCNC: 234 MG/DL (ref 70–130)
GLUCOSE BLDC GLUCOMTR-MCNC: 235 MG/DL (ref 70–130)
GLUCOSE BLDC GLUCOMTR-MCNC: 259 MG/DL (ref 70–130)
GLUCOSE BLDC GLUCOMTR-MCNC: 276 MG/DL (ref 70–130)
GLUCOSE BLDC GLUCOMTR-MCNC: 323 MG/DL (ref 70–130)
GLUCOSE BLDC GLUCOMTR-MCNC: 323 MG/DL (ref 70–130)
GLUCOSE BLDC GLUCOMTR-MCNC: 491 MG/DL (ref 70–130)
GLUCOSE SERPL-MCNC: 245 MG/DL (ref 65–99)
GLUCOSE SERPL-MCNC: 298 MG/DL (ref 65–99)
HCT VFR BLD AUTO: 38.2 % (ref 37.5–51)
HGB BLD-MCNC: 12.9 G/DL (ref 13–17.7)
IMM GRANULOCYTES # BLD AUTO: 0.07 10*3/MM3 (ref 0–0.05)
IMM GRANULOCYTES NFR BLD AUTO: 1.3 % (ref 0–0.5)
LYMPHOCYTES # BLD AUTO: 0.77 10*3/MM3 (ref 0.7–3.1)
LYMPHOCYTES NFR BLD AUTO: 14.6 % (ref 19.6–45.3)
MAGNESIUM SERPL-MCNC: 2.4 MG/DL (ref 1.6–2.6)
MAGNESIUM SERPL-MCNC: 2.7 MG/DL (ref 1.6–2.6)
MCH RBC QN AUTO: 30.6 PG (ref 26.6–33)
MCHC RBC AUTO-ENTMCNC: 33.8 G/DL (ref 31.5–35.7)
MCV RBC AUTO: 90.7 FL (ref 79–97)
MONOCYTES # BLD AUTO: 0.32 10*3/MM3 (ref 0.1–0.9)
MONOCYTES NFR BLD AUTO: 6.1 % (ref 5–12)
NEUTROPHILS NFR BLD AUTO: 4.12 10*3/MM3 (ref 1.7–7)
NEUTROPHILS NFR BLD AUTO: 78 % (ref 42.7–76)
NRBC BLD AUTO-RTO: 0 /100 WBC (ref 0–0.2)
PHOSPHATE SERPL-MCNC: 3 MG/DL (ref 2.5–4.5)
PLATELET # BLD AUTO: 156 10*3/MM3 (ref 140–450)
PMV BLD AUTO: 10.4 FL (ref 6–12)
POTASSIUM SERPL-SCNC: 4.3 MMOL/L (ref 3.5–5.2)
POTASSIUM SERPL-SCNC: 4.5 MMOL/L (ref 3.5–5.2)
PROT SERPL-MCNC: 5.9 G/DL (ref 6–8.5)
PROT SERPL-MCNC: 6.5 G/DL (ref 6–8.5)
RBC # BLD AUTO: 4.21 10*6/MM3 (ref 4.14–5.8)
SODIUM SERPL-SCNC: 136 MMOL/L (ref 136–145)
SODIUM SERPL-SCNC: 138 MMOL/L (ref 136–145)
WBC NRBC COR # BLD AUTO: 5.28 10*3/MM3 (ref 3.4–10.8)

## 2024-02-02 PROCEDURE — G0378 HOSPITAL OBSERVATION PER HR: HCPCS

## 2024-02-02 PROCEDURE — 25010000002 PROCHLORPERAZINE 10 MG/2ML SOLUTION: Performed by: STUDENT IN AN ORGANIZED HEALTH CARE EDUCATION/TRAINING PROGRAM

## 2024-02-02 PROCEDURE — 63710000001 INSULIN REGULAR HUMAN PER 5 UNITS: Performed by: NURSE ANESTHETIST, CERTIFIED REGISTERED

## 2024-02-02 PROCEDURE — 63710000001 INSULIN REGULAR HUMAN PER 5 UNITS: Performed by: STUDENT IN AN ORGANIZED HEALTH CARE EDUCATION/TRAINING PROGRAM

## 2024-02-02 PROCEDURE — 25010000002 ONDANSETRON PER 1 MG: Performed by: NURSE ANESTHETIST, CERTIFIED REGISTERED

## 2024-02-02 PROCEDURE — 25010000002 MIDAZOLAM PER 1 MG: Performed by: NURSE ANESTHETIST, CERTIFIED REGISTERED

## 2024-02-02 PROCEDURE — 25010000002 PROPOFOL 10 MG/ML EMULSION: Performed by: NURSE ANESTHETIST, CERTIFIED REGISTERED

## 2024-02-02 PROCEDURE — 43262 ENDO CHOLANGIOPANCREATOGRAPH: CPT | Performed by: INTERNAL MEDICINE

## 2024-02-02 PROCEDURE — C1769 GUIDE WIRE: HCPCS | Performed by: INTERNAL MEDICINE

## 2024-02-02 PROCEDURE — 74328 X-RAY BILE DUCT ENDOSCOPY: CPT | Performed by: INTERNAL MEDICINE

## 2024-02-02 PROCEDURE — 43273 ENDOSCOPIC PANCREATOSCOPY: CPT | Performed by: INTERNAL MEDICINE

## 2024-02-02 PROCEDURE — 82948 REAGENT STRIP/BLOOD GLUCOSE: CPT

## 2024-02-02 PROCEDURE — 84100 ASSAY OF PHOSPHORUS: CPT | Performed by: STUDENT IN AN ORGANIZED HEALTH CARE EDUCATION/TRAINING PROGRAM

## 2024-02-02 PROCEDURE — 74330 X-RAY BILE/PANC ENDOSCOPY: CPT

## 2024-02-02 PROCEDURE — 25010000002 GLUCAGON (RDNA) PER 1 MG: Performed by: NURSE ANESTHETIST, CERTIFIED REGISTERED

## 2024-02-02 PROCEDURE — 25010000002 DEXAMETHASONE PER 1 MG: Performed by: NURSE ANESTHETIST, CERTIFIED REGISTERED

## 2024-02-02 PROCEDURE — 43239 EGD BIOPSY SINGLE/MULTIPLE: CPT | Performed by: INTERNAL MEDICINE

## 2024-02-02 PROCEDURE — 99232 SBSQ HOSP IP/OBS MODERATE 35: CPT | Performed by: STUDENT IN AN ORGANIZED HEALTH CARE EDUCATION/TRAINING PROGRAM

## 2024-02-02 PROCEDURE — 25010000002 PIPERACILLIN SOD-TAZOBACTAM PER 1 G: Performed by: STUDENT IN AN ORGANIZED HEALTH CARE EDUCATION/TRAINING PROGRAM

## 2024-02-02 PROCEDURE — 25010000002 FENTANYL CITRATE (PF) 50 MCG/ML SOLUTION: Performed by: NURSE ANESTHETIST, CERTIFIED REGISTERED

## 2024-02-02 PROCEDURE — 25010000002 SUGAMMADEX 200 MG/2ML SOLUTION: Performed by: NURSE ANESTHETIST, CERTIFIED REGISTERED

## 2024-02-02 PROCEDURE — 85025 COMPLETE CBC W/AUTO DIFF WBC: CPT | Performed by: STUDENT IN AN ORGANIZED HEALTH CARE EDUCATION/TRAINING PROGRAM

## 2024-02-02 PROCEDURE — 88305 TISSUE EXAM BY PATHOLOGIST: CPT | Performed by: INTERNAL MEDICINE

## 2024-02-02 PROCEDURE — 80053 COMPREHEN METABOLIC PANEL: CPT | Performed by: STUDENT IN AN ORGANIZED HEALTH CARE EDUCATION/TRAINING PROGRAM

## 2024-02-02 PROCEDURE — 25810000003 SODIUM CHLORIDE 0.9 % SOLUTION: Performed by: NURSE ANESTHETIST, CERTIFIED REGISTERED

## 2024-02-02 PROCEDURE — 63710000001 INSULIN DETEMIR PER 5 UNITS: Performed by: STUDENT IN AN ORGANIZED HEALTH CARE EDUCATION/TRAINING PROGRAM

## 2024-02-02 PROCEDURE — 25010000002 ONDANSETRON PER 1 MG: Performed by: STUDENT IN AN ORGANIZED HEALTH CARE EDUCATION/TRAINING PROGRAM

## 2024-02-02 PROCEDURE — 83735 ASSAY OF MAGNESIUM: CPT | Performed by: STUDENT IN AN ORGANIZED HEALTH CARE EDUCATION/TRAINING PROGRAM

## 2024-02-02 PROCEDURE — 25010000002 LABETALOL 5 MG/ML SOLUTION: Performed by: STUDENT IN AN ORGANIZED HEALTH CARE EDUCATION/TRAINING PROGRAM

## 2024-02-02 PROCEDURE — 99204 OFFICE O/P NEW MOD 45 MIN: CPT | Performed by: INTERNAL MEDICINE

## 2024-02-02 PROCEDURE — 25010000002 LIDOCAINE 1 % SOLUTION: Performed by: NURSE ANESTHETIST, CERTIFIED REGISTERED

## 2024-02-02 PROCEDURE — 25010000002 HYDROMORPHONE 1 MG/ML SOLUTION: Performed by: STUDENT IN AN ORGANIZED HEALTH CARE EDUCATION/TRAINING PROGRAM

## 2024-02-02 PROCEDURE — 43264 ERCP REMOVE DUCT CALCULI: CPT | Performed by: INTERNAL MEDICINE

## 2024-02-02 RX ORDER — HYDROCODONE BITARTRATE AND ACETAMINOPHEN 7.5; 325 MG/1; MG/1
1 TABLET ORAL EVERY 4 HOURS PRN
Status: DISCONTINUED | OUTPATIENT
Start: 2024-02-02 | End: 2024-02-04 | Stop reason: HOSPADM

## 2024-02-02 RX ORDER — BUSPIRONE HYDROCHLORIDE 10 MG/1
10 TABLET ORAL 2 TIMES DAILY
Status: DISCONTINUED | OUTPATIENT
Start: 2024-02-02 | End: 2024-02-04 | Stop reason: HOSPADM

## 2024-02-02 RX ORDER — INDOMETHACIN 50 MG/1
SUPPOSITORY RECTAL AS NEEDED
Status: DISCONTINUED | OUTPATIENT
Start: 2024-02-02 | End: 2024-02-02 | Stop reason: HOSPADM

## 2024-02-02 RX ORDER — DROPERIDOL 2.5 MG/ML
0.62 INJECTION, SOLUTION INTRAMUSCULAR; INTRAVENOUS ONCE AS NEEDED
Status: DISCONTINUED | OUTPATIENT
Start: 2024-02-02 | End: 2024-02-02 | Stop reason: HOSPADM

## 2024-02-02 RX ORDER — LISINOPRIL 40 MG/1
40 TABLET ORAL DAILY
Status: DISCONTINUED | OUTPATIENT
Start: 2024-02-02 | End: 2024-02-04 | Stop reason: HOSPADM

## 2024-02-02 RX ORDER — PROMETHAZINE HYDROCHLORIDE 25 MG/1
25 SUPPOSITORY RECTAL ONCE AS NEEDED
Status: DISCONTINUED | OUTPATIENT
Start: 2024-02-02 | End: 2024-02-02 | Stop reason: HOSPADM

## 2024-02-02 RX ORDER — ONDANSETRON 2 MG/ML
INJECTION INTRAMUSCULAR; INTRAVENOUS AS NEEDED
Status: DISCONTINUED | OUTPATIENT
Start: 2024-02-02 | End: 2024-02-02 | Stop reason: SURG

## 2024-02-02 RX ORDER — SODIUM CHLORIDE 9 MG/ML
INJECTION, SOLUTION INTRAVENOUS CONTINUOUS PRN
Status: DISCONTINUED | OUTPATIENT
Start: 2024-02-02 | End: 2024-02-02 | Stop reason: SURG

## 2024-02-02 RX ORDER — PROPOFOL 10 MG/ML
VIAL (ML) INTRAVENOUS AS NEEDED
Status: DISCONTINUED | OUTPATIENT
Start: 2024-02-02 | End: 2024-02-02 | Stop reason: SURG

## 2024-02-02 RX ORDER — FENTANYL CITRATE 50 UG/ML
INJECTION, SOLUTION INTRAMUSCULAR; INTRAVENOUS AS NEEDED
Status: DISCONTINUED | OUTPATIENT
Start: 2024-02-02 | End: 2024-02-02 | Stop reason: SURG

## 2024-02-02 RX ORDER — MIDAZOLAM HYDROCHLORIDE 1 MG/ML
INJECTION INTRAMUSCULAR; INTRAVENOUS AS NEEDED
Status: DISCONTINUED | OUTPATIENT
Start: 2024-02-02 | End: 2024-02-02 | Stop reason: SURG

## 2024-02-02 RX ORDER — SODIUM CHLORIDE 0.9 % (FLUSH) 0.9 %
3 SYRINGE (ML) INJECTION EVERY 12 HOURS SCHEDULED
Status: DISCONTINUED | OUTPATIENT
Start: 2024-02-02 | End: 2024-02-02 | Stop reason: HOSPADM

## 2024-02-02 RX ORDER — NALOXONE HCL 0.4 MG/ML
0.4 VIAL (ML) INJECTION AS NEEDED
Status: DISCONTINUED | OUTPATIENT
Start: 2024-02-02 | End: 2024-02-02 | Stop reason: HOSPADM

## 2024-02-02 RX ORDER — DEXAMETHASONE SODIUM PHOSPHATE 4 MG/ML
INJECTION, SOLUTION INTRA-ARTICULAR; INTRALESIONAL; INTRAMUSCULAR; INTRAVENOUS; SOFT TISSUE AS NEEDED
Status: DISCONTINUED | OUTPATIENT
Start: 2024-02-02 | End: 2024-02-02 | Stop reason: SURG

## 2024-02-02 RX ORDER — CITALOPRAM 20 MG/1
20 TABLET ORAL DAILY
Status: DISCONTINUED | OUTPATIENT
Start: 2024-02-02 | End: 2024-02-04 | Stop reason: HOSPADM

## 2024-02-02 RX ORDER — AMLODIPINE BESYLATE 10 MG/1
10 TABLET ORAL DAILY
Status: DISCONTINUED | OUTPATIENT
Start: 2024-02-02 | End: 2024-02-04 | Stop reason: HOSPADM

## 2024-02-02 RX ORDER — IBUPROFEN 600 MG/1
TABLET ORAL AS NEEDED
Status: DISCONTINUED | OUTPATIENT
Start: 2024-02-02 | End: 2024-02-02 | Stop reason: SURG

## 2024-02-02 RX ORDER — IPRATROPIUM BROMIDE AND ALBUTEROL SULFATE 2.5; .5 MG/3ML; MG/3ML
3 SOLUTION RESPIRATORY (INHALATION) ONCE AS NEEDED
Status: DISCONTINUED | OUTPATIENT
Start: 2024-02-02 | End: 2024-02-02 | Stop reason: HOSPADM

## 2024-02-02 RX ORDER — HYDROMORPHONE HYDROCHLORIDE 1 MG/ML
0.5 INJECTION, SOLUTION INTRAMUSCULAR; INTRAVENOUS; SUBCUTANEOUS
Status: DISCONTINUED | OUTPATIENT
Start: 2024-02-02 | End: 2024-02-02 | Stop reason: HOSPADM

## 2024-02-02 RX ORDER — FENTANYL CITRATE 50 UG/ML
50 INJECTION, SOLUTION INTRAMUSCULAR; INTRAVENOUS
Status: DISCONTINUED | OUTPATIENT
Start: 2024-02-02 | End: 2024-02-02 | Stop reason: HOSPADM

## 2024-02-02 RX ORDER — ONDANSETRON 2 MG/ML
4 INJECTION INTRAMUSCULAR; INTRAVENOUS ONCE AS NEEDED
Status: DISCONTINUED | OUTPATIENT
Start: 2024-02-02 | End: 2024-02-02 | Stop reason: HOSPADM

## 2024-02-02 RX ORDER — ATORVASTATIN CALCIUM 10 MG/1
10 TABLET, FILM COATED ORAL DAILY
Status: DISCONTINUED | OUTPATIENT
Start: 2024-02-02 | End: 2024-02-02

## 2024-02-02 RX ORDER — PROMETHAZINE HYDROCHLORIDE 25 MG/1
25 TABLET ORAL ONCE AS NEEDED
Status: DISCONTINUED | OUTPATIENT
Start: 2024-02-02 | End: 2024-02-02 | Stop reason: HOSPADM

## 2024-02-02 RX ORDER — SODIUM CHLORIDE 0.9 % (FLUSH) 0.9 %
3-10 SYRINGE (ML) INJECTION AS NEEDED
Status: DISCONTINUED | OUTPATIENT
Start: 2024-02-02 | End: 2024-02-02 | Stop reason: HOSPADM

## 2024-02-02 RX ORDER — LIDOCAINE HYDROCHLORIDE 10 MG/ML
INJECTION, SOLUTION INFILTRATION; PERINEURAL AS NEEDED
Status: DISCONTINUED | OUTPATIENT
Start: 2024-02-02 | End: 2024-02-02 | Stop reason: SURG

## 2024-02-02 RX ORDER — PROCHLORPERAZINE EDISYLATE 5 MG/ML
5 INJECTION INTRAMUSCULAR; INTRAVENOUS EVERY 6 HOURS PRN
Status: DISCONTINUED | OUTPATIENT
Start: 2024-02-02 | End: 2024-02-04 | Stop reason: HOSPADM

## 2024-02-02 RX ORDER — DROPERIDOL 2.5 MG/ML
0.62 INJECTION, SOLUTION INTRAMUSCULAR; INTRAVENOUS
Status: DISCONTINUED | OUTPATIENT
Start: 2024-02-02 | End: 2024-02-02 | Stop reason: HOSPADM

## 2024-02-02 RX ORDER — HYDRALAZINE HYDROCHLORIDE 20 MG/ML
5 INJECTION INTRAMUSCULAR; INTRAVENOUS
Status: DISCONTINUED | OUTPATIENT
Start: 2024-02-02 | End: 2024-02-02 | Stop reason: HOSPADM

## 2024-02-02 RX ORDER — SODIUM CHLORIDE 9 MG/ML
40 INJECTION, SOLUTION INTRAVENOUS AS NEEDED
Status: DISCONTINUED | OUTPATIENT
Start: 2024-02-02 | End: 2024-02-02 | Stop reason: HOSPADM

## 2024-02-02 RX ORDER — HYDROCODONE BITARTRATE AND ACETAMINOPHEN 5; 325 MG/1; MG/1
1 TABLET ORAL ONCE AS NEEDED
Status: DISCONTINUED | OUTPATIENT
Start: 2024-02-02 | End: 2024-02-02 | Stop reason: HOSPADM

## 2024-02-02 RX ORDER — LABETALOL HYDROCHLORIDE 5 MG/ML
5 INJECTION, SOLUTION INTRAVENOUS
Status: DISCONTINUED | OUTPATIENT
Start: 2024-02-02 | End: 2024-02-02 | Stop reason: HOSPADM

## 2024-02-02 RX ORDER — MEPERIDINE HYDROCHLORIDE 50 MG/ML
12.5 INJECTION INTRAMUSCULAR; INTRAVENOUS; SUBCUTANEOUS
Status: DISCONTINUED | OUTPATIENT
Start: 2024-02-02 | End: 2024-02-02 | Stop reason: HOSPADM

## 2024-02-02 RX ADMIN — HYDROCODONE BITARTRATE AND ACETAMINOPHEN 1 TABLET: 7.5; 325 TABLET ORAL at 16:23

## 2024-02-02 RX ADMIN — ONDANSETRON 4 MG: 2 INJECTION INTRAMUSCULAR; INTRAVENOUS at 06:52

## 2024-02-02 RX ADMIN — PROCHLORPERAZINE EDISYLATE 5 MG: 5 INJECTION INTRAMUSCULAR; INTRAVENOUS at 09:01

## 2024-02-02 RX ADMIN — FENTANYL CITRATE 100 MCG: 50 INJECTION, SOLUTION INTRAMUSCULAR; INTRAVENOUS at 13:29

## 2024-02-02 RX ADMIN — Medication 10 ML: at 09:06

## 2024-02-02 RX ADMIN — ONDANSETRON 4 MG: 2 INJECTION INTRAMUSCULAR; INTRAVENOUS at 00:59

## 2024-02-02 RX ADMIN — AMLODIPINE BESYLATE 10 MG: 10 TABLET ORAL at 09:05

## 2024-02-02 RX ADMIN — BUSPIRONE HYDROCHLORIDE 10 MG: 10 TABLET ORAL at 09:05

## 2024-02-02 RX ADMIN — ONDANSETRON 4 MG: 2 INJECTION INTRAMUSCULAR; INTRAVENOUS at 14:03

## 2024-02-02 RX ADMIN — INSULIN HUMAN 5 UNITS: 100 INJECTION, SOLUTION PARENTERAL at 02:16

## 2024-02-02 RX ADMIN — PIPERACILLIN SODIUM AND TAZOBACTAM SODIUM 3.38 G: 3; .375 INJECTION, SOLUTION INTRAVENOUS at 11:21

## 2024-02-02 RX ADMIN — PIPERACILLIN SODIUM AND TAZOBACTAM SODIUM 3.38 G: 3; .375 INJECTION, SOLUTION INTRAVENOUS at 21:09

## 2024-02-02 RX ADMIN — LABETALOL HYDROCHLORIDE 10 MG: 5 INJECTION, SOLUTION INTRAVENOUS at 04:13

## 2024-02-02 RX ADMIN — Medication 0.5 MG: at 14:01

## 2024-02-02 RX ADMIN — HYDROCODONE BITARTRATE AND ACETAMINOPHEN 1 TABLET: 7.5; 325 TABLET ORAL at 11:19

## 2024-02-02 RX ADMIN — INSULIN HUMAN 4 UNITS: 100 INJECTION, SOLUTION PARENTERAL at 15:01

## 2024-02-02 RX ADMIN — CITALOPRAM HYDROBROMIDE 20 MG: 20 TABLET ORAL at 09:05

## 2024-02-02 RX ADMIN — DEXAMETHASONE SODIUM PHOSPHATE 4 MG: 4 INJECTION, SOLUTION INTRAMUSCULAR; INTRAVENOUS at 14:03

## 2024-02-02 RX ADMIN — LIDOCAINE HYDROCHLORIDE 100 MG: 10 INJECTION, SOLUTION INFILTRATION; PERINEURAL at 13:30

## 2024-02-02 RX ADMIN — SUGAMMADEX 200 MG: 100 INJECTION, SOLUTION INTRAVENOUS at 14:23

## 2024-02-02 RX ADMIN — PROPOFOL 250 MG: 10 INJECTION, EMULSION INTRAVENOUS at 13:31

## 2024-02-02 RX ADMIN — INSULIN HUMAN 4 UNITS: 100 INJECTION, SOLUTION PARENTERAL at 09:46

## 2024-02-02 RX ADMIN — INSULIN HUMAN 3 UNITS: 100 INJECTION, SOLUTION PARENTERAL at 17:19

## 2024-02-02 RX ADMIN — INSULIN HUMAN 4 UNITS: 100 INJECTION, SOLUTION PARENTERAL at 06:34

## 2024-02-02 RX ADMIN — PIPERACILLIN SODIUM AND TAZOBACTAM SODIUM 3.38 G: 3; .375 INJECTION, SOLUTION INTRAVENOUS at 04:12

## 2024-02-02 RX ADMIN — LISINOPRIL 40 MG: 40 TABLET ORAL at 09:05

## 2024-02-02 RX ADMIN — SODIUM CHLORIDE: 9 INJECTION, SOLUTION INTRAVENOUS at 13:21

## 2024-02-02 RX ADMIN — INSULIN DETEMIR 20 UNITS: 100 INJECTION, SOLUTION SUBCUTANEOUS at 20:04

## 2024-02-02 RX ADMIN — HYDROMORPHONE HYDROCHLORIDE 1 MG: 1 INJECTION, SOLUTION INTRAMUSCULAR; INTRAVENOUS; SUBCUTANEOUS at 00:34

## 2024-02-02 RX ADMIN — INSULIN HUMAN 3 UNITS: 100 INJECTION, SOLUTION PARENTERAL at 20:04

## 2024-02-02 RX ADMIN — ATORVASTATIN CALCIUM 10 MG: 10 TABLET, FILM COATED ORAL at 09:05

## 2024-02-02 RX ADMIN — BUSPIRONE HYDROCHLORIDE 10 MG: 10 TABLET ORAL at 19:56

## 2024-02-02 RX ADMIN — MIDAZOLAM HYDROCHLORIDE 2 MG: 1 INJECTION, SOLUTION INTRAMUSCULAR; INTRAVENOUS at 13:23

## 2024-02-02 NOTE — CONSULTS
[facility]    Referring Provider: No ref. provider found    PCP: Sita Chase, JOANA    Reason for Consultation: Abdominal pain, elevated LFTs    Chief complaint: Abdominal pain    History of present illness:    Malcolm Costa is a 59 y.o. male with history of DM2 (A1c 11.6), GERD, prior EtOH, anxiety who presented on 2/1 with abdominal pain.  He reports she has had some GI issues for the past couple of months with bloating, mild right upper quadrant abdominal pain that seem to worsen after eating along with alternating constipation and diarrhea.  He was noted to have elevated liver enzymes on admission along with a T. bili of 4.1.  CT abdomen pelvis with findings suggestive of acute cholecystitis with no biliary ductal dilatation however there was a 2 to 3 mm calcification noted within the ampulla concerning for possible stone.  MRCP was performed without evidence of choledocholithiasis or ductal dilatation with note that tiny calcification not seen in the region of the ampulla likely due to difference in modality.  Patient is status postcholecystectomy yesterday with IOC with contrast noted in the bile duct draining into the duodenum, however on personal review there may be a small defect in the ampullary region that is not completely obstructive.  At time of encounter patient reports he feels bloated with nausea and he had some emesis this morning.  He reports persistent pain in the upper abdomen although this is somewhat different and quality than pain prior to cholecystectomy.    Allergies:  Shellfish-derived products and Codeine    Scheduled Meds:  amLODIPine, 10 mg, Oral, Daily  atorvastatin, 10 mg, Oral, Daily  busPIRone, 10 mg, Oral, BID  citalopram, 20 mg, Oral, Daily  insulin detemir, 15 Units, Subcutaneous, Nightly  insulin regular, 10 Units, Subcutaneous, Once  insulin regular, 2-7 Units, Subcutaneous, Q4H  lisinopril, 40 mg, Oral, Daily  piperacillin-tazobactam, 3.375 g, Intravenous,  "Q8H  sodium chloride, 10 mL, Intravenous, Q12H         Infusions:  lactated ringers, 9 mL/hr, Last Rate: Stopped (02/01/24 2030)  sodium chloride, 100 mL/hr, Last Rate: 100 mL/hr (02/02/24 0628)        PRN Meds:    Calcium Replacement - Follow Nurse / BPA Driven Protocol    dextrose    dextrose    glucagon (human recombinant)    hydrALAZINE    HYDROcodone-acetaminophen    HYDROmorphone **AND** naloxone    labetalol    Magnesium Standard Dose Replacement - Follow Nurse / BPA Driven Protocol    Morphine **AND** [DISCONTINUED] naloxone    nitroglycerin    ondansetron    Phosphorus Replacement - Follow Nurse / BPA Driven Protocol    Potassium Replacement - Follow Nurse / BPA Driven Protocol    prochlorperazine    Sodium Chloride (PF)    [COMPLETED] Insert Peripheral IV **AND** sodium chloride    sodium chloride    sodium chloride    Home Meds:  Medications Prior to Admission   Medication Sig Dispense Refill Last Dose    amLODIPine (NORVASC) 10 MG tablet Take 1 tablet by mouth Daily. 90 tablet 0 2/1/2024    atorvastatin (LIPITOR) 10 MG tablet Take 1 tablet by mouth Daily. 90 tablet 1 2/1/2024    busPIRone (BUSPAR) 10 MG tablet Take 1 tablet by mouth 2 (Two) Times a Day. 180 tablet 1 2/1/2024    citalopram (CeleXA) 20 MG tablet Take 1 tablet by mouth once daily 90 tablet 0 2/1/2024    Continuous Blood Gluc  (FreeStyle Dagoberto 2 Henley) device 1 Device Take As Directed. 1 each 0 2/1/2024    Continuous Blood Gluc Sensor (FreeStyle Dagoberto 2 Sensor) misc 1 Device Every 14 (Fourteen) Days. 2 each 11 2/1/2024    glipizide (GLUCOTROL) 5 MG tablet Take 1 tablet by mouth Every Morning. 90 tablet 1 2/1/2024    glucose blood test strip Use as instructed 50 each 12 2/1/2024    insulin NPH (NovoLIN N ReliOn) 100 UNIT/ML injection INJECT 10 UNITS WITH MORNING MEAL AND 5 UNITS WITH EVENING MEAL 10 mL 0 2/1/2024    Insulin Syringe-Needle U-100 28G X 5/16\" 1 ML misc 1 Device 2 (Two) Times a Day. 300 each 3 2/1/2024    Lancets " (accu-chek soft touch) lancets Used to test blood sugar for Diabetes E11.65 test up to twice a day 100 each 12 2/1/2024    lisinopril (PRINIVIL,ZESTRIL) 40 MG tablet Take 1 tablet by mouth Daily. 30 tablet 0 2/1/2024    meloxicam (MOBIC) 15 MG tablet TAKE 1 TABLET BY MOUTH ONCE DAILY AS NEEDED FOR MODERATE PAIN 30 tablet 0 2/1/2024    metFORMIN ER (GLUCOPHAGE-XR) 750 MG 24 hr tablet Take 2 tablets by mouth Daily With Breakfast. 180 tablet 1 2/1/2024    omeprazole (priLOSEC) 20 MG capsule Take 1 capsule by mouth Daily. 90 capsule 1 2/1/2024       ROS: Review of Systems   Constitutional:  Negative for chills and fever.   Respiratory:  Negative for cough and shortness of breath.    Gastrointestinal:  Positive for abdominal distention, abdominal pain, nausea and vomiting.   Skin:  Negative for color change and rash.   All other systems reviewed and are negative.      PAST MED HX:  Past Medical History:   Diagnosis Date    Allergic rhinitis     Anxiety     Dermatitis 8/2/2016    Diabetes mellitus     Diverticulitis     Gastroesophageal reflux disease 7/11/2016    GERD (gastroesophageal reflux disease)     History of alcohol abuse     Hypertension     Insomnia 7/11/2016    Visual impairment 7/11/2016    Vitamin D deficiency 7/11/2016       PAST SURG HX:  Past Surgical History:   Procedure Laterality Date    APPENDECTOMY  1995    CHOLECYSTECTOMY WITH INTRAOPERATIVE CHOLANGIOGRAM N/A 2/1/2024    Procedure: CHOLECYSTECTOMY LAPAROSCOPIC WITH INTRAOPERATIVE CHOLANGIOGRAM, UMBILICAL HERNIA REPAIR;  Surgeon: Adam Ramos MD;  Location: [facility];  Service: General;  Laterality: N/A;    HERNIA REPAIR  2010    TONSILLECTOMY  1974       FAM HX:  Family History   Problem Relation Age of Onset    Thyroid disease Other     Hypertension Mother     Multiple myeloma Mother     Hypertension Father     Alcohol abuse Father     Hypertension Maternal Grandmother     Hypertension Maternal Grandfather     Diabetes Paternal Grandmother      "Hypertension Paternal Grandmother     Hypertension Paternal Grandfather        SOC HX:  Social History     Socioeconomic History    Marital status: Single   Tobacco Use    Smoking status: Never     Passive exposure: Never    Smokeless tobacco: Never   Vaping Use    Vaping Use: Never used   Substance and Sexual Activity    Alcohol use: No     Comment: sober since 2015    Drug use: No    Sexual activity: Defer       PHYSICAL EXAM  /97 (BP Location: Left arm, Patient Position: Lying)   Pulse 77   Temp 97.6 °F (36.4 °C) (Oral)   Resp 18   Ht 165.1 cm (65\")   Wt 89.8 kg (198 lb)   SpO2 97%   BMI 32.95 kg/m²   Wt Readings from Last 3 Encounters:   02/01/24 89.8 kg (198 lb)   10/02/23 93 kg (205 lb)   07/25/22 98.9 kg (218 lb)   ,body mass index is 32.95 kg/m².  Physical Exam   General: Patient awake, alert and cooperative   Eyes: Normal lids and lashes   Neck: Supple, normal ROM   Skin: Warm and dry   Cardiovascular: Regular rate, well-perfused extremities   Pulm: Equal expansion bilaterally, no increased WOB   Abdomen: Soft, somewhat distended and mildly TTP diffusely   Extremities: No rash or edema              Neuro: A&O, No obvious sign of focal deficit   Psychiatric: Normal mood and behavior; memory intact      Results Review:   I reviewed the patient's new clinical results.    Lab Results   Component Value Date    WBC 5.28 02/02/2024    HGB 12.9 (L) 02/02/2024    HGB 13.4 02/01/2024    HGB 15.4 02/01/2024    HCT 38.2 02/02/2024    MCV 90.7 02/02/2024     02/02/2024       Lab Results   Component Value Date    INR 1.02 02/01/2024       Lab Results   Component Value Date    GLUCOSE 298 (H) 02/02/2024    BUN 13 02/02/2024    CREATININE 1.03 02/02/2024    EGFRIFNONA 74 09/03/2021    BCR 12.6 02/02/2024     02/02/2024    K 4.5 02/02/2024    CO2 18.0 (L) 02/02/2024    CALCIUM 8.2 (L) 02/02/2024    ALBUMIN 3.6 02/02/2024    ALKPHOS 278 (H) 02/02/2024    BILITOT 6.9 (H) 02/02/2024     (H) " 02/02/2024     (H) 02/02/2024       ASSESSMENTS/PLANS  Impression:  Abdominal pain  Nausea and vomiting  Acute cholecystitis, cholelithiasis  Elevated liver enzymes  Elevated bilirubin   Abnormal CT imaging of biliary tree  Poorly controlled DM2 (A1c 11.6)    Plan:  -Mildly elevated lipase (not consistent with that expected for acute pancreatitis) on admission with normal-appearing pancreas on imaging  - s/p CCY with IOC yesterday.  On personal review, there appears to be drainage of contrast into the duodenum however there may be small nonobstructive filling defect in the ampullary portion of the duct corresponding to calcification seen on CT imaging  -Continues on antibiotics  - NPO   -Given persistent pain, calcification noted in the ampullary portion of the bile duct on CT as well as IOC findings that may suggest nonobstructive filling defect on personal review along with worsening LFTs and T. bili, will proceed with EGD and ERCP today.  I discussed risks and benefits with patient and his family at bedside in full today with risk including but not limited to bleeding, pancreatitis, perforation, infection, risk with medications/anesthesia, etc.  All of their questions were answered at this time.    I discussed the patient's findings and my recommendations with patient, family, and consulting provider    Dominik Chase MD  02/02/24  11:30 EST

## 2024-02-02 NOTE — ANESTHESIA POSTPROCEDURE EVALUATION
Patient: Malcolm Costa    Procedure Summary       Date: 02/02/24 Room / Location: [facility] ENDOSCOPY 3 /  [facility]    Anesthesia Start: 1321 Anesthesia Stop: 1454    Procedures:       ESOPHAGOGASTRODUODENOSCOPY      ENDOSCOPIC RETROGRADE CHOLANGIOPANCREATOGRAPHY Diagnosis:       Elevated liver enzymes      (Elevated liver enzymes [R74.8])    Surgeons: Dominik Chase MD Provider: Yahir Cortés MD    Anesthesia Type: general ASA Status: 3            Anesthesia Type: general    Vitals  Vitals Value Taken Time   /83 02/02/24 1450   Temp 97.2 °F (36.2 °C) 02/02/24 1440   Pulse 79 02/02/24 1457   Resp 15 02/02/24 1440   SpO2 91 % 02/02/24 1457   Vitals shown include unfiled device data.        Post Anesthesia Care and Evaluation    Patient location during evaluation: PACU  Patient participation: complete - patient participated  Level of consciousness: sleepy but conscious  Pain management: adequate    Airway patency: patent  Anesthetic complications: No anesthetic complications  PONV Status: none  Cardiovascular status: hemodynamically stable and acceptable  Respiratory status: nonlabored ventilation, acceptable and nasal cannula  Hydration status: acceptable

## 2024-02-02 NOTE — ANESTHESIA PREPROCEDURE EVALUATION
Anesthesia Evaluation     Patient summary reviewed and Nursing notes reviewed   NPO Solid Status: > 8 hours  NPO Liquid Status: > 8 hours           Airway   Mallampati: I  TM distance: >3 FB  Neck ROM: full  No difficulty expected  Dental    (+) poor dentition        Pulmonary     breath sounds clear to auscultation  Cardiovascular     ECG reviewed  Rhythm: regular  Rate: normal    (+) hypertension, hyperlipidemia      Neuro/Psych  (+) psychiatric history  GI/Hepatic/Renal/Endo    (+) GERD, diabetes mellitus    Musculoskeletal     Abdominal    Substance History      OB/GYN          Other   arthritis,                   Anesthesia Plan    ASA 3     general     intravenous induction     Anesthetic plan, risks, benefits, and alternatives have been provided, discussed and informed consent has been obtained with: patient.    Plan discussed with CRNA.    CODE STATUS:    Level Of Support Discussed With: Patient  Code Status (Patient has no pulse and is not breathing): CPR (Attempt to Resuscitate)  Medical Interventions (Patient has pulse or is breathing): Full Support

## 2024-02-02 NOTE — PROGRESS NOTES
"Patient Name:  Malcolm Costa  YOB: 1965  [id number]    Surgery Progress Note    Date of visit: 2/2/2024    Subjective   - Taken for lap reese with IOC yesterday  - Still having pain and nausea this AM  - Passing gas and having bowel function       Objective       /91 (BP Location: Left arm, Patient Position: Lying)   Pulse 74   Temp 97.9 °F (36.6 °C) (Oral)   Resp 16   Ht 165.1 cm (65\")   Wt 89.8 kg (198 lb)   SpO2 92%   BMI 32.95 kg/m²     Intake/Output Summary (Last 24 hours) at 2/2/2024 1749  Last data filed at 2/2/2024 1633  Gross per 24 hour   Intake 5300 ml   Output 1650 ml   Net 3650 ml       CV:  Rhythm regular and rate regular  L:  Clear to auscultation bilaterally  Abd:  Soft, tender to palpation, incisions are c/d/i  Ext:  No cyanosis, clubbing, edema    Recent labs and imaging that are back at this time have been reviewed.     Labs:    Results from last 7 days   Lab Units 02/02/24  0506   WBC 10*3/mm3 5.28   HEMOGLOBIN g/dL 12.9*   HEMATOCRIT % 38.2   PLATELETS 10*3/mm3 156     Results from last 7 days   Lab Units 02/02/24  1636   SODIUM mmol/L 138   POTASSIUM mmol/L 4.3   CHLORIDE mmol/L 104   CO2 mmol/L 19.0*   BUN mg/dL 14   CREATININE mg/dL 0.87   CALCIUM mg/dL 8.0*   BILIRUBIN mg/dL 7.1*   ALK PHOS U/L 295*   ALT (SGPT) U/L 199*   AST (SGOT) U/L 198*   GLUCOSE mg/dL 245*     Results from last 7 days   Lab Units 02/02/24  1636   SODIUM mmol/L 138   POTASSIUM mmol/L 4.3   CHLORIDE mmol/L 104   CO2 mmol/L 19.0*   BUN mg/dL 14   CREATININE mg/dL 0.87   GLUCOSE mg/dL 245*   CALCIUM mg/dL 8.0*     Lab Results   Lab Value Date/Time    LIPASE 113 (H) 02/01/2024 0039            Assessment & Plan     Problem List Items Addressed This Visit       * (Principal) Acute cholecystitis    Relevant Orders    Tissue Pathology Exam    Elevated liver enzymes - Primary    Relevant Orders    Case Request (Completed)    ERCP    Tissue Pathology Exam     Other Visit Diagnoses       RUQ abdominal " pain        Nausea        Diarrhea, unspecified type        Elevated LFTs        Type 2 diabetes mellitus with hyperglycemia, with long-term current use of insulin        Relevant Medications    insulin regular (humuLIN R,novoLIN R) injection 10 Units    dextrose (GLUTOSE) oral gel 15 g    glucagon (GLUCAGEN) injection 1 mg    insulin regular (humuLIN R,novoLIN R) injection 2-7 Units    Insulin Lispro (humaLOG) injection 8 Units (Completed)    insulin detemir (LEVEMIR) injection 20 Units (Start on 2/2/2024  9:00 PM)    insulin regular (humuLIN R,novoLIN R) injection 4 Units (Completed)    Elevated blood pressure reading with diagnosis of hypertension        Relevant Medications    labetalol (NORMODYNE,TRANDATE) injection 10 mg    hydrALAZINE (APRESOLINE) injection 20 mg    amLODIPine (NORVASC) tablet 10 mg    lisinopril (PRINIVIL,ZESTRIL) tablet 40 mg    Obesity (BMI 30-39.9)        H/O noncompliance with medical treatment, presenting hazards to health        Gastric nodule        Relevant Orders    Tissue Pathology Exam            Mr. Malcolm Costa is a 59 year old gentleman presenting with acute cholecystitis and possible choledocholithiasis s/p laparoscopic cholecystectomy and IOC on 2/1/2024.     - Increase of total bilirubin to 6.8 today, consult GI for possible ERCP given slight abnormality at ampulla on CT and IOC  - Continue antibiotics  - Okay to eat diet from surgery standpoint once ERCP completed  - If pain persists, may re-check lipase to evaluate for pancreatitis  - Surgery will continue to follow      Adam Ramos MD  2/2/2024  17:49 EST

## 2024-02-02 NOTE — ANESTHESIA PROCEDURE NOTES
Airway  Urgency: elective    Date/Time: 2/2/2024 1:35 PM  Airway not difficult    General Information and Staff    Patient location during procedure: OR    Indications and Patient Condition  Indications for airway management: airway protection    Preoxygenated: yes  MILS not maintained throughout  Mask difficulty assessment: 2 - vent by mask + OA or adjuvant +/- NMBA    Final Airway Details  Final airway type: endotracheal airway      Successful airway: ETT  Cuffed: yes   Successful intubation technique: video laryngoscopy  Facilitating devices/methods: intubating stylet  Endotracheal tube insertion site: oral  Blade: Romero  Blade size: 3.5  ETT size (mm): 7.5  Cormack-Lehane Classification: grade I - full view of glottis  Placement verified by: chest auscultation and capnometry   Inital cuff pressure (cm H2O): 7  Measured from: lips  ETT/EBT  to lips (cm): 22  Number of attempts at approach: 1  Assessment: lips, teeth, and gum same as pre-op and atraumatic intubation    Additional Comments  Negative epigastric sounds, Breath sound equal bilaterally with symmetric chest rise and fall

## 2024-02-02 NOTE — PROGRESS NOTES
[facility] Medicine Services  PROGRESS NOTE    Patient Name: Malcolm Costa  : 1965  MRN: [id number]    Date of Admission: 2024  Primary Care Physician: Sita Chase APRN    Subjective   Subjective     CC:  F/u abdominal pain    HPI:  Still with abdominal pain and also having some nausea      Objective   Objective     Vital Signs:   Temp:  [97 °F (36.1 °C)-98.9 °F (37.2 °C)] 97 °F (36.1 °C)  Heart Rate:  [70-89] 85  Resp:  [13-20] 16  BP: (135-183)/() 141/90  Flow (L/min):  [1-3] 1     Physical Exam:  Constitutional: No acute distress, awake, alert  HENT: NCAT, mucous membranes moist  Respiratory: Clear to auscultation bilaterally, respiratory effort normal   Cardiovascular: RRR, no murmurs, rubs, or gallops  Gastrointestinal: Binder in place, tender  Musculoskeletal: No bilateral ankle edema  Psychiatric: Appropriate affect, cooperative  Neurologic: Oriented x 3, strength symmetric, speech clear, no focal deficits  Skin: No rashes      Results Reviewed:  LAB RESULTS:      Lab 24  0506 24   WBC 5.28 8.15 9.70   HEMOGLOBIN 12.9* 13.4 15.4   HEMATOCRIT 38.2 38.0 44.4   PLATELETS 156 160 186   NEUTROS ABS 4.12 6.24 7.95*   IMMATURE GRANS (ABS) 0.07* 0.05 0.06*   LYMPHS ABS 0.77 1.17 0.96   MONOS ABS 0.32 0.64 0.67   EOS ABS 0.00 0.03 0.03   MCV 90.7 88.4 88.3   LACTATE  --   --  2.0   PROTIME  --  13.5  --          Lab 24  0506 24  0029 24   SODIUM 136  --  135* 134*   POTASSIUM 4.5  --  4.4 4.7   CHLORIDE 101  --  100 96*   CO2 18.0*  --  23.0 22.0   ANION GAP 17.0*  --  12.0 16.0*   BUN 13  --  9 9   CREATININE 1.03  --  0.96 0.97   EGFR 83.7  --  91.6 90.5   GLUCOSE 298*  --  327* 430*   CALCIUM 8.2*  --  8.4* 9.2   MAGNESIUM 2.4 2.7* 1.5*  --    PHOSPHORUS 3.0  --   --   --    HEMOGLOBIN A1C  --   --  11.60*  --          Lab 24  0506 24  0620 24  0039   TOTAL PROTEIN 6.5 6.5 7.3    ALBUMIN 3.6 3.9 4.1   GLOBULIN 2.9 2.6 3.2   ALT (SGPT) 200* 167* 138*   AST (SGOT) 199* 290* 284*   BILIRUBIN 6.9* 4.1* 3.3*   ALK PHOS 278* 218* 226*   LIPASE  --   --  113*         Lab 02/01/24  0620   PROTIME 13.5   INR 1.02                 Brief Urine Lab Results  (Last result in the past 365 days)        Color   Clarity   Blood   Leuk Est   Nitrite   Protein   CREAT   Urine HCG        02/01/24 0033 Yellow   Clear   Small (1+)   Negative   Negative   >=300 mg/dL (3+)                   Microbiology Results Abnormal       Procedure Component Value - Date/Time    Blood Culture - Blood, Hand, Right [[id number]]  (Normal) Collected: 02/01/24 0246    Lab Status: Preliminary result Specimen: Blood from Hand, Right Updated: 02/02/24 0431     Blood Culture No growth at 24 hours    Blood Culture - Blood, Hand, Left [[id number]]  (Normal) Collected: 02/01/24 0247    Lab Status: Preliminary result Specimen: Blood from Hand, Left Updated: 02/02/24 0431     Blood Culture No growth at 24 hours            FL Cholangiogram Operative    Result Date: 2/1/2024  FL CHOLANGIOGRAM OPERATIVE Date of Exam: 2/1/2024 5:11 PM EST Indication: CHOLECYSTECTOMY LAPAROSCOPIC WITH INTRAOPERATIVE CHOLANGIOGRAM. Comparison: None available. Technique:  Digital spot images were obtained from an intraoperative cholangiogram procedure performed by the surgeon. Fluoroscopic Time: 21 seconds Number of Images: 1 fluoroscopic cine series Findings: Evaluation is somewhat obscured by apparent artifact secondary to patient motion. The opacified bile duct appears patent. Opacities extending bilaterally from the laparoscopic clip are favored to reflect artifact, evaluation for leak somewhat limited. Correlate with intraoperative findings.     Impression: Impression: The visualized bile ducts appear patent. Evaluation for contrast leak is essentially precluded by patient motion and single cine series saved for evaluation. Electronically Signed: Sean Luevano  MD  2/1/2024 5:42 PM EST  Workstation ID: [id number]    MRI abdomen wo contrast mrcp    Result Date: 2/1/2024  MRI ABDOMEN WO CONTRAST MRCP Date of Exam: 2/1/2024 3:41 AM EST Indication: Pancreatitis, acute, severe.  Comparison: 2/1/2024. Technique:  Routine multiplanar/multisequence images of the abdomen were obtained with MRCP sequences without contrast administration. Findings: Diffuse gallbladder wall thickening is present. Tiny stones are present within the gallbladder neck. Cystic duct and common bile duct appear normal in caliber. No filling defects identified. Calcifications seen on CT images within the region of the ampulla is not appreciated on this study. Pancreas appears unremarkable. No evidence of ductal dilatation. No evidence of surrounding edema. Liver demonstrates low T1 signal changes consistent with steatosis. No significant free fluid identified. No focal lesions. No evidence of hydronephrosis.     Impression: Impression: 1.Diffuse gallbladder wall thickening with tiny stones present within the gallbladder neck consistent with acute cholecystitis. No evidence of choledocholithiasis. No evidence of pancreatic or biliary ductal dilatation. No significant inflammatory changes identified surrounding the pancreas. Tiny calcification seen within the region of the ampulla on the previous study is not appreciated on this study likely related to modality. 2.Hepatic steatosis. Electronically Signed: Symone Hope MD  2/1/2024 4:28 AM EST  Workstation ID: [id number]    CT Abdomen Pelvis With Contrast    Result Date: 2/1/2024  CT ABDOMEN PELVIS W CONTRAST Date of Exam: 2/1/2024 1:25 AM EST Indication: upper abd pain, nausea, stool changes. Comparison: None available. Technique: Axial CT images were obtained of the abdomen and pelvis following the uneventful intravenous administration of intravenous contrast. Reconstructed coronal and sagittal images were also obtained. Automated exposure control and iterative  construction methods were used. Findings: Lung Bases:   The visualized lung bases and lower mediastinal structures are unremarkable. Liver: The liver appears diffusely hypodense consistent with steatosis.. No focal lesions. Biliary/Gallbladder:  The gallbladder is distended. Tiny stones are present within the gallbladder neck. Diffuse wall thickening is present with hyperenhancement of the mucosa. No evidence of biliary ductal dilatation. No biliary duct stones.. The biliary tree is nondilated. There is a tiny focal calcification present within the ampulla (series 900 image 49). This measures approximately 2 to 3 mm. Spleen: Spleen is normal in size and CT density. Pancreas:  Pancreas is normal. There is no evidence of pancreatic mass or peripancreatic fluid. Kidneys:  Kidneys are normal in size. There are no stones or hydronephrosis. Adrenals:  Adrenal glands are unremarkable. Retroperitoneal/Lymph Nodes/Vasculature:  No retroperitoneal adenopathy is identified. Gastrointestinal/Mesentery:  Fat-containing ventral wall hernia present. No evidence of bowel involvement. No additional hernia identified. No significant stool burden. The bowel loops are non-dilated without wall thickening or mass. The appendix is not well visualized. No secondary  findings of acute appendicitis.. No evidence of obstruction. No free air. No mesenteric fluid collections identified. Bladder:  The bladder is normal. Genital:   Unremarkable       Bony Structures:   Visualized bony structures are consistent with the patient's age.     Impression: Impression: 1.Findings consistent with acute cholecystitis. No evidence of biliary ductal dilatation or duct stones identified however there does hypertrophy a tiny stone within the ampulla measuring 2 to 3 mm best seen on the coronal images. 2.Hepatic steatosis. 3.Ancillary findings as described above. Electronically Signed: Symone Hope MD  2/1/2024 1:39 AM EST  Workstation ID: [id number]          Current medications:  Scheduled Meds:amLODIPine, 10 mg, Oral, Daily  busPIRone, 10 mg, Oral, BID  citalopram, 20 mg, Oral, Daily  insulin detemir, 15 Units, Subcutaneous, Nightly  insulin regular, 10 Units, Subcutaneous, Once  insulin regular, 2-7 Units, Subcutaneous, Q4H  lisinopril, 40 mg, Oral, Daily  piperacillin-tazobactam, 3.375 g, Intravenous, Q8H  sodium chloride, 10 mL, Intravenous, Q12H      Continuous Infusions:lactated ringers, 9 mL/hr, Last Rate: Stopped (02/01/24 2030)  sodium chloride, 100 mL/hr, Last Rate: 100 mL/hr (02/02/24 0628)      PRN Meds:.  Calcium Replacement - Follow Nurse / BPA Driven Protocol    dextrose    dextrose    glucagon (human recombinant)    hydrALAZINE    HYDROcodone-acetaminophen    HYDROmorphone **AND** naloxone    labetalol    Magnesium Standard Dose Replacement - Follow Nurse / BPA Driven Protocol    Morphine **AND** [DISCONTINUED] naloxone    nitroglycerin    ondansetron    Phosphorus Replacement - Follow Nurse / BPA Driven Protocol    Potassium Replacement - Follow Nurse / BPA Driven Protocol    prochlorperazine    Sodium Chloride (PF)    [COMPLETED] Insert Peripheral IV **AND** sodium chloride    sodium chloride    sodium chloride    Assessment & Plan   Assessment & Plan     Active Hospital Problems    Diagnosis  POA    **Acute cholecystitis [K81.0]  Yes    Elevated liver enzymes [R74.8]  Unknown      Resolved Hospital Problems   No resolved problems to display.        Brief Hospital Course to date:  Malcolm Costa is a 59 y.o. male with history of hypertension, diabetes, GERD and depression presenting with right upper quadrant pain.  He was admitted for acute cholecystitis and general surgery consulted.  Status post lap cholecystectomy on 2/1.    Abdominal pain  Acute cholecystitis  - Status post lap cholecystectomy on 2/1  - Continue Zosyn  - GI was consulted given persistent pain and calcification noted in ampullary portion of bile duct on CT and IVC findings.   Possible nonobstructive filling defect also with worsening LFTs and bilirubin.  Plan for EGD and ERCP today    Hypertension  - Restarted home medications  - Has PRNs in place also    Type 2 diabetes  - A1c is 11.6 indicating poor control  - Continue Levemir and SSI. Adjust PRN  - Diabetes education    HLD  --hold statin given LFT elevations    Expected Discharge Location and Transportation: home  Expected Discharge   Expected Discharge Date: 2/5/2024; Expected Discharge Time:      DVT prophylaxis:  Mechanical DVT prophylaxis orders are present.         AM-PAC 6 Clicks Score (PT): 20 (02/01/24 2041)    CODE STATUS:   Code Status and Medical Interventions:   Ordered at: 02/01/24 0237     Level Of Support Discussed With:    Patient     Code Status (Patient has no pulse and is not breathing):    CPR (Attempt to Resuscitate)     Medical Interventions (Patient has pulse or is breathing):    Full Support       Eunice Cavanaugh MD  02/02/24

## 2024-02-02 NOTE — CASE MANAGEMENT/SOCIAL WORK
Continued Stay Note  [facility]     Patient Name: Malcolm Costa  MRN: [id number]  Today's Date: 2/2/2024    Admit Date: 2/1/2024    Plan: home   Discharge Plan       Row Name 02/02/24 0943       Plan    Plan home    Patient/Family in Agreement with Plan yes    Plan Comments Pt lives alone in [facility]. He reports he is independent with ADLs and denies use of any DME. He is followed by his PCP and has drug coverage. Plan for discharge is to return home. CM to follow for any discharge needs    Final Discharge Disposition Code 01 - home or self-care                   Discharge Codes    No documentation.                       Jayne Rebolledo RN

## 2024-02-02 NOTE — CONSULTS
"Diabetes Education    Patient Name:  Malcolm Costa  YOB: 1965  MRN: [id number]  Admit Date:  2/1/2024      Attempted to see pt for diabetes ed again this morning, as a follow-up from yesterday. Was able to spend a few minutes at the bedside with him prior to pt going to Endo. Pt states he has not taken any insulin in \"about a year\". Is unable to recall previous doses. Reviewed A1c value again with him and discussed importance of managing blood glucose levels. We will cont to follow this patient during his hospital stay. Thank you.     TTS reviewing chart and at bedside approx 15 min.       Electronically signed by:  Pau Mujica RN  02/02/24 12:55 EST  "

## 2024-02-02 NOTE — PLAN OF CARE
Goal Outcome Evaluation:  Plan of Care Reviewed With: patient        Progress: no change            Pt arrived from the PACU. He is alert and oriented X 4. VSS. RA/1L NC. Abdominal binder in place. Pain controlled with prn dilaudid. Labetalol administered for SBP > 170.

## 2024-02-03 LAB
ALBUMIN SERPL-MCNC: 3.3 G/DL (ref 3.5–5.2)
ALBUMIN/GLOB SERPL: 1.5 G/DL
ALP SERPL-CCNC: 339 U/L (ref 39–117)
ALT SERPL W P-5'-P-CCNC: 206 U/L (ref 1–41)
ANION GAP SERPL CALCULATED.3IONS-SCNC: 16 MMOL/L (ref 5–15)
AST SERPL-CCNC: 187 U/L (ref 1–40)
BASOPHILS # BLD AUTO: 0.01 10*3/MM3 (ref 0–0.2)
BASOPHILS NFR BLD AUTO: 0.2 % (ref 0–1.5)
BILIRUB SERPL-MCNC: 7.7 MG/DL (ref 0–1.2)
BUN SERPL-MCNC: 18 MG/DL (ref 6–20)
BUN/CREAT SERPL: 17.3 (ref 7–25)
CALCIUM SPEC-SCNC: 8 MG/DL (ref 8.6–10.5)
CHLORIDE SERPL-SCNC: 103 MMOL/L (ref 98–107)
CO2 SERPL-SCNC: 19 MMOL/L (ref 22–29)
CREAT SERPL-MCNC: 1.04 MG/DL (ref 0.76–1.27)
DEPRECATED RDW RBC AUTO: 43 FL (ref 37–54)
EGFRCR SERPLBLD CKD-EPI 2021: 82.7 ML/MIN/1.73
EOSINOPHIL # BLD AUTO: 0 10*3/MM3 (ref 0–0.4)
EOSINOPHIL NFR BLD AUTO: 0 % (ref 0.3–6.2)
ERYTHROCYTE [DISTWIDTH] IN BLOOD BY AUTOMATED COUNT: 13 % (ref 12.3–15.4)
GLOBULIN UR ELPH-MCNC: 2.2 GM/DL
GLUCOSE BLDC GLUCOMTR-MCNC: 164 MG/DL (ref 70–130)
GLUCOSE BLDC GLUCOMTR-MCNC: 187 MG/DL (ref 70–130)
GLUCOSE BLDC GLUCOMTR-MCNC: 218 MG/DL (ref 70–130)
GLUCOSE BLDC GLUCOMTR-MCNC: 258 MG/DL (ref 70–130)
GLUCOSE SERPL-MCNC: 183 MG/DL (ref 65–99)
HCT VFR BLD AUTO: 35.1 % (ref 37.5–51)
HGB BLD-MCNC: 12 G/DL (ref 13–17.7)
IMM GRANULOCYTES # BLD AUTO: 0.04 10*3/MM3 (ref 0–0.05)
IMM GRANULOCYTES NFR BLD AUTO: 0.7 % (ref 0–0.5)
LYMPHOCYTES # BLD AUTO: 0.94 10*3/MM3 (ref 0.7–3.1)
LYMPHOCYTES NFR BLD AUTO: 16.2 % (ref 19.6–45.3)
MCH RBC QN AUTO: 31 PG (ref 26.6–33)
MCHC RBC AUTO-ENTMCNC: 34.2 G/DL (ref 31.5–35.7)
MCV RBC AUTO: 90.7 FL (ref 79–97)
MONOCYTES # BLD AUTO: 0.44 10*3/MM3 (ref 0.1–0.9)
MONOCYTES NFR BLD AUTO: 7.6 % (ref 5–12)
NEUTROPHILS NFR BLD AUTO: 4.36 10*3/MM3 (ref 1.7–7)
NEUTROPHILS NFR BLD AUTO: 75.3 % (ref 42.7–76)
NRBC BLD AUTO-RTO: 0 /100 WBC (ref 0–0.2)
PLATELET # BLD AUTO: 158 10*3/MM3 (ref 140–450)
PMV BLD AUTO: 10.6 FL (ref 6–12)
POTASSIUM SERPL-SCNC: 3.9 MMOL/L (ref 3.5–5.2)
PROT SERPL-MCNC: 5.5 G/DL (ref 6–8.5)
RBC # BLD AUTO: 3.87 10*6/MM3 (ref 4.14–5.8)
SODIUM SERPL-SCNC: 138 MMOL/L (ref 136–145)
WBC NRBC COR # BLD AUTO: 5.79 10*3/MM3 (ref 3.4–10.8)

## 2024-02-03 PROCEDURE — 63710000001 INSULIN REGULAR HUMAN PER 5 UNITS: Performed by: STUDENT IN AN ORGANIZED HEALTH CARE EDUCATION/TRAINING PROGRAM

## 2024-02-03 PROCEDURE — 85025 COMPLETE CBC W/AUTO DIFF WBC: CPT | Performed by: STUDENT IN AN ORGANIZED HEALTH CARE EDUCATION/TRAINING PROGRAM

## 2024-02-03 PROCEDURE — 25810000003 SODIUM CHLORIDE 0.9 % SOLUTION: Performed by: STUDENT IN AN ORGANIZED HEALTH CARE EDUCATION/TRAINING PROGRAM

## 2024-02-03 PROCEDURE — 99232 SBSQ HOSP IP/OBS MODERATE 35: CPT | Performed by: STUDENT IN AN ORGANIZED HEALTH CARE EDUCATION/TRAINING PROGRAM

## 2024-02-03 PROCEDURE — 99214 OFFICE O/P EST MOD 30 MIN: CPT | Performed by: INTERNAL MEDICINE

## 2024-02-03 PROCEDURE — 82948 REAGENT STRIP/BLOOD GLUCOSE: CPT

## 2024-02-03 PROCEDURE — 25010000002 PIPERACILLIN SOD-TAZOBACTAM PER 1 G: Performed by: STUDENT IN AN ORGANIZED HEALTH CARE EDUCATION/TRAINING PROGRAM

## 2024-02-03 PROCEDURE — G0378 HOSPITAL OBSERVATION PER HR: HCPCS

## 2024-02-03 PROCEDURE — 25010000002 PROCHLORPERAZINE 10 MG/2ML SOLUTION: Performed by: STUDENT IN AN ORGANIZED HEALTH CARE EDUCATION/TRAINING PROGRAM

## 2024-02-03 PROCEDURE — 63710000001 INSULIN DETEMIR PER 5 UNITS: Performed by: STUDENT IN AN ORGANIZED HEALTH CARE EDUCATION/TRAINING PROGRAM

## 2024-02-03 PROCEDURE — 80053 COMPREHEN METABOLIC PANEL: CPT | Performed by: STUDENT IN AN ORGANIZED HEALTH CARE EDUCATION/TRAINING PROGRAM

## 2024-02-03 RX ORDER — ACETAMINOPHEN 325 MG/1
650 TABLET ORAL EVERY 6 HOURS PRN
Status: DISCONTINUED | OUTPATIENT
Start: 2024-02-03 | End: 2024-02-04 | Stop reason: HOSPADM

## 2024-02-03 RX ADMIN — PROCHLORPERAZINE EDISYLATE 5 MG: 5 INJECTION INTRAMUSCULAR; INTRAVENOUS at 08:58

## 2024-02-03 RX ADMIN — AMLODIPINE BESYLATE 10 MG: 10 TABLET ORAL at 08:59

## 2024-02-03 RX ADMIN — INSULIN DETEMIR 20 UNITS: 100 INJECTION, SOLUTION SUBCUTANEOUS at 20:04

## 2024-02-03 RX ADMIN — INSULIN HUMAN 4 UNITS: 100 INJECTION, SOLUTION PARENTERAL at 17:45

## 2024-02-03 RX ADMIN — SODIUM CHLORIDE 100 ML/HR: 9 INJECTION, SOLUTION INTRAVENOUS at 17:45

## 2024-02-03 RX ADMIN — PIPERACILLIN SODIUM AND TAZOBACTAM SODIUM 3.38 G: 3; .375 INJECTION, SOLUTION INTRAVENOUS at 19:41

## 2024-02-03 RX ADMIN — BUSPIRONE HYDROCHLORIDE 10 MG: 10 TABLET ORAL at 19:41

## 2024-02-03 RX ADMIN — HYDROCODONE BITARTRATE AND ACETAMINOPHEN 1 TABLET: 7.5; 325 TABLET ORAL at 14:50

## 2024-02-03 RX ADMIN — INSULIN HUMAN 2 UNITS: 100 INJECTION, SOLUTION PARENTERAL at 09:04

## 2024-02-03 RX ADMIN — PIPERACILLIN SODIUM AND TAZOBACTAM SODIUM 3.38 G: 3; .375 INJECTION, SOLUTION INTRAVENOUS at 03:00

## 2024-02-03 RX ADMIN — Medication 10 ML: at 19:41

## 2024-02-03 RX ADMIN — PIPERACILLIN SODIUM AND TAZOBACTAM SODIUM 3.38 G: 3; .375 INJECTION, SOLUTION INTRAVENOUS at 10:35

## 2024-02-03 RX ADMIN — INSULIN HUMAN 2 UNITS: 100 INJECTION, SOLUTION PARENTERAL at 13:00

## 2024-02-03 RX ADMIN — BUSPIRONE HYDROCHLORIDE 10 MG: 10 TABLET ORAL at 09:00

## 2024-02-03 RX ADMIN — ACETAMINOPHEN 650 MG: 325 TABLET ORAL at 14:50

## 2024-02-03 RX ADMIN — CITALOPRAM HYDROBROMIDE 20 MG: 20 TABLET ORAL at 09:00

## 2024-02-03 RX ADMIN — LISINOPRIL 40 MG: 40 TABLET ORAL at 08:58

## 2024-02-03 RX ADMIN — INSULIN HUMAN 3 UNITS: 100 INJECTION, SOLUTION PARENTERAL at 20:04

## 2024-02-03 RX ADMIN — Medication 10 ML: at 09:05

## 2024-02-03 RX ADMIN — HYDROCODONE BITARTRATE AND ACETAMINOPHEN 1 TABLET: 7.5; 325 TABLET ORAL at 08:59

## 2024-02-03 NOTE — PLAN OF CARE
BG level significantly elevated at HS.  Non adherent (Telfa)/transparent dressings to mid abd incisions CDI. Binder in place. SBP moderately elevated on 2L via NC. SR in 70s on cardiac mx. Patient denies pain/discomfort at this time. Call light in reach.

## 2024-02-03 NOTE — PROGRESS NOTES
"Patient Name:  Malcolm Costa  YOB: 1965  [id number]    Surgery Progress Note    Date of visit: 2/3/2024    Subjective   Pain improved this morning.  Patient is eating with some bowel function.  Passing gas.  Total bili continues to trend up.  ERCP conducted yesterday did not show a leak or obstruction of the common bile duct, and this was swept with sphincterotomy performed.  Doing well from surgical standpoint.       Objective       /81 (BP Location: Left arm, Patient Position: Lying)   Pulse 56   Temp 97.7 °F (36.5 °C) (Oral)   Resp 16   Ht 165.1 cm (65\")   Wt 89.8 kg (198 lb)   SpO2 95%   BMI 32.95 kg/m²     Intake/Output Summary (Last 24 hours) at 2/3/2024 1242  Last data filed at 2/3/2024 1115  Gross per 24 hour   Intake 2350 ml   Output 1200 ml   Net 1150 ml       CV:  Rhythm regular and rate regular  L:  Clear to auscultation bilaterally  Abd:  Soft, appropriately tender, incisions are c/d/i  Ext:  No cyanosis, clubbing, edema    Recent labs and imaging that are back at this time have been reviewed.     Labs:    Results from last 7 days   Lab Units 02/03/24  0538   WBC 10*3/mm3 5.79   HEMOGLOBIN g/dL 12.0*   HEMATOCRIT % 35.1*   PLATELETS 10*3/mm3 158     Results from last 7 days   Lab Units 02/03/24  0538   SODIUM mmol/L 138   POTASSIUM mmol/L 3.9   CHLORIDE mmol/L 103   CO2 mmol/L 19.0*   BUN mg/dL 18   CREATININE mg/dL 1.04   CALCIUM mg/dL 8.0*   BILIRUBIN mg/dL 7.7*   ALK PHOS U/L 339*   ALT (SGPT) U/L 206*   AST (SGOT) U/L 187*   GLUCOSE mg/dL 183*     Results from last 7 days   Lab Units 02/03/24  0538   SODIUM mmol/L 138   POTASSIUM mmol/L 3.9   CHLORIDE mmol/L 103   CO2 mmol/L 19.0*   BUN mg/dL 18   CREATININE mg/dL 1.04   GLUCOSE mg/dL 183*   CALCIUM mg/dL 8.0*     Lab Results   Lab Value Date/Time    LIPASE 113 (H) 02/01/2024 0039            Assessment & Plan     Problem List Items Addressed This Visit       * (Principal) Acute cholecystitis    Relevant Orders    Tissue " Pathology Exam    Elevated liver enzymes - Primary    Relevant Orders    Case Request (Completed)    ERCP    Tissue Pathology Exam     Other Visit Diagnoses       RUQ abdominal pain        Nausea        Diarrhea, unspecified type        Elevated LFTs        Type 2 diabetes mellitus with hyperglycemia, with long-term current use of insulin        Relevant Medications    insulin regular (humuLIN R,novoLIN R) injection 10 Units    dextrose (GLUTOSE) oral gel 15 g    glucagon (GLUCAGEN) injection 1 mg    Insulin Lispro (humaLOG) injection 8 Units (Completed)    insulin detemir (LEVEMIR) injection 20 Units    insulin regular (humuLIN R,novoLIN R) injection 4 Units (Completed)    insulin regular (humuLIN R,novoLIN R) injection 2-7 Units    Elevated blood pressure reading with diagnosis of hypertension        Relevant Medications    labetalol (NORMODYNE,TRANDATE) injection 10 mg    hydrALAZINE (APRESOLINE) injection 20 mg    amLODIPine (NORVASC) tablet 10 mg    lisinopril (PRINIVIL,ZESTRIL) tablet 40 mg    Obesity (BMI 30-39.9)        H/O noncompliance with medical treatment, presenting hazards to health        Gastric nodule        Relevant Orders    Tissue Pathology Exam            Mr. Malcolm Costa is a 59 year old gentleman presenting with acute cholecystitis and choledocholithiasis s/p laparoscopic cholecystectomy and IOC on 2/1/2024 and ERCP with clearance of the duct and sphincterotomy on 2/2/2024.      - Increase of total bilirubin to 7.7 today, GI following, likely passed stone as duct is clear. If continues to trend up, recommend fractionation, continue to trend, anticipate this will begin to decrease  - Okay to discontinue antibiotics from surgery standpoint  - Regular diet  - Surgery will continue to follow    Adam Ramos MD  2/3/2024  12:42 EST

## 2024-02-03 NOTE — PROGRESS NOTES
"GI Daily Progress Note  Subjective:    Chief Complaint: Abdominal pain    Patient reports pain improved today.  Reports he is hungry.  Denies any current nausea.    Objective:    /80 (BP Location: Left arm, Patient Position: Lying)   Pulse 70   Temp 97.7 °F (36.5 °C) (Oral)   Resp 16   Ht 165.1 cm (65\")   Wt 89.8 kg (198 lb)   SpO2 95%   BMI 32.95 kg/m²     Physical Exam   General: Patient awake, alert and cooperative   Cardiovascular: Regular rate, well-perfused extremities   Pulm: Equal expansion bilaterally, no increased WOB   Abdomen: Soft, no significant tenderness              Neuro: A&O, No obvious sign of focal deficit   Psychiatric: Normal mood and behavior; memory intact    Lab  Lab Results   Component Value Date    WBC 5.79 02/03/2024    HGB 12.0 (L) 02/03/2024    HGB 12.9 (L) 02/02/2024    HGB 13.4 02/01/2024    MCV 90.7 02/03/2024     02/03/2024    INR 1.02 02/01/2024       Lab Results   Component Value Date    GLUCOSE 183 (H) 02/03/2024    BUN 18 02/03/2024    CREATININE 1.04 02/03/2024    EGFRIFNONA 74 09/03/2021    BCR 17.3 02/03/2024     02/03/2024    K 3.9 02/03/2024    CO2 19.0 (L) 02/03/2024    CALCIUM 8.0 (L) 02/03/2024    ALBUMIN 3.3 (L) 02/03/2024    ALKPHOS 339 (H) 02/03/2024    BILITOT 7.7 (H) 02/03/2024     (H) 02/03/2024     (H) 02/03/2024       Assessment:  Abdominal pain  Nausea and vomiting  Acute cholecystitis, cholelithiasis  Elevated liver enzymes  Elevated bilirubin   Abnormal CT imaging of biliary tree  Hepatic steatosis  Poorly controlled DM2 (A1c 11.6)  Obesity    Plan:  -Status post ERCP on 2/2 with biliary sphincterotomy, bile duct not dilated, no stones, no extravasation of contrast, had only a small scant amount of debris swept from the duct  -Liver enzymes not improving as of yet, may have component of cholestasis in the setting of infection with acute cholecystitis in the setting of likely chronic underlying liver disease given his " hepatic steatosis on imaging  -Remains on antibiotics  -Denies recent alcohol  -INR is normal, platelets borderline  -Hepatitis panel was negative in 2018, will repeat in a.m.  -Has multiple risk factors for NAFLD  -Recommend lifestyle and dietary modification with healthy weight loss and improvement in A1c  -If liver numbers fail to improve over the coming days can consider full serologic workup  -Okay with advancing diet from GI standpoint    I discussed plan with patient, general surgeon.    Dominik Chase MD  02/03/24  18:20 EST

## 2024-02-03 NOTE — PROGRESS NOTES
[facility] Medicine Services  PROGRESS NOTE    Patient Name: Malcolm Costa  : 1965  MRN: [id number]    Date of Admission: 2024  Primary Care Physician: Sita Chase APRN    Subjective   Subjective     CC:  F/u abdominal pain    HPI:  Having less nausea today but still some abdominal pain      Objective   Objective     Vital Signs:   Temp:  [97.2 °F (36.2 °C)-98 °F (36.7 °C)] 97.7 °F (36.5 °C)  Heart Rate:  [56-91] 56  Resp:  [14-28] 16  BP: (132-167)/(77-93) 148/81  Flow (L/min):  [1-3] 2     Physical Exam:  Constitutional: No acute distress, awake, alert  HENT: NCAT, mucous membranes moist  Respiratory: Clear to auscultation bilaterally, respiratory effort normal   Cardiovascular: RRR, no murmurs, rubs, or gallops  Gastrointestinal: Binder in place, tender  Musculoskeletal: No bilateral ankle edema  Psychiatric: Appropriate affect, cooperative  Neurologic: Oriented x 3, strength symmetric, speech clear, no focal deficits  Skin: No rashes      Results Reviewed:  LAB RESULTS:      Lab 24  0538 24  0506 24  0624  0039   WBC 5.79 5.28 8.15 9.70   HEMOGLOBIN 12.0* 12.9* 13.4 15.4   HEMATOCRIT 35.1* 38.2 38.0 44.4   PLATELETS 158 156 160 186   NEUTROS ABS 4.36 4.12 6.24 7.95*   IMMATURE GRANS (ABS) 0.04 0.07* 0.05 0.06*   LYMPHS ABS 0.94 0.77 1.17 0.96   MONOS ABS 0.44 0.32 0.64 0.67   EOS ABS 0.00 0.00 0.03 0.03   MCV 90.7 90.7 88.4 88.3   LACTATE  --   --   --  2.0   PROTIME  --   --  13.5  --          Lab 24  0538 24  1636 24  0506 24  0029 24  0620 24  0039   SODIUM 138 138 136  --  135* 134*   POTASSIUM 3.9 4.3 4.5  --  4.4 4.7   CHLORIDE 103 104 101  --  100 96*   CO2 19.0* 19.0* 18.0*  --  23.0 22.0   ANION GAP 16.0* 15.0 17.0*  --  12.0 16.0*   BUN 18 14 13  --  9 9   CREATININE 1.04 0.87 1.03  --  0.96 0.97   EGFR 82.7 99.4 83.7  --  91.6 90.5   GLUCOSE 183* 245* 298*  --  327* 430*   CALCIUM 8.0* 8.0* 8.2*   --  8.4* 9.2   MAGNESIUM  --   --  2.4 2.7* 1.5*  --    PHOSPHORUS  --   --  3.0  --   --   --    HEMOGLOBIN A1C  --   --   --   --  11.60*  --          Lab 02/03/24  0538 02/02/24  1636 02/02/24  0506 02/01/24  0620 02/01/24  0039   TOTAL PROTEIN 5.5* 5.9* 6.5 6.5 7.3   ALBUMIN 3.3* 3.3* 3.6 3.9 4.1   GLOBULIN 2.2 2.6 2.9 2.6 3.2   ALT (SGPT) 206* 199* 200* 167* 138*   AST (SGOT) 187* 198* 199* 290* 284*   BILIRUBIN 7.7* 7.1* 6.9* 4.1* 3.3*   ALK PHOS 339* 295* 278* 218* 226*   LIPASE  --   --   --   --  113*         Lab 02/01/24  0620   PROTIME 13.5   INR 1.02                 Brief Urine Lab Results  (Last result in the past 365 days)        Color   Clarity   Blood   Leuk Est   Nitrite   Protein   CREAT   Urine HCG        02/01/24 0033 Yellow   Clear   Small (1+)   Negative   Negative   >=300 mg/dL (3+)                   Microbiology Results Abnormal       Procedure Component Value - Date/Time    Blood Culture - Blood, Hand, Right [[id number]]  (Normal) Collected: 02/01/24 0246    Lab Status: Preliminary result Specimen: Blood from Hand, Right Updated: 02/03/24 0430     Blood Culture No growth at 2 days    Blood Culture - Blood, Hand, Left [[id number]]  (Normal) Collected: 02/01/24 0247    Lab Status: Preliminary result Specimen: Blood from Hand, Left Updated: 02/03/24 0430     Blood Culture No growth at 2 days            FL ERCP pancreatic and biliary ducts    Result Date: 2/3/2024  FL ERCP PANCREATIC AND BILIARY DUCTS Date of Exam: 2/2/2024 1:23 PM EST Indication: ERCP. Comparison: None available. Technique:  A series of radiographic digital spot films were obtained in conjunction with a endoscopic catheterization of the biliary and pancreatic ductal system, performed by the gastroenterologist. Fluoroscopic Time: 1 minute 43 seconds Number of Images: 11 Findings: Intraprocedural images demonstrate cannulation and opacification of the biliary system. Please see procedure report for full details and findings.      Impression: Impression: Fluoroscopy data as above. Please see procedure report for full details and findings. Electronically Signed: Anthony Boswlel MD  2/3/2024 9:51 AM EST  Workstation ID: [id number]    FL Cholangiogram Operative    Result Date: 2/1/2024  FL CHOLANGIOGRAM OPERATIVE Date of Exam: 2/1/2024 5:11 PM EST Indication: CHOLECYSTECTOMY LAPAROSCOPIC WITH INTRAOPERATIVE CHOLANGIOGRAM. Comparison: None available. Technique:  Digital spot images were obtained from an intraoperative cholangiogram procedure performed by the surgeon. Fluoroscopic Time: 21 seconds Number of Images: 1 fluoroscopic cine series Findings: Evaluation is somewhat obscured by apparent artifact secondary to patient motion. The opacified bile duct appears patent. Opacities extending bilaterally from the laparoscopic clip are favored to reflect artifact, evaluation for leak somewhat limited. Correlate with intraoperative findings.     Impression: Impression: The visualized bile ducts appear patent. Evaluation for contrast leak is essentially precluded by patient motion and single cine series saved for evaluation. Electronically Signed: Sean Luevano MD  2/1/2024 5:42 PM EST  Workstation ID: [id number]         Current medications:  Scheduled Meds:amLODIPine, 10 mg, Oral, Daily  busPIRone, 10 mg, Oral, BID  citalopram, 20 mg, Oral, Daily  insulin detemir, 20 Units, Subcutaneous, Nightly  insulin regular, 10 Units, Subcutaneous, Once  insulin regular, 2-7 Units, Subcutaneous, 4x Daily AC & at Bedtime  lisinopril, 40 mg, Oral, Daily  piperacillin-tazobactam, 3.375 g, Intravenous, Q8H  sodium chloride, 10 mL, Intravenous, Q12H      Continuous Infusions:lactated ringers, 9 mL/hr, Last Rate: Stopped (02/01/24 2030)  sodium chloride, 100 mL/hr, Last Rate: 100 mL/hr (02/02/24 0628)      PRN Meds:.  Calcium Replacement - Follow Nurse / BPA Driven Protocol    dextrose    dextrose    glucagon (human recombinant)    hydrALAZINE     HYDROcodone-acetaminophen    HYDROmorphone **AND** naloxone    labetalol    Magnesium Standard Dose Replacement - Follow Nurse / BPA Driven Protocol    Morphine **AND** [DISCONTINUED] naloxone    nitroglycerin    ondansetron    Phosphorus Replacement - Follow Nurse / BPA Driven Protocol    Potassium Replacement - Follow Nurse / BPA Driven Protocol    prochlorperazine    Sodium Chloride (PF)    [COMPLETED] Insert Peripheral IV **AND** sodium chloride    sodium chloride    sodium chloride    Assessment & Plan   Assessment & Plan     Active Hospital Problems    Diagnosis  POA    **Acute cholecystitis [K81.0]  Yes    Elevated liver enzymes [R74.8]  Unknown      Resolved Hospital Problems   No resolved problems to display.        Brief Hospital Course to date:  Malcolm Costa is a 59 y.o. male with history of hypertension, diabetes, GERD and depression presenting with right upper quadrant pain.  He was admitted for acute cholecystitis and general surgery consulted.  Status post lap cholecystectomy on 2/1. Post operative having persistent LFT elevations, GI following.    Abdominal pain  Acute cholecystitis  - Status post lap cholecystectomy on 2/1  - Continue Zosyn  - GI was consulted given persistent pain and calcification noted in ampullary portion of bile duct on CT and IVC findings.  Possible nonobstructive filling defect also with worsening LFTs and bilirubin. EGD on 2/2 was nl, biopsies taken. ERCP on 2/2 performed biliary sphincterotomy, no filling defects or dilation or extravasation seen. Suspect recently passed stone/material  --LFTs are up again today, CTM expected to decrease. Advanced to regular diet    Hypertension  - Restarted home medications  - Has PRNs in place also    Type 2 diabetes  - A1c is 11.6 indicating poor control  - Continue Levemir and SSI. Adjust PRN  - Diabetes education    HLD  --hold statin given LFT elevations    Expected Discharge Location and Transportation: home  Expected Discharge    Expected Discharge Date: 2/5/2024; Expected Discharge Time:      DVT prophylaxis:  Mechanical DVT prophylaxis orders are present.         AM-PAC 6 Clicks Score (PT): 19 (02/02/24 2000)    CODE STATUS:   Code Status and Medical Interventions:   Ordered at: 02/01/24 0237     Level Of Support Discussed With:    Patient     Code Status (Patient has no pulse and is not breathing):    CPR (Attempt to Resuscitate)     Medical Interventions (Patient has pulse or is breathing):    Full Support       Eunice Cavanaugh MD  02/03/24

## 2024-02-04 ENCOUNTER — READMISSION MANAGEMENT (OUTPATIENT)
Dept: CALL CENTER | Facility: HOSPITAL | Age: 59
End: 2024-02-04
Payer: COMMERCIAL

## 2024-02-04 VITALS
OXYGEN SATURATION: 96 % | DIASTOLIC BLOOD PRESSURE: 81 MMHG | RESPIRATION RATE: 16 BRPM | SYSTOLIC BLOOD PRESSURE: 167 MMHG | HEIGHT: 65 IN | TEMPERATURE: 97.7 F | WEIGHT: 198 LBS | HEART RATE: 83 BPM | BODY MASS INDEX: 32.99 KG/M2

## 2024-02-04 LAB
ALBUMIN SERPL-MCNC: 3 G/DL (ref 3.5–5.2)
ALBUMIN/GLOB SERPL: 1.4 G/DL
ALP SERPL-CCNC: 387 U/L (ref 39–117)
ALT SERPL W P-5'-P-CCNC: 197 U/L (ref 1–41)
ANION GAP SERPL CALCULATED.3IONS-SCNC: 12 MMOL/L (ref 5–15)
AST SERPL-CCNC: 146 U/L (ref 1–40)
BASOPHILS # BLD AUTO: 0.01 10*3/MM3 (ref 0–0.2)
BASOPHILS NFR BLD AUTO: 0.2 % (ref 0–1.5)
BILIRUB SERPL-MCNC: 6.8 MG/DL (ref 0–1.2)
BUN SERPL-MCNC: 11 MG/DL (ref 6–20)
BUN/CREAT SERPL: 15.1 (ref 7–25)
CALCIUM SPEC-SCNC: 8 MG/DL (ref 8.6–10.5)
CHLORIDE SERPL-SCNC: 105 MMOL/L (ref 98–107)
CO2 SERPL-SCNC: 20 MMOL/L (ref 22–29)
CREAT SERPL-MCNC: 0.73 MG/DL (ref 0.76–1.27)
DEPRECATED RDW RBC AUTO: 41.1 FL (ref 37–54)
EGFRCR SERPLBLD CKD-EPI 2021: 104.8 ML/MIN/1.73
EOSINOPHIL # BLD AUTO: 0.05 10*3/MM3 (ref 0–0.4)
EOSINOPHIL NFR BLD AUTO: 1.2 % (ref 0.3–6.2)
ERYTHROCYTE [DISTWIDTH] IN BLOOD BY AUTOMATED COUNT: 12.7 % (ref 12.3–15.4)
GLOBULIN UR ELPH-MCNC: 2.2 GM/DL
GLUCOSE BLDC GLUCOMTR-MCNC: 189 MG/DL (ref 70–130)
GLUCOSE BLDC GLUCOMTR-MCNC: 190 MG/DL (ref 70–130)
GLUCOSE SERPL-MCNC: 193 MG/DL (ref 65–99)
HAV IGM SERPL QL IA: NORMAL
HBV CORE IGM SERPL QL IA: NORMAL
HBV SURFACE AG SERPL QL IA: NORMAL
HCT VFR BLD AUTO: 35.1 % (ref 37.5–51)
HCV AB SER DONR QL: NORMAL
HGB BLD-MCNC: 12 G/DL (ref 13–17.7)
IMM GRANULOCYTES # BLD AUTO: 0.04 10*3/MM3 (ref 0–0.05)
IMM GRANULOCYTES NFR BLD AUTO: 1 % (ref 0–0.5)
LYMPHOCYTES # BLD AUTO: 0.83 10*3/MM3 (ref 0.7–3.1)
LYMPHOCYTES NFR BLD AUTO: 20 % (ref 19.6–45.3)
MCH RBC QN AUTO: 30.6 PG (ref 26.6–33)
MCHC RBC AUTO-ENTMCNC: 34.2 G/DL (ref 31.5–35.7)
MCV RBC AUTO: 89.5 FL (ref 79–97)
MONOCYTES # BLD AUTO: 0.4 10*3/MM3 (ref 0.1–0.9)
MONOCYTES NFR BLD AUTO: 9.7 % (ref 5–12)
NEUTROPHILS NFR BLD AUTO: 2.81 10*3/MM3 (ref 1.7–7)
NEUTROPHILS NFR BLD AUTO: 67.9 % (ref 42.7–76)
NRBC BLD AUTO-RTO: 0 /100 WBC (ref 0–0.2)
PLATELET # BLD AUTO: 161 10*3/MM3 (ref 140–450)
PMV BLD AUTO: 10.6 FL (ref 6–12)
POTASSIUM SERPL-SCNC: 3.4 MMOL/L (ref 3.5–5.2)
PROT SERPL-MCNC: 5.2 G/DL (ref 6–8.5)
RBC # BLD AUTO: 3.92 10*6/MM3 (ref 4.14–5.8)
SODIUM SERPL-SCNC: 137 MMOL/L (ref 136–145)
WBC NRBC COR # BLD AUTO: 4.14 10*3/MM3 (ref 3.4–10.8)

## 2024-02-04 PROCEDURE — G0378 HOSPITAL OBSERVATION PER HR: HCPCS

## 2024-02-04 PROCEDURE — 25010000002 LABETALOL 5 MG/ML SOLUTION: Performed by: STUDENT IN AN ORGANIZED HEALTH CARE EDUCATION/TRAINING PROGRAM

## 2024-02-04 PROCEDURE — 99239 HOSP IP/OBS DSCHRG MGMT >30: CPT | Performed by: INTERNAL MEDICINE

## 2024-02-04 PROCEDURE — 82948 REAGENT STRIP/BLOOD GLUCOSE: CPT

## 2024-02-04 PROCEDURE — 80053 COMPREHEN METABOLIC PANEL: CPT | Performed by: STUDENT IN AN ORGANIZED HEALTH CARE EDUCATION/TRAINING PROGRAM

## 2024-02-04 PROCEDURE — 80074 ACUTE HEPATITIS PANEL: CPT | Performed by: INTERNAL MEDICINE

## 2024-02-04 PROCEDURE — 25810000003 SODIUM CHLORIDE 0.9 % SOLUTION: Performed by: STUDENT IN AN ORGANIZED HEALTH CARE EDUCATION/TRAINING PROGRAM

## 2024-02-04 PROCEDURE — 25010000002 PIPERACILLIN SOD-TAZOBACTAM PER 1 G: Performed by: STUDENT IN AN ORGANIZED HEALTH CARE EDUCATION/TRAINING PROGRAM

## 2024-02-04 PROCEDURE — 63710000001 INSULIN REGULAR HUMAN PER 5 UNITS: Performed by: STUDENT IN AN ORGANIZED HEALTH CARE EDUCATION/TRAINING PROGRAM

## 2024-02-04 PROCEDURE — 99213 OFFICE O/P EST LOW 20 MIN: CPT | Performed by: PHYSICIAN ASSISTANT

## 2024-02-04 PROCEDURE — 85025 COMPLETE CBC W/AUTO DIFF WBC: CPT | Performed by: STUDENT IN AN ORGANIZED HEALTH CARE EDUCATION/TRAINING PROGRAM

## 2024-02-04 RX ORDER — ATORVASTATIN CALCIUM 10 MG/1
10 TABLET, FILM COATED ORAL DAILY
Qty: 30 TABLET | Refills: 0 | Status: SHIPPED | OUTPATIENT
Start: 2024-02-04

## 2024-02-04 RX ORDER — CITALOPRAM 20 MG/1
20 TABLET ORAL DAILY
Qty: 30 TABLET | Refills: 0 | Status: SHIPPED | OUTPATIENT
Start: 2024-02-04 | End: 2024-02-06 | Stop reason: DRUGHIGH

## 2024-02-04 RX ORDER — GLIPIZIDE 5 MG/1
5 TABLET ORAL EVERY MORNING
Qty: 30 TABLET | Refills: 0 | Status: SHIPPED | OUTPATIENT
Start: 2024-02-04

## 2024-02-04 RX ORDER — AMLODIPINE BESYLATE 10 MG/1
10 TABLET ORAL DAILY
Qty: 30 TABLET | Refills: 0 | Status: SHIPPED | OUTPATIENT
Start: 2024-02-04

## 2024-02-04 RX ORDER — BUSPIRONE HYDROCHLORIDE 10 MG/1
10 TABLET ORAL 2 TIMES DAILY
Qty: 30 TABLET | Refills: 0 | Status: SHIPPED | OUTPATIENT
Start: 2024-02-04

## 2024-02-04 RX ORDER — OMEPRAZOLE 20 MG/1
20 CAPSULE, DELAYED RELEASE ORAL DAILY
Qty: 30 CAPSULE | Refills: 0 | Status: SHIPPED | OUTPATIENT
Start: 2024-02-04

## 2024-02-04 RX ORDER — METFORMIN HYDROCHLORIDE 750 MG/1
1500 TABLET, EXTENDED RELEASE ORAL
Qty: 60 TABLET | Refills: 0 | Status: SHIPPED | OUTPATIENT
Start: 2024-02-04 | End: 2024-03-05

## 2024-02-04 RX ORDER — POTASSIUM CHLORIDE 20 MEQ/1
40 TABLET, EXTENDED RELEASE ORAL EVERY 4 HOURS
Status: COMPLETED | OUTPATIENT
Start: 2024-02-04 | End: 2024-02-04

## 2024-02-04 RX ORDER — HUMAN INSULIN 100 [IU]/ML
INJECTION, SUSPENSION SUBCUTANEOUS
Qty: 10 ML | Refills: 0 | Status: SHIPPED | OUTPATIENT
Start: 2024-02-04

## 2024-02-04 RX ORDER — HYDROCODONE BITARTRATE AND ACETAMINOPHEN 5; 325 MG/1; MG/1
1 TABLET ORAL EVERY 8 HOURS PRN
Qty: 9 TABLET | Refills: 0 | Status: SHIPPED | OUTPATIENT
Start: 2024-02-04

## 2024-02-04 RX ORDER — LISINOPRIL 40 MG/1
40 TABLET ORAL DAILY
Qty: 30 TABLET | Refills: 0 | Status: SHIPPED | OUTPATIENT
Start: 2024-02-04

## 2024-02-04 RX ADMIN — SODIUM CHLORIDE 100 ML/HR: 9 INJECTION, SOLUTION INTRAVENOUS at 12:02

## 2024-02-04 RX ADMIN — BUSPIRONE HYDROCHLORIDE 10 MG: 10 TABLET ORAL at 08:45

## 2024-02-04 RX ADMIN — Medication 10 ML: at 08:47

## 2024-02-04 RX ADMIN — LISINOPRIL 40 MG: 40 TABLET ORAL at 08:45

## 2024-02-04 RX ADMIN — POTASSIUM CHLORIDE 40 MEQ: 1500 TABLET, EXTENDED RELEASE ORAL at 08:51

## 2024-02-04 RX ADMIN — AMLODIPINE BESYLATE 10 MG: 10 TABLET ORAL at 08:45

## 2024-02-04 RX ADMIN — POTASSIUM CHLORIDE 40 MEQ: 1500 TABLET, EXTENDED RELEASE ORAL at 12:01

## 2024-02-04 RX ADMIN — CITALOPRAM HYDROBROMIDE 20 MG: 20 TABLET ORAL at 08:45

## 2024-02-04 RX ADMIN — LABETALOL HYDROCHLORIDE 10 MG: 5 INJECTION, SOLUTION INTRAVENOUS at 12:09

## 2024-02-04 RX ADMIN — PIPERACILLIN SODIUM AND TAZOBACTAM SODIUM 3.38 G: 3; .375 INJECTION, SOLUTION INTRAVENOUS at 04:12

## 2024-02-04 RX ADMIN — INSULIN HUMAN 2 UNITS: 100 INJECTION, SOLUTION PARENTERAL at 08:44

## 2024-02-04 RX ADMIN — SODIUM CHLORIDE 100 ML/HR: 9 INJECTION, SOLUTION INTRAVENOUS at 08:47

## 2024-02-04 RX ADMIN — INSULIN HUMAN 2 UNITS: 100 INJECTION, SOLUTION PARENTERAL at 12:00

## 2024-02-04 NOTE — DISCHARGE SUMMARY
[facility] Medicine Services  DISCHARGE SUMMARY    Patient Name: Malcolm Costa  : 1965  MRN: [id number]    Date of Admission: 2024 12:41 AM  Date of Discharge:  2024  Primary Care Physician: Sita Chase APRN    Consults       Date and Time Order Name Status Description    2024 11:30 AM Inpatient Gastroenterology Consult Completed     2024  2:37 AM Inpatient General Surgery Consult Completed             Hospital Course     Presenting Problem: Acute cholecystitis    Active Hospital Problems    Diagnosis  POA    **Acute cholecystitis [K81.0]  Yes    Elevated liver enzymes [R74.8]  Unknown      Resolved Hospital Problems   No resolved problems to display.          Hospital Course:  Malcolm Costa is a 59 y.o. male with history of hypertension, diabetes, GERD and depression presenting with right upper quadrant pain.  He was admitted for acute cholecystitis and general surgery consulted.  Status post lap cholecystectomy on . Post operative having persistent LFT elevations, GI following.     Acute cholecystitis  - Status post lap cholecystectomy on  with Dr. Ramos  - GI was consulted given persistent pain and elevation in liver enzymes, underwent EGD on  which was normal, biopsies taken. Also underwent ERCP on  and performed biliary sphincterotomy, no filling defects or dilation or extravasation seen. Suspect recently passed stone  --LFTs remain elevated, however slightly better. GI recommended f/u with PCP this week for repeat labs, if LFTs remain elevated needs f/u with [facility] in 2 weeks     Hypertension  - resume home regimen     Type 2 diabetes  - A1c is 11.6 indicating poor control, continue home regimen, patient to f/u with PCP outpatient for further adjustment of insulin.  - Diabetes education     HLD  --hold statin for now given LFT elevations    Discharge Follow Up Recommendations for outpatient labs/diagnostics:   - f/u with PCP this week with CMP,  if liver enzymes remain elevated would recommend f/u with [facility] in 2 weeks    Day of Discharge     HPI:   Doing well. Tolerating diet. No nausea/vomiting.    Review of Systems  Gen- No fevers, chills  CV- No chest pain, palpitations  Resp- No cough, dyspnea  GI- No N/V/D, abd pain    Vital Signs:   Temp:  [97.6 °F (36.4 °C)-97.9 °F (36.6 °C)] 97.9 °F (36.6 °C)  Heart Rate:  [56-79] 79  Resp:  [16] 16  BP: (148-185)/() 171/79      Physical Exam:  Constitutional: No acute distress, awake, alert  HENT: NCAT, mucous membranes moist  Respiratory: Clear to auscultation bilaterally, respiratory effort normal   Cardiovascular: RRR, no murmurs, rubs, or gallops  Gastrointestinal: soft, mildly tender post-op, incisions c/d/i  Musculoskeletal: No bilateral ankle edema  Psychiatric: Appropriate affect, cooperative  Neurologic: Oriented x 3, speech clear, no focal deficits  Skin: No rashes      Pertinent  and/or Most Recent Results     LAB RESULTS:      Lab 02/04/24  0530 02/03/24  0538 02/02/24  0506 02/01/24  0620 02/01/24  0039   WBC 4.14 5.79 5.28 8.15 9.70   HEMOGLOBIN 12.0* 12.0* 12.9* 13.4 15.4   HEMATOCRIT 35.1* 35.1* 38.2 38.0 44.4   PLATELETS 161 158 156 160 186   NEUTROS ABS 2.81 4.36 4.12 6.24 7.95*   IMMATURE GRANS (ABS) 0.04 0.04 0.07* 0.05 0.06*   LYMPHS ABS 0.83 0.94 0.77 1.17 0.96   MONOS ABS 0.40 0.44 0.32 0.64 0.67   EOS ABS 0.05 0.00 0.00 0.03 0.03   MCV 89.5 90.7 90.7 88.4 88.3   LACTATE  --   --   --   --  2.0   PROTIME  --   --   --  13.5  --          Lab 02/04/24  0530 02/03/24  0538 02/02/24  1636 02/02/24  0506 02/02/24  0029 02/01/24  0620   SODIUM 137 138 138 136  --  135*   POTASSIUM 3.4* 3.9 4.3 4.5  --  4.4   CHLORIDE 105 103 104 101  --  100   CO2 20.0* 19.0* 19.0* 18.0*  --  23.0   ANION GAP 12.0 16.0* 15.0 17.0*  --  12.0   BUN 11 18 14 13  --  9   CREATININE 0.73* 1.04 0.87 1.03  --  0.96   EGFR 104.8 82.7 99.4 83.7  --  91.6   GLUCOSE 193* 183* 245* 298*  --  327*   CALCIUM 8.0* 8.0*  8.0* 8.2*  --  8.4*   MAGNESIUM  --   --   --  2.4 2.7* 1.5*   PHOSPHORUS  --   --   --  3.0  --   --    HEMOGLOBIN A1C  --   --   --   --   --  11.60*         Lab 02/04/24  0530 02/03/24  0538 02/02/24  1636 02/02/24  0506 02/01/24  0620 02/01/24  0039   TOTAL PROTEIN 5.2* 5.5* 5.9* 6.5 6.5 7.3   ALBUMIN 3.0* 3.3* 3.3* 3.6 3.9 4.1   GLOBULIN 2.2 2.2 2.6 2.9 2.6 3.2   ALT (SGPT) 197* 206* 199* 200* 167* 138*   AST (SGOT) 146* 187* 198* 199* 290* 284*   BILIRUBIN 6.8* 7.7* 7.1* 6.9* 4.1* 3.3*   ALK PHOS 387* 339* 295* 278* 218* 226*   LIPASE  --   --   --   --   --  113*         Lab 02/01/24  0620   PROTIME 13.5   INR 1.02                 Brief Urine Lab Results  (Last result in the past 365 days)        Color   Clarity   Blood   Leuk Est   Nitrite   Protein   CREAT   Urine HCG        02/01/24 0033 Yellow   Clear   Small (1+)   Negative   Negative   >=300 mg/dL (3+)                 Microbiology Results (last 10 days)       Procedure Component Value - Date/Time    Blood Culture - Blood, Hand, Left [[id number]]  (Normal) Collected: 02/01/24 0247    Lab Status: Preliminary result Specimen: Blood from Hand, Left Updated: 02/04/24 0430     Blood Culture No growth at 3 days    Blood Culture - Blood, Hand, Right [[id number]]  (Normal) Collected: 02/01/24 0246    Lab Status: Preliminary result Specimen: Blood from Hand, Right Updated: 02/04/24 0430     Blood Culture No growth at 3 days            FL ERCP pancreatic and biliary ducts    Result Date: 2/3/2024  FL ERCP PANCREATIC AND BILIARY DUCTS Date of Exam: 2/2/2024 1:23 PM EST Indication: ERCP. Comparison: None available. Technique:  A series of radiographic digital spot films were obtained in conjunction with a endoscopic catheterization of the biliary and pancreatic ductal system, performed by the gastroenterologist. Fluoroscopic Time: 1 minute 43 seconds Number of Images: 11 Findings: Intraprocedural images demonstrate cannulation and opacification of the biliary system.  Please see procedure report for full details and findings.     Impression: Fluoroscopy data as above. Please see procedure report for full details and findings. Electronically Signed: Anthony Boswell MD  2/3/2024 9:51 AM EST  Workstation ID: [id number]    FL Cholangiogram Operative    Result Date: 2/1/2024  FL CHOLANGIOGRAM OPERATIVE Date of Exam: 2/1/2024 5:11 PM EST Indication: CHOLECYSTECTOMY LAPAROSCOPIC WITH INTRAOPERATIVE CHOLANGIOGRAM. Comparison: None available. Technique:  Digital spot images were obtained from an intraoperative cholangiogram procedure performed by the surgeon. Fluoroscopic Time: 21 seconds Number of Images: 1 fluoroscopic cine series Findings: Evaluation is somewhat obscured by apparent artifact secondary to patient motion. The opacified bile duct appears patent. Opacities extending bilaterally from the laparoscopic clip are favored to reflect artifact, evaluation for leak somewhat limited. Correlate with intraoperative findings.     Impression: The visualized bile ducts appear patent. Evaluation for contrast leak is essentially precluded by patient motion and single cine series saved for evaluation. Electronically Signed: Sean Luevano MD  2/1/2024 5:42 PM EST  Workstation ID: [id number]    MRI abdomen wo contrast mrcp    Result Date: 2/1/2024  MRI ABDOMEN WO CONTRAST MRCP Date of Exam: 2/1/2024 3:41 AM EST Indication: Pancreatitis, acute, severe.  Comparison: 2/1/2024. Technique:  Routine multiplanar/multisequence images of the abdomen were obtained with MRCP sequences without contrast administration. Findings: Diffuse gallbladder wall thickening is present. Tiny stones are present within the gallbladder neck. Cystic duct and common bile duct appear normal in caliber. No filling defects identified. Calcifications seen on CT images within the region of the ampulla is not appreciated on this study. Pancreas appears unremarkable. No evidence of ductal dilatation. No evidence of surrounding  edema. Liver demonstrates low T1 signal changes consistent with steatosis. No significant free fluid identified. No focal lesions. No evidence of hydronephrosis.     Impression: 1.Diffuse gallbladder wall thickening with tiny stones present within the gallbladder neck consistent with acute cholecystitis. No evidence of choledocholithiasis. No evidence of pancreatic or biliary ductal dilatation. No significant inflammatory changes identified surrounding the pancreas. Tiny calcification seen within the region of the ampulla on the previous study is not appreciated on this study likely related to modality. 2.Hepatic steatosis. Electronically Signed: Symone Hope MD  2/1/2024 4:28 AM EST  Workstation ID: [id number]    CT Abdomen Pelvis With Contrast    Result Date: 2/1/2024  CT ABDOMEN PELVIS W CONTRAST Date of Exam: 2/1/2024 1:25 AM EST Indication: upper abd pain, nausea, stool changes. Comparison: None available. Technique: Axial CT images were obtained of the abdomen and pelvis following the uneventful intravenous administration of intravenous contrast. Reconstructed coronal and sagittal images were also obtained. Automated exposure control and iterative construction methods were used. Findings: Lung Bases:   The visualized lung bases and lower mediastinal structures are unremarkable. Liver: The liver appears diffusely hypodense consistent with steatosis.. No focal lesions. Biliary/Gallbladder:  The gallbladder is distended. Tiny stones are present within the gallbladder neck. Diffuse wall thickening is present with hyperenhancement of the mucosa. No evidence of biliary ductal dilatation. No biliary duct stones.. The biliary tree is nondilated. There is a tiny focal calcification present within the ampulla (series 900 image 49). This measures approximately 2 to 3 mm. Spleen: Spleen is normal in size and CT density. Pancreas:  Pancreas is normal. There is no evidence of pancreatic mass or peripancreatic fluid. Kidneys:   Kidneys are normal in size. There are no stones or hydronephrosis. Adrenals:  Adrenal glands are unremarkable. Retroperitoneal/Lymph Nodes/Vasculature:  No retroperitoneal adenopathy is identified. Gastrointestinal/Mesentery:  Fat-containing ventral wall hernia present. No evidence of bowel involvement. No additional hernia identified. No significant stool burden. The bowel loops are non-dilated without wall thickening or mass. The appendix is not well visualized. No secondary  findings of acute appendicitis.. No evidence of obstruction. No free air. No mesenteric fluid collections identified. Bladder:  The bladder is normal. Genital:   Unremarkable       Bony Structures:   Visualized bony structures are consistent with the patient's age.     Impression: 1.Findings consistent with acute cholecystitis. No evidence of biliary ductal dilatation or duct stones identified however there does hypertrophy a tiny stone within the ampulla measuring 2 to 3 mm best seen on the coronal images. 2.Hepatic steatosis. 3.Ancillary findings as described above. Electronically Signed: Symone Hope MD  2/1/2024 1:39 AM EST  Workstation ID: [id number]                 Plan for Follow-up of Pending Labs/Results:   Pending Labs       Order Current Status    Tissue Pathology Exam In process    Tissue Pathology Exam In process    Blood Culture - Blood, Hand, Left Preliminary result    Blood Culture - Blood, Hand, Right Preliminary result          Discharge Details        Discharge Medications        New Medications        Instructions Start Date   HYDROcodone-acetaminophen 5-325 MG per tablet  Commonly known as: Norco   1 tablet, Oral, Every 8 Hours PRN             Continue These Medications        Instructions Start Date   accu-chek soft touch lancets   Used to test blood sugar for Diabetes E11.65 test up to twice a day      amLODIPine 10 MG tablet  Commonly known as: NORVASC   10 mg, Oral, Daily      atorvastatin 10 MG tablet  Commonly known  "as: LIPITOR   10 mg, Oral, Daily      busPIRone 10 MG tablet  Commonly known as: BUSPAR   10 mg, Oral, 2 Times Daily      citalopram 20 MG tablet  Commonly known as: CeleXA   Take 1 tablet by mouth once daily      FreeStyle Dagoberto 2 [place] device   1 Device, Does not apply, Take As Directed      FreeStyle Dagoberto 2 Sensor misc   1 Device, Does not apply, Every 14 Days      glipizide 5 MG tablet  Commonly known as: GLUCOTROL   5 mg, Oral, Every Morning      glucose blood test strip   Use as instructed      Insulin Syringe-Needle U-100 28G X 5/16\" 1 ML misc   1 Device, Does not apply, 2 Times Daily      lisinopril 40 MG tablet  Commonly known as: PRINIVIL,ZESTRIL   40 mg, Oral, Daily      meloxicam 15 MG tablet  Commonly known as: MOBIC   TAKE 1 TABLET BY MOUTH ONCE DAILY AS NEEDED FOR MODERATE PAIN      metFORMIN  MG 24 hr tablet  Commonly known as: GLUCOPHAGE-XR   1,500 mg, Oral, Daily With Breakfast      NovoLIN N ReliOn 100 UNIT/ML injection  Generic drug: insulin NPH   INJECT 10 UNITS WITH MORNING MEAL AND 5 UNITS WITH EVENING MEAL      omeprazole 20 MG capsule  Commonly known as: priLOSEC   20 mg, Oral, Daily               Allergies   Allergen Reactions    Shellfish-Derived Products Anaphylaxis    Codeine Nausea Only     Not sure of reaction but thinks it is just nausea         Discharge Disposition:  Home or Self Care    Diet:  Hospital:  Diet Order   Procedures    Diet: Liquid Diets, Diabetic Diets; Full Liquid; Consistent Carbohydrate; Fluid Consistency: Thin (IDDSI 0)            Activity:  - as tolerated    Restrictions or Other Recommendations:  - no lifting       CODE STATUS:    Code Status and Medical Interventions:   Ordered at: 02/01/24 0231     Level Of Support Discussed With:    Patient     Code Status (Patient has no pulse and is not breathing):    CPR (Attempt to Resuscitate)     Medical Interventions (Patient has pulse or is breathing):    Full Support       No future " appointments.              Luz Maria Boykin DO  02/04/24      Time Spent on Discharge:  I spent  35 minutes on this discharge activity which included: face-to-face encounter with the patient, reviewing the data in the system, coordination of the care with the nursing staff as well as consultants, documentation, and entering orders.

## 2024-02-04 NOTE — PLAN OF CARE
Adhesive strip to midline incision CDI, free of drainage/redness or swelling. Patient denies abdominal pain/discomfort at this time. BG level / SBP remain elevated. SR on cardiac mx. Call light in reach.

## 2024-02-04 NOTE — PROGRESS NOTES
"Patient Name:  Malcolm Costa  YOB: 1965  [id number]    Surgery Progress Note    Date of visit: 2/4/2024    Subjective   Pain improved and patient feeling better.  Patient is eating with some bowel function.  Passing gas.  Total bili down to 6.8 today.     Doing well from surgical standpoint.       Objective       /79   Pulse 79   Temp 97.9 °F (36.6 °C) (Oral)   Resp 16   Ht 165.1 cm (65\")   Wt 89.8 kg (198 lb)   SpO2 96%   BMI 32.95 kg/m²     Intake/Output Summary (Last 24 hours) at 2/4/2024 1120  Last data filed at 2/4/2024 0847  Gross per 24 hour   Intake 1180 ml   Output 1150 ml   Net 30 ml       CV:  Rhythm regular and rate regular  L:  Clear to auscultation bilaterally  Abd:  Soft, appropriately tender, incisions are c/d/i  Ext:  No cyanosis, clubbing, edema    Recent labs and imaging that are back at this time have been reviewed.     Labs:    Results from last 7 days   Lab Units 02/04/24  0530   WBC 10*3/mm3 4.14   HEMOGLOBIN g/dL 12.0*   HEMATOCRIT % 35.1*   PLATELETS 10*3/mm3 161     Results from last 7 days   Lab Units 02/04/24  0530   SODIUM mmol/L 137   POTASSIUM mmol/L 3.4*   CHLORIDE mmol/L 105   CO2 mmol/L 20.0*   BUN mg/dL 11   CREATININE mg/dL 0.73*   CALCIUM mg/dL 8.0*   BILIRUBIN mg/dL 6.8*   ALK PHOS U/L 387*   ALT (SGPT) U/L 197*   AST (SGOT) U/L 146*   GLUCOSE mg/dL 193*     Results from last 7 days   Lab Units 02/04/24  0530   SODIUM mmol/L 137   POTASSIUM mmol/L 3.4*   CHLORIDE mmol/L 105   CO2 mmol/L 20.0*   BUN mg/dL 11   CREATININE mg/dL 0.73*   GLUCOSE mg/dL 193*   CALCIUM mg/dL 8.0*     Lab Results   Lab Value Date/Time    LIPASE 113 (H) 02/01/2024 0039            Assessment & Plan     Problem List Items Addressed This Visit       * (Principal) Acute cholecystitis    Relevant Orders    Tissue Pathology Exam    Elevated liver enzymes - Primary    Relevant Orders    Case Request (Completed)    ERCP    Tissue Pathology Exam     Other Visit Diagnoses       RUQ " abdominal pain        Nausea        Diarrhea, unspecified type        Elevated LFTs        Type 2 diabetes mellitus with hyperglycemia, with long-term current use of insulin        Relevant Medications    insulin regular (humuLIN R,novoLIN R) injection 10 Units    dextrose (GLUTOSE) oral gel 15 g    glucagon (GLUCAGEN) injection 1 mg    Insulin Lispro (humaLOG) injection 8 Units (Completed)    insulin detemir (LEVEMIR) injection 20 Units    insulin regular (humuLIN R,novoLIN R) injection 4 Units (Completed)    insulin regular (humuLIN R,novoLIN R) injection 2-7 Units    Elevated blood pressure reading with diagnosis of hypertension        Relevant Medications    labetalol (NORMODYNE,TRANDATE) injection 10 mg    hydrALAZINE (APRESOLINE) injection 20 mg    amLODIPine (NORVASC) tablet 10 mg    lisinopril (PRINIVIL,ZESTRIL) tablet 40 mg    Obesity (BMI 30-39.9)        H/O noncompliance with medical treatment, presenting hazards to health        Gastric nodule        Relevant Orders    Tissue Pathology Exam            Mr. Malcolm Costa is a 59 year old gentleman presenting with acute cholecystitis and choledocholithiasis s/p laparoscopic cholecystectomy and IOC on 2/1/2024 and ERCP with clearance of the duct and sphincterotomy on 2/2/2024.      - Total bilirubin to 6.8 today, appropriately trending down. GI following, likely passed stone as duct is clear.   - Okay to discontinue antibiotics   - Regular diet  - Doing well from surgery standpoint, will sign off. Please call with any questions or concerns    Adam Ramos MD  2/4/2024  11:20 EST

## 2024-02-04 NOTE — PROGRESS NOTES
"GI Daily Progress Note  Subjective:    Chief Complaint:  Follow up abdominal pain, jaundice     Abdominal pain improved.   No other complaints.   Denies any alcohol use, OTC medications/herbals or recent antibiotic use.      Objective:    /79   Pulse 79   Temp 97.9 °F (36.6 °C) (Oral)   Resp 16   Ht 165.1 cm (65\")   Wt 89.8 kg (198 lb)   SpO2 96%   BMI 32.95 kg/m²     Physical Exam  Constitutional:       General: He is not in acute distress.  Eyes:      General: Scleral icterus present.   Cardiovascular:      Rate and Rhythm: Normal rate and regular rhythm.   Pulmonary:      Effort: Pulmonary effort is normal. No respiratory distress.   Abdominal:      General: Bowel sounds are normal.      Palpations: Abdomen is soft.      Comments: Mild tenderness at laparoscopic sites    Skin:     Coloration: Skin is jaundiced.   Neurological:      Mental Status: He is alert and oriented to person, place, and time.         Lab  Lab Results   Component Value Date    WBC 4.14 02/04/2024    HGB 12.0 (L) 02/04/2024    HGB 12.0 (L) 02/03/2024    HGB 12.9 (L) 02/02/2024    MCV 89.5 02/04/2024     02/04/2024    INR 1.02 02/01/2024     Lab Results   Component Value Date    GLUCOSE 193 (H) 02/04/2024    BUN 11 02/04/2024    CREATININE 0.73 (L) 02/04/2024    EGFRIFNONA 74 09/03/2021    BCR 15.1 02/04/2024     02/04/2024    K 3.4 (L) 02/04/2024    CO2 20.0 (L) 02/04/2024    CALCIUM 8.0 (L) 02/04/2024    ALBUMIN 3.0 (L) 02/04/2024    ALKPHOS 387 (H) 02/04/2024    BILITOT 6.8 (H) 02/04/2024     (H) 02/04/2024     (H) 02/04/2024     Assessment:    Elevated LFTs, cholestatic pattern.   Improving.   Possible passed stone versus cholestasis in the setting of infection.   Status post ERCP on 2/2 with biliary sphincterotomy, bile duct not dilated, no stones, no extravasation of contrast, had only a small scant amount of debris swept from the duct   Acute cholecystitis, s/p cholecystectomy on 2/1/24. "     Plan:    LFTs slightly improved.    He is feeling better.      Okay from a GI standpoint for discharge home today.   Needs close follow up with PCP this week for CMP.  If LFTs remain elevated will need follow up with [facility] in 2 weeks.       GARRY Briceño  02/04/24  10:59 EST

## 2024-02-04 NOTE — OUTREACH NOTE
Prep Survey      Flowsheet Row Responses   [facility] patient discharged from? [place]   Is LACE score < 7 ? Yes   Eligibility [facility]   Date of Admission 02/01/24   Date of Discharge 02/04/24   Discharge Disposition Home or Self Care   Discharge diagnosis *Acute cholecystitisap cholecystectomy   Does the patient have one of the following disease processes/diagnoses(primary or secondary)? General Surgery   Does the patient have Home health ordered? No   Is there a DME ordered? No   Prep survey completed? Yes            MERRICK LIRA - Registered Nurse

## 2024-02-05 ENCOUNTER — TRANSITIONAL CARE MANAGEMENT TELEPHONE ENCOUNTER (OUTPATIENT)
Dept: CALL CENTER | Facility: HOSPITAL | Age: 59
End: 2024-02-05
Payer: COMMERCIAL

## 2024-02-05 LAB
CYTO UR: NORMAL
LAB AP CASE REPORT: NORMAL
LAB AP CLINICAL INFORMATION: NORMAL
PATH REPORT.FINAL DX SPEC: NORMAL
PATH REPORT.GROSS SPEC: NORMAL

## 2024-02-05 NOTE — OUTREACH NOTE
Call Center TCM Note      Flowsheet Row Responses   [facility] patient discharged from? [place]   Does the patient have one of the following disease processes/diagnoses(primary or secondary)? General Surgery   TCM attempt successful? No  [No VR for PCP]   Unsuccessful attempts Attempt 1   Call Status Left message            Mini Lamb RN    2/5/2024, 15:07 EST

## 2024-02-05 NOTE — OUTREACH NOTE
Call Center TCM Note      Flowsheet Row Responses   [facility] patient discharged from? [place]   Does the patient have one of the following disease processes/diagnoses(primary or secondary)? General Surgery   TCM attempt successful? No   Unsuccessful attempts Attempt 2            Veronica Maravilla RN    2/5/2024, 15:53 EST

## 2024-02-06 ENCOUNTER — LAB (OUTPATIENT)
Dept: INTERNAL MEDICINE | Facility: CLINIC | Age: 59
End: 2024-02-06
Payer: COMMERCIAL

## 2024-02-06 ENCOUNTER — OFFICE VISIT (OUTPATIENT)
Dept: INTERNAL MEDICINE | Facility: CLINIC | Age: 59
End: 2024-02-06
Payer: COMMERCIAL

## 2024-02-06 ENCOUNTER — TRANSITIONAL CARE MANAGEMENT TELEPHONE ENCOUNTER (OUTPATIENT)
Dept: CALL CENTER | Facility: HOSPITAL | Age: 59
End: 2024-02-06
Payer: COMMERCIAL

## 2024-02-06 VITALS
OXYGEN SATURATION: 96 % | HEIGHT: 65 IN | BODY MASS INDEX: 32.99 KG/M2 | WEIGHT: 198 LBS | DIASTOLIC BLOOD PRESSURE: 70 MMHG | RESPIRATION RATE: 18 BRPM | SYSTOLIC BLOOD PRESSURE: 124 MMHG | HEART RATE: 88 BPM | TEMPERATURE: 97.7 F

## 2024-02-06 DIAGNOSIS — E78.00 PURE HYPERCHOLESTEROLEMIA: ICD-10-CM

## 2024-02-06 DIAGNOSIS — I10 ESSENTIAL HYPERTENSION: ICD-10-CM

## 2024-02-06 DIAGNOSIS — F33.41 RECURRENT MAJOR DEPRESSIVE DISORDER, IN PARTIAL REMISSION: ICD-10-CM

## 2024-02-06 DIAGNOSIS — K21.9 CHRONIC GERD: ICD-10-CM

## 2024-02-06 DIAGNOSIS — E11.65 UNCONTROLLED TYPE 2 DIABETES MELLITUS WITH HYPERGLYCEMIA: Primary | ICD-10-CM

## 2024-02-06 DIAGNOSIS — Z90.49 STATUS POST CHOLECYSTECTOMY: ICD-10-CM

## 2024-02-06 DIAGNOSIS — R79.89 ELEVATED LFTS: ICD-10-CM

## 2024-02-06 DIAGNOSIS — F41.9 ANXIETY: ICD-10-CM

## 2024-02-06 LAB
BACTERIA SPEC AEROBE CULT: NORMAL
BACTERIA SPEC AEROBE CULT: NORMAL
CYTO UR: NORMAL
LAB AP CASE REPORT: NORMAL
LAB AP CLINICAL INFORMATION: NORMAL
PATH REPORT.FINAL DX SPEC: NORMAL
PATH REPORT.GROSS SPEC: NORMAL

## 2024-02-06 PROCEDURE — 99214 OFFICE O/P EST MOD 30 MIN: CPT | Performed by: NURSE PRACTITIONER

## 2024-02-06 PROCEDURE — 80076 HEPATIC FUNCTION PANEL: CPT | Performed by: NURSE PRACTITIONER

## 2024-02-06 PROCEDURE — 36415 COLL VENOUS BLD VENIPUNCTURE: CPT | Performed by: NURSE PRACTITIONER

## 2024-02-06 RX ORDER — CITALOPRAM 40 MG/1
40 TABLET ORAL DAILY
Qty: 30 TABLET | Refills: 1 | Status: SHIPPED | OUTPATIENT
Start: 2024-02-06

## 2024-02-06 NOTE — OUTREACH NOTE
Call Center TCM Note      Flowsheet Row Responses   [facility] patient discharged from? [place]   Does the patient have one of the following disease processes/diagnoses(primary or secondary)? General Surgery   TCM attempt successful? No   Unsuccessful attempts Attempt 3            Goldy Lopez RN    2/6/2024, 10:46 EST

## 2024-02-06 NOTE — PROGRESS NOTES
Subjective   Malcolm Costa is a 59 y.o. male.     Chief Complaint   Patient presents with    Depression    Hypertension    Hyperlipidemia    Diabetes    Anxiety    Heartburn       PCP: Sita Chase APRN      Malcolm Costa is a 59-year-old male who presents today for follow up on elevated liver function tests and depression. He also recently had cholecystectomy. He has type 2 diabetes, hyperlipidemia, hypertension, anxiety, depression, and GERD.    The patient is not following up with anybody for his diabetes. He reports he has not been taking all his medications, however the hospital got him back on all his medications. His lisinopril was increased to 40 mg. He reports he is on metformin, NPH insulin, and glipizide from the surgery. He adds he checked his blood sugar a few months ago with a continuous glucose monitor and it was 80 to 87. Yesterday, his blood sugar was 69 and he had eaten. He reports right now, his average blood sugar is 112. When he went to the hospital that night, his blood sugar was 400. He denies chest pain, heart palpitations, or swelling.    He reports he was taken off his cholesterol medication because of his liver enzymes. He denies a history of hepatitis. He reports he is supposed to make an appointment with the gallbladder surgeon and endoscopy. He denies heartburn or indigestion issues. Omeprazole helps with his heartburn and indigestion. He is on omeprazole.    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 He reports [place] was tough for him this year. He adds he is taking Celexa and BuSpar 10 mg in the morning. He has not tried any other medications for anxiety or depression.    He reports he has diarrhea. He denies blood in his stool. His stool is black in color. He denies shoulder pain. He can not sit for very long.    He had a rough year. He lost his mother in 02/2023. He is not currently working.    Lab Results   Component Value Date    CHOL 178 04/21/2022    CHLPL 192 11/03/2015     TRIG 268 (H) 04/21/2022    HDL 39 (L) 04/21/2022    LDL 94 04/21/2022     Lab Results   Component Value Date    GLUCOSE 193 (H) 02/04/2024    BUN 11 02/04/2024    CREATININE 0.73 (L) 02/04/2024    EGFR 104.8 02/04/2024    BCR 15.1 02/04/2024    K 3.4 (L) 02/04/2024    CO2 20.0 (L) 02/04/2024    CALCIUM 8.0 (L) 02/04/2024    ALBUMIN 3.0 (L) 02/04/2024    BILITOT 6.8 (H) 02/04/2024     (H) 02/04/2024     (H) 02/04/2024         The following portions of the patient's history were reviewed and updated as appropriate: allergies, current medications, past family history, past medical history, past social history, past surgical history and problem list.        Review of Systems   Constitutional:  Negative for fatigue, fever and unexpected weight loss.   Eyes:  Negative for blurred vision, double vision and visual disturbance.   Respiratory:  Negative for cough, shortness of breath and wheezing.    Cardiovascular:  Negative for chest pain, palpitations and leg swelling.   Gastrointestinal:  Positive for diarrhea and indigestion. Negative for abdominal pain, anal bleeding, blood in stool, constipation, nausea, rectal pain and vomiting.   Genitourinary:  Negative for difficulty urinating, frequency and urgency.   Musculoskeletal:  Negative for arthralgias and myalgias.   Skin:  Negative for color change and rash.   Neurological:  Negative for dizziness, weakness and headache.   Hematological:  Negative for adenopathy. Does not bruise/bleed easily.   Psychiatric/Behavioral:  Positive for decreased concentration, sleep disturbance, depressed mood and stress. Negative for suicidal ideas. The patient is nervous/anxious.      PHQ-9 Depression Screening  Little interest or pleasure in doing things? 3-->nearly every day   Feeling down, depressed, or hopeless? 2-->more than half the days   Trouble falling or staying asleep, or sleeping too much? 3-->nearly every day   Feeling tired or having little energy? 2-->more than  "half the days   Poor appetite or overeating? 0-->not at all   Feeling bad about yourself - or that you are a failure or have let yourself or your family down? 1-->several days   Trouble concentrating on things, such as reading the newspaper or watching television? 0-->not at all   Moving or speaking so slowly that other people could have noticed? Or the opposite - being so fidgety or restless that you have been moving around a lot more than usual? 0-->not at all   Thoughts that you would be better off dead, or of hurting yourself in some way? 0-->not at all   PHQ-9 Total Score 11   If you checked off any problems, how difficult have these problems made it for you to do your work, take care of things at home, or get along with other people? somewhat difficult          Outpatient Medications Marked as Taking for the 2/6/24 encounter (Office Visit) with Sita Chase APRN   Medication Sig Dispense Refill    amLODIPine (NORVASC) 10 MG tablet Take 1 tablet by mouth Daily. 30 tablet 0    busPIRone (BUSPAR) 10 MG tablet Take 1 tablet by mouth 2 (Two) Times a Day. 30 tablet 0    citalopram (CeleXA) 20 MG tablet Take 1 tablet by mouth Daily. 30 tablet 0    Continuous Blood Gluc  (FreeStyle Dagoberto 2 Thermal) device 1 Device Take As Directed. 1 each 0    Continuous Blood Gluc Sensor (FreeStyle Dagoberto 2 Sensor) misc 1 Device Every 14 (Fourteen) Days. 2 each 11    glipizide (GLUCOTROL) 5 MG tablet Take 1 tablet by mouth Every Morning. 30 tablet 0    glucose blood test strip Use as instructed 50 each 12    insulin NPH (NovoLIN N ReliOn) 100 UNIT/ML injection INJECT 10 UNITS WITH MORNING MEAL AND 5 UNITS WITH EVENING MEAL 10 mL 0    Insulin Syringe-Needle U-100 28G X 5/16\" 1 ML misc 1 Device 2 (Two) Times a Day. 300 each 3    Lancets (accu-chek soft touch) lancets Used to test blood sugar for Diabetes E11.65 test up to twice a day 100 each 12    lisinopril (PRINIVIL,ZESTRIL) 40 MG tablet Take 1 tablet by mouth Daily. 30 " "tablet 0    meloxicam (MOBIC) 15 MG tablet TAKE 1 TABLET BY MOUTH ONCE DAILY AS NEEDED FOR MODERATE PAIN 30 tablet 0    metFORMIN ER (GLUCOPHAGE-XR) 750 MG 24 hr tablet Take 2 tablets by mouth Daily With Breakfast for 30 days. 60 tablet 0    omeprazole (priLOSEC) 20 MG capsule Take 1 capsule by mouth Daily. 30 capsule 0     Allergies   Allergen Reactions    Shellfish-Derived Products Anaphylaxis    Codeine Nausea Only     Not sure of reaction but thinks it is just nausea           Objective   Physical Exam  Vitals and nursing note reviewed.   Constitutional:       Appearance: Normal appearance. He is well-developed.   HENT:      Head: Normocephalic and atraumatic.   Eyes:      Conjunctiva/sclera: Conjunctivae normal.   Cardiovascular:      Rate and Rhythm: Normal rate and regular rhythm.      Heart sounds: Normal heart sounds.   Pulmonary:      Effort: Pulmonary effort is normal. No respiratory distress.      Breath sounds: Normal breath sounds.   Abdominal:      General: Bowel sounds are normal. There is no distension.      Palpations: Abdomen is soft. There is no mass.      Tenderness: There is abdominal tenderness. There is no guarding or rebound.   Musculoskeletal:      Cervical back: Normal range of motion.   Skin:     General: Skin is warm and dry.      Comments: Abd lap sites with steri strips C/D/I. No redness, warmth or erythema.    Neurological:      Mental Status: He is alert and oriented to person, place, and time.   Psychiatric:         Speech: Speech normal.         Behavior: Behavior normal.         Thought Content: Thought content normal.         Judgment: Judgment normal.     Vitals:    02/06/24 1324   BP: 124/70   BP Location: Left arm   Patient Position: Sitting   Cuff Size: Adult   Pulse: 88   Resp: 18   Temp: 97.7 °F (36.5 °C)   TempSrc: Infrared   SpO2: 96%   Weight: 89.8 kg (198 lb)   Height: 165.1 cm (65\")     Body mass index is 32.95 kg/m².  Wt Readings from Last 3 Encounters:   02/06/24 " 89.8 kg (198 lb)   02/01/24 89.8 kg (198 lb)   10/02/23 93 kg (205 lb)             Assessment & Plan   Diagnoses and all orders for this visit:    1. Uncontrolled type 2 diabetes mellitus with hyperglycemia (Primary)    2. Essential hypertension    3. Pure hypercholesterolemia    4. Recurrent major depressive disorder, in partial remission    5. Anxiety    6. Chronic GERD    7. Status post cholecystectomy    1. Anxiety and depression.  This is uncontrolled. No safety risks identified. No HI/SI. He will continue BuSpar and increase Celexa.    2. Hypertension.  This is well controlled. He will continue current medications.    3. Hyperlipidemia.  He will hold statin while evaluating LFTs.    4. Diabetes.  This is uncontrolled, but he had not been taking his medications. He has restarted on his medications and blood sugars at home sound good, so we will continue this and put him check A1c in 3 months.    5. GERD.  This is well controlled with PPI therapy. He will continue PPI therapy.    6. Status post cholecystectomy.  He will follow up with surgery as planned.    7. Elevated LFTs.  He did have negative acute hepatitis panel in hospital. I will recheck LFTs. I will also follow up with GI.    Follow-up  The patient will follow up in the next 4 to 6 weeks to reevaluate.    BMI is >= 30 and <35. (Class 1 Obesity). The following options were offered after discussion;: weight loss educational material (shared in after visit summary)      No follow-ups on file.    I discussed my findings,recommendations, and plan of care was with the patient. They verbalized understanding and agreement.  Patient was encouraged to keep me informed of any acute changes, lack of improvement, or any new concerning symptoms.       Transcribed from ambient dictation for JOANA Lai by Staci Goldstein.  02/06/24   16:12 EST    Patient or patient representative verbalized consent to the visit recording.  I have personally performed the  services described in this document as transcribed by the above individual, and it is both accurate and complete.  Sita Chase, APRN  2/11/2024  21:34 EST

## 2024-02-07 LAB
ALBUMIN SERPL-MCNC: 3.5 G/DL (ref 3.5–5.2)
ALP SERPL-CCNC: 484 U/L (ref 39–117)
ALT SERPL W P-5'-P-CCNC: 148 U/L (ref 1–41)
AST SERPL-CCNC: 81 U/L (ref 1–40)
BILIRUB CONJ SERPL-MCNC: 2.2 MG/DL (ref 0–0.3)
BILIRUB INDIRECT SERPL-MCNC: 1.1 MG/DL
BILIRUB SERPL-MCNC: 3.3 MG/DL (ref 0–1.2)
PROT SERPL-MCNC: 7.1 G/DL (ref 6–8.5)

## 2024-02-08 NOTE — PROGRESS NOTES
Please ensure patient has GI office follow up in the next 2 weeks.  Please let patient know pathology from recent upper endoscopy as follows:    The nodule in the top part of the stomach was benign.   Biopsies from the stomach show mild benign inflammation with no evidence of H pylori.     Follow up in GI office to be scheduled.   Follow up with your primary care provider as scheduled.

## 2024-02-09 ENCOUNTER — TELEPHONE (OUTPATIENT)
Dept: INTERNAL MEDICINE | Facility: CLINIC | Age: 59
End: 2024-02-09

## 2024-02-09 ENCOUNTER — TELEPHONE (OUTPATIENT)
Dept: GASTROENTEROLOGY | Facility: CLINIC | Age: 59
End: 2024-02-09
Payer: COMMERCIAL

## 2024-02-09 NOTE — TELEPHONE ENCOUNTER
Regarding: PATHOLOGY RESULTS  ----- Message from MARY Birmingham sent at 2/9/2024  8:23 AM EST -----  Dr Salvatore Verma wants patient to have a follow up in 2 weeks. Thanks     ----- Message sent from Taqueria ZAMORA RMA to Malcolm Costa at 2/9/2024  8:21 AM -----     February 9, 2024        Malcolm Costa   [address] [place], [place] [id number]             Dear Malcolm Costa,    The nodule in the top part of the stomach was benign.   Biopsies from the stomach show mild benign inflammation with no evidence of H pylori.     Follow up in GI office to be scheduled.   Follow up with your primary care provider as scheduled.       If you have any questions, please call our office at [phone], and press option 3 for the clinical staff.    Sincerely,    ABDULKADIR Chase M.D.

## 2024-02-09 NOTE — TELEPHONE ENCOUNTER
----- Message from JOANA Ferris sent at 2/9/2024 12:54 PM EST -----  Please let him know that his liver function tests are still elevated although somewhat improved.  Does he has appointment to see GI for this yet?  If not please make sure he does schedule that.  If he needs a new referral I will be glad to enter that.

## 2024-02-12 ENCOUNTER — PREP FOR SURGERY (OUTPATIENT)
Dept: OTHER | Facility: HOSPITAL | Age: 59
End: 2024-02-12

## 2024-02-12 DIAGNOSIS — Z98.890 HISTORY OF BILIARY STENT INSERTION: Primary | ICD-10-CM

## 2024-03-04 NOTE — ANESTHESIA PREPROCEDURE EVALUATION
Anesthesia Evaluation                  Airway   Mallampati: I  TM distance: >3 FB  Neck ROM: full  No difficulty expected  Dental      Pulmonary    Cardiovascular     ECG reviewed    (+) hypertension      Neuro/Psych  (+) psychiatric history  GI/Hepatic/Renal/Endo    (+) GERD, diabetes mellitus    Musculoskeletal     Abdominal    Substance History      OB/GYN          Other   arthritis,                 Anesthesia Plan    ASA 3     general     intravenous induction     Anesthetic plan, risks, benefits, and alternatives have been provided, discussed and informed consent has been obtained with: patient.    Plan discussed with CRNA.    CODE STATUS:    Level Of Support Discussed With: Patient  Code Status (Patient has no pulse and is not breathing): CPR (Attempt to Resuscitate)  Medical Interventions (Patient has pulse or is breathing): Full Support       no

## 2024-03-05 DIAGNOSIS — I10 ESSENTIAL HYPERTENSION: ICD-10-CM

## 2024-03-05 DIAGNOSIS — F41.9 ANXIETY: ICD-10-CM

## 2024-03-05 DIAGNOSIS — K21.9 CHRONIC GERD: ICD-10-CM

## 2024-03-05 DIAGNOSIS — E11.9 TYPE 2 DIABETES MELLITUS WITHOUT COMPLICATION, WITHOUT LONG-TERM CURRENT USE OF INSULIN: ICD-10-CM

## 2024-03-05 DIAGNOSIS — E11.65 UNCONTROLLED TYPE 2 DIABETES MELLITUS WITH HYPERGLYCEMIA: ICD-10-CM

## 2024-03-05 RX ORDER — OMEPRAZOLE 20 MG/1
20 CAPSULE, DELAYED RELEASE ORAL DAILY
Qty: 30 CAPSULE | Refills: 0 | Status: SHIPPED | OUTPATIENT
Start: 2024-03-05

## 2024-03-05 RX ORDER — GLIPIZIDE 5 MG/1
5 TABLET ORAL EVERY MORNING
Qty: 30 TABLET | Refills: 0 | Status: SHIPPED | OUTPATIENT
Start: 2024-03-05

## 2024-03-05 RX ORDER — CITALOPRAM 20 MG/1
20 TABLET ORAL DAILY
Qty: 30 TABLET | Refills: 0 | Status: SHIPPED | OUTPATIENT
Start: 2024-03-05

## 2024-03-05 RX ORDER — BUSPIRONE HYDROCHLORIDE 10 MG/1
10 TABLET ORAL 2 TIMES DAILY
Qty: 30 TABLET | Refills: 0 | Status: SHIPPED | OUTPATIENT
Start: 2024-03-05

## 2024-03-05 RX ORDER — METFORMIN HYDROCHLORIDE 750 MG/1
1500 TABLET, EXTENDED RELEASE ORAL
Qty: 60 TABLET | Refills: 0 | Status: SHIPPED | OUTPATIENT
Start: 2024-03-05 | End: 2024-04-04

## 2024-03-05 RX ORDER — LISINOPRIL 40 MG/1
40 TABLET ORAL DAILY
Qty: 30 TABLET | Refills: 0 | Status: SHIPPED | OUTPATIENT
Start: 2024-03-05

## 2024-03-05 RX ORDER — AMLODIPINE BESYLATE 10 MG/1
10 TABLET ORAL DAILY
Qty: 30 TABLET | Refills: 0 | Status: SHIPPED | OUTPATIENT
Start: 2024-03-05

## 2024-03-05 NOTE — TELEPHONE ENCOUNTER
D  Caller: HERO LANCASTER     Relationship: PATIENT    Best call back number:      Requested Prescriptions:   Requested Prescriptions     Pending Prescriptions Disp Refills    metFORMIN ER (GLUCOPHAGE-XR) 750 MG 24 hr tablet 60 tablet 0     Sig: Take 2 tablets by mouth Daily With Breakfast for 30 days.    amLODIPine (NORVASC) 10 MG tablet 30 tablet 0     Sig: Take 1 tablet by mouth Daily.    lisinopril (PRINIVIL,ZESTRIL) 40 MG tablet 30 tablet 0     Sig: Take 1 tablet by mouth Daily.    busPIRone (BUSPAR) 10 MG tablet 30 tablet 0     Sig: Take 1 tablet by mouth 2 (Two) Times a Day.    omeprazole (priLOSEC) 20 MG capsule 30 capsule 0     Sig: Take 1 capsule by mouth Daily.    glipizide (GLUCOTROL) 5 MG tablet 30 tablet 0     Sig: Take 1 tablet by mouth Every Morning.    citalopram (CeleXA) 40 MG tablet 30 tablet 1     Sig: Take 1 tablet by mouth Daily.    Continuous Blood Gluc Sensor (FreeStyle Dagoberto 2 Sensor) misc 2 each 11     Sig: Use 1 Device Every 14 (Fourteen) Days.        Pharmacy where request should be sent: [address] [phone] [facility] [phone] [facility]     Last office visit with prescribing clinician: 2/6/2024   Last telemedicine visit with prescribing clinician: Visit date not found   Next office visit with prescribing clinician: 3/19/2024     Additional details provided by patient:  PATIENT IS OUT OF MEDICATION; HE IS ALSO THE CITALOPRAM BE REDUCED BACK TO 20 MG AS WITH THE 40 MG HE IS HAVING A HARD TIME GETTING UP IN THE MORNINGS    Does the patient have less than a 3 day supply:  [x] Yes  [] No    Oseas Downs   03/05/24 08:39 EST

## 2024-03-19 ENCOUNTER — OFFICE VISIT (OUTPATIENT)
Dept: INTERNAL MEDICINE | Facility: CLINIC | Age: 59
End: 2024-03-19
Payer: COMMERCIAL

## 2024-03-19 ENCOUNTER — LAB (OUTPATIENT)
Dept: INTERNAL MEDICINE | Facility: CLINIC | Age: 59
End: 2024-03-19
Payer: COMMERCIAL

## 2024-03-19 VITALS
DIASTOLIC BLOOD PRESSURE: 86 MMHG | SYSTOLIC BLOOD PRESSURE: 148 MMHG | OXYGEN SATURATION: 96 % | WEIGHT: 201.2 LBS | HEART RATE: 88 BPM | RESPIRATION RATE: 18 BRPM | TEMPERATURE: 97.5 F | BODY MASS INDEX: 33.52 KG/M2 | HEIGHT: 65 IN

## 2024-03-19 DIAGNOSIS — I10 ESSENTIAL HYPERTENSION: ICD-10-CM

## 2024-03-19 DIAGNOSIS — R79.89 ELEVATED LFTS: ICD-10-CM

## 2024-03-19 DIAGNOSIS — F41.9 ANXIETY: Primary | ICD-10-CM

## 2024-03-19 DIAGNOSIS — F33.41 RECURRENT MAJOR DEPRESSIVE DISORDER, IN PARTIAL REMISSION: ICD-10-CM

## 2024-03-19 DIAGNOSIS — E11.9 TYPE 2 DIABETES MELLITUS WITHOUT COMPLICATION, WITHOUT LONG-TERM CURRENT USE OF INSULIN: ICD-10-CM

## 2024-03-19 LAB
ALBUMIN SERPL-MCNC: 4.6 G/DL (ref 3.5–5.2)
ALP SERPL-CCNC: 99 U/L (ref 39–117)
ALT SERPL W P-5'-P-CCNC: 29 U/L (ref 1–41)
AST SERPL-CCNC: 19 U/L (ref 1–40)
BILIRUB CONJ SERPL-MCNC: <0.2 MG/DL (ref 0–0.3)
BILIRUB INDIRECT SERPL-MCNC: NORMAL MG/DL
BILIRUB SERPL-MCNC: 0.5 MG/DL (ref 0–1.2)
PROT SERPL-MCNC: 7.9 G/DL (ref 6–8.5)

## 2024-03-19 PROCEDURE — 80076 HEPATIC FUNCTION PANEL: CPT | Performed by: NURSE PRACTITIONER

## 2024-03-19 PROCEDURE — 36415 COLL VENOUS BLD VENIPUNCTURE: CPT | Performed by: NURSE PRACTITIONER

## 2024-03-19 RX ORDER — HUMAN INSULIN 100 [IU]/ML
INJECTION, SUSPENSION SUBCUTANEOUS
Qty: 10 ML | Refills: 3 | Status: SHIPPED | OUTPATIENT
Start: 2024-03-19

## 2024-03-19 RX ORDER — BUPROPION HYDROCHLORIDE 150 MG/1
150 TABLET ORAL DAILY
Qty: 30 TABLET | Refills: 3 | Status: SHIPPED | OUTPATIENT
Start: 2024-03-19

## 2024-03-19 NOTE — PROGRESS NOTES
Subjective   Malcolm Costa is a 59 y.o. male.     Chief Complaint   Patient presents with    Elevated Hepatic Enzymes     6 week recheck       PCP: Sita Chase APRN    History of Present Illness   The patient is a 59-year-old male who is here today to follow up on elevated liver function tests as well as anxiety and depression. Anxiety and depression were uncontrolled at last visit, so we increased his Celexa and continued BuSpar. He did have cholecystectomy. He had acute cholecystitis in 02/2025, underwent lap cholecystectomy on 02/01/2025 with Dr. Ramos. GI consulted given persistent pain and elevation in liver enzymes. He had EGD on 02/02/2024 which was normal. ERCP on 02/02/2024 performed biliary sphincterotomy. No filling defects or dilation or extravasation seen. They suspected a recent stone was passed. Recommended follow-up with GI if LFTs remained elevated. LFTs were elevated on 02/06/2025 with a mild improvement in the AST and ALT. It does not appear that he has seen GI yet.    He has been feeling better since being home. He has not followed up with the gastroenterologist yet because he does not think his insurance is going to cover it. He denies any abdominal pain, but every now and then he feels a twinge of something. He has diarrhea sporadically. His bowel movements are quite a bit different than what they were before.    The increased dose of Celexa did not help him yet. He was having trouble getting up and was having a lot of sleeping troubles. He was tired during the day. He slept 2 to 4 hours at night. He would be up an hour and then go back for another 2 hours. He thinks it is part of getting older. He thinks there is a lot of depression going on. He has never been on any other medication.    He is taking NPH 10 in the morning and 5 in the evening. His blood sugars are doing pretty good. He is eating a lot better.    He has been checking his blood pressure at home. It is usually in the 130s to  "140s.     Supplemental Information  He fell the other day and bruised a rib.    The following portions of the patient's history were reviewed and updated as appropriate: allergies, current medications, past family history, past medical history, past social history, past surgical history and problem list.        Review of Systems   Constitutional:  Positive for fatigue. Negative for fever and unexpected weight loss.   Eyes:  Negative for blurred vision, double vision, pain and visual disturbance.   Respiratory:  Negative for cough, chest tightness, shortness of breath and wheezing.    Cardiovascular:  Negative for chest pain, palpitations and leg swelling.   Gastrointestinal:  Positive for diarrhea. Negative for abdominal pain, constipation, nausea and vomiting.   Genitourinary:  Negative for difficulty urinating, frequency and urgency.   Musculoskeletal:  Negative for arthralgias and myalgias.   Skin:  Negative for color change and rash.   Neurological:  Negative for dizziness, weakness, light-headedness, headache and confusion.   Hematological:  Negative for adenopathy. Does not bruise/bleed easily.   Psychiatric/Behavioral:  Positive for sleep disturbance and depressed mood. The patient is nervous/anxious.            Outpatient Medications Marked as Taking for the 3/19/24 encounter (Office Visit) with Sita Chase APRN   Medication Sig Dispense Refill    amLODIPine (NORVASC) 10 MG tablet Take 1 tablet by mouth Daily. 30 tablet 0    busPIRone (BUSPAR) 10 MG tablet Take 1 tablet by mouth 2 (Two) Times a Day. 30 tablet 0    citalopram (CeleXA) 20 MG tablet Take 1 tablet by mouth Daily. 30 tablet 0    glipizide (GLUCOTROL) 5 MG tablet Take 1 tablet by mouth Every Morning. 30 tablet 0    glucose blood test strip Use as instructed 50 each 12    insulin NPH (NovoLIN N ReliOn) 100 UNIT/ML injection INJECT 10 UNITS WITH MORNING MEAL AND 5 UNITS WITH EVENING MEAL 10 mL 3    Insulin Syringe-Needle U-100 28G X 5/16\" 1 ML " misc Use 1 Device 2 (Two) Times a Day. 300 each 3    Lancets (accu-chek soft touch) lancets Used to test blood sugar for Diabetes E11.65 test up to twice a day 100 each 12    lisinopril (PRINIVIL,ZESTRIL) 40 MG tablet Take 1 tablet by mouth Daily. 30 tablet 0    metFORMIN ER (GLUCOPHAGE-XR) 750 MG 24 hr tablet Take 2 tablets by mouth Daily With Breakfast for 30 days. 60 tablet 0    omeprazole (priLOSEC) 20 MG capsule Take 1 capsule by mouth Daily. 30 capsule 0    [DISCONTINUED] insulin NPH (NovoLIN N ReliOn) 100 UNIT/ML injection INJECT 10 UNITS WITH MORNING MEAL AND 5 UNITS WITH EVENING MEAL 10 mL 0     Allergies   Allergen Reactions    Shellfish-Derived Products Anaphylaxis    Codeine Nausea Only     Not sure of reaction but thinks it is just nausea           Objective   Physical Exam  Vitals and nursing note reviewed.   Constitutional:       Appearance: Normal appearance. He is well-developed.   HENT:      Head: Normocephalic and atraumatic.   Eyes:      Conjunctiva/sclera: Conjunctivae normal.   Cardiovascular:      Rate and Rhythm: Normal rate and regular rhythm.      Heart sounds: Normal heart sounds.   Pulmonary:      Effort: Pulmonary effort is normal. No respiratory distress.      Breath sounds: Normal breath sounds.   Abdominal:      General: Bowel sounds are normal. There is no distension.      Palpations: Abdomen is soft.      Tenderness: There is no abdominal tenderness.   Musculoskeletal:      Cervical back: Normal range of motion.   Skin:     General: Skin is warm and dry.   Neurological:      Mental Status: He is alert and oriented to person, place, and time.   Psychiatric:         Speech: Speech normal.         Behavior: Behavior normal.         Thought Content: Thought content normal.         Judgment: Judgment normal.         Vitals:    03/19/24 1029   BP: 148/86   BP Location: Right arm   Patient Position: Sitting   Cuff Size: Adult   Pulse: 88   Resp: 18   Temp: 97.5 °F (36.4 °C)   TempSrc:  "Infrared   SpO2: 96%   Weight: 91.3 kg (201 lb 3.2 oz)   Height: 165.1 cm (65\")     Body mass index is 33.48 kg/m².  Wt Readings from Last 3 Encounters:   03/19/24 91.3 kg (201 lb 3.2 oz)   02/06/24 89.8 kg (198 lb)   02/01/24 89.8 kg (198 lb)             Assessment & Plan   Diagnoses and all orders for this visit:    1. Anxiety (Primary)  -     buPROPion XL (Wellbutrin XL) 150 MG 24 hr tablet; Take 1 tablet by mouth Daily.  Dispense: 30 tablet; Refill: 3    2. Recurrent major depressive disorder, in partial remission  -     buPROPion XL (Wellbutrin XL) 150 MG 24 hr tablet; Take 1 tablet by mouth Daily.  Dispense: 30 tablet; Refill: 3    3. Elevated LFTs  -     Hepatic Function Panel; Future    4. Type 2 diabetes mellitus without complication, without long-term current use of insulin  -     insulin NPH (NovoLIN N ReliOn) 100 UNIT/ML injection; INJECT 10 UNITS WITH MORNING MEAL AND 5 UNITS WITH EVENING MEAL  Dispense: 10 mL; Refill: 3      1. Anxiety and depression.  This is uncontrolled. He will continue Celexa, BuSpar, and add Wellbutrin. Recheck in 4 weeks.     2. Elevated LFTs.  He has not seen GI yet. He is concerned about costs with this. We will plan to recheck his liver function tests today and if elevated, we will refer to GI. We can go outside of [facility] [place] if we need to find someone that takes his insurance.    3. Diabetes.  This is stable. He will continue current medications. I refilled insulin today.    4.HTN  High in office, better at home. Cont current Rx. Monitor closetly      Follow-up  The patient will follow up in 4 weeks.          Return in about 4 weeks (around 4/16/2024) for Recheck.    I discussed my findings,recommendations, and plan of care was with the patient. They verbalized understanding and agreement.  Patient was encouraged to keep me informed of any acute changes, lack of improvement, or any new concerning symptoms.       Transcribed from ambient dictation for JOANA Lai by " Taisha Formna.  03/19/24   11:29 EDT    Patient or patient representative verbalized consent to the visit recording.  I have personally performed the services described in this document as transcribed by the above individual, and it is both accurate and complete.

## 2024-04-03 DIAGNOSIS — E11.9 TYPE 2 DIABETES MELLITUS WITHOUT COMPLICATION, WITHOUT LONG-TERM CURRENT USE OF INSULIN: ICD-10-CM

## 2024-04-03 DIAGNOSIS — I10 ESSENTIAL HYPERTENSION: ICD-10-CM

## 2024-04-03 DIAGNOSIS — F41.9 ANXIETY: ICD-10-CM

## 2024-04-03 DIAGNOSIS — K21.9 CHRONIC GERD: ICD-10-CM

## 2024-04-03 RX ORDER — AMLODIPINE BESYLATE 10 MG/1
10 TABLET ORAL DAILY
Qty: 30 TABLET | Refills: 0 | Status: SHIPPED | OUTPATIENT
Start: 2024-04-03

## 2024-04-03 NOTE — TELEPHONE ENCOUNTER
Rx Refill Note  Requested Prescriptions     Pending Prescriptions Disp Refills    amLODIPine (NORVASC) 10 MG tablet [Pharmacy Med Name: amLODIPine Besylate 10 MG Oral Tablet] 30 tablet 0     Sig: Take 1 tablet by mouth once daily      Last office visit with prescribing clinician: 3/19/2024   Last telemedicine visit with prescribing clinician: Visit date not found   Next office visit with prescribing clinician: 4/16/2024     Lia Heath MA  04/03/24, 17:24 EDT

## 2024-04-04 RX ORDER — LISINOPRIL 40 MG/1
40 TABLET ORAL DAILY
Qty: 30 TABLET | Refills: 0 | Status: SHIPPED | OUTPATIENT
Start: 2024-04-04

## 2024-04-04 RX ORDER — GLIPIZIDE 5 MG/1
5 TABLET ORAL EVERY MORNING
Qty: 30 TABLET | Refills: 0 | Status: SHIPPED | OUTPATIENT
Start: 2024-04-04

## 2024-04-04 RX ORDER — METFORMIN HYDROCHLORIDE 750 MG/1
1500 TABLET, EXTENDED RELEASE ORAL
Qty: 60 TABLET | Refills: 0 | Status: SHIPPED | OUTPATIENT
Start: 2024-04-04

## 2024-04-04 RX ORDER — BUSPIRONE HYDROCHLORIDE 10 MG/1
10 TABLET ORAL 2 TIMES DAILY
Qty: 30 TABLET | Refills: 0 | Status: SHIPPED | OUTPATIENT
Start: 2024-04-04

## 2024-04-04 RX ORDER — OMEPRAZOLE 20 MG/1
20 CAPSULE, DELAYED RELEASE ORAL DAILY
Qty: 30 CAPSULE | Refills: 0 | Status: SHIPPED | OUTPATIENT
Start: 2024-04-04

## 2024-04-04 NOTE — TELEPHONE ENCOUNTER
Rx Refill Note  Requested Prescriptions     Pending Prescriptions Disp Refills    metFORMIN ER (GLUCOPHAGE-XR) 750 MG 24 hr tablet [Pharmacy Med Name: metFORMIN HCl  MG Oral Tablet Extended Release 24 Hour] 60 tablet 0     Sig: TAKE 2 TABLETS BY MOUTH ONCE DAILY WITH BREAKFAST    omeprazole (priLOSEC) 20 MG capsule [Pharmacy Med Name: Omeprazole 20 MG Oral Capsule Delayed Release] 30 capsule 0     Sig: Take 1 capsule by mouth once daily    glipizide (GLUCOTROL) 5 MG tablet [Pharmacy Med Name: glipiZIDE 5 MG Oral Tablet] 30 tablet 0     Sig: TAKE 1 TABLET BY MOUTH ONCE DAILY IN THE MORNING    lisinopril (PRINIVIL,ZESTRIL) 40 MG tablet [Pharmacy Med Name: Lisinopril 40 MG Oral Tablet] 30 tablet 0     Sig: Take 1 tablet by mouth once daily    busPIRone (BUSPAR) 10 MG tablet [Pharmacy Med Name: busPIRone HCl 10 MG Oral Tablet] 30 tablet 0     Sig: Take 1 tablet by mouth twice daily      Last office visit with prescribing clinician: 3/19/2024   Last telemedicine visit with prescribing clinician: Visit date not found   Next office visit with prescribing clinician: 4/3/2024     Lia Heath MA  04/04/24, 16:33 EDT

## 2024-05-07 DIAGNOSIS — F41.9 ANXIETY: ICD-10-CM

## 2024-05-07 DIAGNOSIS — K21.9 CHRONIC GERD: ICD-10-CM

## 2024-05-07 DIAGNOSIS — I10 ESSENTIAL HYPERTENSION: ICD-10-CM

## 2024-05-07 DIAGNOSIS — E11.9 TYPE 2 DIABETES MELLITUS WITHOUT COMPLICATION, WITHOUT LONG-TERM CURRENT USE OF INSULIN: ICD-10-CM

## 2024-05-09 RX ORDER — METFORMIN HYDROCHLORIDE 750 MG/1
1500 TABLET, EXTENDED RELEASE ORAL
Qty: 60 TABLET | Refills: 1 | Status: SHIPPED | OUTPATIENT
Start: 2024-05-09

## 2024-05-09 RX ORDER — CITALOPRAM 20 MG/1
20 TABLET ORAL DAILY
Qty: 30 TABLET | Refills: 1 | Status: SHIPPED | OUTPATIENT
Start: 2024-05-09

## 2024-05-09 RX ORDER — GLIPIZIDE 5 MG/1
5 TABLET ORAL EVERY MORNING
Qty: 30 TABLET | Refills: 1 | Status: SHIPPED | OUTPATIENT
Start: 2024-05-09

## 2024-05-09 RX ORDER — AMLODIPINE BESYLATE 10 MG/1
10 TABLET ORAL DAILY
Qty: 30 TABLET | Refills: 1 | Status: SHIPPED | OUTPATIENT
Start: 2024-05-09

## 2024-05-09 RX ORDER — OMEPRAZOLE 20 MG/1
20 CAPSULE, DELAYED RELEASE ORAL DAILY
Qty: 30 CAPSULE | Refills: 1 | Status: SHIPPED | OUTPATIENT
Start: 2024-05-09

## 2024-05-09 RX ORDER — LISINOPRIL 40 MG/1
40 TABLET ORAL DAILY
Qty: 30 TABLET | Refills: 1 | Status: SHIPPED | OUTPATIENT
Start: 2024-05-09

## 2024-05-09 RX ORDER — BUSPIRONE HYDROCHLORIDE 10 MG/1
10 TABLET ORAL 2 TIMES DAILY
Qty: 60 TABLET | Refills: 1 | Status: SHIPPED | OUTPATIENT
Start: 2024-05-09

## 2024-07-11 DIAGNOSIS — E11.9 TYPE 2 DIABETES MELLITUS WITHOUT COMPLICATION, WITHOUT LONG-TERM CURRENT USE OF INSULIN: ICD-10-CM

## 2024-07-11 DIAGNOSIS — I10 ESSENTIAL HYPERTENSION: ICD-10-CM

## 2024-07-11 RX ORDER — METFORMIN HYDROCHLORIDE 750 MG/1
1500 TABLET, EXTENDED RELEASE ORAL
Qty: 60 TABLET | Refills: 0 | Status: SHIPPED | OUTPATIENT
Start: 2024-07-11

## 2024-07-11 RX ORDER — AMLODIPINE BESYLATE 10 MG/1
10 TABLET ORAL DAILY
Qty: 30 TABLET | Refills: 0 | Status: SHIPPED | OUTPATIENT
Start: 2024-07-11

## 2024-07-11 RX ORDER — LISINOPRIL 40 MG/1
40 TABLET ORAL DAILY
Qty: 30 TABLET | Refills: 0 | Status: SHIPPED | OUTPATIENT
Start: 2024-07-11

## 2024-07-11 RX ORDER — GLIPIZIDE 5 MG/1
5 TABLET ORAL EVERY MORNING
Qty: 30 TABLET | Refills: 0 | Status: SHIPPED | OUTPATIENT
Start: 2024-07-11

## 2024-07-11 NOTE — TELEPHONE ENCOUNTER
Rx Refill Note  Requested Prescriptions     Pending Prescriptions Disp Refills    metFORMIN ER (GLUCOPHAGE-XR) 750 MG 24 hr tablet [Pharmacy Med Name: metFORMIN HCl  MG Oral Tablet Extended Release 24 Hour] 60 tablet 0     Sig: TAKE 2 TABLETS BY MOUTH ONCE DAILY WITH BREAKFAST    lisinopril (PRINIVIL,ZESTRIL) 40 MG tablet [Pharmacy Med Name: Lisinopril 40 MG Oral Tablet] 30 tablet 0     Sig: Take 1 tablet by mouth once daily    glipizide (GLUCOTROL) 5 MG tablet [Pharmacy Med Name: glipiZIDE 5 MG Oral Tablet] 30 tablet 0     Sig: TAKE 1 TABLET BY MOUTH ONCE DAILY IN THE MORNING    amLODIPine (NORVASC) 10 MG tablet [Pharmacy Med Name: amLODIPine Besylate 10 MG Oral Tablet] 30 tablet 0     Sig: Take 1 tablet by mouth once daily      Last office visit with prescribing clinician: 3/19/2024   Last telemedicine visit with prescribing clinician: Visit date not found   Next office visit with prescribing clinician: 7/30/2024       Abril Hall MA  07/11/24, 14:01 EDT

## 2024-07-30 ENCOUNTER — TELEMEDICINE (OUTPATIENT)
Dept: INTERNAL MEDICINE | Facility: CLINIC | Age: 59
End: 2024-07-30
Payer: COMMERCIAL

## 2024-07-30 DIAGNOSIS — R53.83 FATIGUE, UNSPECIFIED TYPE: ICD-10-CM

## 2024-07-30 DIAGNOSIS — F33.41 RECURRENT MAJOR DEPRESSIVE DISORDER, IN PARTIAL REMISSION: ICD-10-CM

## 2024-07-30 DIAGNOSIS — E78.00 PURE HYPERCHOLESTEROLEMIA: ICD-10-CM

## 2024-07-30 DIAGNOSIS — K21.9 CHRONIC GERD: ICD-10-CM

## 2024-07-30 DIAGNOSIS — Z90.49 STATUS POST CHOLECYSTECTOMY: ICD-10-CM

## 2024-07-30 DIAGNOSIS — Z79.4 TYPE 2 DIABETES MELLITUS WITH HYPERGLYCEMIA, WITH LONG-TERM CURRENT USE OF INSULIN: Primary | ICD-10-CM

## 2024-07-30 DIAGNOSIS — F41.9 ANXIETY: ICD-10-CM

## 2024-07-30 DIAGNOSIS — Z91.89 AT RISK FOR SLEEP APNEA: ICD-10-CM

## 2024-07-30 DIAGNOSIS — E11.65 TYPE 2 DIABETES MELLITUS WITH HYPERGLYCEMIA, WITH LONG-TERM CURRENT USE OF INSULIN: Primary | ICD-10-CM

## 2024-07-30 DIAGNOSIS — I10 ESSENTIAL HYPERTENSION: ICD-10-CM

## 2024-07-30 PROCEDURE — 99214 OFFICE O/P EST MOD 30 MIN: CPT | Performed by: NURSE PRACTITIONER

## 2024-07-30 RX ORDER — METFORMIN HYDROCHLORIDE 750 MG/1
1500 TABLET, EXTENDED RELEASE ORAL
Qty: 60 TABLET | Refills: 0 | Status: SHIPPED | OUTPATIENT
Start: 2024-07-30

## 2024-07-30 RX ORDER — LISINOPRIL 40 MG/1
40 TABLET ORAL DAILY
Qty: 30 TABLET | Refills: 0 | Status: SHIPPED | OUTPATIENT
Start: 2024-07-30

## 2024-07-30 RX ORDER — CITALOPRAM 20 MG/1
20 TABLET ORAL DAILY
Qty: 30 TABLET | Refills: 1 | Status: SHIPPED | OUTPATIENT
Start: 2024-07-30

## 2024-07-30 RX ORDER — BUPROPION HYDROCHLORIDE 300 MG/1
300 TABLET ORAL DAILY
Qty: 30 TABLET | Refills: 6 | Status: SHIPPED | OUTPATIENT
Start: 2024-07-30

## 2024-07-30 RX ORDER — LANOLIN ALCOHOL/MO/W.PET/CERES
1000 CREAM (GRAM) TOPICAL DAILY
COMMUNITY

## 2024-07-30 RX ORDER — COLESTIPOL HYDROCHLORIDE 5 G/5G
5 GRANULE, FOR SUSPENSION ORAL 2 TIMES DAILY
Qty: 300 G | Refills: 6 | Status: SHIPPED | OUTPATIENT
Start: 2024-07-30

## 2024-07-30 RX ORDER — ATORVASTATIN CALCIUM 10 MG/1
10 TABLET, FILM COATED ORAL DAILY
Qty: 30 TABLET | Refills: 0 | Status: SHIPPED | OUTPATIENT
Start: 2024-07-30

## 2024-07-30 RX ORDER — AMLODIPINE BESYLATE 10 MG/1
10 TABLET ORAL DAILY
Qty: 30 TABLET | Refills: 0 | Status: SHIPPED | OUTPATIENT
Start: 2024-07-30

## 2024-07-30 RX ORDER — HUMAN INSULIN 100 [IU]/ML
INJECTION, SUSPENSION SUBCUTANEOUS
Qty: 10 ML | Refills: 3 | Status: SHIPPED | OUTPATIENT
Start: 2024-07-30

## 2024-07-30 RX ORDER — GLIPIZIDE 5 MG/1
5 TABLET ORAL EVERY MORNING
Qty: 30 TABLET | Refills: 0 | Status: SHIPPED | OUTPATIENT
Start: 2024-07-30

## 2024-07-30 NOTE — PROGRESS NOTES
This was an audio and video enabled visit.   This provider is located at their home address in [place] using a secure [employer] Video Visit through [employer]. Malcolm  is being seen remotely via telehealth  in [place]. Pt provided a secure environment for this session.  Malcolm may be asked to present for in office testing and/or evaluation if felt to be unsafe for telemedicine.    The provider identified herself /credentials as appropriate.   By proceeding with the telehealth visit, the patient consents to be seen remotely which allows for patient identifiable information to be sent to a third party as needed. The patient may refuse to be seen remotely at any time. The electronic data is encrypted and password protected, and the patient is advised of the potential risks to privacy not withstanding such measures.    You have chosen to receive care through a telehealth visit. Do you consent to use a video/audio connection for your medical care today? Yes      Subjective   Malcolm Costa is a 59 y.o. male.     Chief Complaint   Patient presents with    Depression    Diabetes    Hyperlipidemia    Hypertension    Anxiety    Fatigue       History of Present Illness     History of Present Illness  The patient is a 59-year-old male who is being seen today in follow-up for type 2 diabetes, anxiety, depression, hypertension, chronic GERD, and hyperlipidemia.    The patient reports experiencing significant fatigue, the cause of which is unknown to him. He continues to take Wellbutrin 150 mg, which he believes has provided some relief for his depression. Despite his efforts to find a job, he expresses concern about his inability to do so. Previously, he was only able to work three hours daily, which resulted in exhaustion. He also reports mild depression. He denies experiencing chest pain, shortness of breath, or heart palpitations. He continues to experience diarrhea, a condition he has experienced since his gallbladder removal, for  which he takes Imodium. He denies the presence of blood or mucus in his stool, nausea, vomiting, or heartburn. He monitors his blood sugar at home, which typically ranges from 85 to 140. Blood sugar was 85 this morning.His current medication regimen includes atorvastatin, buspirone, lisinopril, omeprazole, amlodipine, Celexa, glipizide, NPH insulin, and metformin.  He believes he has lost some weight, as his clothes are fitting looser. He sleeps for approximately three hours per night, wakes up, and then returns to sleep. He denies difficulty returning to sleep. He has been walking 1 to 3 miles daily for the past two weeks. He takes a daily B12 1000 mcg supplement. He has not been tested for sleep apnea. He believes he snores and falls asleep easily after meals.    Results        Lab Results   Component Value Date    WBC 4.14 02/04/2024    HGB 12.0 (L) 02/04/2024    HCT 35.1 (L) 02/04/2024    MCV 89.5 02/04/2024     02/04/2024     Lab Results   Component Value Date    GLUCOSE 193 (H) 02/04/2024    BUN 11 02/04/2024    CREATININE 0.73 (L) 02/04/2024    EGFR 104.8 02/04/2024    BCR 15.1 02/04/2024    K 3.4 (L) 02/04/2024    CO2 20.0 (L) 02/04/2024    CALCIUM 8.0 (L) 02/04/2024    ALBUMIN 4.6 03/19/2024    BILITOT 0.5 03/19/2024    AST 19 03/19/2024    ALT 29 03/19/2024     Lab Results   Component Value Date    CHOL 178 04/21/2022    CHLPL 192 11/03/2015    TRIG 268 (H) 04/21/2022    HDL 39 (L) 04/21/2022    LDL 94 04/21/2022     Lab Results   Component Value Date    TSH 1.190 03/23/2021     A1C Last 3 Results          2/1/2024    06:20   HGBA1C Last 3 Results   Hemoglobin A1C 11.60          The following portions of the patient's history were reviewed and updated as appropriate: allergies, current medications, past family history, past medical history, past social history, past surgical history and problem list.          Outpatient Medications Marked as Taking for the 7/30/24 encounter (Telemedicine) with  "Salvatore Sita F, APRN   Medication Sig Dispense Refill    amLODIPine (NORVASC) 10 MG tablet Take 1 tablet by mouth Daily. 30 tablet 0    atorvastatin (LIPITOR) 10 MG tablet Take 1 tablet by mouth Daily. 30 tablet 0    buPROPion XL (Wellbutrin XL) 300 MG 24 hr tablet Take 1 tablet by mouth Daily. 30 tablet 6    citalopram (CeleXA) 20 MG tablet Take 1 tablet by mouth Daily. 30 tablet 1    Continuous Blood Gluc  (FreeStyle Dagoberto 2 [place]) device 1 Device Take As Directed. 1 each 0    Continuous Blood Gluc Sensor (FreeStyle Dagoberto 2 Sensor) misc Use 1 Device Every 14 (Fourteen) Days. 2 each 11    glipizide (GLUCOTROL) 5 MG tablet Take 1 tablet by mouth Every Morning. 30 tablet 0    glucose blood test strip Use as instructed 50 each 12    insulin NPH (NovoLIN N ReliOn) 100 UNIT/ML injection INJECT 10 UNITS WITH MORNING MEAL AND 5 UNITS WITH EVENING MEAL 10 mL 3    Insulin Syringe-Needle U-100 28G X 5/16\" 1 ML misc Use 1 Device 2 (Two) Times a Day. 300 each 3    Lancets (accu-chek soft touch) lancets Used to test blood sugar for Diabetes E11.65 test up to twice a day 100 each 12    lisinopril (PRINIVIL,ZESTRIL) 40 MG tablet Take 1 tablet by mouth Daily. 30 tablet 0    metFORMIN ER (GLUCOPHAGE-XR) 750 MG 24 hr tablet Take 2 tablets by mouth Daily With Breakfast. 60 tablet 0    omeprazole (priLOSEC) 20 MG capsule Take 1 capsule by mouth once daily 30 capsule 1    vitamin B-12 (CYANOCOBALAMIN) 1000 MCG tablet Take 1 tablet by mouth Daily.      [DISCONTINUED] amLODIPine (NORVASC) 10 MG tablet Take 1 tablet by mouth once daily 30 tablet 0    [DISCONTINUED] atorvastatin (LIPITOR) 10 MG tablet Take 1 tablet by mouth Daily. 30 tablet 0    [DISCONTINUED] buPROPion XL (Wellbutrin XL) 150 MG 24 hr tablet Take 1 tablet by mouth Daily. 30 tablet 3    [DISCONTINUED] citalopram (CeleXA) 20 MG tablet Take 1 tablet by mouth Daily. 30 tablet 1    [DISCONTINUED] glipizide (GLUCOTROL) 5 MG tablet TAKE 1 TABLET BY MOUTH ONCE DAILY IN " "THE MORNING 30 tablet 0    [DISCONTINUED] insulin NPH (NovoLIN N ReliOn) 100 UNIT/ML injection INJECT 10 UNITS WITH MORNING MEAL AND 5 UNITS WITH EVENING MEAL 10 mL 3    [DISCONTINUED] Insulin Syringe-Needle U-100 28G X 5/16\" 1 ML misc Use 1 Device 2 (Two) Times a Day. 300 each 3    [DISCONTINUED] lisinopril (PRINIVIL,ZESTRIL) 40 MG tablet Take 1 tablet by mouth once daily 30 tablet 0    [DISCONTINUED] metFORMIN ER (GLUCOPHAGE-XR) 750 MG 24 hr tablet TAKE 2 TABLETS BY MOUTH ONCE DAILY WITH BREAKFAST 60 tablet 0     Allergies   Allergen Reactions    Shellfish-Derived Products Anaphylaxis    Codeine Nausea Only     Not sure of reaction but thinks it is just nausea           Objective     Physical Exam   Constitutional: He is oriented to person, place, and time. He appears well-developed and well-nourished. No distress.   HENT:   Head: Normocephalic and atraumatic.   Right Ear: External ear normal.   Left Ear: External ear normal.   Mouth/Throat: Oropharynx is clear and moist.   Eyes: Right eye exhibits no discharge. Left eye exhibits no discharge. No scleral icterus.   Neck: Neck normal appearance.  Pulmonary/Chest: Effort normal. No accessory muscle usage.  No respiratory distress.No use of oxygen by nasal cannula  Abdominal: Abdomen appears normal.   Neurological: He is alert and oriented to person, place, and time.   Skin: Skin is dry. He is not diaphoretic. No erythema. No pallor.   Psychiatric: He has a normal mood and affect. His speech is normal and behavior is normal. Judgment normal. He is attentive.         There were no vitals filed for this visit.  There is no height or weight on file to calculate BMI.        Assessment & Plan     Diagnoses and all orders for this visit:    1. Type 2 diabetes mellitus with hyperglycemia, with long-term current use of insulin (Primary)  -     MicroAlbumin, Urine, Random - Urine, Clean Catch; Future  -     glipizide (GLUCOTROL) 5 MG tablet; Take 1 tablet by mouth Every " "Morning.  Dispense: 30 tablet; Refill: 0  -     insulin NPH (NovoLIN N ReliOn) 100 UNIT/ML injection; INJECT 10 UNITS WITH MORNING MEAL AND 5 UNITS WITH EVENING MEAL  Dispense: 10 mL; Refill: 3  -     metFORMIN ER (GLUCOPHAGE-XR) 750 MG 24 hr tablet; Take 2 tablets by mouth Daily With Breakfast.  Dispense: 60 tablet; Refill: 0  -     lisinopril (PRINIVIL,ZESTRIL) 40 MG tablet; Take 1 tablet by mouth Daily.  Dispense: 30 tablet; Refill: 0  -     atorvastatin (LIPITOR) 10 MG tablet; Take 1 tablet by mouth Daily.  Dispense: 30 tablet; Refill: 0  -     Insulin Syringe-Needle U-100 28G X 5/16\" 1 ML misc; Use 1 Device 2 (Two) Times a Day.  Dispense: 300 each; Refill: 3  -     CBC (No Diff); Future  -     Comprehensive Metabolic Panel; Future  -     Lipid Panel; Future  -     Hemoglobin A1c; Future  -     Iron Profile; Future  -     Ferritin; Future  -     Vitamin B12; Future  -     Vitamin D 25 hydroxy; Future    2. Essential hypertension  -     lisinopril (PRINIVIL,ZESTRIL) 40 MG tablet; Take 1 tablet by mouth Daily.  Dispense: 30 tablet; Refill: 0  -     amLODIPine (NORVASC) 10 MG tablet; Take 1 tablet by mouth Daily.  Dispense: 30 tablet; Refill: 0  -     CBC (No Diff); Future  -     Comprehensive Metabolic Panel; Future  -     Lipid Panel; Future  -     Hemoglobin A1c; Future  -     Iron Profile; Future  -     Ferritin; Future  -     Vitamin B12; Future  -     Vitamin D 25 hydroxy; Future    3. Chronic GERD  -     CBC (No Diff); Future  -     Comprehensive Metabolic Panel; Future  -     Lipid Panel; Future  -     Hemoglobin A1c; Future  -     Iron Profile; Future  -     Ferritin; Future  -     Vitamin B12; Future  -     Vitamin D 25 hydroxy; Future    4. Anxiety  -     buPROPion XL (Wellbutrin XL) 300 MG 24 hr tablet; Take 1 tablet by mouth Daily.  Dispense: 30 tablet; Refill: 6  -     CBC (No Diff); Future  -     Comprehensive Metabolic Panel; Future  -     Lipid Panel; Future  -     Hemoglobin A1c; Future  -     " Iron Profile; Future  -     Ferritin; Future  -     Vitamin B12; Future  -     Vitamin D 25 hydroxy; Future    5. Recurrent major depressive disorder, in partial remission  -     buPROPion XL (Wellbutrin XL) 300 MG 24 hr tablet; Take 1 tablet by mouth Daily.  Dispense: 30 tablet; Refill: 6  -     citalopram (CeleXA) 20 MG tablet; Take 1 tablet by mouth Daily.  Dispense: 30 tablet; Refill: 1  -     CBC (No Diff); Future  -     Comprehensive Metabolic Panel; Future  -     Lipid Panel; Future  -     Hemoglobin A1c; Future  -     Iron Profile; Future  -     Ferritin; Future  -     Vitamin B12; Future  -     Vitamin D 25 hydroxy; Future    6. Pure hypercholesterolemia  -     atorvastatin (LIPITOR) 10 MG tablet; Take 1 tablet by mouth Daily.  Dispense: 30 tablet; Refill: 0  -     CBC (No Diff); Future  -     Comprehensive Metabolic Panel; Future  -     Lipid Panel; Future  -     Hemoglobin A1c; Future  -     Iron Profile; Future  -     Ferritin; Future  -     Vitamin B12; Future  -     Vitamin D 25 hydroxy; Future    7. Status post cholecystectomy-diarrhea  -     colestipol (COLESTID) 5 g granules; Take 5 g by mouth 2 (Two) Times a Day.  Dispense: 300 g; Refill: 6  -     CBC (No Diff); Future  -     Comprehensive Metabolic Panel; Future  -     Lipid Panel; Future  -     Hemoglobin A1c; Future  -     Iron Profile; Future  -     Ferritin; Future  -     Vitamin B12; Future  -     Vitamin D 25 hydroxy; Future    8. Fatigue, unspecified type  -     CBC (No Diff); Future  -     Comprehensive Metabolic Panel; Future  -     Lipid Panel; Future  -     Hemoglobin A1c; Future  -     Iron Profile; Future  -     Ferritin; Future  -     Vitamin B12; Future  -     Vitamin D 25 hydroxy; Future    9. At risk for sleep apnea  -     Ambulatory Referral to Sleep Medicine  -     CBC (No Diff); Future  -     Comprehensive Metabolic Panel; Future  -     Lipid Panel; Future  -     Hemoglobin A1c; Future  -     Iron Profile; Future  -      Ferritin; Future  -     Vitamin B12; Future  -     Vitamin D 25 hydroxy; Future        Assessment & Plan  1. Anxiety and depression.  The dosage of Wellbutrin will be escalated to 300 mg.    2. Diarrhea.  Colestipol will be added to his regimen.    3. Type 2 diabetes.  A urine microalbumin test will be ordered. Refills for his syringes will be provided.   Cont current meds  Check labs    4. Hypertension.  Pt asked to [facility] for BP check when he has labs drawn.     5. GERD   Stable. Cont PPI    6. Hyperlipidemia   Recheck lipids.   Cont statin     7. Fatigue   Check labs   Refer for sleep apnea eval .     Follow-up  A follow-up appointment is scheduled for 3 months from now, or earlier if necessary.           Return in about 3 months (around 10/30/2024) for chronic condition follow up, and needs to return for labs within 1-2 weeks.    I have reviewed the limitations of a telehealth visit (such as lack of a physical exam and unable to obtain vital signs) and advised the patient that they may need to follow up for an office visit should their symptoms or concerns persist, worsen, or change.  Patient was encouraged to keep me informed of any acute changes, lack of improvement, or any new concerning symptoms.   I discussed my findings,recommendations, and plan of care was with the patient. They verbalized understanding and agreement.    Patient or patient representative verbalized consent for the use of Ambient Listening during the visit with  JOANA Lai for chart documentation. 7/30/2024  14:02 EDT  Answers submitted by the patient for this visit:  Diabetes Questionnaire (Submitted on 7/30/2024)  Treatment compliance: all of the time  Symptom course: improving  Home blood tests: 3-4 x per day  High score: 130-140  Below 70: occasionally  foot paresthesias: No  foot ulcerations: No  headaches: No  sweats: No  Dose schedule: pre-breakfast  Given by: patient  Injection sites: abdominal  wall  Current diet: generally healthy  Exercise: three times a week  Eye exam current: No  Sees podiatrist: No  High Blood Pressure Questionnaire (Submitted on 7/30/2024)  headaches: No  Condition status: controlled  anxiety: No  peripheral edema: No  Agents associated with hypertension: no associated agents  Compliance problems: no compliance problems

## 2024-07-31 ENCOUNTER — LAB (OUTPATIENT)
Dept: INTERNAL MEDICINE | Facility: CLINIC | Age: 59
End: 2024-07-31
Payer: COMMERCIAL

## 2024-07-31 VITALS — SYSTOLIC BLOOD PRESSURE: 128 MMHG | HEART RATE: 92 BPM | DIASTOLIC BLOOD PRESSURE: 72 MMHG

## 2024-07-31 DIAGNOSIS — F33.41 RECURRENT MAJOR DEPRESSIVE DISORDER, IN PARTIAL REMISSION: ICD-10-CM

## 2024-07-31 DIAGNOSIS — E78.00 PURE HYPERCHOLESTEROLEMIA: ICD-10-CM

## 2024-07-31 DIAGNOSIS — E11.65 TYPE 2 DIABETES MELLITUS WITH HYPERGLYCEMIA, WITH LONG-TERM CURRENT USE OF INSULIN: ICD-10-CM

## 2024-07-31 DIAGNOSIS — I10 ESSENTIAL HYPERTENSION: ICD-10-CM

## 2024-07-31 DIAGNOSIS — Z91.89 AT RISK FOR SLEEP APNEA: ICD-10-CM

## 2024-07-31 DIAGNOSIS — K21.9 CHRONIC GERD: ICD-10-CM

## 2024-07-31 DIAGNOSIS — F41.9 ANXIETY: ICD-10-CM

## 2024-07-31 DIAGNOSIS — R53.83 FATIGUE, UNSPECIFIED TYPE: ICD-10-CM

## 2024-07-31 DIAGNOSIS — Z79.4 TYPE 2 DIABETES MELLITUS WITH HYPERGLYCEMIA, WITH LONG-TERM CURRENT USE OF INSULIN: ICD-10-CM

## 2024-07-31 DIAGNOSIS — Z90.49 STATUS POST CHOLECYSTECTOMY: ICD-10-CM

## 2024-07-31 PROCEDURE — 85027 COMPLETE CBC AUTOMATED: CPT | Performed by: NURSE PRACTITIONER

## 2024-07-31 PROCEDURE — 80053 COMPREHEN METABOLIC PANEL: CPT | Performed by: NURSE PRACTITIONER

## 2024-07-31 PROCEDURE — 83036 HEMOGLOBIN GLYCOSYLATED A1C: CPT | Performed by: NURSE PRACTITIONER

## 2024-07-31 PROCEDURE — 82306 VITAMIN D 25 HYDROXY: CPT | Performed by: NURSE PRACTITIONER

## 2024-07-31 PROCEDURE — 83540 ASSAY OF IRON: CPT | Performed by: NURSE PRACTITIONER

## 2024-07-31 PROCEDURE — 84466 ASSAY OF TRANSFERRIN: CPT | Performed by: NURSE PRACTITIONER

## 2024-07-31 PROCEDURE — 82607 VITAMIN B-12: CPT | Performed by: NURSE PRACTITIONER

## 2024-07-31 PROCEDURE — 36415 COLL VENOUS BLD VENIPUNCTURE: CPT | Performed by: NURSE PRACTITIONER

## 2024-07-31 PROCEDURE — 82043 UR ALBUMIN QUANTITATIVE: CPT | Performed by: NURSE PRACTITIONER

## 2024-07-31 PROCEDURE — 82728 ASSAY OF FERRITIN: CPT | Performed by: NURSE PRACTITIONER

## 2024-07-31 PROCEDURE — 80061 LIPID PANEL: CPT | Performed by: NURSE PRACTITIONER

## 2024-08-01 LAB
25(OH)D3 SERPL-MCNC: 15.5 NG/ML (ref 30–100)
ALBUMIN SERPL-MCNC: 4.4 G/DL (ref 3.5–5.2)
ALBUMIN UR-MCNC: 55.1 MG/DL
ALBUMIN/GLOB SERPL: 1.4 G/DL
ALP SERPL-CCNC: 95 U/L (ref 39–117)
ALT SERPL W P-5'-P-CCNC: 32 U/L (ref 1–41)
ANION GAP SERPL CALCULATED.3IONS-SCNC: 13 MMOL/L (ref 5–15)
AST SERPL-CCNC: 27 U/L (ref 1–40)
BILIRUB SERPL-MCNC: 0.3 MG/DL (ref 0–1.2)
BUN SERPL-MCNC: 17 MG/DL (ref 6–20)
BUN/CREAT SERPL: 15.7 (ref 7–25)
CALCIUM SPEC-SCNC: 9.4 MG/DL (ref 8.6–10.5)
CHLORIDE SERPL-SCNC: 99 MMOL/L (ref 98–107)
CHOLEST SERPL-MCNC: 179 MG/DL (ref 0–200)
CO2 SERPL-SCNC: 21 MMOL/L (ref 22–29)
CREAT SERPL-MCNC: 1.08 MG/DL (ref 0.76–1.27)
DEPRECATED RDW RBC AUTO: 44 FL (ref 37–54)
EGFRCR SERPLBLD CKD-EPI 2021: 79.1 ML/MIN/1.73
ERYTHROCYTE [DISTWIDTH] IN BLOOD BY AUTOMATED COUNT: 13.3 % (ref 12.3–15.4)
FERRITIN SERPL-MCNC: 851 NG/ML (ref 30–400)
GLOBULIN UR ELPH-MCNC: 3.2 GM/DL
GLUCOSE SERPL-MCNC: 77 MG/DL (ref 65–99)
HBA1C MFR BLD: 6.1 % (ref 4.8–5.6)
HCT VFR BLD AUTO: 37.2 % (ref 37.5–51)
HDLC SERPL-MCNC: 39 MG/DL (ref 40–60)
HGB BLD-MCNC: 12.8 G/DL (ref 13–17.7)
IRON 24H UR-MRATE: 83 MCG/DL (ref 59–158)
IRON SATN MFR SERPL: 26 % (ref 20–50)
LDLC SERPL CALC-MCNC: 98 MG/DL (ref 0–100)
LDLC/HDLC SERPL: 2.31 {RATIO}
MCH RBC QN AUTO: 31.4 PG (ref 26.6–33)
MCHC RBC AUTO-ENTMCNC: 34.4 G/DL (ref 31.5–35.7)
MCV RBC AUTO: 91.4 FL (ref 79–97)
PLATELET # BLD AUTO: 178 10*3/MM3 (ref 140–450)
PMV BLD AUTO: 10.4 FL (ref 6–12)
POTASSIUM SERPL-SCNC: 4.7 MMOL/L (ref 3.5–5.2)
PROT SERPL-MCNC: 7.6 G/DL (ref 6–8.5)
RBC # BLD AUTO: 4.07 10*6/MM3 (ref 4.14–5.8)
SODIUM SERPL-SCNC: 133 MMOL/L (ref 136–145)
TIBC SERPL-MCNC: 322 MCG/DL (ref 298–536)
TRANSFERRIN SERPL-MCNC: 216 MG/DL (ref 200–360)
TRIGL SERPL-MCNC: 249 MG/DL (ref 0–150)
VIT B12 BLD-MCNC: 1908 PG/ML (ref 211–946)
VLDLC SERPL-MCNC: 42 MG/DL (ref 5–40)
WBC NRBC COR # BLD AUTO: 5.82 10*3/MM3 (ref 3.4–10.8)

## 2024-08-06 ENCOUNTER — PATIENT MESSAGE (OUTPATIENT)
Dept: INTERNAL MEDICINE | Facility: CLINIC | Age: 59
End: 2024-08-06
Payer: COMMERCIAL

## 2024-08-06 ENCOUNTER — TELEPHONE (OUTPATIENT)
Dept: INTERNAL MEDICINE | Facility: CLINIC | Age: 59
End: 2024-08-06
Payer: COMMERCIAL

## 2024-08-06 DIAGNOSIS — R79.89 HIGH SERUM FERRITIN: Primary | ICD-10-CM

## 2024-08-06 DIAGNOSIS — R19.7 DIARRHEA, UNSPECIFIED TYPE: ICD-10-CM

## 2024-08-06 NOTE — TELEPHONE ENCOUNTER
PATIENT HAS CALLED REQUESTING A CALL BACK TO ADDRESS SOME CONCERNS HE HAS IN REGARDS TO SOME LAB RESULTS FROM LAB WORK HE HAD ON 07/31/24.    CALL BACK NUMBER [phone]

## 2024-08-13 NOTE — TELEPHONE ENCOUNTER
Kristan Carlos MA 8/6/2024 1:31 PM EDT      ----- Message -----  From: Malcolm Costa  Sent: 8/6/2024 1:11 PM EDT  To: [facility] [address] Clinical Pool  Subject: Iron     Sita I am not taking an iron supplement

## 2024-08-15 ENCOUNTER — LAB (OUTPATIENT)
Dept: INTERNAL MEDICINE | Facility: CLINIC | Age: 59
End: 2024-08-15
Payer: COMMERCIAL

## 2024-08-15 DIAGNOSIS — R19.7 DIARRHEA, UNSPECIFIED TYPE: ICD-10-CM

## 2024-08-15 DIAGNOSIS — R79.89 HIGH SERUM FERRITIN: ICD-10-CM

## 2024-08-15 LAB
FERRITIN SERPL-MCNC: 963 NG/ML (ref 30–400)
IRON 24H UR-MRATE: 54 MCG/DL (ref 59–158)
IRON SATN MFR SERPL: 18 % (ref 20–50)
TIBC SERPL-MCNC: 292 MCG/DL (ref 298–536)
TRANSFERRIN SERPL-MCNC: 196 MG/DL (ref 200–360)

## 2024-08-15 PROCEDURE — 83540 ASSAY OF IRON: CPT | Performed by: NURSE PRACTITIONER

## 2024-08-15 PROCEDURE — 84466 ASSAY OF TRANSFERRIN: CPT | Performed by: NURSE PRACTITIONER

## 2024-08-15 PROCEDURE — 82728 ASSAY OF FERRITIN: CPT | Performed by: NURSE PRACTITIONER

## 2024-08-15 PROCEDURE — 36415 COLL VENOUS BLD VENIPUNCTURE: CPT | Performed by: NURSE PRACTITIONER

## 2024-08-19 LAB
C DIFF TOX GENS STL QL NAA+PROBE: NOT DETECTED
WBC STL QL MICRO: NORMAL

## 2024-08-19 PROCEDURE — 87427 SHIGA-LIKE TOXIN AG IA: CPT | Performed by: NURSE PRACTITIONER

## 2024-08-19 PROCEDURE — 87209 SMEAR COMPLEX STAIN: CPT | Performed by: NURSE PRACTITIONER

## 2024-08-19 PROCEDURE — 87045 FECES CULTURE AEROBIC BACT: CPT | Performed by: NURSE PRACTITIONER

## 2024-08-19 PROCEDURE — 87493 C DIFF AMPLIFIED PROBE: CPT | Performed by: NURSE PRACTITIONER

## 2024-08-19 PROCEDURE — 87046 STOOL CULTR AEROBIC BACT EA: CPT | Performed by: NURSE PRACTITIONER

## 2024-08-19 PROCEDURE — 87177 OVA AND PARASITES SMEARS: CPT | Performed by: NURSE PRACTITIONER

## 2024-08-19 PROCEDURE — 87205 SMEAR GRAM STAIN: CPT | Performed by: NURSE PRACTITIONER

## 2024-08-22 DIAGNOSIS — R79.89 HIGH SERUM FERRITIN: Primary | ICD-10-CM

## 2024-08-23 ENCOUNTER — LAB (OUTPATIENT)
Dept: INTERNAL MEDICINE | Facility: CLINIC | Age: 59
End: 2024-08-23
Payer: COMMERCIAL

## 2024-08-23 DIAGNOSIS — R79.89 HIGH SERUM FERRITIN: ICD-10-CM

## 2024-08-23 PROCEDURE — 81256 HFE GENE: CPT | Performed by: NURSE PRACTITIONER

## 2024-08-23 PROCEDURE — 36415 COLL VENOUS BLD VENIPUNCTURE: CPT | Performed by: NURSE PRACTITIONER

## 2024-08-24 LAB
BACTERIA SPEC CULT: ABNORMAL
BACTERIA SPEC CULT: ABNORMAL
CAMPYLOBACTER STL CULT: ABNORMAL
E COLI SXT STL QL IA: NEGATIVE
SALM + SHIG STL CULT: ABNORMAL

## 2024-08-25 DIAGNOSIS — A04.5 CAMPYLOBACTER DIARRHEA: Primary | ICD-10-CM

## 2024-08-25 RX ORDER — AZITHROMYCIN 500 MG/1
500 TABLET, FILM COATED ORAL DAILY
Qty: 3 TABLET | Refills: 0 | Status: SHIPPED | OUTPATIENT
Start: 2024-08-25 | End: 2024-08-28

## 2024-08-26 ENCOUNTER — TELEPHONE (OUTPATIENT)
Dept: INTERNAL MEDICINE | Facility: CLINIC | Age: 59
End: 2024-08-26
Payer: COMMERCIAL

## 2024-08-26 NOTE — TELEPHONE ENCOUNTER
Received a call from Marisela with  Lab to report that on patients stool culture it came up positive for Campylobacter jejuni ssp. Jejuni.

## 2024-08-26 NOTE — TELEPHONE ENCOUNTER
Patient did read the my chart message yesterday.  Left message on voicemail making sure the patient had picked up the medication at the pharmacy.

## 2024-09-02 LAB
O+P SPEC MICRO: NORMAL
O+P STL TRI STN: NORMAL

## 2024-09-04 LAB
HFE GENE MUT ANL BLD/T: NORMAL
IMP & REVIEW OF LAB RESULTS: NORMAL

## 2024-09-06 DIAGNOSIS — R79.89 HIGH SERUM FERRITIN: Primary | ICD-10-CM

## 2024-09-06 DIAGNOSIS — E83.119 HEMOCHROMATOSIS, UNSPECIFIED HEMOCHROMATOSIS TYPE: ICD-10-CM

## 2024-09-27 DIAGNOSIS — Z79.4 TYPE 2 DIABETES MELLITUS WITH HYPERGLYCEMIA, WITH LONG-TERM CURRENT USE OF INSULIN: ICD-10-CM

## 2024-09-27 DIAGNOSIS — E11.65 TYPE 2 DIABETES MELLITUS WITH HYPERGLYCEMIA, WITH LONG-TERM CURRENT USE OF INSULIN: ICD-10-CM

## 2024-09-27 DIAGNOSIS — I10 ESSENTIAL HYPERTENSION: ICD-10-CM

## 2024-09-27 RX ORDER — AMLODIPINE BESYLATE 10 MG/1
10 TABLET ORAL DAILY
Qty: 30 TABLET | Refills: 0 | Status: SHIPPED | OUTPATIENT
Start: 2024-09-27

## 2024-09-27 RX ORDER — GLIPIZIDE 5 MG/1
5 TABLET ORAL EVERY MORNING
Qty: 30 TABLET | Refills: 0 | Status: SHIPPED | OUTPATIENT
Start: 2024-09-27

## 2024-09-27 RX ORDER — LISINOPRIL 40 MG/1
40 TABLET ORAL DAILY
Qty: 30 TABLET | Refills: 0 | Status: SHIPPED | OUTPATIENT
Start: 2024-09-27

## 2024-09-27 RX ORDER — METFORMIN HYDROCHLORIDE 750 MG/1
1500 TABLET, EXTENDED RELEASE ORAL
Qty: 60 TABLET | Refills: 0 | Status: SHIPPED | OUTPATIENT
Start: 2024-09-27

## 2024-09-29 ENCOUNTER — PATIENT ROUNDING (BHMG ONLY) (OUTPATIENT)
Dept: URGENT CARE | Facility: CLINIC | Age: 59
End: 2024-09-29
Payer: COMMERCIAL

## 2024-10-30 ENCOUNTER — PATIENT ROUNDING (BHMG ONLY) (OUTPATIENT)
Dept: URGENT CARE | Facility: CLINIC | Age: 59
End: 2024-10-30
Payer: COMMERCIAL

## 2024-11-25 ENCOUNTER — LAB (OUTPATIENT)
Dept: LAB | Facility: HOSPITAL | Age: 59
End: 2024-11-25
Payer: COMMERCIAL

## 2024-11-25 ENCOUNTER — CONSULT (OUTPATIENT)
Dept: ONCOLOGY | Facility: CLINIC | Age: 59
End: 2024-11-25
Payer: COMMERCIAL

## 2024-11-25 VITALS
OXYGEN SATURATION: 98 % | DIASTOLIC BLOOD PRESSURE: 77 MMHG | SYSTOLIC BLOOD PRESSURE: 126 MMHG | TEMPERATURE: 97.3 F | RESPIRATION RATE: 18 BRPM | HEART RATE: 110 BPM | HEIGHT: 66 IN | BODY MASS INDEX: 29.89 KG/M2 | WEIGHT: 186 LBS

## 2024-11-25 DIAGNOSIS — R53.83 FATIGUE, UNSPECIFIED TYPE: ICD-10-CM

## 2024-11-25 DIAGNOSIS — E61.1 IRON DEFICIENCY: ICD-10-CM

## 2024-11-25 DIAGNOSIS — E83.110 HEMOCHROMATOSIS ASSOCIATED WITH MUTATION IN HFE GENE: Primary | ICD-10-CM

## 2024-11-25 DIAGNOSIS — E83.110 HEMOCHROMATOSIS ASSOCIATED WITH MUTATION IN HFE GENE: ICD-10-CM

## 2024-11-25 LAB
ALBUMIN SERPL-MCNC: 4.2 G/DL (ref 3.5–5.2)
ALBUMIN/GLOB SERPL: 1.2 G/DL
ALP SERPL-CCNC: 119 U/L (ref 39–117)
ALT SERPL W P-5'-P-CCNC: 28 U/L (ref 1–41)
ANION GAP SERPL CALCULATED.3IONS-SCNC: 13 MMOL/L (ref 5–15)
AST SERPL-CCNC: 34 U/L (ref 1–40)
BASOPHILS # BLD AUTO: 0.01 10*3/MM3 (ref 0–0.2)
BASOPHILS NFR BLD AUTO: 0.2 % (ref 0–1.5)
BILIRUB CONJ SERPL-MCNC: 0.2 MG/DL (ref 0–0.3)
BILIRUB SERPL-MCNC: 0.6 MG/DL (ref 0–1.2)
BUN SERPL-MCNC: 8 MG/DL (ref 6–20)
BUN/CREAT SERPL: 8.1 (ref 7–25)
CALCIUM SPEC-SCNC: 9 MG/DL (ref 8.6–10.5)
CHLORIDE SERPL-SCNC: 98 MMOL/L (ref 98–107)
CO2 SERPL-SCNC: 22 MMOL/L (ref 22–29)
CREAT SERPL-MCNC: 0.99 MG/DL (ref 0.76–1.27)
DAT POLY-SP REAG RBC QL: NEGATIVE
DEPRECATED RDW RBC AUTO: 48 FL (ref 37–54)
EGFRCR SERPLBLD CKD-EPI 2021: 87.8 ML/MIN/1.73
EOSINOPHIL # BLD AUTO: 0.1 10*3/MM3 (ref 0–0.4)
EOSINOPHIL NFR BLD AUTO: 1.9 % (ref 0.3–6.2)
ERYTHROCYTE [DISTWIDTH] IN BLOOD BY AUTOMATED COUNT: 14.8 % (ref 12.3–15.4)
ERYTHROCYTE [SEDIMENTATION RATE] IN BLOOD: 38 MM/HR (ref 0–20)
FERRITIN SERPL-MCNC: 1152 NG/ML (ref 30–400)
FOLATE SERPL-MCNC: 6.61 NG/ML (ref 4.78–24.2)
GLOBULIN UR ELPH-MCNC: 3.4 GM/DL
GLUCOSE SERPL-MCNC: 102 MG/DL (ref 65–99)
HAPTOGLOB SERPL-MCNC: 219 MG/DL (ref 30–200)
HCT VFR BLD AUTO: 37.4 % (ref 37.5–51)
HCYS SERPL-MCNC: 17 UMOL/L (ref 0–15)
HGB BLD-MCNC: 12.8 G/DL (ref 13–17.7)
IMM GRANULOCYTES # BLD AUTO: 0.01 10*3/MM3 (ref 0–0.05)
IMM GRANULOCYTES NFR BLD AUTO: 0.2 % (ref 0–0.5)
IRON 24H UR-MRATE: 76 MCG/DL (ref 59–158)
IRON SATN MFR SERPL: 26 % (ref 20–50)
LYMPHOCYTES # BLD AUTO: 1.5 10*3/MM3 (ref 0.7–3.1)
LYMPHOCYTES NFR BLD AUTO: 28.7 % (ref 19.6–45.3)
MCH RBC QN AUTO: 30.1 PG (ref 26.6–33)
MCHC RBC AUTO-ENTMCNC: 34.2 G/DL (ref 31.5–35.7)
MCV RBC AUTO: 88 FL (ref 79–97)
MONOCYTES # BLD AUTO: 0.48 10*3/MM3 (ref 0.1–0.9)
MONOCYTES NFR BLD AUTO: 9.2 % (ref 5–12)
NEUTROPHILS NFR BLD AUTO: 3.13 10*3/MM3 (ref 1.7–7)
NEUTROPHILS NFR BLD AUTO: 59.8 % (ref 42.7–76)
PLATELET # BLD AUTO: 154 10*3/MM3 (ref 140–450)
PMV BLD AUTO: 10.3 FL (ref 6–12)
POTASSIUM SERPL-SCNC: 4 MMOL/L (ref 3.5–5.2)
PROT SERPL-MCNC: 7.6 G/DL (ref 6–8.5)
RBC # BLD AUTO: 4.25 10*6/MM3 (ref 4.14–5.8)
RETICS # AUTO: 0.05 10*6/MM3 (ref 0.02–0.13)
RETICS/RBC NFR AUTO: 1.29 % (ref 0.7–1.9)
SODIUM SERPL-SCNC: 133 MMOL/L (ref 136–145)
T4 FREE SERPL-MCNC: 0.97 NG/DL (ref 0.92–1.68)
TIBC SERPL-MCNC: 294 MCG/DL (ref 298–536)
TRANSFERRIN SERPL-MCNC: 197 MG/DL (ref 200–360)
TSH SERPL DL<=0.05 MIU/L-ACNC: 2.18 UIU/ML (ref 0.27–4.2)
VIT B12 BLD-MCNC: 747 PG/ML (ref 211–946)
WBC NRBC COR # BLD AUTO: 5.23 10*3/MM3 (ref 3.4–10.8)

## 2024-11-25 PROCEDURE — 80053 COMPREHEN METABOLIC PANEL: CPT

## 2024-11-25 PROCEDURE — 85652 RBC SED RATE AUTOMATED: CPT

## 2024-11-25 PROCEDURE — 82607 VITAMIN B-12: CPT

## 2024-11-25 PROCEDURE — 82746 ASSAY OF FOLIC ACID SERUM: CPT

## 2024-11-25 PROCEDURE — 85045 AUTOMATED RETICULOCYTE COUNT: CPT

## 2024-11-25 PROCEDURE — 84439 ASSAY OF FREE THYROXINE: CPT

## 2024-11-25 PROCEDURE — 86880 COOMBS TEST DIRECT: CPT

## 2024-11-25 PROCEDURE — 83921 ORGANIC ACID SINGLE QUANT: CPT

## 2024-11-25 PROCEDURE — 82248 BILIRUBIN DIRECT: CPT

## 2024-11-25 PROCEDURE — 82668 ASSAY OF ERYTHROPOIETIN: CPT

## 2024-11-25 PROCEDURE — 99205 OFFICE O/P NEW HI 60 MIN: CPT | Performed by: INTERNAL MEDICINE

## 2024-11-25 PROCEDURE — 84466 ASSAY OF TRANSFERRIN: CPT

## 2024-11-25 PROCEDURE — 83090 ASSAY OF HOMOCYSTEINE: CPT

## 2024-11-25 PROCEDURE — 36415 COLL VENOUS BLD VENIPUNCTURE: CPT

## 2024-11-25 PROCEDURE — 84443 ASSAY THYROID STIM HORMONE: CPT

## 2024-11-25 PROCEDURE — 82728 ASSAY OF FERRITIN: CPT

## 2024-11-25 PROCEDURE — 83010 ASSAY OF HAPTOGLOBIN QUANT: CPT

## 2024-11-25 PROCEDURE — 83540 ASSAY OF IRON: CPT

## 2024-11-25 PROCEDURE — 85025 COMPLETE CBC W/AUTO DIFF WBC: CPT

## 2024-11-25 NOTE — PROGRESS NOTES
CHIEF COMPLAINT: Severe fatigue disproportional to the degree of his anemia    REASON FOR REFERRAL: Homozygous HFE gene mutation      RECORDS OBTAINED  Records of the patients history including those obtained from primary care were reviewed and summarized in detail.    Oncology/Hematology History Overview Note   1.  Homozygous H63D hemochromatosis gene mutations 8/23/2024 with ferritin 963, iron low 54, saturation low 18%, and transferrin 196 and total iron binding capacity low 292.  February 2024  AST 81 alkaline phosphatase 484 in the face of acute cholecystitis with laparoscopic cholecystectomy February 1, 2024.  Subsequently normalized March 2024 and July 2024.  February 2024 MRI MRCP showed diffuse gallbladder wall thickening with tiny stones and hepatic steatosis.  Hemoglobin in July with normochromic normocytic indices and normal Red cell distribution width.  2.  Hypertension  3.  Hyperlipidemia  4.  Arthritis  5.  Diabetes  6.  History of diverticular disease.  7.  Hepatic steatosis  8.  Cholecystitis status postcholecystectomy February 2024    -11/25/2024 [facility] consult: Patient had elevated liver enzymes earlier in the year that subsequently normalized after his cholecystectomy.  Dr. Chase performed EGD with his ERCP in February that showed a single 4 mm mucosal papule with no bleeding.  Pathology showed this to be a hyperplastic polyp with surface erosion and reactive changes and body of stomach biopsy showed mild reactive changes.  Gallbladder pathology just showed cholecystitis and cholelithiasis.Presumably due to that liver enzyme elevation is ferritin was checked and noted to be elevated earlier in the year and they checked HFE gene mutation.  This showed homozygous H63D mutation.  This means he does have hemochromatosis but it can have variable penetrance and the confounding factor here is that his ferritin while high in August was associated with a low iron and low iron saturation  and yet with a low total iron binding capacity.  Typically hemochromatosis would have elevated iron and iron saturation and the phlebotomy demands that he have adequate iron stores to deplete and bring ferritin down to a goal of 100 or less.  I cannot phlebotomize when his saturation is low.  However, iron deficiency would usually be associated with an elevated total iron binding capacity where his was low and that suggests anemia of inflammation more than soren iron deficiency.  He is very fatigued and disproportional to his hemoglobin in the 12's and has been unable to work apparently.  His last colonoscopy was over 10 years ago at [facility] and he does not recall the gastroenterologist.  I will get him to our GI doctors for colonoscopy and I will repeat his ferritin, iron panel, and in light of his fatigue we will also check his thyroid functions and megaloblastic panel.  If the endoscopies and labs do not give explanations for the fatigue, we may need to do further imaging.  He did have some fatty liver and his liver enzymes had normalized earlier in the year after his cholecystectomy so I do not think this hemochromatosis gene mutation had anything to do with the liver enzyme elevations.     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  male, referred for homozygous HFE gene mutation with transient elevated liver enzymes resolving after cholecystectomy earlier this year.  Very fatigued despite hemoglobin in the 12's.  No B symptoms.  Last colonoscopy over 10 years ago at [facility].  Fatty liver on imaging earlier in the year that showed his gallstones with cholecystitis    REVIEW OF SYSTEMS:  Debilitating fatigue disproportional to the degree of his hemoglobin.  No change in the color caliber or consistency of his stools.    Past Medical History:   Diagnosis Date    Allergic rhinitis     Anxiety     Dermatitis 8/2/2016    Diabetes  mellitus     Diverticulitis     Gastroesophageal reflux disease 7/11/2016    GERD (gastroesophageal reflux disease)     History of alcohol abuse     Hypertension     Insomnia 7/11/2016    Visual impairment 7/11/2016    Vitamin D deficiency 7/11/2016     Past Surgical History:   Procedure Laterality Date    APPENDECTOMY  1995    CHOLECYSTECTOMY WITH INTRAOPERATIVE CHOLANGIOGRAM N/A 2/1/2024    Procedure: CHOLECYSTECTOMY LAPAROSCOPIC WITH INTRAOPERATIVE CHOLANGIOGRAM, UMBILICAL HERNIA REPAIR;  Surgeon: Adam Ramos MD;  Location:  [facility];  Service: General;  Laterality: N/A;    ENDOSCOPY N/A 2/2/2024    Procedure: ESOPHAGOGASTRODUODENOSCOPY;  Surgeon: Dominik Chase MD;  Location:  [facility];  Service: Gastroenterology;  Laterality: N/A;    ERCP N/A 2/2/2024    Procedure: ENDOSCOPIC RETROGRADE CHOLANGIOPANCREATOGRAPHY;  Surgeon: Dominik Chase MD;  Location:  [facility];  Service: Gastroenterology;  Laterality: N/A;  Sphincterotomy made to common bile duct (CBD) ampulla. CBD swept with 9-12 mm balloon. ERCP scope removed with plastic cap intact.    HERNIA REPAIR  2010    TONSILLECTOMY  1974       Current Outpatient Medications on File Prior to Visit   Medication Sig Dispense Refill    amLODIPine (NORVASC) 10 MG tablet Take 1 tablet by mouth once daily 30 tablet 0    atorvastatin (LIPITOR) 10 MG tablet Take 1 tablet by mouth Daily. 30 tablet 0    buPROPion XL (WELLBUTRIN XL) 150 MG 24 hr tablet Take 1 tablet by mouth Daily.      buPROPion XL (Wellbutrin XL) 300 MG 24 hr tablet Take 1 tablet by mouth Daily. 30 tablet 6    cephalexin (KEFLEX) 500 MG capsule Take 1 capsule by mouth 3 (Three) Times a Day. 21 capsule 0    citalopram (CeleXA) 20 MG tablet Take 1 tablet by mouth Daily. 30 tablet 1    Continuous Blood Gluc  (FreeStyle Dagoberto 2 [place]) device 1 Device Take As Directed. 1 each 0    Continuous Blood Gluc Sensor (FreeStyle Dagoberto 2 Sensor) misc Use 1 Device Every 14 (Fourteen) Days. 2 each 11  "   glipizide (GLUCOTROL) 5 MG tablet TAKE 1 TABLET BY MOUTH ONCE DAILY IN THE MORNING 30 tablet 0    glucose blood test strip Use as instructed 50 each 12    indomethacin (INDOCIN) 25 MG capsule Take 1 capsule by mouth 3 (Three) Times a Day As Needed for Mild Pain. 30 capsule 0    insulin NPH (NovoLIN N ReliOn) 100 UNIT/ML injection INJECT 10 UNITS WITH MORNING MEAL AND 5 UNITS WITH EVENING MEAL 10 mL 3    Insulin Syringe-Needle U-100 28G X 5/16\" 1 ML misc Use 1 Device 2 (Two) Times a Day. 300 each 3    Lancets (accu-chek soft touch) lancets Used to test blood sugar for Diabetes E11.65 test up to twice a day 100 each 12    lisinopril (PRINIVIL,ZESTRIL) 40 MG tablet Take 1 tablet by mouth once daily 30 tablet 0    metFORMIN ER (GLUCOPHAGE-XR) 750 MG 24 hr tablet TAKE 2 TABLETS BY MOUTH ONCE DAILY WITH BREAKFAST 60 tablet 0    omeprazole (priLOSEC) 20 MG capsule Take 1 capsule by mouth once daily 30 capsule 1    RELION INSULIN SYRINGE 1ML/31G 31G X 5/16\" 1 ML misc 2 (Two) Times a Day.      vitamin B-12 (CYANOCOBALAMIN) 1000 MCG tablet Take 1 tablet by mouth Daily.       No current facility-administered medications on file prior to visit.       Allergies   Allergen Reactions    Shellfish-Derived Products Anaphylaxis    Codeine Nausea Only     Not sure of reaction but thinks it is just nausea    Shellfish Allergy Swelling     Swelling throat       Social History     Socioeconomic History    Marital status: Single   Tobacco Use    Smoking status: Never     Passive exposure: Never    Smokeless tobacco: Never    Tobacco comments:     Pt was diagnosed hemochromatosis March 2024   Vaping Use    Vaping status: Never Used   Substance and Sexual Activity    Alcohol use: No     Comment: sober since 2015    Drug use: No    Sexual activity: Defer       Family History   Problem Relation Age of Onset    Hypertension Mother     Multiple myeloma Mother     Hypertension Father     Alcohol abuse Father     Hypertension Maternal " "Grandmother     Hypertension Maternal Grandfather     Diabetes Paternal Grandmother     Hypertension Paternal Grandmother     Hypertension Paternal Grandfather     Thyroid disease Other        PHYSICAL EXAM:  No jaundice or icterus.  Has dry skin with some flaking.  No abdominal tenderness or palpable hepatosplenomegaly.  No palpable cervical axillary or inguinal adenopathy.  Lung sounds clear.    /77   Pulse 110   Temp 97.3 °F (36.3 °C)   Resp 18   Ht 167.6 cm (66\")   Wt 84.4 kg (186 lb)   SpO2 98%   BMI 30.02 kg/m²     ECOG score: 0           ECOG: (0) Fully Active - Able to Carry On All Pre-disease Performance Without Restriction    Lab Results   Component Value Date    HGB 12.8 (L) 07/31/2024    HCT 37.2 (L) 07/31/2024    MCV 91.4 07/31/2024     07/31/2024    WBC 5.82 07/31/2024    NEUTROABS 2.81 02/04/2024    LYMPHSABS 0.83 02/04/2024    MONOSABS 0.40 02/04/2024    EOSABS 0.05 02/04/2024    BASOSABS 0.01 02/04/2024     Lab Results   Component Value Date    GLUCOSE 77 07/31/2024    BUN 17 07/31/2024    CREATININE 1.08 07/31/2024     (L) 07/31/2024    K 4.7 07/31/2024    CL 99 07/31/2024    CO2 21.0 (L) 07/31/2024    CALCIUM 9.4 07/31/2024    PROTEINTOT 7.6 07/31/2024    ALBUMIN 4.4 07/31/2024    BILITOT 0.3 07/31/2024    ALKPHOS 95 07/31/2024    AST 27 07/31/2024    ALT 32 07/31/2024         Assessment & Plan   1.  Homozygous H63D hemochromatosis gene mutations 8/23/2024 with ferritin 963, iron low 54, saturation low 18%, and transferrin 196 and total iron binding capacity low 292.  February 2024  AST 81 alkaline phosphatase 484 in the face of acute cholecystitis with laparoscopic cholecystectomy February 1, 2024.  Subsequently normalized March 2024 and July 2024.  February 2024 MRI MRCP showed diffuse gallbladder wall thickening with tiny stones and hepatic steatosis.  Hemoglobin in July with normochromic normocytic indices and normal Red cell distribution width.  2.  " Hypertension  3.  Hyperlipidemia  4.  Arthritis  5.  Diabetes  6.  History of diverticular disease.  7.  Hepatic steatosis  8.  Cholecystitis status postcholecystectomy February 2024    -11/25/2024 [facility] hematology consult: Patient had elevated liver enzymes earlier in the year that subsequently normalized after his cholecystectomy.  Dr. Chase performed EGD with his ERCP in February that showed a single 4 mm mucosal papule with no bleeding.  Pathology showed this to be a hyperplastic polyp with surface erosion and reactive changes and body of stomach biopsy showed mild reactive changes.  Gallbladder pathology just showed cholecystitis and cholelithiasis.Presumably due to that liver enzyme elevation is ferritin was checked and noted to be elevated earlier in the year and they checked HFE gene mutation.  This showed homozygous H63D mutation.  This means he does have hemochromatosis but it can have variable penetrance and the confounding factor here is that his ferritin while high in August was associated with a low iron and low iron saturation and yet with a low total iron binding capacity.  Typically hemochromatosis would have elevated iron and iron saturation and the phlebotomy demands that he have adequate iron stores to deplete and bring ferritin down to a goal of 100 or less.  I cannot phlebotomize when his saturation is low.  However, iron deficiency would usually be associated with an elevated total iron binding capacity where his was low and that suggests anemia of inflammation more than soren iron deficiency.  He is very fatigued and disproportional to his hemoglobin in the 12's and has been unable to work apparently.  His last colonoscopy was over 10 years ago at [facility] and he does not recall the gastroenterologist.  I will get him to our GI doctors for colonoscopy and I will repeat his ferritin, iron panel, and in light of his fatigue we will also check his thyroid functions and megaloblastic  panel.  If the endoscopies and labs do not give explanations for the fatigue, we may need to do further imaging.  He did have some fatty liver and his liver enzymes had normalized earlier in the year after his cholecystectomy so I do not think this hemochromatosis gene mutation had anything to do with the liver enzyme elevations.    Total time of care today inclusive of time spent today prior to patient's arrival reviewing past records and cataloging as above and interviewing and Julieta signs or symptoms of his disease and management thereof and after visit instituting the plan as outlined took 1 hour patient care time throughout the day today.      Robert Myers MD    11/25/2024

## 2024-11-25 NOTE — LETTER
November 25, 2024     JOANA Ferris  [address]  [address]  [place] [address]    Patient: Malcolm Costa   YOB: 1965   Date of Visit: 11/25/2024     Dear JOANA Ferris:       Thank you for referring Malcolm Costa to me for evaluation. Below are the relevant portions of my assessment and plan of care.    If you have questions, please do not hesitate to call me. I look forward to following Malcolm along with you.         Sincerely,        Robert Myers MD        CC: MD Louis Hampton, Robert SHIN MD  11/25/24 0850  Sign when Signing Visit  CHIEF COMPLAINT: Severe fatigue disproportional to the degree of his anemia    REASON FOR REFERRAL: Homozygous HFE gene mutation      RECORDS OBTAINED  Records of the patients history including those obtained from primary care were reviewed and summarized in detail.    Oncology/Hematology History Overview Note   1.  Homozygous H63D hemochromatosis gene mutations 8/23/2024 with ferritin 963, iron low 54, saturation low 18%, and transferrin 196 and total iron binding capacity low 292.  February 2024  AST 81 alkaline phosphatase 484 in the face of acute cholecystitis with laparoscopic cholecystectomy February 1, 2024.  Subsequently normalized March 2024 and July 2024.  February 2024 MRI MRCP showed diffuse gallbladder wall thickening with tiny stones and hepatic steatosis.  Hemoglobin in July with normochromic normocytic indices and normal Red cell distribution width.  2.  Hypertension  3.  Hyperlipidemia  4.  Arthritis  5.  Diabetes  6.  History of diverticular disease.  7.  Hepatic steatosis  8.  Cholecystitis status postcholecystectomy February 2024    -11/25/2024 [facility] consult: Patient had elevated liver enzymes earlier in the year that subsequently normalized after his cholecystectomy.  Dr. Chase performed EGD with his ERCP in February that showed a single 4 mm mucosal papule with no bleeding.  Pathology showed this to be a  hyperplastic polyp with surface erosion and reactive changes and body of stomach biopsy showed mild reactive changes.  Gallbladder pathology just showed cholecystitis and cholelithiasis.Presumably due to that liver enzyme elevation is ferritin was checked and noted to be elevated earlier in the year and they checked HFE gene mutation.  This showed homozygous H63D mutation.  This means he does have hemochromatosis but it can have variable penetrance and the confounding factor here is that his ferritin while high in August was associated with a low iron and low iron saturation and yet with a low total iron binding capacity.  Typically hemochromatosis would have elevated iron and iron saturation and the phlebotomy demands that he have adequate iron stores to deplete and bring ferritin down to a goal of 100 or less.  I cannot phlebotomize when his saturation is low.  However, iron deficiency would usually be associated with an elevated total iron binding capacity where his was low and that suggests anemia of inflammation more than soren iron deficiency.  He is very fatigued and disproportional to his hemoglobin in the 12's and has been unable to work apparently.  His last colonoscopy was over 10 years ago at [facility] and he does not recall the gastroenterologist.  I will get him to our GI doctors for colonoscopy and I will repeat his ferritin, iron panel, and in light of his fatigue we will also check his thyroid functions and megaloblastic panel.  If the endoscopies and labs do not give explanations for the fatigue, we may need to do further imaging.  He did have some fatty liver and his liver enzymes had normalized earlier in the year after his cholecystectomy so I do not think this hemochromatosis gene mutation had anything to do with the liver enzyme elevations.     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  male, referred for homozygous  HFE gene mutation with transient elevated liver enzymes resolving after cholecystectomy earlier this year.  Very fatigued despite hemoglobin in the 12's.  No B symptoms.  Last colonoscopy over 10 years ago at [facility].  Fatty liver on imaging earlier in the year that showed his gallstones with cholecystitis    REVIEW OF SYSTEMS:  Debilitating fatigue disproportional to the degree of his hemoglobin.  No change in the color caliber or consistency of his stools.    Past Medical History:   Diagnosis Date   • Allergic rhinitis    • Anxiety    • Dermatitis 8/2/2016   • Diabetes mellitus    • Diverticulitis    • Gastroesophageal reflux disease 7/11/2016   • GERD (gastroesophageal reflux disease)    • History of alcohol abuse    • Hypertension    • Insomnia 7/11/2016   • Visual impairment 7/11/2016   • Vitamin D deficiency 7/11/2016     Past Surgical History:   Procedure Laterality Date   • APPENDECTOMY  1995   • CHOLECYSTECTOMY WITH INTRAOPERATIVE CHOLANGIOGRAM N/A 2/1/2024    Procedure: CHOLECYSTECTOMY LAPAROSCOPIC WITH INTRAOPERATIVE CHOLANGIOGRAM, UMBILICAL HERNIA REPAIR;  Surgeon: Adam Ramos MD;  Location: [facility] OR;  Service: General;  Laterality: N/A;   • ENDOSCOPY N/A 2/2/2024    Procedure: ESOPHAGOGASTRODUODENOSCOPY;  Surgeon: Dominik Chase MD;  Location:  [facility];  Service: Gastroenterology;  Laterality: N/A;   • ERCP N/A 2/2/2024    Procedure: ENDOSCOPIC RETROGRADE CHOLANGIOPANCREATOGRAPHY;  Surgeon: Dominik Chase MD;  Location: [facility];  Service: Gastroenterology;  Laterality: N/A;  Sphincterotomy made to common bile duct (CBD) ampulla. CBD swept with 9-12 mm balloon. ERCP scope removed with plastic cap intact.   • HERNIA REPAIR  2010   • TONSILLECTOMY  1974       Current Outpatient Medications on File Prior to Visit   Medication Sig Dispense Refill   • amLODIPine (NORVASC) 10 MG tablet Take 1 tablet by mouth once daily 30 tablet 0   • atorvastatin (LIPITOR) 10 MG tablet Take 1  "tablet by mouth Daily. 30 tablet 0   • buPROPion XL (WELLBUTRIN XL) 150 MG 24 hr tablet Take 1 tablet by mouth Daily.     • buPROPion XL (Wellbutrin XL) 300 MG 24 hr tablet Take 1 tablet by mouth Daily. 30 tablet 6   • cephalexin (KEFLEX) 500 MG capsule Take 1 capsule by mouth 3 (Three) Times a Day. 21 capsule 0   • citalopram (CeleXA) 20 MG tablet Take 1 tablet by mouth Daily. 30 tablet 1   • Continuous Blood Gluc  (FreeStyle Dagoberto 2 [place]) device 1 Device Take As Directed. 1 each 0   • Continuous Blood Gluc Sensor (FreeStyle Dagoberto 2 Sensor) misc Use 1 Device Every 14 (Fourteen) Days. 2 each 11   • glipizide (GLUCOTROL) 5 MG tablet TAKE 1 TABLET BY MOUTH ONCE DAILY IN THE MORNING 30 tablet 0   • glucose blood test strip Use as instructed 50 each 12   • indomethacin (INDOCIN) 25 MG capsule Take 1 capsule by mouth 3 (Three) Times a Day As Needed for Mild Pain. 30 capsule 0   • insulin NPH (NovoLIN N ReliOn) 100 UNIT/ML injection INJECT 10 UNITS WITH MORNING MEAL AND 5 UNITS WITH EVENING MEAL 10 mL 3   • Insulin Syringe-Needle U-100 28G X 5/16\" 1 ML misc Use 1 Device 2 (Two) Times a Day. 300 each 3   • Lancets (accu-chek soft touch) lancets Used to test blood sugar for Diabetes E11.65 test up to twice a day 100 each 12   • lisinopril (PRINIVIL,ZESTRIL) 40 MG tablet Take 1 tablet by mouth once daily 30 tablet 0   • metFORMIN ER (GLUCOPHAGE-XR) 750 MG 24 hr tablet TAKE 2 TABLETS BY MOUTH ONCE DAILY WITH BREAKFAST 60 tablet 0   • omeprazole (priLOSEC) 20 MG capsule Take 1 capsule by mouth once daily 30 capsule 1   • RELION INSULIN SYRINGE 1ML/31G 31G X 5/16\" 1 ML misc 2 (Two) Times a Day.     • vitamin B-12 (CYANOCOBALAMIN) 1000 MCG tablet Take 1 tablet by mouth Daily.       No current facility-administered medications on file prior to visit.       Allergies   Allergen Reactions   • Shellfish-Derived Products Anaphylaxis   • Codeine Nausea Only     Not sure of reaction but thinks it is just nausea   • Shellfish " "Allergy Swelling     Swelling throat       Social History     Socioeconomic History   • Marital status: Single   Tobacco Use   • Smoking status: Never     Passive exposure: Never   • Smokeless tobacco: Never   • Tobacco comments:     Pt was diagnosed hemochromatosis March 2024   Vaping Use   • Vaping status: Never Used   Substance and Sexual Activity   • Alcohol use: No     Comment: sober since 2015   • Drug use: No   • Sexual activity: Defer       Family History   Problem Relation Age of Onset   • Hypertension Mother    • Multiple myeloma Mother    • Hypertension Father    • Alcohol abuse Father    • Hypertension Maternal Grandmother    • Hypertension Maternal Grandfather    • Diabetes Paternal Grandmother    • Hypertension Paternal Grandmother    • Hypertension Paternal Grandfather    • Thyroid disease Other        PHYSICAL EXAM:  No jaundice or icterus.  Has dry skin with some flaking.  No abdominal tenderness or palpable hepatosplenomegaly.  No palpable cervical axillary or inguinal adenopathy.  Lung sounds clear.    /77   Pulse 110   Temp 97.3 °F (36.3 °C)   Resp 18   Ht 167.6 cm (66\")   Wt 84.4 kg (186 lb)   SpO2 98%   BMI 30.02 kg/m²     ECOG score: 0           ECOG: (0) Fully Active - Able to Carry On All Pre-disease Performance Without Restriction    Lab Results   Component Value Date    HGB 12.8 (L) 07/31/2024    HCT 37.2 (L) 07/31/2024    MCV 91.4 07/31/2024     07/31/2024    WBC 5.82 07/31/2024    NEUTROABS 2.81 02/04/2024    LYMPHSABS 0.83 02/04/2024    MONOSABS 0.40 02/04/2024    EOSABS 0.05 02/04/2024    BASOSABS 0.01 02/04/2024     Lab Results   Component Value Date    GLUCOSE 77 07/31/2024    BUN 17 07/31/2024    CREATININE 1.08 07/31/2024     (L) 07/31/2024    K 4.7 07/31/2024    CL 99 07/31/2024    CO2 21.0 (L) 07/31/2024    CALCIUM 9.4 07/31/2024    PROTEINTOT 7.6 07/31/2024    ALBUMIN 4.4 07/31/2024    BILITOT 0.3 07/31/2024    ALKPHOS 95 07/31/2024    AST 27 07/31/2024 "    ALT 32 07/31/2024         Assessment & Plan  1.  Homozygous H63D hemochromatosis gene mutations 8/23/2024 with ferritin 963, iron low 54, saturation low 18%, and transferrin 196 and total iron binding capacity low 292.  February 2024  AST 81 alkaline phosphatase 484 in the face of acute cholecystitis with laparoscopic cholecystectomy February 1, 2024.  Subsequently normalized March 2024 and July 2024.  February 2024 MRI MRCP showed diffuse gallbladder wall thickening with tiny stones and hepatic steatosis.  Hemoglobin in July with normochromic normocytic indices and normal Red cell distribution width.  2.  Hypertension  3.  Hyperlipidemia  4.  Arthritis  5.  Diabetes  6.  History of diverticular disease.  7.  Hepatic steatosis  8.  Cholecystitis status postcholecystectomy February 2024    -11/25/2024 [facility] hematology consult: Patient had elevated liver enzymes earlier in the year that subsequently normalized after his cholecystectomy.  Dr. Chase performed EGD with his ERCP in February that showed a single 4 mm mucosal papule with no bleeding.  Pathology showed this to be a hyperplastic polyp with surface erosion and reactive changes and body of stomach biopsy showed mild reactive changes.  Gallbladder pathology just showed cholecystitis and cholelithiasis.Presumably due to that liver enzyme elevation is ferritin was checked and noted to be elevated earlier in the year and they checked HFE gene mutation.  This showed homozygous H63D mutation.  This means he does have hemochromatosis but it can have variable penetrance and the confounding factor here is that his ferritin while high in August was associated with a low iron and low iron saturation and yet with a low total iron binding capacity.  Typically hemochromatosis would have elevated iron and iron saturation and the phlebotomy demands that he have adequate iron stores to deplete and bring ferritin down to a goal of 100 or less.  I cannot phlebotomize  when his saturation is low.  However, iron deficiency would usually be associated with an elevated total iron binding capacity where his was low and that suggests anemia of inflammation more than soren iron deficiency.  He is very fatigued and disproportional to his hemoglobin in the 12's and has been unable to work apparently.  His last colonoscopy was over 10 years ago at [facility] and he does not recall the gastroenterologist.  I will get him to our GI doctors for colonoscopy and I will repeat his ferritin, iron panel, and in light of his fatigue we will also check his thyroid functions and megaloblastic panel.  If the endoscopies and labs do not give explanations for the fatigue, we may need to do further imaging.  He did have some fatty liver and his liver enzymes had normalized earlier in the year after his cholecystectomy so I do not think this hemochromatosis gene mutation had anything to do with the liver enzyme elevations.    Total time of care today inclusive of time spent today prior to patient's arrival reviewing past records and cataloging as above and interviewing and Julieta signs or symptoms of his disease and management thereof and after visit instituting the plan as outlined took 1 hour patient care time throughout the day today.      Robert Myers MD    11/25/2024

## 2024-11-26 LAB — EPO SERPL-ACNC: 19.1 MIU/ML (ref 2.6–18.5)

## 2024-11-28 LAB — METHYLMALONATE SERPL-SCNC: 203 NMOL/L (ref 0–378)

## 2024-12-06 ENCOUNTER — OFFICE VISIT (OUTPATIENT)
Dept: ONCOLOGY | Facility: CLINIC | Age: 59
End: 2024-12-06
Payer: COMMERCIAL

## 2024-12-06 ENCOUNTER — LAB (OUTPATIENT)
Dept: LAB | Facility: HOSPITAL | Age: 59
End: 2024-12-06
Payer: COMMERCIAL

## 2024-12-06 VITALS
SYSTOLIC BLOOD PRESSURE: 109 MMHG | OXYGEN SATURATION: 99 % | BODY MASS INDEX: 29.73 KG/M2 | RESPIRATION RATE: 18 BRPM | HEIGHT: 66 IN | TEMPERATURE: 97.8 F | DIASTOLIC BLOOD PRESSURE: 68 MMHG | HEART RATE: 109 BPM | WEIGHT: 185 LBS

## 2024-12-06 DIAGNOSIS — E83.110 HEMOCHROMATOSIS ASSOCIATED WITH MUTATION IN HFE GENE: ICD-10-CM

## 2024-12-06 DIAGNOSIS — E83.110 HEMOCHROMATOSIS ASSOCIATED WITH MUTATION IN HFE GENE: Primary | ICD-10-CM

## 2024-12-06 LAB
CRP SERPL-MCNC: 0.86 MG/DL (ref 0–0.5)
ERYTHROCYTE [SEDIMENTATION RATE] IN BLOOD: 61 MM/HR (ref 0–20)

## 2024-12-06 PROCEDURE — 85652 RBC SED RATE AUTOMATED: CPT

## 2024-12-06 PROCEDURE — 36415 COLL VENOUS BLD VENIPUNCTURE: CPT

## 2024-12-06 PROCEDURE — 86140 C-REACTIVE PROTEIN: CPT

## 2024-12-06 NOTE — LETTER
December 6, 2024     JOANA Ferris  [address]  [address]  [facility] [address]    Patient: Malcolm Costa   YOB: 1965   Date of Visit: 12/6/2024     Dear JOANA Ferris:       Thank you for referring Malcolm Costa to me for evaluation. Below are the relevant portions of my assessment and plan of care.    If you have questions, please do not hesitate to call me. I look forward to following Malcolm along with you.         Sincerely,        Robert Myers MD        CC: MD Louis Hampton Lee G, MD  12/06/24 1200  Sign when Signing Visit  CHIEF COMPLAINT: Severe debilitating fatigue thus far unexplained    Problem List:  Oncology/Hematology History Overview Note   1.  Homozygous H63D hemochromatosis gene mutations 8/23/2024 with ferritin 963, iron low 54, saturation low 18%, and transferrin 196 and total iron binding capacity low 292.  February 2024  AST 81 alkaline phosphatase 484 in the face of acute cholecystitis with laparoscopic cholecystectomy February 1, 2024.  Subsequently normalized March 2024 and July 2024.  February 2024 MRI MRCP showed diffuse gallbladder wall thickening with tiny stones and hepatic steatosis.  Hemoglobin in July with normochromic normocytic indices and normal Red cell distribution width.  2.  Hypertension  3.  Hyperlipidemia  4.  Arthritis  5.  Diabetes  6.  History of diverticular disease.  7.  Hepatic steatosis  8.  Cholecystitis status postcholecystectomy February 2024    -11/25/2024 [facility] consult: Patient had elevated liver enzymes earlier in the year that subsequently normalized after his cholecystectomy.  Dr. Chase performed EGD with his ERCP in February that showed a single 4 mm mucosal papule with no bleeding.  Pathology showed this to be a hyperplastic polyp with surface erosion and reactive changes and body of stomach biopsy showed mild reactive changes.  Gallbladder pathology just showed cholecystitis and  cholelithiasis.Presumably due to that liver enzyme elevation is ferritin was checked and noted to be elevated earlier in the year and they checked HFE gene mutation.  This showed homozygous H63D mutation.  This means he does have hemochromatosis but it can have variable penetrance and the confounding factor here is that his ferritin while high in August was associated with a low iron and low iron saturation and yet with a low total iron binding capacity.  Typically hemochromatosis would have elevated iron and iron saturation and the phlebotomy demands that he have adequate iron stores to deplete and bring ferritin down to a goal of 100 or less.  I cannot phlebotomize when his saturation is low.  However, iron deficiency would usually be associated with an elevated total iron binding capacity where his was low and that suggests anemia of inflammation more than soren iron deficiency.  He is very fatigued and disproportional to his hemoglobin in the 12's and has been unable to work apparently.  His last colonoscopy was over 10 years ago at [facility] and he does not recall the gastroenterologist.  I will get him to our GI doctors for colonoscopy and I will repeat his ferritin, iron panel, and in light of his fatigue we will also check his thyroid functions and megaloblastic panel.  If the endoscopies and labs do not give explanations for the fatigue, we may need to do further imaging.  He did have some fatty liver and his liver enzymes had normalized earlier in the year after his cholecystectomy so I do not think this hemochromatosis gene mutation had anything to do with the liver enzyme elevations.    -11/25/2024 Hemoglobin 12.8 otherwise unremarkable CBC.  Glucose 102 sodium 133 alkaline phosphatase 119 otherwise unremarkable CMP.  Iron panel: Ferritin elevated 1152.  Iron normal 76 saturation normal 26%.  Decreased total iron binding capacity 294.  Decreased transferrin 197.  Hemolytic panel negative:  Erythropoietin 19.1 upper limit 18.5.  Haptoglobin elevated to 19.  Reticulocyte count normal 1.29%.  TRACY negative.  Normal bilirubin  Megaloblastic panel negative: B12 normal 747.  Methylmalonic acid normal 203.  Folic acid 6.61.  Homocystine 17 upper limit of normal 15.  TSH 2.18 T40.97 Both normal.    -12/6/2024 [facility] hematology consult: Colonoscopy scheduled for January.  Still very fatigued.  Hemoglobin still normal.  Ferritin elevated with normal iron and therefore a resulting low iron saturation.  He has no evidence of hemolysis or megaloblastic anemia and thyroid functions are normal.  His liver enzymes were squarely normal.  I will check a liver iron quantitation and a sedimentation rate and C-reactive protein to look for inflammatory causes of elevated ferritin and have him see my nurse practitioner back in a few weeks.  If there is no elevation of liver iron quantitation he could have variable penetrance of his homozygous H63D mutated hemochromatosis and I would not start therapeutic phlebotomy immediately and would just repeat his ferritin and iron panel and CBC and CMP in 3 months.  If the liver iron quantitation is elevated then she will start him on 1 unit phlebotomy monthly with the blood bank holding for hemoglobin less than 11 with follow-up iron panel and ferritin 4 months later.     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 male, here for follow up on management of homozygous H63D mutated hemochromatosis gene with elevated ferritin but with normal iron and decreased iron saturation with normal liver enzymes and normal hemoglobin with debilitating fatigue thus far unexplained    Past Medical History:   Diagnosis Date   • Allergic rhinitis    • Anxiety    • Dermatitis 8/2/2016   • Diabetes mellitus    • Diverticulitis    • Gastroesophageal reflux disease 7/11/2016   • GERD (gastroesophageal reflux disease)    • History of  "alcohol abuse    • Hypertension    • Insomnia 7/11/2016   • Visual impairment 7/11/2016   • Vitamin D deficiency 7/11/2016     Past Surgical History:   Procedure Laterality Date   • APPENDECTOMY  1995   • CHOLECYSTECTOMY WITH INTRAOPERATIVE CHOLANGIOGRAM N/A 2/1/2024    Procedure: CHOLECYSTECTOMY LAPAROSCOPIC WITH INTRAOPERATIVE CHOLANGIOGRAM, UMBILICAL HERNIA REPAIR;  Surgeon: Adam Ramos MD;  Location:  [facility];  Service: General;  Laterality: N/A;   • ENDOSCOPY N/A 2/2/2024    Procedure: ESOPHAGOGASTRODUODENOSCOPY;  Surgeon: Dominik Chase MD;  Location:  [facility];  Service: Gastroenterology;  Laterality: N/A;   • ERCP N/A 2/2/2024    Procedure: ENDOSCOPIC RETROGRADE CHOLANGIOPANCREATOGRAPHY;  Surgeon: Dominik Chase MD;  Location:  [facility];  Service: Gastroenterology;  Laterality: N/A;  Sphincterotomy made to common bile duct (CBD) ampulla. CBD swept with 9-12 mm balloon. ERCP scope removed with plastic cap intact.   • HERNIA REPAIR  2010   • TONSILLECTOMY  1974       Allergies   Allergen Reactions   • Shellfish-Derived Products Anaphylaxis   • Codeine Nausea Only     Not sure of reaction but thinks it is just nausea   • Shellfish Allergy Swelling     Swelling throat       Family History and Social History reviewed and changed as necessary    REVIEW OF SYSTEM:   Severe fatigue otherwise no specific complaints    PHYSICAL EXAM:  No jaundice icterus or pallor.  No respiratory distress.  Dry skin.    Vitals:    12/06/24 1135   BP: 109/68   Pulse: 109   Resp: 18   Temp: 97.8 °F (36.6 °C)   SpO2: 99%   Weight: 83.9 kg (185 lb)   Height: 167.6 cm (66\")     Vitals:    12/06/24 1135   PainSc: 0-No pain          ECOG score: 0           Vitals reviewed.    ECOG: (0) Fully Active - Able to Carry On All Pre-disease Performance Without Restriction    Lab Results   Component Value Date    HGB 12.8 (L) 11/25/2024    HCT 37.4 (L) 11/25/2024    MCV 88.0 11/25/2024     11/25/2024    WBC 5.23 11/25/2024 "    NEUTROABS 3.13 11/25/2024    LYMPHSABS 1.50 11/25/2024    MONOSABS 0.48 11/25/2024    EOSABS 0.10 11/25/2024    BASOSABS 0.01 11/25/2024       Lab Results   Component Value Date    GLUCOSE 102 (H) 11/25/2024    BUN 8 11/25/2024    CREATININE 0.99 11/25/2024     (L) 11/25/2024    K 4.0 11/25/2024    CL 98 11/25/2024    CO2 22.0 11/25/2024    CALCIUM 9.0 11/25/2024    PROTEINTOT 7.6 11/25/2024    ALBUMIN 4.2 11/25/2024    BILITOT 0.6 11/25/2024    ALKPHOS 119 (H) 11/25/2024    AST 34 11/25/2024    ALT 28 11/25/2024             ASSESSMENT & PLAN:  1.  Homozygous H63D hemochromatosis gene mutations 8/23/2024 with ferritin 963, iron low 54, saturation low 18%, and transferrin 196 and total iron binding capacity low 292.  February 2024  AST 81 alkaline phosphatase 484 in the face of acute cholecystitis with laparoscopic cholecystectomy February 1, 2024.  Subsequently normalized March 2024 and July 2024.  February 2024 MRI MRCP showed diffuse gallbladder wall thickening with tiny stones and hepatic steatosis.  Hemoglobin in July with normochromic normocytic indices and normal Red cell distribution width.  2.  Hypertension  3.  Hyperlipidemia  4.  Arthritis  5.  Diabetes  6.  History of diverticular disease.  7.  Hepatic steatosis  8.  Cholecystitis status postcholecystectomy February 2024    -11/25/2024 [facility]ology consult: Patient had elevated liver enzymes earlier in the year that subsequently normalized after his cholecystectomy.  Dr. Chase performed EGD with his ERCP in February that showed a single 4 mm mucosal papule with no bleeding.  Pathology showed this to be a hyperplastic polyp with surface erosion and reactive changes and body of stomach biopsy showed mild reactive changes.  Gallbladder pathology just showed cholecystitis and cholelithiasis.Presumably due to that liver enzyme elevation is ferritin was checked and noted to be elevated earlier in the year and they checked HFE gene  mutation.  This showed homozygous H63D mutation.  This means he does have hemochromatosis but it can have variable penetrance and the confounding factor here is that his ferritin while high in August was associated with a low iron and low iron saturation and yet with a low total iron binding capacity.  Typically hemochromatosis would have elevated iron and iron saturation and the phlebotomy demands that he have adequate iron stores to deplete and bring ferritin down to a goal of 100 or less.  I cannot phlebotomize when his saturation is low.  However, iron deficiency would usually be associated with an elevated total iron binding capacity where his was low and that suggests anemia of inflammation more than soren iron deficiency.  He is very fatigued and disproportional to his hemoglobin in the 12's and has been unable to work apparently.  His last colonoscopy was over 10 years ago at [facility] and he does not recall the gastroenterologist.  I will get him to our GI doctors for colonoscopy and I will repeat his ferritin, iron panel, and in light of his fatigue we will also check his thyroid functions and megaloblastic panel.  If the endoscopies and labs do not give explanations for the fatigue, we may need to do further imaging.  He did have some fatty liver and his liver enzymes had normalized earlier in the year after his cholecystectomy so I do not think this hemochromatosis gene mutation had anything to do with the liver enzyme elevations.    -11/25/2024 Hemoglobin 12.8 otherwise unremarkable CBC.  Glucose 102 sodium 133 alkaline phosphatase 119 otherwise unremarkable CMP.  Iron panel: Ferritin elevated 1152.  Iron normal 76 saturation normal 26%.  Decreased total iron binding capacity 294.  Decreased transferrin 197.  Hemolytic panel negative: Erythropoietin 19.1 upper limit 18.5.  Haptoglobin elevated to 19.  Reticulocyte count normal 1.29%.  TRACY negative.  Normal bilirubin  Megaloblastic panel negative:  B12 normal 747.  Methylmalonic acid normal 203.  Folic acid 6.61.  Homocystine 17 upper limit of normal 15.  TSH 2.18 T40.97 Both normal.    -12/6/2024 [facility] hematology consult: Colonoscopy scheduled for January.  Still very fatigued.  Hemoglobin still normal.  Ferritin elevated with normal iron and therefore a resulting low iron saturation.  He has no evidence of hemolysis or megaloblastic anemia and thyroid functions are normal.  His liver enzymes were squarely normal.  I will check a liver iron quantitation and a sedimentation rate and C-reactive protein to look for inflammatory causes of elevated ferritin and have him see my nurse practitioner back in a few weeks.  If there is no elevation of liver iron quantitation he could have variable penetrance of his homozygous H63D mutated hemochromatosis and I would not start therapeutic phlebotomy immediately and would just repeat his ferritin and iron panel and CBC and CMP in 3 months.  If the liver iron quantitation is elevated then she will start him on 1 unit phlebotomy monthly with the blood bank holding for hemoglobin less than 11 with follow-up iron panel and ferritin 4 months later.    Total time of care today inclusive of time spent today prior to patient's arrival reviewing plethora of interval data and during visit translating patient putting forth plan as outlined in after visit instituting this plan took 50 minutes patient care time throughout the day today.  Robert Myers MD    12/06/2024

## 2024-12-06 NOTE — PROGRESS NOTES
CHIEF COMPLAINT: Severe debilitating fatigue thus far unexplained    Problem List:  Oncology/Hematology History Overview Note   1.  Homozygous H63D hemochromatosis gene mutations 8/23/2024 with ferritin 963, iron low 54, saturation low 18%, and transferrin 196 and total iron binding capacity low 292.  February 2024  AST 81 alkaline phosphatase 484 in the face of acute cholecystitis with laparoscopic cholecystectomy February 1, 2024.  Subsequently normalized March 2024 and July 2024.  February 2024 MRI MRCP showed diffuse gallbladder wall thickening with tiny stones and hepatic steatosis.  Hemoglobin in July with normochromic normocytic indices and normal Red cell distribution width.  2.  Hypertension  3.  Hyperlipidemia  4.  Arthritis  5.  Diabetes  6.  History of diverticular disease.  7.  Hepatic steatosis  8.  Cholecystitis status postcholecystectomy February 2024    -11/25/2024 [facility] hematology consult: Patient had elevated liver enzymes earlier in the year that subsequently normalized after his cholecystectomy.  Dr. Chase performed EGD with his ERCP in February that showed a single 4 mm mucosal papule with no bleeding.  Pathology showed this to be a hyperplastic polyp with surface erosion and reactive changes and body of stomach biopsy showed mild reactive changes.  Gallbladder pathology just showed cholecystitis and cholelithiasis.Presumably due to that liver enzyme elevation is ferritin was checked and noted to be elevated earlier in the year and they checked HFE gene mutation.  This showed homozygous H63D mutation.  This means he does have hemochromatosis but it can have variable penetrance and the confounding factor here is that his ferritin while high in August was associated with a low iron and low iron saturation and yet with a low total iron binding capacity.  Typically hemochromatosis would have elevated iron and iron saturation and the phlebotomy demands that he have adequate iron stores to  deplete and bring ferritin down to a goal of 100 or less.  I cannot phlebotomize when his saturation is low.  However, iron deficiency would usually be associated with an elevated total iron binding capacity where his was low and that suggests anemia of inflammation more than soren iron deficiency.  He is very fatigued and disproportional to his hemoglobin in the 12's and has been unable to work apparently.  His last colonoscopy was over 10 years ago at [facility] and he does not recall the gastroenterologist.  I will get him to our GI doctors for colonoscopy and I will repeat his ferritin, iron panel, and in light of his fatigue we will also check his thyroid functions and megaloblastic panel.  If the endoscopies and labs do not give explanations for the fatigue, we may need to do further imaging.  He did have some fatty liver and his liver enzymes had normalized earlier in the year after his cholecystectomy so I do not think this hemochromatosis gene mutation had anything to do with the liver enzyme elevations.    -11/25/2024 Hemoglobin 12.8 otherwise unremarkable CBC.  Glucose 102 sodium 133 alkaline phosphatase 119 otherwise unremarkable CMP.  Iron panel: Ferritin elevated 1152.  Iron normal 76 saturation normal 26%.  Decreased total iron binding capacity 294.  Decreased transferrin 197.  Hemolytic panel negative: Erythropoietin 19.1 upper limit 18.5.  Haptoglobin elevated to 19.  Reticulocyte count normal 1.29%.  TRACY negative.  Normal bilirubin  Megaloblastic panel negative: B12 normal 747.  Methylmalonic acid normal 203.  Folic acid 6.61.  Homocystine 17 upper limit of normal 15.  TSH 2.18 T40.97 Both normal.    -12/6/2024 [facility], operated by [facility] hematology consult: Colonoscopy scheduled for January.  Still very fatigued.  Hemoglobin still normal.  Ferritin elevated with normal iron and therefore a resulting low iron saturation.  He has no evidence of hemolysis or megaloblastic anemia and thyroid functions are normal.   His liver enzymes were squarely normal.  I will check a liver iron quantitation and a sedimentation rate and C-reactive protein to look for inflammatory causes of elevated ferritin and have him see my nurse practitioner back in a few weeks.  If there is no elevation of liver iron quantitation he could have variable penetrance of his homozygous H63D mutated hemochromatosis and I would not start therapeutic phlebotomy immediately and would just repeat his ferritin and iron panel and CBC and CMP in 3 months.  If the liver iron quantitation is elevated then she will start him on 1 unit phlebotomy monthly with the blood bank holding for hemoglobin less than 11 with follow-up iron panel and ferritin 4 months later.     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 male, here for follow up on management of homozygous H63D mutated hemochromatosis gene with elevated ferritin but with normal iron and decreased iron saturation with normal liver enzymes and normal hemoglobin with debilitating fatigue thus far unexplained    Past Medical History:   Diagnosis Date    Allergic rhinitis     Anxiety     Dermatitis 8/2/2016    Diabetes mellitus     Diverticulitis     Gastroesophageal reflux disease 7/11/2016    GERD (gastroesophageal reflux disease)     History of alcohol abuse     Hypertension     Insomnia 7/11/2016    Visual impairment 7/11/2016    Vitamin D deficiency 7/11/2016     Past Surgical History:   Procedure Laterality Date    APPENDECTOMY  1995    CHOLECYSTECTOMY WITH INTRAOPERATIVE CHOLANGIOGRAM N/A 2/1/2024    Procedure: CHOLECYSTECTOMY LAPAROSCOPIC WITH INTRAOPERATIVE CHOLANGIOGRAM, UMBILICAL HERNIA REPAIR;  Surgeon: Adam Ramos MD;  Location: [facility] OR;  Service: General;  Laterality: N/A;    ENDOSCOPY N/A 2/2/2024    Procedure: ESOPHAGOGASTRODUODENOSCOPY;  Surgeon: Dominik Chase MD;  Location: [facility] ENDOSCOPY;  Service: Gastroenterology;   "Laterality: N/A;    ERCP N/A 2/2/2024    Procedure: ENDOSCOPIC RETROGRADE CHOLANGIOPANCREATOGRAPHY;  Surgeon: Dominik Chase MD;  Location: [facility] ENDOSCOPY;  Service: Gastroenterology;  Laterality: N/A;  Sphincterotomy made to common bile duct (CBD) ampulla. CBD swept with 9-12 mm balloon. ERCP scope removed with plastic cap intact.    HERNIA REPAIR  2010    TONSILLECTOMY  1974       Allergies   Allergen Reactions    Shellfish-Derived Products Anaphylaxis    Codeine Nausea Only     Not sure of reaction but thinks it is just nausea    Shellfish Allergy Swelling     Swelling throat       Family History and Social History reviewed and changed as necessary    REVIEW OF SYSTEM:   Severe fatigue otherwise no specific complaints    PHYSICAL EXAM:  No jaundice icterus or pallor.  No respiratory distress.  Dry skin.    Vitals:    12/06/24 1135   BP: 109/68   Pulse: 109   Resp: 18   Temp: 97.8 °F (36.6 °C)   SpO2: 99%   Weight: 83.9 kg (185 lb)   Height: 167.6 cm (66\")     Vitals:    12/06/24 1135   PainSc: 0-No pain          ECOG score: 0           Vitals reviewed.    ECOG: (0) Fully Active - Able to Carry On All Pre-disease Performance Without Restriction    Lab Results   Component Value Date    HGB 12.8 (L) 11/25/2024    HCT 37.4 (L) 11/25/2024    MCV 88.0 11/25/2024     11/25/2024    WBC 5.23 11/25/2024    NEUTROABS 3.13 11/25/2024    LYMPHSABS 1.50 11/25/2024    MONOSABS 0.48 11/25/2024    EOSABS 0.10 11/25/2024    BASOSABS 0.01 11/25/2024       Lab Results   Component Value Date    GLUCOSE 102 (H) 11/25/2024    BUN 8 11/25/2024    CREATININE 0.99 11/25/2024     (L) 11/25/2024    K 4.0 11/25/2024    CL 98 11/25/2024    CO2 22.0 11/25/2024    CALCIUM 9.0 11/25/2024    PROTEINTOT 7.6 11/25/2024    ALBUMIN 4.2 11/25/2024    BILITOT 0.6 11/25/2024    ALKPHOS 119 (H) 11/25/2024    AST 34 11/25/2024    ALT 28 11/25/2024             ASSESSMENT & PLAN:  1.  Homozygous H63D hemochromatosis gene mutations 8/23/2024 " with ferritin 963, iron low 54, saturation low 18%, and transferrin 196 and total iron binding capacity low 292.  February 2024  AST 81 alkaline phosphatase 484 in the face of acute cholecystitis with laparoscopic cholecystectomy February 1, 2024.  Subsequently normalized March 2024 and July 2024.  February 2024 MRI MRCP showed diffuse gallbladder wall thickening with tiny stones and hepatic steatosis.  Hemoglobin in July with normochromic normocytic indices and normal Red cell distribution width.  2.  Hypertension  3.  Hyperlipidemia  4.  Arthritis  5.  Diabetes  6.  History of diverticular disease.  7.  Hepatic steatosis  8.  Cholecystitis status postcholecystectomy February 2024    -11/25/2024 [facility] hematology consult: Patient had elevated liver enzymes earlier in the year that subsequently normalized after his cholecystectomy.  Dr. Chase performed EGD with his ERCP in February that showed a single 4 mm mucosal papule with no bleeding.  Pathology showed this to be a hyperplastic polyp with surface erosion and reactive changes and body of stomach biopsy showed mild reactive changes.  Gallbladder pathology just showed cholecystitis and cholelithiasis.Presumably due to that liver enzyme elevation is ferritin was checked and noted to be elevated earlier in the year and they checked HFE gene mutation.  This showed homozygous H63D mutation.  This means he does have hemochromatosis but it can have variable penetrance and the confounding factor here is that his ferritin while high in August was associated with a low iron and low iron saturation and yet with a low total iron binding capacity.  Typically hemochromatosis would have elevated iron and iron saturation and the phlebotomy demands that he have adequate iron stores to deplete and bring ferritin down to a goal of 100 or less.  I cannot phlebotomize when his saturation is low.  However, iron deficiency would usually be associated with an elevated total iron  binding capacity where his was low and that suggests anemia of inflammation more than soren iron deficiency.  He is very fatigued and disproportional to his hemoglobin in the 12's and has been unable to work apparently.  His last colonoscopy was over 10 years ago at [facility] and he does not recall the gastroenterologist.  I will get him to our GI doctors for colonoscopy and I will repeat his ferritin, iron panel, and in light of his fatigue we will also check his thyroid functions and megaloblastic panel.  If the endoscopies and labs do not give explanations for the fatigue, we may need to do further imaging.  He did have some fatty liver and his liver enzymes had normalized earlier in the year after his cholecystectomy so I do not think this hemochromatosis gene mutation had anything to do with the liver enzyme elevations.    -11/25/2024 Hemoglobin 12.8 otherwise unremarkable CBC.  Glucose 102 sodium 133 alkaline phosphatase 119 otherwise unremarkable CMP.  Iron panel: Ferritin elevated 1152.  Iron normal 76 saturation normal 26%.  Decreased total iron binding capacity 294.  Decreased transferrin 197.  Hemolytic panel negative: Erythropoietin 19.1 upper limit 18.5.  Haptoglobin elevated to 19.  Reticulocyte count normal 1.29%.  TRACY negative.  Normal bilirubin  Megaloblastic panel negative: B12 normal 747.  Methylmalonic acid normal 203.  Folic acid 6.61.  Homocystine 17 upper limit of normal 15.  TSH 2.18 T40.97 Both normal.    -12/6/2024 [facility] hematology consult: Colonoscopy scheduled for January.  Still very fatigued.  Hemoglobin still normal.  Ferritin elevated with normal iron and therefore a resulting low iron saturation.  He has no evidence of hemolysis or megaloblastic anemia and thyroid functions are normal.  His liver enzymes were squarely normal.  I will check a liver iron quantitation and a sedimentation rate and C-reactive protein to look for inflammatory causes of elevated ferritin and have him  see my nurse practitioner back in a few weeks.  If there is no elevation of liver iron quantitation he could have variable penetrance of his homozygous H63D mutated hemochromatosis and I would not start therapeutic phlebotomy immediately and would just repeat his ferritin and iron panel and CBC and CMP in 3 months.  If the liver iron quantitation is elevated then she will start him on 1 unit phlebotomy monthly with the blood bank holding for hemoglobin less than 11 with follow-up iron panel and ferritin 4 months later.    Total time of care today inclusive of time spent today prior to patient's arrival reviewing plethora of interval data and during visit translating patient putting forth plan as outlined in after visit instituting this plan took 50 minutes patient care time throughout the day today.  Robert Myers MD    12/06/2024

## 2024-12-18 ENCOUNTER — HOSPITAL ENCOUNTER (OUTPATIENT)
Dept: MRI IMAGING | Facility: HOSPITAL | Age: 59
Discharge: HOME | End: 2024-12-18
Admitting: INTERNAL MEDICINE
Payer: MEDICAID

## 2024-12-18 DIAGNOSIS — E83.110 HEMOCHROMATOSIS ASSOCIATED WITH MUTATION IN HFE GENE: ICD-10-CM

## 2024-12-18 PROCEDURE — 74181 MRI ABDOMEN W/O CONTRAST: CPT

## 2024-12-27 ENCOUNTER — OFFICE VISIT (OUTPATIENT)
Dept: ONCOLOGY | Facility: CLINIC | Age: 59
End: 2024-12-27

## 2024-12-27 ENCOUNTER — HOSPITAL ENCOUNTER (EMERGENCY)
Facility: HOSPITAL | Age: 59
Discharge: HOME | End: 2024-12-28
Attending: EMERGENCY MEDICINE
Payer: MEDICAID

## 2024-12-27 ENCOUNTER — APPOINTMENT (OUTPATIENT)
Dept: CT IMAGING | Facility: HOSPITAL | Age: 59
End: 2024-12-27
Payer: MEDICAID

## 2024-12-27 VITALS
DIASTOLIC BLOOD PRESSURE: 61 MMHG | WEIGHT: 177 LBS | BODY MASS INDEX: 28.45 KG/M2 | SYSTOLIC BLOOD PRESSURE: 99 MMHG | HEIGHT: 66 IN | TEMPERATURE: 97.2 F | OXYGEN SATURATION: 100 % | RESPIRATION RATE: 18 BRPM | HEART RATE: 116 BPM

## 2024-12-27 DIAGNOSIS — E83.110 HEMOCHROMATOSIS ASSOCIATED WITH MUTATION IN HFE GENE: Primary | ICD-10-CM

## 2024-12-27 DIAGNOSIS — L02.811 SCALP ABSCESS: Primary | ICD-10-CM

## 2024-12-27 DIAGNOSIS — L03.811 CELLULITIS OF SCALP: ICD-10-CM

## 2024-12-27 LAB
ALBUMIN SERPL-MCNC: 4 G/DL (ref 3.5–5.2)
ALBUMIN/GLOB SERPL: 1.1 G/DL
ALP SERPL-CCNC: 131 U/L (ref 39–117)
ALT SERPL W P-5'-P-CCNC: 24 U/L (ref 1–41)
ANION GAP SERPL CALCULATED.3IONS-SCNC: 14 MMOL/L (ref 5–15)
AST SERPL-CCNC: 31 U/L (ref 1–40)
BASOPHILS # BLD AUTO: 0.01 10*3/MM3 (ref 0–0.2)
BASOPHILS NFR BLD AUTO: 0.2 % (ref 0–1.5)
BILIRUB SERPL-MCNC: 0.6 MG/DL (ref 0–1.2)
BUN SERPL-MCNC: 15 MG/DL (ref 6–20)
BUN/CREAT SERPL: 12.2 (ref 7–25)
CALCIUM SPEC-SCNC: 9.4 MG/DL (ref 8.6–10.5)
CHLORIDE SERPL-SCNC: 96 MMOL/L (ref 98–107)
CO2 SERPL-SCNC: 23 MMOL/L (ref 22–29)
CREAT SERPL-MCNC: 1.23 MG/DL (ref 0.76–1.27)
DEPRECATED RDW RBC AUTO: 45.9 FL (ref 37–54)
EGFRCR SERPLBLD CKD-EPI 2021: 67.6 ML/MIN/1.73
EOSINOPHIL # BLD AUTO: 0.08 10*3/MM3 (ref 0–0.4)
EOSINOPHIL NFR BLD AUTO: 1.6 % (ref 0.3–6.2)
ERYTHROCYTE [DISTWIDTH] IN BLOOD BY AUTOMATED COUNT: 14.6 % (ref 12.3–15.4)
GLOBULIN UR ELPH-MCNC: 3.6 GM/DL
GLUCOSE SERPL-MCNC: 222 MG/DL (ref 65–99)
HCT VFR BLD AUTO: 34.9 % (ref 37.5–51)
HGB BLD-MCNC: 12.1 G/DL (ref 13–17.7)
IMM GRANULOCYTES # BLD AUTO: 0.03 10*3/MM3 (ref 0–0.05)
IMM GRANULOCYTES NFR BLD AUTO: 0.6 % (ref 0–0.5)
LYMPHOCYTES # BLD AUTO: 1.38 10*3/MM3 (ref 0.7–3.1)
LYMPHOCYTES NFR BLD AUTO: 26.8 % (ref 19.6–45.3)
MCH RBC QN AUTO: 30 PG (ref 26.6–33)
MCHC RBC AUTO-ENTMCNC: 34.7 G/DL (ref 31.5–35.7)
MCV RBC AUTO: 86.4 FL (ref 79–97)
MONOCYTES # BLD AUTO: 0.47 10*3/MM3 (ref 0.1–0.9)
MONOCYTES NFR BLD AUTO: 9.1 % (ref 5–12)
NEUTROPHILS NFR BLD AUTO: 3.17 10*3/MM3 (ref 1.7–7)
NEUTROPHILS NFR BLD AUTO: 61.7 % (ref 42.7–76)
NRBC BLD AUTO-RTO: 0 /100 WBC (ref 0–0.2)
PLATELET # BLD AUTO: 141 10*3/MM3 (ref 140–450)
PMV BLD AUTO: 10.1 FL (ref 6–12)
POTASSIUM SERPL-SCNC: 4.4 MMOL/L (ref 3.5–5.2)
PROT SERPL-MCNC: 7.6 G/DL (ref 6–8.5)
RBC # BLD AUTO: 4.04 10*6/MM3 (ref 4.14–5.8)
SODIUM SERPL-SCNC: 133 MMOL/L (ref 136–145)
WBC NRBC COR # BLD AUTO: 5.14 10*3/MM3 (ref 3.4–10.8)

## 2024-12-27 PROCEDURE — 63710000001 ONDANSETRON ODT 4 MG TABLET DISPERSIBLE: Performed by: EMERGENCY MEDICINE

## 2024-12-27 PROCEDURE — 87186 SC STD MICRODIL/AGAR DIL: CPT | Performed by: EMERGENCY MEDICINE

## 2024-12-27 PROCEDURE — 87070 CULTURE OTHR SPECIMN AEROBIC: CPT | Performed by: EMERGENCY MEDICINE

## 2024-12-27 PROCEDURE — 87147 CULTURE TYPE IMMUNOLOGIC: CPT | Performed by: EMERGENCY MEDICINE

## 2024-12-27 PROCEDURE — 25510000001 IOPAMIDOL 61 % SOLUTION: Performed by: EMERGENCY MEDICINE

## 2024-12-27 PROCEDURE — 99285 EMERGENCY DEPT VISIT HI MDM: CPT

## 2024-12-27 PROCEDURE — 87205 SMEAR GRAM STAIN: CPT | Performed by: EMERGENCY MEDICINE

## 2024-12-27 PROCEDURE — 85025 COMPLETE CBC W/AUTO DIFF WBC: CPT | Performed by: EMERGENCY MEDICINE

## 2024-12-27 PROCEDURE — 70491 CT SOFT TISSUE NECK W/DYE: CPT

## 2024-12-27 PROCEDURE — 80053 COMPREHEN METABOLIC PANEL: CPT | Performed by: EMERGENCY MEDICINE

## 2024-12-27 RX ORDER — SODIUM CHLORIDE 0.9 % (FLUSH) 0.9 %
10 SYRINGE (ML) INJECTION AS NEEDED
Status: DISCONTINUED | OUTPATIENT
Start: 2024-12-27 | End: 2024-12-28 | Stop reason: HOSPADM

## 2024-12-27 RX ORDER — ONDANSETRON 4 MG/1
4 TABLET, ORALLY DISINTEGRATING ORAL ONCE
Status: COMPLETED | OUTPATIENT
Start: 2024-12-27 | End: 2024-12-27

## 2024-12-27 RX ORDER — LIDOCAINE HYDROCHLORIDE AND EPINEPHRINE 10; 10 MG/ML; UG/ML
10 INJECTION, SOLUTION INFILTRATION; PERINEURAL ONCE
Status: DISCONTINUED | OUTPATIENT
Start: 2024-12-27 | End: 2024-12-28 | Stop reason: HOSPADM

## 2024-12-27 RX ORDER — IOPAMIDOL 612 MG/ML
85 INJECTION, SOLUTION INTRAVASCULAR
Status: COMPLETED | OUTPATIENT
Start: 2024-12-27 | End: 2024-12-27

## 2024-12-27 RX ORDER — DOXYCYCLINE 100 MG/1
100 CAPSULE ORAL ONCE
Status: COMPLETED | OUTPATIENT
Start: 2024-12-27 | End: 2024-12-27

## 2024-12-27 RX ORDER — HYDROCODONE BITARTRATE AND ACETAMINOPHEN 7.5; 325 MG/1; MG/1
1 TABLET ORAL ONCE
Status: COMPLETED | OUTPATIENT
Start: 2024-12-27 | End: 2024-12-27

## 2024-12-27 RX ADMIN — Medication 3 ML: at 22:11

## 2024-12-27 RX ADMIN — HYDROCODONE BITARTRATE AND ACETAMINOPHEN 1 TABLET: 7.5; 325 TABLET ORAL at 19:00

## 2024-12-27 RX ADMIN — ONDANSETRON 4 MG: 4 TABLET, ORALLY DISINTEGRATING ORAL at 19:00

## 2024-12-27 RX ADMIN — DOXYCYCLINE 100 MG: 100 CAPSULE ORAL at 19:00

## 2024-12-27 RX ADMIN — AMOXICILLIN AND CLAVULANATE POTASSIUM 1 TABLET: 875; 125 TABLET, FILM COATED ORAL at 19:00

## 2024-12-27 RX ADMIN — IOPAMIDOL 85 ML: 612 INJECTION, SOLUTION INTRAVENOUS at 19:40

## 2024-12-27 NOTE — PROGRESS NOTES
CHIEF COMPLAINT: 1.  Significant fatigue    2.  Knot behind the right ear that has been draining    Problem List:  Oncology/Hematology History Overview Note   1.  Homozygous H63D hemochromatosis gene mutations 8/23/2024 with ferritin 963, iron low 54, saturation low 18%, and transferrin 196 and total iron binding capacity low 292.  February 2024  AST 81 alkaline phosphatase 484 in the face of acute cholecystitis with laparoscopic cholecystectomy February 1, 2024.  Subsequently normalized March 2024 and July 2024.  February 2024 MRI MRCP showed diffuse gallbladder wall thickening with tiny stones and hepatic steatosis.  Hemoglobin in July with normochromic normocytic indices and normal Red cell distribution width.  2.  Hypertension  3.  Hyperlipidemia  4.  Arthritis  5.  Diabetes  6.  History of diverticular disease.  7.  Hepatic steatosis  8.  Cholecystitis status postcholecystectomy February 2024    -11/25/2024 [facility] hematology consult: Patient had elevated liver enzymes earlier in the year that subsequently normalized after his cholecystectomy.  Dr. Chase performed EGD with his ERCP in February that showed a single 4 mm mucosal papule with no bleeding.  Pathology showed this to be a hyperplastic polyp with surface erosion and reactive changes and body of stomach biopsy showed mild reactive changes.  Gallbladder pathology just showed cholecystitis and cholelithiasis.Presumably due to that liver enzyme elevation is ferritin was checked and noted to be elevated earlier in the year and they checked HFE gene mutation.  This showed homozygous H63D mutation.  This means he does have hemochromatosis but it can have variable penetrance and the confounding factor here is that his ferritin while high in August was associated with a low iron and low iron saturation and yet with a low total iron binding capacity.  Typically hemochromatosis would have elevated iron and iron saturation and the phlebotomy demands that he  have adequate iron stores to deplete and bring ferritin down to a goal of 100 or less.  I cannot phlebotomize when his saturation is low.  However, iron deficiency would usually be associated with an elevated total iron binding capacity where his was low and that suggests anemia of inflammation more than soren iron deficiency.  He is very fatigued and disproportional to his hemoglobin in the 12's and has been unable to work apparently.  His last colonoscopy was over 10 years ago at [facility] and he does not recall the gastroenterologist.  I will get him to our GI doctors for colonoscopy and I will repeat his ferritin, iron panel, and in light of his fatigue we will also check his thyroid functions and megaloblastic panel.  If the endoscopies and labs do not give explanations for the fatigue, we may need to do further imaging.  He did have some fatty liver and his liver enzymes had normalized earlier in the year after his cholecystectomy so I do not think this hemochromatosis gene mutation had anything to do with the liver enzyme elevations.    -11/25/2024 Hemoglobin 12.8 otherwise unremarkable CBC.  Glucose 102 sodium 133 alkaline phosphatase 119 otherwise unremarkable CMP.  Iron panel: Ferritin elevated 1152.  Iron normal 76 saturation normal 26%.  Decreased total iron binding capacity 294.  Decreased transferrin 197.  Hemolytic panel negative: Erythropoietin 19.1 upper limit 18.5.  Haptoglobin elevated to 19.  Reticulocyte count normal 1.29%.  TRACY negative.  Normal bilirubin  Megaloblastic panel negative: B12 normal 747.  Methylmalonic acid normal 203.  Folic acid 6.61.  Homocystine 17 upper limit of normal 15.  TSH 2.18 T40.97 Both normal.    -12/6/2024 [facility] hematology consult: Colonoscopy scheduled for January.  Still very fatigued.  Hemoglobin still normal.  Ferritin elevated with normal iron and therefore a resulting low iron saturation.  He has no evidence of hemolysis or megaloblastic anemia and  thyroid functions are normal.  His liver enzymes were squarely normal.  I will check a liver iron quantitation and a sedimentation rate and C-reactive protein to look for inflammatory causes of elevated ferritin and have him see my nurse practitioner back in a few weeks.  If there is no elevation of liver iron quantitation he could have variable penetrance of his homozygous H63D mutated hemochromatosis and I would not start therapeutic phlebotomy immediately and would just repeat his ferritin and iron panel and CBC and CMP in 3 months.  If the liver iron quantitation is elevated then she will start him on 1 unit phlebotomy monthly with the blood bank holding for hemoglobin less than 11 with follow-up iron panel and ferritin 4 months later.    -12/18/2024 MRI abdomen shows mild iron deposition in the liver (3.1 mg iron per gram dry weight liver).  Signal loss on out of phase imaging also suggests hepatic steatosis.  Stable mild splenomegaly.     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 male, here for follow up on management of homozygous H63D mutated hemochromatosis gene with elevated ferritin but with normal iron and decreased iron saturation with normal liver enzymes and normal hemoglobin with debilitating fatigue thus far unexplained.  Malcolm is here today to go over the results of his MRI abdomen.  He continues to report debilitating fatigue stating that he can sleep up to 14 hours a day.  He denies any fevers or chills.  He states that he noticed a sore appear behind his right ear around Thanksgiving that would drain and then go away but it has been recurring, over the last 2 days it has been significantly swollen.  It is mildly tender.  In addition to this nodule he has 2 other pustular places, 1 on the right side of his neck and one at the base of his neck and the back.  He reports when the large nodule behind his right ear drains it is  serous fluid.    Past Medical History:   Diagnosis Date    Allergic rhinitis     Anxiety     Dermatitis 8/2/2016    Diabetes mellitus     Diverticulitis     Gastroesophageal reflux disease 7/11/2016    GERD (gastroesophageal reflux disease)     History of alcohol abuse     Hypertension     Insomnia 7/11/2016    Visual impairment 7/11/2016    Vitamin D deficiency 7/11/2016     Past Surgical History:   Procedure Laterality Date    APPENDECTOMY  1995    CHOLECYSTECTOMY WITH INTRAOPERATIVE CHOLANGIOGRAM N/A 2/1/2024    Procedure: CHOLECYSTECTOMY LAPAROSCOPIC WITH INTRAOPERATIVE CHOLANGIOGRAM, UMBILICAL HERNIA REPAIR;  Surgeon: Adam Ramos MD;  Location:  [facility];  Service: General;  Laterality: N/A;    ENDOSCOPY N/A 2/2/2024    Procedure: ESOPHAGOGASTRODUODENOSCOPY;  Surgeon: Dominik Chase MD;  Location:  [facility];  Service: Gastroenterology;  Laterality: N/A;    ERCP N/A 2/2/2024    Procedure: ENDOSCOPIC RETROGRADE CHOLANGIOPANCREATOGRAPHY;  Surgeon: Dominik Chase MD;  Location:  [facility];  Service: Gastroenterology;  Laterality: N/A;  Sphincterotomy made to common bile duct (CBD) ampulla. CBD swept with 9-12 mm balloon. ERCP scope removed with plastic cap intact.    HERNIA REPAIR  2010    TONSILLECTOMY  1974       Allergies   Allergen Reactions    Shellfish-Derived Products Anaphylaxis    Codeine Nausea Only     Not sure of reaction but thinks it is just nausea    Shellfish Allergy Swelling     Swelling throat       Family History and Social History reviewed and changed as necessary    REVIEW OF SYSTEM:   Severe fatigue   Large knot behind his right ear    PHYSICAL EXAM:  Well-developed appearing male in no distress  Large, soft, fluctuating nodule behind the right ear with scab, only mildly tender, approximately the size of a small leg  2 pustular scabs noted on the neck, 1 on the right side of the neck and one at the base of the neck posteriorly    Vitals:    12/27/24 1305   BP: 99/61   Pulse:  "116   Resp: 18   Temp: 97.2 °F (36.2 °C)   SpO2: 100%   Weight: 80.3 kg (177 lb)   Height: 167.6 cm (66\")     Vitals:    12/27/24 1305   PainSc: 0-No pain  Comment: Knot on back of head x2 wks          ECOG score: 0           Vitals reviewed.  Reviewed results of MRI abdomen 12/18/2024    ECOG: (0) Fully Active - Able to Carry On All Pre-disease Performance Without Restriction    Lab Results   Component Value Date    HGB 12.8 (L) 11/25/2024    HCT 37.4 (L) 11/25/2024    MCV 88.0 11/25/2024     11/25/2024    WBC 5.23 11/25/2024    NEUTROABS 3.13 11/25/2024    LYMPHSABS 1.50 11/25/2024    MONOSABS 0.48 11/25/2024    EOSABS 0.10 11/25/2024    BASOSABS 0.01 11/25/2024       Lab Results   Component Value Date    GLUCOSE 102 (H) 11/25/2024    BUN 8 11/25/2024    CREATININE 0.99 11/25/2024     (L) 11/25/2024    K 4.0 11/25/2024    CL 98 11/25/2024    CO2 22.0 11/25/2024    CALCIUM 9.0 11/25/2024    PROTEINTOT 7.6 11/25/2024    ALBUMIN 4.2 11/25/2024    BILITOT 0.6 11/25/2024    ALKPHOS 119 (H) 11/25/2024    AST 34 11/25/2024    ALT 28 11/25/2024             ASSESSMENT & PLAN:  1.  Homozygous H63D hemochromatosis gene mutations 8/23/2024 with ferritin 963, iron low 54, saturation low 18%, and transferrin 196 and total iron binding capacity low 292.  February 2024  AST 81 alkaline phosphatase 484 in the face of acute cholecystitis with laparoscopic cholecystectomy February 1, 2024.  Subsequently normalized March 2024 and July 2024.  February 2024 MRI MRCP showed diffuse gallbladder wall thickening with tiny stones and hepatic steatosis.  Hemoglobin in July with normochromic normocytic indices and normal Red cell distribution width.  2.  Hypertension  3.  Hyperlipidemia  4.  Arthritis  5.  Diabetes  6.  History of diverticular disease.  7.  Hepatic steatosis  8.  Cholecystitis status postcholecystectomy February 2024  9.  Skin infection    -11/25/2024 [facility] hematology consult: Patient had elevated liver " enzymes earlier in the year that subsequently normalized after his cholecystectomy.  Dr. Chase performed EGD with his ERCP in February that showed a single 4 mm mucosal papule with no bleeding.  Pathology showed this to be a hyperplastic polyp with surface erosion and reactive changes and body of stomach biopsy showed mild reactive changes.  Gallbladder pathology just showed cholecystitis and cholelithiasis.Presumably due to that liver enzyme elevation is ferritin was checked and noted to be elevated earlier in the year and they checked HFE gene mutation.  This showed homozygous H63D mutation.  This means he does have hemochromatosis but it can have variable penetrance and the confounding factor here is that his ferritin while high in August was associated with a low iron and low iron saturation and yet with a low total iron binding capacity.  Typically hemochromatosis would have elevated iron and iron saturation and the phlebotomy demands that he have adequate iron stores to deplete and bring ferritin down to a goal of 100 or less.  I cannot phlebotomize when his saturation is low.  However, iron deficiency would usually be associated with an elevated total iron binding capacity where his was low and that suggests anemia of inflammation more than soren iron deficiency.  He is very fatigued and disproportional to his hemoglobin in the 12's and has been unable to work apparently.  His last colonoscopy was over 10 years ago at [facility] and he does not recall the gastroenterologist.  I will get him to our GI doctors for colonoscopy and I will repeat his ferritin, iron panel, and in light of his fatigue we will also check his thyroid functions and megaloblastic panel.  If the endoscopies and labs do not give explanations for the fatigue, we may need to do further imaging.  He did have some fatty liver and his liver enzymes had normalized earlier in the year after his cholecystectomy so I do not think this  hemochromatosis gene mutation had anything to do with the liver enzyme elevations.    -11/25/2024 Hemoglobin 12.8 otherwise unremarkable CBC.  Glucose 102 sodium 133 alkaline phosphatase 119 otherwise unremarkable CMP.  Iron panel: Ferritin elevated 1152.  Iron normal 76 saturation normal 26%.  Decreased total iron binding capacity 294.  Decreased transferrin 197.  Hemolytic panel negative: Erythropoietin 19.1 upper limit 18.5.  Haptoglobin elevated to 19.  Reticulocyte count normal 1.29%.  TRACY negative.  Normal bilirubin  Megaloblastic panel negative: B12 normal 747.  Methylmalonic acid normal 203.  Folic acid 6.61.  Homocystine 17 upper limit of normal 15.  TSH 2.18 T40.97 Both normal.    -12/6/2024 [facility] hematology consult: Colonoscopy scheduled for January.  Still very fatigued.  Hemoglobin still normal.  Ferritin elevated with normal iron and therefore a resulting low iron saturation.  He has no evidence of hemolysis or megaloblastic anemia and thyroid functions are normal.  His liver enzymes were squarely normal.  I will check a liver iron quantitation and a sedimentation rate and C-reactive protein to look for inflammatory causes of elevated ferritin and have him see my nurse practitioner back in a few weeks.  If there is no elevation of liver iron quantitation he could have variable penetrance of his homozygous H63D mutated hemochromatosis and I would not start therapeutic phlebotomy immediately and would just repeat his ferritin and iron panel and CBC and CMP in 3 months.  If the liver iron quantitation is elevated then she will start him on 1 unit phlebotomy monthly with the blood bank holding for hemoglobin less than 11 with follow-up iron panel and ferritin 4 months later.    -12/18/2024 MRI abdomen shows mild iron deposition in the liver (3.1 mg iron per gram dry weight liver).  Signal loss on out of phase imaging also suggests hepatic steatosis.  Stable mild splenomegaly.  -12/27/2024 [facility]  hematology clinic follow-up: MRI abdomen shows mild iron deposition in the liver as outlined above.  We will get him started on phlebotomy, I will do weekly phlebotomy for 4 weeks and then monthly and hold for hemoglobin less than 11.  We will get this arranged at the [place] [facility].  We will repeat his CBC, CMP, ferritin and iron profile prior to return in 3 months and I have ordered those today.  We discussed that hopefully this will help his fatigue.  In regards to his large swollen likely infected skin lesion behind his right ear I have instructed him to go directly to the urgent care center after our appointment as this needs to be lanced and drained and cultured.  He states understanding and assures me he will get this looked at today.  He has an appointment with Dr. Mccrary in January.    I spent 31 minutes caring for Malcolm on this date of service. This time includes time spent by me in the following activities: preparing for the visit, reviewing tests, performing a medically appropriate examination and/or evaluation, counseling and educating the patient/family/caregiver, ordering medications, tests, or procedures, referring and communicating with other health care professionals, documenting information in the medical record, independently interpreting results and communicating that information with the patient/family/caregiver, care coordination, and discussed plan of care regarding phlebotomy and results of MRI abdomen with Dr. Robert Myers at time of visit .     Sima Altamirano, APRN    12/27/2024

## 2024-12-27 NOTE — ED PROVIDER NOTES
Subjective   History of Present Illness  Pleasant patient who presents the ER for draining wound to his right posterior scalp behind his ear.  Tells me it has been there since around Thanksgiving and started draining today.  Sent from [facility] for further evaluation.  Does have a history of diabetes and a blood disorder which she follows hematology for.  Denies any fevers chills.        Review of Systems    Past Medical History:   Diagnosis Date    Allergic rhinitis     Anxiety     Dermatitis 8/2/2016    Diabetes mellitus     Diverticulitis     Gastroesophageal reflux disease 7/11/2016    GERD (gastroesophageal reflux disease)     History of alcohol abuse     Hypertension     Insomnia 7/11/2016    Visual impairment 7/11/2016    Vitamin D deficiency 7/11/2016       Allergies   Allergen Reactions    Shellfish-Derived Products Anaphylaxis    Codeine Nausea Only     Not sure of reaction but thinks it is just nausea    Shellfish Allergy Swelling     Swelling throat       Past Surgical History:   Procedure Laterality Date    APPENDECTOMY  1995    CHOLECYSTECTOMY WITH INTRAOPERATIVE CHOLANGIOGRAM N/A 2/1/2024    Procedure: CHOLECYSTECTOMY LAPAROSCOPIC WITH INTRAOPERATIVE CHOLANGIOGRAM, UMBILICAL HERNIA REPAIR;  Surgeon: Adam Ramos MD;  Location:  [facility];  Service: General;  Laterality: N/A;    ENDOSCOPY N/A 2/2/2024    Procedure: ESOPHAGOGASTRODUODENOSCOPY;  Surgeon: Dominik Chase MD;  Location:  [facility];  Service: Gastroenterology;  Laterality: N/A;    ERCP N/A 2/2/2024    Procedure: ENDOSCOPIC RETROGRADE CHOLANGIOPANCREATOGRAPHY;  Surgeon: Dominik Chase MD;  Location:  [facility];  Service: Gastroenterology;  Laterality: N/A;  Sphincterotomy made to common bile duct (CBD) ampulla. CBD swept with 9-12 mm balloon. ERCP scope removed with plastic cap intact.    HERNIA REPAIR  2010    TONSILLECTOMY  1974       Family History   Problem Relation Age of Onset    Hypertension Mother     Multiple myeloma Mother      Hypertension Father     Alcohol abuse Father     Hypertension Maternal Grandmother     Hypertension Maternal Grandfather     Diabetes Paternal Grandmother     Hypertension Paternal Grandmother     Hypertension Paternal Grandfather     Thyroid disease Other        Social History     Socioeconomic History    Marital status: Single   Tobacco Use    Smoking status: Never     Passive exposure: Never    Smokeless tobacco: Never    Tobacco comments:     Pt was diagnosed hemochromatosis March 2024   Vaping Use    Vaping status: Never Used   Substance and Sexual Activity    Alcohol use: No     Comment: sober since 2015    Drug use: No    Sexual activity: Defer           Objective   Physical Exam  Constitutional:       General: He is not in acute distress.     Appearance: He is not ill-appearing or toxic-appearing.   HENT:      Head:      Comments: 3 cm abscess currently draining.  With scant area of cellulitis and tenderness right posterior scalp.  Cardiovascular:      Rate and Rhythm: Normal rate.   Pulmonary:      Effort: Pulmonary effort is normal. No respiratory distress.   Abdominal:      Tenderness: There is no abdominal tenderness.   Musculoskeletal:         General: Normal range of motion.      Cervical back: Normal range of motion.   Neurological:      Mental Status: He is alert and oriented to person, place, and time.   Psychiatric:         Mood and Affect: Mood normal.         Behavior: Behavior normal.         Thought Content: Thought content normal.         Judgment: Judgment normal.         Incision & Drainage    Date/Time: 12/28/2024 12:04 AM    Performed by: Dominik Mason APRN  Authorized by: Nick Cramer MD    Consent:     Consent obtained:  Verbal    Consent given by:  Patient    Risks discussed:  Bleeding, damage to other organs, incomplete drainage, infection and pain    Alternatives discussed:  No treatment  Universal protocol:     Procedure explained and questions answered to patient or proxy's  satisfaction: yes      Patient identity confirmed:  Verbally with patient and arm band  Location:     Type:  Abscess (Right scalp)    Size:  3cm    Location:  Head    Head location:  Scalp  Pre-procedure details:     Skin preparation:  Povidone-iodine  Anesthesia:     Anesthesia method:  Local infiltration and topical application    Topical anesthetic:  LET    Local anesthetic:  Lidocaine 1% WITH epi  Procedure type:     Complexity:  Complex  Procedure details:     Needle aspiration: yes      Needle size:  18 G    Incision types:  Stab incision    Incision depth:  Dermal    Wound management:  Probed and deloculated, irrigated with saline and extensive cleaning    Drainage:  Bloody    Drainage amount:  Moderate    Wound treatment:  Wound left open    Packing materials:  None  Post-procedure details:     Procedure completion:  Tolerated  Comments:      Abscess became flat.  Much less erythema.  Patient tolerated procedure well.  No packing or vessel loop needed.  1 cm incision made.  Wound was irrigated copiously with normal saline and Betadine.  Will also get a wound culture as well.             ED Course  ED Course as of 12/28/24 0020   Fri Dec 27, 2024   1614 Will need to get lab work considering his blood disorder and his diabetes.  Will need a CAT scan for further evaluation.  Check renal functions.  Antibiotics and we will perform an incision and drainage later. [JM]   2235 Awaiting incision and drainage tray will drain the area [JM]   Sat Dec 28, 2024   0006 Patient resting comfortably for duration of ER stay.  Antibiotics pain medication.  Patient well aware the signs of worse condition.  All thankful and agreeable.  Patient has been very patient for the duration of his stay. [JM]      ED Course User Index  [JM] Dominik Mason APRN                                           CT Soft Tissue Neck With Contrast   Final Result   Impression:   Incompletely imaged rim-enhancing fluid collection along the right  posterior scalp measuring approximately 3 cm, suspicious for abscess.. There is mild adjacent inflammatory change without extension of inflammation into the deep spaces of the neck.            Electronically Signed: Darien Stuart     12/27/2024 7:51 PM EST     Workstation ID: [id number]            TAMRA reviewed by Nick Cramer MD       Medical Decision Making      Final diagnoses:   Scalp abscess   Cellulitis of scalp       ED Disposition  ED Disposition       ED Disposition   Discharge    Condition   Stable    Comment   --               Sita Chase, APRN  [address] [facility]  [address]  [place] [id number]  [phone]      If symptoms worsen    [facility] EMERGENCY DEPARTMENT  [address] [facility] [id number] [phone]             Medication List        New Prescriptions      amoxicillin-clavulanate 875-125 MG per tablet  Commonly known as: AUGMENTIN  Take 1 tablet by mouth 2 (Two) Times a Day.     doxycycline 100 MG capsule  Commonly known as: MONODOX  Take 1 capsule by mouth 2 (Two) Times a Day.     ondansetron ODT 4 MG disintegrating tablet  Commonly known as: ZOFRAN-ODT  Take 1 tablet by mouth Every 6 (Six) Hours As Needed for Nausea or Vomiting.     oxyCODONE-acetaminophen 5-325 MG per tablet  Commonly known as: PERCOCET  Take 1 tablet by mouth Every 6 (Six) Hours As Needed for Severe Pain.            Stop      cephalexin 500 MG capsule  Commonly known as: KEFLEX               Where to Get Your Medications        These medications were sent to [address] [employer] [facility] - [phone]  - [phone]   [address] [facility] [address]      Phone: [phone]   amoxicillin-clavulanate 875-125 MG per tablet  doxycycline 100 MG capsule  ondansetron ODT 4 MG disintegrating tablet  oxyCODONE-acetaminophen 5-325 MG per tablet            Dominik Mason, JOANA  12/28/24 0020

## 2024-12-28 ENCOUNTER — PATIENT ROUNDING (BHMG ONLY) (OUTPATIENT)
Dept: URGENT CARE | Facility: CLINIC | Age: 59
End: 2024-12-28

## 2024-12-28 VITALS
HEART RATE: 68 BPM | WEIGHT: 175 LBS | DIASTOLIC BLOOD PRESSURE: 64 MMHG | SYSTOLIC BLOOD PRESSURE: 121 MMHG | BODY MASS INDEX: 28.12 KG/M2 | RESPIRATION RATE: 18 BRPM | HEIGHT: 66 IN | TEMPERATURE: 98 F | OXYGEN SATURATION: 98 %

## 2024-12-28 RX ORDER — DOXYCYCLINE 100 MG/1
100 CAPSULE ORAL 2 TIMES DAILY
Qty: 20 CAPSULE | Refills: 0 | Status: SHIPPED | OUTPATIENT
Start: 2024-12-28

## 2024-12-28 RX ORDER — ONDANSETRON 4 MG/1
4 TABLET, ORALLY DISINTEGRATING ORAL EVERY 6 HOURS PRN
Qty: 12 TABLET | Refills: 0 | Status: SHIPPED | OUTPATIENT
Start: 2024-12-28

## 2024-12-28 RX ORDER — OXYCODONE AND ACETAMINOPHEN 5; 325 MG/1; MG/1
1 TABLET ORAL EVERY 6 HOURS PRN
Qty: 12 TABLET | Refills: 0 | Status: SHIPPED | OUTPATIENT
Start: 2024-12-28

## 2024-12-29 ENCOUNTER — TELEPHONE (OUTPATIENT)
Dept: EMERGENCY DEPT | Facility: HOSPITAL | Age: 59
End: 2024-12-29

## 2024-12-30 LAB
BACTERIA SPEC AEROBE CULT: ABNORMAL
GRAM STN SPEC: ABNORMAL
GRAM STN SPEC: ABNORMAL

## 2025-01-03 ENCOUNTER — HOSPITAL ENCOUNTER (EMERGENCY)
Facility: HOSPITAL | Age: 60
Discharge: HOME | End: 2025-01-03
Attending: EMERGENCY MEDICINE
Payer: MEDICAID

## 2025-01-03 VITALS
DIASTOLIC BLOOD PRESSURE: 59 MMHG | HEIGHT: 65 IN | OXYGEN SATURATION: 99 % | RESPIRATION RATE: 18 BRPM | SYSTOLIC BLOOD PRESSURE: 110 MMHG | WEIGHT: 175 LBS | BODY MASS INDEX: 29.16 KG/M2 | TEMPERATURE: 97.3 F | HEART RATE: 72 BPM

## 2025-01-03 DIAGNOSIS — I95.81 HYPOTENSION AFTER PROCEDURE: Primary | ICD-10-CM

## 2025-01-03 DIAGNOSIS — Z86.39 HISTORY OF HEMOCHROMATOSIS: ICD-10-CM

## 2025-01-03 LAB
ALBUMIN SERPL-MCNC: 3.5 G/DL (ref 3.5–5.2)
ALBUMIN/GLOB SERPL: 1.1 G/DL
ALP SERPL-CCNC: 111 U/L (ref 39–117)
ALT SERPL W P-5'-P-CCNC: 17 U/L (ref 1–41)
ANION GAP SERPL CALCULATED.3IONS-SCNC: 14.1 MMOL/L (ref 5–15)
AST SERPL-CCNC: 29 U/L (ref 1–40)
BASOPHILS # BLD AUTO: 0.01 10*3/MM3 (ref 0–0.2)
BASOPHILS NFR BLD AUTO: 0.2 % (ref 0–1.5)
BILIRUB SERPL-MCNC: 0.4 MG/DL (ref 0–1.2)
BUN SERPL-MCNC: 12 MG/DL (ref 6–20)
BUN/CREAT SERPL: 9.8 (ref 7–25)
CALCIUM SPEC-SCNC: 8.5 MG/DL (ref 8.6–10.5)
CHLORIDE SERPL-SCNC: 96 MMOL/L (ref 98–107)
CO2 SERPL-SCNC: 22.9 MMOL/L (ref 22–29)
CREAT SERPL-MCNC: 1.22 MG/DL (ref 0.76–1.27)
DEPRECATED RDW RBC AUTO: 47.9 FL (ref 37–54)
EGFRCR SERPLBLD CKD-EPI 2021: 68.3 ML/MIN/1.73
EOSINOPHIL # BLD AUTO: 0.06 10*3/MM3 (ref 0–0.4)
EOSINOPHIL NFR BLD AUTO: 1.4 % (ref 0.3–6.2)
ERYTHROCYTE [DISTWIDTH] IN BLOOD BY AUTOMATED COUNT: 14.7 % (ref 12.3–15.4)
GLOBULIN UR ELPH-MCNC: 3.2 GM/DL
GLUCOSE BLDC GLUCOMTR-MCNC: 301 MG/DL (ref 70–130)
GLUCOSE SERPL-MCNC: 303 MG/DL (ref 65–99)
HCT VFR BLD AUTO: 31.7 % (ref 37.5–51)
HGB BLD-MCNC: 10.7 G/DL (ref 13–17.7)
IMM GRANULOCYTES # BLD AUTO: 0.04 10*3/MM3 (ref 0–0.05)
IMM GRANULOCYTES NFR BLD AUTO: 0.9 % (ref 0–0.5)
LYMPHOCYTES # BLD AUTO: 1.4 10*3/MM3 (ref 0.7–3.1)
LYMPHOCYTES NFR BLD AUTO: 31.5 % (ref 19.6–45.3)
MCH RBC QN AUTO: 29.5 PG (ref 26.6–33)
MCHC RBC AUTO-ENTMCNC: 33.8 G/DL (ref 31.5–35.7)
MCV RBC AUTO: 87.3 FL (ref 79–97)
MONOCYTES # BLD AUTO: 0.42 10*3/MM3 (ref 0.1–0.9)
MONOCYTES NFR BLD AUTO: 9.5 % (ref 5–12)
NEUTROPHILS NFR BLD AUTO: 2.51 10*3/MM3 (ref 1.7–7)
NEUTROPHILS NFR BLD AUTO: 56.5 % (ref 42.7–76)
PLATELET # BLD AUTO: 134 10*3/MM3 (ref 140–450)
PMV BLD AUTO: 11.4 FL (ref 6–12)
POTASSIUM SERPL-SCNC: 3.7 MMOL/L (ref 3.5–5.2)
PROT SERPL-MCNC: 6.7 G/DL (ref 6–8.5)
RBC # BLD AUTO: 3.63 10*6/MM3 (ref 4.14–5.8)
SODIUM SERPL-SCNC: 133 MMOL/L (ref 136–145)
WBC NRBC COR # BLD AUTO: 4.44 10*3/MM3 (ref 3.4–10.8)

## 2025-01-03 PROCEDURE — 80053 COMPREHEN METABOLIC PANEL: CPT

## 2025-01-03 PROCEDURE — 25810000003 SODIUM CHLORIDE 0.9 % SOLUTION

## 2025-01-03 PROCEDURE — 99283 EMERGENCY DEPT VISIT LOW MDM: CPT

## 2025-01-03 PROCEDURE — 85025 COMPLETE CBC W/AUTO DIFF WBC: CPT

## 2025-01-03 PROCEDURE — 82948 REAGENT STRIP/BLOOD GLUCOSE: CPT

## 2025-01-03 RX ORDER — SODIUM CHLORIDE 0.9 % (FLUSH) 0.9 %
10 SYRINGE (ML) INJECTION AS NEEDED
Status: DISCONTINUED | OUTPATIENT
Start: 2025-01-03 | End: 2025-01-03 | Stop reason: HOSPADM

## 2025-01-03 RX ADMIN — SODIUM CHLORIDE 1000 ML: 9 INJECTION, SOLUTION INTRAVENOUS at 15:18

## 2025-01-03 NOTE — FSED PROVIDER NOTE
CHIEF COMPLAINT  Hypotension     HISTORY OF PRESENT ILLNESS:  Malcolm Costa is a 59 y.o. male who presents with low blood pressure, and fatigue after having phlebotomy at 10 AM for hemochromatosis.  This was his first phlebotomy procedure.  He was diagnosed 1 month ago.  States that the phlebotomy clinic did try to feed him and improve his vitals and symptoms but were unable to due to the symptoms urgent care.  Urgent care was unable to assist him so they sent him to the emergency department.  Upon arrival his blood pressure is soft with 100 systolically and he is fatigued.  He denies any recent illness fever, chills, nausea, vomiting, diarrhea.  States he felt fine before the procedure.      PAST MEDICAL HISTORY  Past Medical History:   Diagnosis Date    Allergic rhinitis     Anxiety     Dermatitis 8/2/2016    Diabetes mellitus     Diverticulitis     Gastroesophageal reflux disease 7/11/2016    GERD (gastroesophageal reflux disease)     History of alcohol abuse     Hypertension     Insomnia 7/11/2016    Visual impairment 7/11/2016    Vitamin D deficiency 7/11/2016     Reviewed the imported nursing notes    PAST SURGICAL HISTORY  Past Surgical History:   Procedure Laterality Date    APPENDECTOMY  1995    CHOLECYSTECTOMY WITH INTRAOPERATIVE CHOLANGIOGRAM N/A 2/1/2024    Procedure: CHOLECYSTECTOMY LAPAROSCOPIC WITH INTRAOPERATIVE CHOLANGIOGRAM, UMBILICAL HERNIA REPAIR;  Surgeon: Adam Ramos MD;  Location: [facility] OR;  Service: General;  Laterality: N/A;    ENDOSCOPY N/A 2/2/2024    Procedure: ESOPHAGOGASTRODUODENOSCOPY;  Surgeon: Dominik Chase MD;  Location:  [facility];  Service: Gastroenterology;  Laterality: N/A;    ERCP N/A 2/2/2024    Procedure: ENDOSCOPIC RETROGRADE CHOLANGIOPANCREATOGRAPHY;  Surgeon: Dominik Chase MD;  Location:  [facility];  Service: Gastroenterology;  Laterality: N/A;  Sphincterotomy made to common bile duct (CBD) ampulla. CBD swept with 9-12 mm balloon. ERCP scope removed with  "plastic cap intact.    HERNIA REPAIR  2010    TONSILLECTOMY  1974     Reviewed the imported nursing notes    ALLERGIES  Allergies   Allergen Reactions    Shellfish-Derived Products Anaphylaxis    Codeine Nausea Only     Not sure of reaction but thinks it is just nausea    Shellfish Allergy Swelling     Swelling throat       MEDICATIONS       Medication List        CONTINUE taking these medications      accu-chek soft touch lancets  Used to test blood sugar for Diabetes E11.65 test up to twice a day     amLODIPine 10 MG tablet  Commonly known as: NORVASC  Take 1 tablet by mouth once daily     amoxicillin-clavulanate 875-125 MG per tablet  Commonly known as: AUGMENTIN  Take 1 tablet by mouth 2 (Two) Times a Day.     atorvastatin 10 MG tablet  Commonly known as: LIPITOR  Take 1 tablet by mouth Daily.     * buPROPion  MG 24 hr tablet  Commonly known as: Wellbutrin XL  Take 1 tablet by mouth Daily.     * buPROPion  MG 24 hr tablet  Commonly known as: WELLBUTRIN XL     citalopram 20 MG tablet  Commonly known as: CeleXA  Take 1 tablet by mouth Daily.     doxycycline 100 MG capsule  Commonly known as: MONODOX  Take 1 capsule by mouth 2 (Two) Times a Day.     FreeStyle Dagoberto 2 Smoot device  1 Device Take As Directed.     FreeStyle Dagoberto 2 Sensor misc  Use 1 Device Every 14 (Fourteen) Days.     glipizide 5 MG tablet  Commonly known as: GLUCOTROL  TAKE 1 TABLET BY MOUTH ONCE DAILY IN THE MORNING     glucose blood test strip  Use as instructed     indomethacin 25 MG capsule  Commonly known as: INDOCIN  Take 1 capsule by mouth 3 (Three) Times a Day As Needed for Mild Pain.     * Insulin Syringe-Needle U-100 28G X 5/16\" 1 ML misc  Use 1 Device 2 (Two) Times a Day.     * RELION INSULIN SYRINGE 1ML/31G 31G X 5/16\" 1 ML misc  Generic drug: Insulin Syringe-Needle U-100     lisinopril 40 MG tablet  Commonly known as: PRINIVIL,ZESTRIL  Take 1 tablet by mouth once daily     metFORMIN  MG 24 hr tablet  Commonly known " as: GLUCOPHAGE-XR  TAKE 2 TABLETS BY MOUTH ONCE DAILY WITH BREAKFAST     NovoLIN N ReliOn 100 UNIT/ML injection  Generic drug: insulin NPH  INJECT 10 UNITS WITH MORNING MEAL AND 5 UNITS WITH EVENING MEAL     omeprazole 20 MG capsule  Commonly known as: priLOSEC  Take 1 capsule by mouth once daily     ondansetron ODT 4 MG disintegrating tablet  Commonly known as: ZOFRAN-ODT  Take 1 tablet by mouth Every 6 (Six) Hours As Needed for Nausea or Vomiting.     oxyCODONE-acetaminophen 5-325 MG per tablet  Commonly known as: PERCOCET  Take 1 tablet by mouth Every 6 (Six) Hours As Needed for Severe Pain.     vitamin B-12 1000 MCG tablet  Commonly known as: CYANOCOBALAMIN           * This list has 4 medication(s) that are the same as other medications prescribed for you. Read the directions carefully, and ask your doctor or other care provider to review them with you.                SOCIAL HISTORY    Social History     Tobacco Use    Smoking status: Never     Passive exposure: Never    Smokeless tobacco: Never    Tobacco comments:     Pt was diagnosed hemochromatosis March 2024   Vaping Use    Vaping status: Never Used   Substance Use Topics    Alcohol use: No     Comment: sober since 2015    Drug use: No     The patient otherwise denies any further social history.  Reviewed the imported nursing notes      FAMILY HISTORY  Family History   Problem Relation Age of Onset    Hypertension Mother     Multiple myeloma Mother     Hypertension Father     Alcohol abuse Father     Hypertension Maternal Grandmother     Hypertension Maternal Grandfather     Diabetes Paternal Grandmother     Hypertension Paternal Grandmother     Hypertension Paternal Grandfather     Thyroid disease Other      The patient otherwise patient denies any further family history.  Reviewed the imported nursing notes      REVIEW OF SYSTEMS  Reviewed and negative/not pertinent unless mentioned in the HPI        PHYSICAL EXAM  Vitals: /59   Pulse 72   Temp  "97.3 °F (36.3 °C) (Oral)   Resp 18   Ht 165.1 cm (65\")   Wt 79.4 kg (175 lb)   SpO2 99%   BMI 29.12 kg/m²      General Appearance: Awake and Alert, cooperative, no distress   Head:  Normocephalic, atraumatic   Eyes: Conjunctiva clear, corneas clear   Ears:  Normal external ears,   Nose: Nares normal and clear   Throat: Lips, mucosa moist   Neck:  Trachea midline, no stridor   Lungs:  Clear to auscultation bilaterally, respirations unlabored   Heart: Regular, No rub   Abdomen: Nondistended, non tender   Genitourinary:  Pelvic:  Rectal: Not Performed  Not Performed  Not Performed   Extremities: Extremities Atraumatic   Vascular: Present in all extremities   Skin:  Skin no rashes or lesions   Neurologic: Non Focal , CN 2-12 grossly intact, GCS 15   Psyc: Not Performed         MEDICAL DECISION MAKING - ED COURSE/PROCEDURE/DDX:    PULSE OX: Interpretation by me on room air Is normal  SpO2 Readings from Last 1 Encounters:   01/03/25 99%          CARDIAC MONITOR: Normal sinus rhythm  Pulse Readings from Last 1 Encounters:   01/03/25 72            I reviewed the nursing notes: YES  I reviewed and discussed with EMS:   External documents reviewed:   Additional history taken from: Previous notes     Discussed with consulting physician  additionally, the admitting / accepting provider was contacted with handoff      LAB REVIEWED: Interpretation of all unique test ordered  Labs Reviewed   COMPREHENSIVE METABOLIC PANEL - Abnormal; Notable for the following components:       Result Value    Glucose 303 (*)     Sodium 133 (*)     Chloride 96 (*)     Calcium 8.5 (*)     All other components within normal limits    Narrative:     GFR Categories in Chronic Kidney Disease (CKD)      GFR Category          GFR (mL/min/1.73)    Interpretation  G1                     90 or greater         Normal or high (1)  G2                      60-89                Mild decrease (1)  G3a                   45-59                Mild to moderate " decrease  G3b                   30-44                Moderate to severe decrease  G4                    15-29                Severe decrease  G5                    14 or less           Kidney failure          (1)In the absence of evidence of kidney disease, neither GFR category G1 or G2 fulfill the criteria for CKD.    eGFR calculation 2021 CKD-EPI creatinine equation, which does not include race as a factor   CBC WITH AUTO DIFFERENTIAL - Abnormal; Notable for the following components:    RBC 3.63 (*)     Hemoglobin 10.7 (*)     Hematocrit 31.7 (*)     Platelets 134 (*)     Immature Grans % 0.9 (*)     All other components within normal limits   POCT GLUCOSE FINGERSTICK - Abnormal; Notable for the following components:    Glucose 301 (*)     All other components within normal limits   CBC AND DIFFERENTIAL    Narrative:     The following orders were created for panel order CBC & Differential.  Procedure                               Abnormality         Status                     ---------                               -----------         ------                     CBC Auto Differential[[id number]]        Abnormal            Final result                 Please view results for these tests on the individual orders.       IMAGING REVIEWED  My X-ray interpretation:   My CT interpretation:   My Ultrasound interpretation:   No orders to display       Procedures:    Decision rules/scores:        Drug therapy provided in the ED and monitored as needed given IV/PO :   Medications   sodium chloride 0.9 % flush 10 mL (has no administration in time range)   sodium chloride 0.9 % bolus 1,000 mL (0 mL Intravenous Stopped 1/3/25 1548)       Vitals Trend:  Vitals:    01/03/25 1500 01/03/25 1530 01/03/25 1600   BP: 93/56 105/60 110/59   BP Location: Left arm     Patient Position: Sitting     Pulse: 91 71 72   Resp: 18     Temp: 97.3 °F (36.3 °C)     TempSrc: Oral     SpO2: 100% 100% 99%   Weight: 79.4 kg (175 lb)     Height: 165.1 cm  "(65\")         Malcolm Costa is a 59 y.o. male who presents to the emergency department with the acute illness as stated within HPI  Shared medical decision making regarding a detailed discussion with the patient concerning this ED encounter, the differential diagnosis considered hypotension after procedure, sepsis, hypotension, hypovolemia,, and the emergency room imaging and lab testing done today The patient and/or family have been given an opportunity to ask questions and exhibit an understanding of what happened today in the emergency room.        ED Course as of 01/03/25 1626   Fri Jan 03, 2025   1545 POC Glucose Once(!) [NC]   1546 Comprehensive Metabolic Panel(!) [NC]   1546 CBC & Differential(!)  2 g/dL drop in hemoglobin from the 27th.  This would be expected with the phlebotomy that occurred today [NC]   1623 Patient's vitals have improved.  He feels improved after the bolus of fluids and eating a snack.  I suspect that they took too much of during his phlebotomy.  Advised him to touch base with his hematologist.  Also advised him to take it easy over next couple days and be slow with transition in his transitions of position. [NC]      ED Course User Index  [NC] Johnnie Sanderson PA-C           Condition considered emergent due to:  1) Acuity  2) Failure or unavailable treatment in the outpatient setting  3) Risk of deterioration and decompensation given the multiple differential diagnoses that  need to be ruled out      Amount and/or Complexity of Data Reviewed  Clinical lab tests: as above noted in MDM  Tests in the radiology section of CPT®: as above noted in MDM  Tests in the medicine section of CPT®: as above noted in MDM  Independent visualization of images, tracings, or specimens: as above noted in MDM        CLINICAL IMPRESSION:  Final diagnoses:   Hypotension after procedure   History of hemochromatosis      DISPOSITION:  Discharge      As with my usual standard practice patient was advised to " return for any reason for any  complaint at any time whatsoever. Please refer to the discharge instructions, AVS paperwork  and prescriptions written for treatment plan.      Johnnie Sanderson PA-C   Reviewed and Electronically signed  1/3/2025  16:26 EST      This report was dictated utilizing a voice recognition system which has a propensity to miss  small words such as a, an, the, no, he,she,him, her,his and not. Please read this report carefully,  and if any discrepancy or uncertainty is present, please contact the author directly for  clarification

## 2025-01-06 DIAGNOSIS — I10 ESSENTIAL HYPERTENSION: ICD-10-CM

## 2025-01-06 DIAGNOSIS — Z79.4 TYPE 2 DIABETES MELLITUS WITH HYPERGLYCEMIA, WITH LONG-TERM CURRENT USE OF INSULIN: ICD-10-CM

## 2025-01-06 DIAGNOSIS — E11.65 TYPE 2 DIABETES MELLITUS WITH HYPERGLYCEMIA, WITH LONG-TERM CURRENT USE OF INSULIN: ICD-10-CM

## 2025-01-06 RX ORDER — METFORMIN HYDROCHLORIDE 750 MG/1
1500 TABLET, EXTENDED RELEASE ORAL
Qty: 60 TABLET | Refills: 0 | Status: SHIPPED | OUTPATIENT
Start: 2025-01-06

## 2025-01-06 RX ORDER — BUPROPION HYDROCHLORIDE 150 MG/1
150 TABLET ORAL DAILY
Qty: 30 TABLET | Refills: 0 | Status: SHIPPED | OUTPATIENT
Start: 2025-01-06

## 2025-01-06 RX ORDER — GLIPIZIDE 5 MG/1
5 TABLET ORAL EVERY MORNING
Qty: 30 TABLET | Refills: 0 | Status: SHIPPED | OUTPATIENT
Start: 2025-01-06

## 2025-01-06 RX ORDER — LISINOPRIL 40 MG/1
40 TABLET ORAL DAILY
Qty: 30 TABLET | Refills: 0 | Status: SHIPPED | OUTPATIENT
Start: 2025-01-06 | End: 2025-01-10

## 2025-01-06 RX ORDER — AMLODIPINE BESYLATE 10 MG/1
10 TABLET ORAL DAILY
Qty: 30 TABLET | Refills: 0 | OUTPATIENT
Start: 2025-01-06

## 2025-01-06 RX ORDER — AMLODIPINE BESYLATE 10 MG/1
10 TABLET ORAL DAILY
Qty: 30 TABLET | Refills: 0 | Status: SHIPPED | OUTPATIENT
Start: 2025-01-06 | End: 2025-01-10

## 2025-01-06 NOTE — TELEPHONE ENCOUNTER
LV: 7/30/24 telemedicine  NV: 1/10/25 just scheduled  Needs refills on:  amlodipine, buspirone, glipizide, lisinopril and metformin.  He has been in and out of the ER.  LL: 1/3/25  LF: 9/27/24 on these medications.

## 2025-01-06 NOTE — TELEPHONE ENCOUNTER
LV: 7/30/24 telemedicine  NV: 1/10/25 just scheduled  Needs refills on:  amlodipine, buspirone, glipizide, lisinopril and metformin.  He has been in and out of the ER.  LL: 1/3/25  LF: 9/27/24 on these medications.  There are 2 messages open on these refills.  Will close this one.  Other one has been sent to [facility].

## 2025-01-10 ENCOUNTER — TELEPHONE (OUTPATIENT)
Dept: ONCOLOGY | Facility: CLINIC | Age: 60
End: 2025-01-10
Payer: MEDICAID

## 2025-01-10 ENCOUNTER — HOSPITAL ENCOUNTER (EMERGENCY)
Facility: HOSPITAL | Age: 60
Discharge: HOME | End: 2025-01-10
Attending: STUDENT IN AN ORGANIZED HEALTH CARE EDUCATION/TRAINING PROGRAM
Payer: MEDICAID

## 2025-01-10 ENCOUNTER — APPOINTMENT (OUTPATIENT)
Facility: HOSPITAL | Age: 60
End: 2025-01-10
Payer: MEDICAID

## 2025-01-10 ENCOUNTER — OFFICE VISIT (OUTPATIENT)
Dept: INTERNAL MEDICINE | Facility: CLINIC | Age: 60
End: 2025-01-10
Payer: MEDICAID

## 2025-01-10 VITALS
HEART RATE: 87 BPM | SYSTOLIC BLOOD PRESSURE: 126 MMHG | TEMPERATURE: 98.3 F | OXYGEN SATURATION: 96 % | DIASTOLIC BLOOD PRESSURE: 79 MMHG | RESPIRATION RATE: 20 BRPM | BODY MASS INDEX: 29.16 KG/M2 | WEIGHT: 175 LBS | HEIGHT: 65 IN

## 2025-01-10 VITALS
WEIGHT: 175 LBS | OXYGEN SATURATION: 100 % | SYSTOLIC BLOOD PRESSURE: 92 MMHG | BODY MASS INDEX: 29.16 KG/M2 | HEIGHT: 65 IN | RESPIRATION RATE: 18 BRPM | HEART RATE: 104 BPM | TEMPERATURE: 96.8 F | DIASTOLIC BLOOD PRESSURE: 58 MMHG

## 2025-01-10 DIAGNOSIS — I95.9 HYPOTENSION, UNSPECIFIED HYPOTENSION TYPE: ICD-10-CM

## 2025-01-10 DIAGNOSIS — R53.83 OTHER FATIGUE: ICD-10-CM

## 2025-01-10 DIAGNOSIS — E11.65 TYPE 2 DIABETES MELLITUS WITH HYPERGLYCEMIA, WITH LONG-TERM CURRENT USE OF INSULIN: Primary | ICD-10-CM

## 2025-01-10 DIAGNOSIS — I10 ESSENTIAL HYPERTENSION: ICD-10-CM

## 2025-01-10 DIAGNOSIS — F41.9 ANXIETY: ICD-10-CM

## 2025-01-10 DIAGNOSIS — Z79.4 TYPE 2 DIABETES MELLITUS WITH HYPERGLYCEMIA, WITH LONG-TERM CURRENT USE OF INSULIN: Primary | ICD-10-CM

## 2025-01-10 DIAGNOSIS — R11.0 NAUSEA: Primary | ICD-10-CM

## 2025-01-10 DIAGNOSIS — D64.9 ANEMIA, UNSPECIFIED TYPE: ICD-10-CM

## 2025-01-10 LAB
ALBUMIN SERPL-MCNC: 3.8 G/DL (ref 3.5–5.2)
ALBUMIN/GLOB SERPL: 1.2 G/DL
ALP SERPL-CCNC: 118 U/L (ref 39–117)
ALT SERPL W P-5'-P-CCNC: 23 U/L (ref 1–41)
AMORPH URATE CRY URNS QL MICRO: ABNORMAL /HPF
ANION GAP SERPL CALCULATED.3IONS-SCNC: 14.5 MMOL/L (ref 5–15)
AST SERPL-CCNC: 30 U/L (ref 1–40)
BACTERIA UR QL AUTO: ABNORMAL /HPF
BASOPHILS # BLD AUTO: 0.01 10*3/MM3 (ref 0–0.2)
BASOPHILS NFR BLD AUTO: 0.2 % (ref 0–1.5)
BILIRUB SERPL-MCNC: 0.6 MG/DL (ref 0–1.2)
BILIRUB UR QL STRIP: NEGATIVE
BUN SERPL-MCNC: 13 MG/DL (ref 6–20)
BUN/CREAT SERPL: 14.6 (ref 7–25)
CALCIUM SPEC-SCNC: 8.7 MG/DL (ref 8.6–10.5)
CHLORIDE SERPL-SCNC: 98 MMOL/L (ref 98–107)
CLARITY UR: CLEAR
CO2 SERPL-SCNC: 22.5 MMOL/L (ref 22–29)
COLOR UR: YELLOW
CREAT SERPL-MCNC: 0.89 MG/DL (ref 0.76–1.27)
DEPRECATED RDW RBC AUTO: 49.9 FL (ref 37–54)
EGFRCR SERPLBLD CKD-EPI 2021: 98.7 ML/MIN/1.73
EOSINOPHIL # BLD AUTO: 0.08 10*3/MM3 (ref 0–0.4)
EOSINOPHIL NFR BLD AUTO: 1.9 % (ref 0.3–6.2)
ERYTHROCYTE [DISTWIDTH] IN BLOOD BY AUTOMATED COUNT: 15.5 % (ref 12.3–15.4)
EXPIRATION DATE: ABNORMAL
FLUAV RNA RESP QL NAA+PROBE: NOT DETECTED
FLUBV RNA RESP QL NAA+PROBE: NOT DETECTED
GEN 5 1HR TROPONIN T REFLEX: 22 NG/L
GLOBULIN UR ELPH-MCNC: 3.3 GM/DL
GLUCOSE BLDC GLUCOMTR-MCNC: 92 MG/DL (ref 70–130)
GLUCOSE SERPL-MCNC: 94 MG/DL (ref 65–99)
GLUCOSE UR STRIP-MCNC: NEGATIVE MG/DL
HBA1C MFR BLD: 6.7 % (ref 4.5–5.7)
HCT VFR BLD AUTO: 29.3 % (ref 37.5–51)
HGB BLD-MCNC: 9.7 G/DL (ref 13–17.7)
HGB UR QL STRIP.AUTO: NEGATIVE
HOLD SPECIMEN: NORMAL
HYALINE CASTS UR QL AUTO: ABNORMAL /LPF
IMM GRANULOCYTES # BLD AUTO: 0.02 10*3/MM3 (ref 0–0.05)
IMM GRANULOCYTES NFR BLD AUTO: 0.5 % (ref 0–0.5)
KETONES UR QL STRIP: NEGATIVE
LEUKOCYTE ESTERASE UR QL STRIP.AUTO: NEGATIVE
LYMPHOCYTES # BLD AUTO: 0.47 10*3/MM3 (ref 0.7–3.1)
LYMPHOCYTES NFR BLD AUTO: 11.4 % (ref 19.6–45.3)
Lab: ABNORMAL
MAGNESIUM SERPL-MCNC: 1.6 MG/DL (ref 1.6–2.6)
MCH RBC QN AUTO: 29.6 PG (ref 26.6–33)
MCHC RBC AUTO-ENTMCNC: 33.1 G/DL (ref 31.5–35.7)
MCV RBC AUTO: 89.3 FL (ref 79–97)
MONOCYTES # BLD AUTO: 0.38 10*3/MM3 (ref 0.1–0.9)
MONOCYTES NFR BLD AUTO: 9.2 % (ref 5–12)
MUCOUS THREADS URNS QL MICRO: ABNORMAL /HPF
NEUTROPHILS NFR BLD AUTO: 3.15 10*3/MM3 (ref 1.7–7)
NEUTROPHILS NFR BLD AUTO: 76.8 % (ref 42.7–76)
NITRITE UR QL STRIP: NEGATIVE
PH UR STRIP.AUTO: 7 [PH] (ref 5–8)
PLATELET # BLD AUTO: 135 10*3/MM3 (ref 140–450)
PMV BLD AUTO: 10.6 FL (ref 6–12)
POTASSIUM SERPL-SCNC: 4 MMOL/L (ref 3.5–5.2)
PROT SERPL-MCNC: 7.1 G/DL (ref 6–8.5)
PROT UR QL STRIP: ABNORMAL
RBC # BLD AUTO: 3.28 10*6/MM3 (ref 4.14–5.8)
RBC # UR STRIP: ABNORMAL /HPF
REF LAB TEST METHOD: ABNORMAL
RSV RNA RESP QL NAA+PROBE: NOT DETECTED
SARS-COV-2 RNA RESP QL NAA+PROBE: NOT DETECTED
SODIUM SERPL-SCNC: 135 MMOL/L (ref 136–145)
SP GR UR STRIP: 1.02 (ref 1–1.03)
SQUAMOUS #/AREA URNS HPF: ABNORMAL /HPF
TROPONIN T % DELTA: -8 %
TROPONIN T NUMERIC DELTA: -2 NG/L
TROPONIN T SERPL HS-MCNC: 24 NG/L
UROBILINOGEN UR QL STRIP: ABNORMAL
WBC # UR STRIP: ABNORMAL /HPF
WBC NRBC COR # BLD AUTO: 4.11 10*3/MM3 (ref 3.4–10.8)
WHOLE BLOOD HOLD COAG: NORMAL
WHOLE BLOOD HOLD SPECIMEN: NORMAL

## 2025-01-10 PROCEDURE — 83735 ASSAY OF MAGNESIUM: CPT | Performed by: STUDENT IN AN ORGANIZED HEALTH CARE EDUCATION/TRAINING PROGRAM

## 2025-01-10 PROCEDURE — 96374 THER/PROPH/DIAG INJ IV PUSH: CPT

## 2025-01-10 PROCEDURE — 93005 ELECTROCARDIOGRAM TRACING: CPT | Performed by: STUDENT IN AN ORGANIZED HEALTH CARE EDUCATION/TRAINING PROGRAM

## 2025-01-10 PROCEDURE — 82948 REAGENT STRIP/BLOOD GLUCOSE: CPT | Performed by: NURSE PRACTITIONER

## 2025-01-10 PROCEDURE — 83036 HEMOGLOBIN GLYCOSYLATED A1C: CPT | Performed by: NURSE PRACTITIONER

## 2025-01-10 PROCEDURE — 80053 COMPREHEN METABOLIC PANEL: CPT | Performed by: STUDENT IN AN ORGANIZED HEALTH CARE EDUCATION/TRAINING PROGRAM

## 2025-01-10 PROCEDURE — 81001 URINALYSIS AUTO W/SCOPE: CPT | Performed by: STUDENT IN AN ORGANIZED HEALTH CARE EDUCATION/TRAINING PROGRAM

## 2025-01-10 PROCEDURE — 85025 COMPLETE CBC W/AUTO DIFF WBC: CPT | Performed by: STUDENT IN AN ORGANIZED HEALTH CARE EDUCATION/TRAINING PROGRAM

## 2025-01-10 PROCEDURE — 99284 EMERGENCY DEPT VISIT MOD MDM: CPT

## 2025-01-10 PROCEDURE — 25010000002 ONDANSETRON PER 1 MG: Performed by: STUDENT IN AN ORGANIZED HEALTH CARE EDUCATION/TRAINING PROGRAM

## 2025-01-10 PROCEDURE — 1126F AMNT PAIN NOTED NONE PRSNT: CPT | Performed by: NURSE PRACTITIONER

## 2025-01-10 PROCEDURE — 3074F SYST BP LT 130 MM HG: CPT | Performed by: NURSE PRACTITIONER

## 2025-01-10 PROCEDURE — 3078F DIAST BP <80 MM HG: CPT | Performed by: NURSE PRACTITIONER

## 2025-01-10 PROCEDURE — 99214 OFFICE O/P EST MOD 30 MIN: CPT | Performed by: NURSE PRACTITIONER

## 2025-01-10 PROCEDURE — 36415 COLL VENOUS BLD VENIPUNCTURE: CPT

## 2025-01-10 PROCEDURE — 84484 ASSAY OF TROPONIN QUANT: CPT | Performed by: STUDENT IN AN ORGANIZED HEALTH CARE EDUCATION/TRAINING PROGRAM

## 2025-01-10 PROCEDURE — 87637 SARSCOV2&INF A&B&RSV AMP PRB: CPT | Performed by: STUDENT IN AN ORGANIZED HEALTH CARE EDUCATION/TRAINING PROGRAM

## 2025-01-10 PROCEDURE — 25810000003 SODIUM CHLORIDE 0.9 % SOLUTION: Performed by: STUDENT IN AN ORGANIZED HEALTH CARE EDUCATION/TRAINING PROGRAM

## 2025-01-10 PROCEDURE — 3044F HG A1C LEVEL LT 7.0%: CPT | Performed by: NURSE PRACTITIONER

## 2025-01-10 PROCEDURE — 71045 X-RAY EXAM CHEST 1 VIEW: CPT

## 2025-01-10 RX ORDER — ONDANSETRON 4 MG/1
4 TABLET, ORALLY DISINTEGRATING ORAL EVERY 8 HOURS PRN
Qty: 6 TABLET | Refills: 0 | Status: SHIPPED | OUTPATIENT
Start: 2025-01-10 | End: 2025-01-12

## 2025-01-10 RX ORDER — ONDANSETRON 2 MG/ML
4 INJECTION INTRAMUSCULAR; INTRAVENOUS ONCE
Status: COMPLETED | OUTPATIENT
Start: 2025-01-10 | End: 2025-01-10

## 2025-01-10 RX ORDER — SODIUM CHLORIDE 0.9 % (FLUSH) 0.9 %
10 SYRINGE (ML) INJECTION AS NEEDED
Status: DISCONTINUED | OUTPATIENT
Start: 2025-01-10 | End: 2025-01-10 | Stop reason: HOSPADM

## 2025-01-10 RX ADMIN — SODIUM CHLORIDE 500 ML: 9 INJECTION, SOLUTION INTRAVENOUS at 18:45

## 2025-01-10 RX ADMIN — ONDANSETRON 4 MG: 2 INJECTION INTRAMUSCULAR; INTRAVENOUS at 16:31

## 2025-01-10 NOTE — FSED PROVIDER NOTE
Subjective  History of Present Illness:      59 year old male hx of hemochromatosis who presents with nausea and fatigue x 3 months. He was recently diagnosed with hemachromatosis and had his first blood withdrawal one week ago. Since then he feels like his sx have been progressively getting worse. He denies abd pain, emesis, diarrhea, fevers. He reports finishing a round of abx yesterday for ear abscess.     Nurses Notes reviewed and agree, including vitals, allergies, social history and prior medical history.     REVIEW OF SYSTEMS: All systems reviewed and not pertinent unless noted.  Review of Systems   All other systems reviewed and are negative.      Past Medical History:   Diagnosis Date    Allergic rhinitis     Anxiety     Dermatitis 8/2/2016    Diabetes mellitus     Diverticulitis     Gastroesophageal reflux disease 7/11/2016    GERD (gastroesophageal reflux disease)     History of alcohol abuse     Hypertension     Insomnia 7/11/2016    Visual impairment 7/11/2016    Vitamin D deficiency 7/11/2016       Allergies:    Shellfish allergy, Shellfish-derived products, and Codeine      Past Surgical History:   Procedure Laterality Date    APPENDECTOMY  1995    CHOLECYSTECTOMY WITH INTRAOPERATIVE CHOLANGIOGRAM N/A 2/1/2024    Procedure: CHOLECYSTECTOMY LAPAROSCOPIC WITH INTRAOPERATIVE CHOLANGIOGRAM, UMBILICAL HERNIA REPAIR;  Surgeon: Adam Ramos MD;  Location:  [facility];  Service: General;  Laterality: N/A;    ENDOSCOPY N/A 2/2/2024    Procedure: ESOPHAGOGASTRODUODENOSCOPY;  Surgeon: Dominik Chase MD;  Location:  [facility];  Service: Gastroenterology;  Laterality: N/A;    ERCP N/A 2/2/2024    Procedure: ENDOSCOPIC RETROGRADE CHOLANGIOPANCREATOGRAPHY;  Surgeon: Dominik Chase MD;  Location:  [facility];  Service: Gastroenterology;  Laterality: N/A;  Sphincterotomy made to common bile duct (CBD) ampulla. CBD swept with 9-12 mm balloon. ERCP scope removed with plastic cap intact.    HERNIA REPAIR   "2010    TONSILLECTOMY  1974         Social History     Socioeconomic History    Marital status: Single   Tobacco Use    Smoking status: Never     Passive exposure: Never    Smokeless tobacco: Never    Tobacco comments:     Pt was diagnosed hemochromatosis March 2024   Vaping Use    Vaping status: Never Used   Substance and Sexual Activity    Alcohol use: Not Currently     Comment: sober since 2015    Drug use: No    Sexual activity: Defer         Family History   Problem Relation Age of Onset    Hypertension Mother     Multiple myeloma Mother     Hypertension Father     Alcohol abuse Father     Hypertension Maternal Grandmother     Hypertension Maternal Grandfather     Diabetes Paternal Grandmother     Hypertension Paternal Grandmother     Hypertension Paternal Grandfather     Thyroid disease Other        Objective  Physical Exam:  /75 (BP Location: Right arm, Patient Position: Sitting)   Pulse 96   Temp 97.9 °F (36.6 °C) (Oral)   Resp 20   Ht 165.1 cm (65\")   Wt 79.4 kg (175 lb)   SpO2 100%   BMI 29.12 kg/m²      Physical Exam  Constitutional:       Appearance: Normal appearance.   HENT:      Head: Normocephalic and atraumatic.      Nose: Nose normal.      Mouth/Throat:      Mouth: Mucous membranes are moist.   Eyes:      Extraocular Movements: Extraocular movements intact.      Conjunctiva/sclera: Conjunctivae normal.   Cardiovascular:      Rate and Rhythm: Normal rate and regular rhythm.   Pulmonary:      Effort: Pulmonary effort is normal. No respiratory distress.   Abdominal:      General: There is no distension.      Comments: Atraumatic    Musculoskeletal:         General: Normal range of motion.      Cervical back: Normal range of motion and neck supple.   Skin:     General: Skin is warm and dry.   Neurological:      General: No focal deficit present.      Mental Status: He is alert.      Comments: Moving all extremities spontaneously    Psychiatric:         Mood and Affect: Mood normal.         " Behavior: Behavior normal.         Procedures    ED Course:         Lab Results (last 24 hours)       Procedure Component Value Units Date/Time    POCT Glucose [[id number]]  (Normal) Collected: 01/10/25 1427    Specimen: Blood Updated: 01/10/25 1427     Glucose 92 mg/dL     POC Glycosylated Hemoglobin (Hb A1C) [[id number]]  (Abnormal) Collected: 01/10/25 1430    Specimen: Blood Updated: 01/10/25 1431     Hemoglobin A1C 6.7 %      Lot Number [id number]     Expiration Date 10/04/2026    Urinalysis With Microscopic If Indicated (No Culture) - Urine, Clean Catch [[id number]] Collected: 01/10/25 1541    Specimen: Urine, Clean Catch Updated: 01/10/25 1542             No radiology results from the last 24 hrs       MDM      DDX: includes but is not limited to: iron toxicity, gastroenteritis, influenza    Pertinent features from physical exam: VSS, patient well appearing.    Initial diagnostic plan: swabs, labs    Results from initial plan were reviewed and interpreted by me revealing EKG stable from prior. Labs show elevated troponin. Patient denies chest pain. Repeat trop/EKG stable. Patient made aware.   Patient is anemic but not unexpected given recent phlebotomy   Given his lack of abd pain I have lower suspicion for acute intraabdominal surgical pathology   Diagnostic information from other sources: chart review    Interventions / Re-evaluation: zofran-patient states nausea has significantly improved    Medications   sodium chloride 0.9 % flush 10 mL (has no administration in time range)       Results/clinical rationale were discussed with patient     Consultations/Discussion of results with other physicians: none    Data interpreted: Nursing notes reviewed, vital signs reviewed.  Labs independently interpreted by me .  Imaging independently interpreted by me.  EKG independently interpreted by me.      Counseling: Discussed the results above with the patient regarding need for admission or discharge.  Patient understands  "and agrees plan of care.      -----  ED Disposition       None          Final diagnoses:   None     Your Follow-Up Providers    Follow-up information has not been specified.       Contact information for after-discharge care    Follow-up information has not been specified.          Your medication list        CONTINUE taking these medications        Instructions Last Dose Given Next Dose Due   accu-chek soft touch lancets      Used to test blood sugar for Diabetes E11.65 test up to twice a day       FreeStyle Dagoberto 2 [place] device      1 Device Take As Directed.       FreeStyle Dagoberto 2 Sensor misc      Use 1 Device Every 14 (Fourteen) Days.       Insulin Syringe-Needle U-100 28G X 5/16\" 1 ML misc      Use 1 Device 2 (Two) Times a Day.       RELION INSULIN SYRINGE 1ML/31G 31G X 5/16\" 1 ML misc  Generic drug: Insulin Syringe-Needle U-100      2 (Two) Times a Day.              ASK your doctor about these medications        Instructions Last Dose Given Next Dose Due   atorvastatin 10 MG tablet  Commonly known as: LIPITOR      Take 1 tablet by mouth Daily.       buPROPion  MG 24 hr tablet  Commonly known as: WELLBUTRIN XL      Take 1 tablet by mouth once daily       citalopram 20 MG tablet  Commonly known as: CeleXA      Take 1 tablet by mouth Daily.       glipizide 5 MG tablet  Commonly known as: GLUCOTROL      TAKE 1 TABLET BY MOUTH ONCE DAILY IN THE MORNING       glucose blood test strip      Use as instructed       metFORMIN  MG 24 hr tablet  Commonly known as: GLUCOPHAGE-XR      TAKE 2 TABLETS BY MOUTH ONCE DAILY WITH BREAKFAST       NovoLIN N ReliOn 100 UNIT/ML injection  Generic drug: insulin NPH      INJECT 10 UNITS WITH MORNING MEAL AND 5 UNITS WITH EVENING MEAL       omeprazole 20 MG capsule  Commonly known as: priLOSEC      Take 1 capsule by mouth once daily       ondansetron ODT 4 MG disintegrating tablet  Commonly known as: ZOFRAN-ODT      Take 1 tablet by mouth Every 6 (Six) Hours As Needed for " Nausea or Vomiting.       Sod Picosulfate-Mag Ox-Cit Acd 10-3.5-12 MG-GM -GM/160ML solution      Take 350 mL by mouth Take As Directed for 1 dose.       vitamin B-12 1000 MCG tablet  Commonly known as: CYANOCOBALAMIN      Take 1 tablet by mouth Daily.

## 2025-01-10 NOTE — PROGRESS NOTES
Subjective   Malcolm Costa is a 59 y.o. male.     Chief Complaint   Patient presents with    Anxiety    Diabetes    Hypertension       PCP: iSta Chase APRN    History of Present Illness     History of Present Illness  The patient is a 59-year-old male here for follow-up on chronic conditions, including diabetes, hypertension, anxiety, hypercholesterolemia, and depression.    In the ER last Friday for IV fluids after therapeutic phlebotomy for his hemochromatosis  as he had really low blood pressure.    Vicente see's Dr. Myers with hematology  Reports currently he does not feel well.  He is nauseated, has no appetite.  Not drinking a lot of fluids.  Reports urinary output is normal.  He stopped taking his medicines.  He plans on going to the ER when he leaves here today as he feels like he needs more IVF's  Review of Systems   Constitutional:  Positive for fatigue. Negative for fever and unexpected weight loss.   Eyes:  Negative for blurred vision, double vision and visual disturbance.   Respiratory:  Negative for cough, shortness of breath and wheezing.    Cardiovascular:  Negative for chest pain, palpitations and leg swelling.   Gastrointestinal:  Positive for nausea. Negative for abdominal pain, constipation, diarrhea and vomiting.   Genitourinary:  Negative for difficulty urinating, frequency and urgency.   Musculoskeletal:  Negative for arthralgias and myalgias.   Skin:  Negative for color change and rash.   Neurological:  Negative for dizziness, weakness and headache.   Hematological:  Negative for adenopathy. Does not bruise/bleed easily.       Results      Lab Results   Component Value Date    WBC 4.44 01/03/2025    HGB 10.7 (L) 01/03/2025    HCT 31.7 (L) 01/03/2025    MCV 87.3 01/03/2025     (L) 01/03/2025     Lab Results   Component Value Date    GLUCOSE 303 (H) 01/03/2025    BUN 12 01/03/2025    CREATININE 1.22 01/03/2025    EGFR 68.3 01/03/2025    BCR 9.8 01/03/2025    K 3.7 01/03/2025    CO2 22.9  "01/03/2025    CALCIUM 8.5 (L) 01/03/2025    ALBUMIN 3.5 01/03/2025    BILITOT 0.4 01/03/2025    AST 29 01/03/2025    ALT 17 01/03/2025     Lab Results   Component Value Date    CHOL 179 07/31/2024    CHLPL 192 11/03/2015    TRIG 249 (H) 07/31/2024    HDL 39 (L) 07/31/2024    LDL 98 07/31/2024     Lab Results   Component Value Date    TSH 2.180 11/25/2024     A1C Last 3 Results          2/1/2024    06:20 7/31/2024    13:45 1/10/2025    14:30   HGBA1C Last 3 Results   Hemoglobin A1C 11.60  6.10  6.7        The following portions of the patient's history were reviewed and updated as appropriate: allergies, current medications, past family history, past medical history, past social history, past surgical history and problem list.              Outpatient Medications Marked as Taking for the 1/10/25 encounter (Office Visit) with Sita Chase APRN   Medication Sig Dispense Refill    atorvastatin (LIPITOR) 10 MG tablet Take 1 tablet by mouth Daily. 30 tablet 0    buPROPion XL (WELLBUTRIN XL) 150 MG 24 hr tablet Take 1 tablet by mouth once daily 30 tablet 0    citalopram (CeleXA) 20 MG tablet Take 1 tablet by mouth Daily. 30 tablet 1    Continuous Blood Gluc  (FreeStyle Dagoberto 2 [place]) device 1 Device Take As Directed. 1 each 0    Continuous Blood Gluc Sensor (FreeStyle Dagoberto 2 Sensor) misc Use 1 Device Every 14 (Fourteen) Days. 2 each 11    glipizide (GLUCOTROL) 5 MG tablet TAKE 1 TABLET BY MOUTH ONCE DAILY IN THE MORNING 30 tablet 0    glucose blood test strip Use as instructed 50 each 12    insulin NPH (NovoLIN N ReliOn) 100 UNIT/ML injection INJECT 10 UNITS WITH MORNING MEAL AND 5 UNITS WITH EVENING MEAL 10 mL 3    Insulin Syringe-Needle U-100 28G X 5/16\" 1 ML misc Use 1 Device 2 (Two) Times a Day. 300 each 3    Lancets (accu-chek soft touch) lancets Used to test blood sugar for Diabetes E11.65 test up to twice a day 100 each 12    metFORMIN ER (GLUCOPHAGE-XR) 750 MG 24 hr tablet TAKE 2 TABLETS BY MOUTH ONCE " "DAILY WITH BREAKFAST 60 tablet 0    omeprazole (priLOSEC) 20 MG capsule Take 1 capsule by mouth once daily 30 capsule 1    RELION INSULIN SYRINGE 1ML/31G 31G X 5/16\" 1 ML misc 2 (Two) Times a Day.      Sod Picosulfate-Mag Ox-Cit Acd 10-3.5-12 MG-GM -GM/160ML solution Take 350 mL by mouth Take As Directed for 1 dose. 350 mL 0    vitamin B-12 (CYANOCOBALAMIN) 1000 MCG tablet Take 1 tablet by mouth Daily.      [DISCONTINUED] amLODIPine (NORVASC) 10 MG tablet Take 1 tablet by mouth once daily 30 tablet 0    [DISCONTINUED] indomethacin (INDOCIN) 25 MG capsule Take 1 capsule by mouth 3 (Three) Times a Day As Needed for Mild Pain. 30 capsule 0    [DISCONTINUED] lisinopril (PRINIVIL,ZESTRIL) 40 MG tablet Take 1 tablet by mouth once daily 30 tablet 0     Allergies   Allergen Reactions    Shellfish Allergy Anaphylaxis    Shellfish-Derived Products Anaphylaxis    Codeine Nausea Only     Not sure of reaction but thinks it is just nausea           Objective     Vitals:    01/10/25 1411   BP: 92/58   BP Location: Right arm   Patient Position: Sitting   Cuff Size: Adult   Pulse: 104   Resp: 18   Temp: 96.8 °F (36 °C)   TempSrc: Infrared   SpO2: 100%   Weight: 79.4 kg (175 lb)   Height: 165.1 cm (65\")   PainSc: 0-No pain     Body mass index is 29.12 kg/m².  Wt Readings from Last 3 Encounters:   01/10/25 79.4 kg (175 lb)   01/10/25 79.4 kg (175 lb)   01/03/25 79.4 kg (175 lb)       Physical Exam  Vitals and nursing note reviewed.   Constitutional:       Appearance: Normal appearance. He is well-developed. He is ill-appearing. He is not toxic-appearing.   HENT:      Head: Normocephalic and atraumatic.   Eyes:      Conjunctiva/sclera: Conjunctivae normal.   Cardiovascular:      Rate and Rhythm: Normal rate and regular rhythm.      Heart sounds: Normal heart sounds.   Pulmonary:      Effort: Pulmonary effort is normal. No respiratory distress.      Breath sounds: Normal breath sounds.   Abdominal:      General: Bowel sounds are normal. " There is no distension.      Palpations: Abdomen is soft.      Tenderness: There is no abdominal tenderness.   Musculoskeletal:      Cervical back: Normal range of motion.   Skin:     General: Skin is warm and dry.   Neurological:      Mental Status: He is alert and oriented to person, place, and time.   Psychiatric:         Speech: Speech normal.         Behavior: Behavior normal.         Thought Content: Thought content normal.         Judgment: Judgment normal.               Assessment & Plan   Diagnoses and all orders for this visit:    1. Type 2 diabetes mellitus with hyperglycemia, with long-term current use of insulin (Primary)  -     POC Glycosylated Hemoglobin (Hb A1C)  -     POCT Glucose    2. Hypotension, unspecified hypotension type    3. Essential hypertension    4. Anxiety    Diabetes is well-controlled. Cont current meds. Be sure to eat with glipizide- hold if unable to eat.         We will temporarily Stop amlodipine and lisinopril   Pt planning on going to ER. May need more fluids. Anemia last week after phlebotomy. Needs recheck.         Return in about 2 weeks (around 1/24/2025).    I discussed my findings,recommendations, and plan of care was with the patient. They verbalized understanding and agreement.  Patient was encouraged to keep me informed of any acute changes, lack of improvement, or any new concerning symptoms.     Patient or patient representative verbalized consent for the use of Ambient Listening during the visit with  JOANA Lai for chart documentation. 1/13/2025  12:47 EST

## 2025-01-13 DIAGNOSIS — E83.110 HEMOCHROMATOSIS ASSOCIATED WITH MUTATION IN HFE GENE: Primary | ICD-10-CM

## 2025-01-14 ENCOUNTER — LAB (OUTPATIENT)
Dept: LAB | Facility: HOSPITAL | Age: 60
End: 2025-01-14
Payer: MEDICAID

## 2025-01-14 DIAGNOSIS — E83.110 HEMOCHROMATOSIS ASSOCIATED WITH MUTATION IN HFE GENE: ICD-10-CM

## 2025-01-14 LAB
BASOPHILS # BLD AUTO: 0.01 10*3/MM3 (ref 0–0.2)
BASOPHILS NFR BLD AUTO: 0.3 % (ref 0–1.5)
DEPRECATED RDW RBC AUTO: 55.1 FL (ref 37–54)
EOSINOPHIL # BLD AUTO: 0.14 10*3/MM3 (ref 0–0.4)
EOSINOPHIL NFR BLD AUTO: 3.5 % (ref 0.3–6.2)
ERYTHROCYTE [DISTWIDTH] IN BLOOD BY AUTOMATED COUNT: 16.6 % (ref 12.3–15.4)
ERYTHROCYTE [SEDIMENTATION RATE] IN BLOOD: 53 MM/HR (ref 0–20)
FERRITIN SERPL-MCNC: 1635 NG/ML (ref 30–400)
HCT VFR BLD AUTO: 33.4 % (ref 37.5–51)
HGB BLD-MCNC: 11.2 G/DL (ref 13–17.7)
IMM GRANULOCYTES # BLD AUTO: 0 10*3/MM3 (ref 0–0.05)
IMM GRANULOCYTES NFR BLD AUTO: 0 % (ref 0–0.5)
IRON 24H UR-MRATE: 67 MCG/DL (ref 59–158)
IRON SATN MFR SERPL: 25 % (ref 20–50)
LYMPHOCYTES # BLD AUTO: 1.4 10*3/MM3 (ref 0.7–3.1)
LYMPHOCYTES NFR BLD AUTO: 35.3 % (ref 19.6–45.3)
MCH RBC QN AUTO: 30.5 PG (ref 26.6–33)
MCHC RBC AUTO-ENTMCNC: 33.5 G/DL (ref 31.5–35.7)
MCV RBC AUTO: 91 FL (ref 79–97)
MONOCYTES # BLD AUTO: 0.36 10*3/MM3 (ref 0.1–0.9)
MONOCYTES NFR BLD AUTO: 9.1 % (ref 5–12)
NEUTROPHILS NFR BLD AUTO: 2.06 10*3/MM3 (ref 1.7–7)
NEUTROPHILS NFR BLD AUTO: 51.8 % (ref 42.7–76)
PLATELET # BLD AUTO: 145 10*3/MM3 (ref 140–450)
PMV BLD AUTO: 10.4 FL (ref 6–12)
QT INTERVAL: 404 MS
QT INTERVAL: 416 MS
QTC INTERVAL: 470 MS
QTC INTERVAL: 472 MS
RBC # BLD AUTO: 3.67 10*6/MM3 (ref 4.14–5.8)
TIBC SERPL-MCNC: 273 MCG/DL (ref 298–536)
TRANSFERRIN SERPL-MCNC: 183 MG/DL (ref 200–360)
WBC NRBC COR # BLD AUTO: 3.97 10*3/MM3 (ref 3.4–10.8)

## 2025-01-14 PROCEDURE — 85025 COMPLETE CBC W/AUTO DIFF WBC: CPT

## 2025-01-14 PROCEDURE — 36415 COLL VENOUS BLD VENIPUNCTURE: CPT

## 2025-01-14 PROCEDURE — 83540 ASSAY OF IRON: CPT

## 2025-01-14 PROCEDURE — 84466 ASSAY OF TRANSFERRIN: CPT

## 2025-01-14 PROCEDURE — 85652 RBC SED RATE AUTOMATED: CPT

## 2025-01-14 PROCEDURE — 82728 ASSAY OF FERRITIN: CPT

## 2025-01-14 NOTE — TELEPHONE ENCOUNTER
Called patient back and left message, he is already currently in the ER. Informed him via message that Dr. Myers would be made aware on Monday.   
Discussed lab results with Dr. Myers, Hgb is back up to 11.2, iron good, ferritin still elevated and se rate elevated (inflammation). He would like to see patient in February with labs prior as ordered instead of March. Called and left detailed message for patient with this information, informed him that he would be contacted with a new appt for February.   
Discussed with Dr Myers. Dr. Myers concerned pt may be bleeding, he does not feel this is a typical side effect of phlebotomy. Dr. Myers recommended checking CBC, Iron panel, and Ferritin. Also ask pt if he has had an EGD or colonoscopy recently. RN called pt and left vn for pt to call back.   
PT SAID HE HAD REACTION LAST FRIDAY WITH PHLE[facility]. THEY SENT HIM TO ER HE GOT FLUIDS.   PT STATES HE'S NOT FEELING WELL AND HEADING TO ER [place] BUT STILL LIKE TO TALK TO NURSE.  
Pt called back. RN advised pt of Dr Myers recommendations. Pt reported he has EGD/colonoscopy schd next week. RN instructed pt to come in tomorrow for lab draw. Pt verbalized understanding.   
0

## 2025-01-15 ENCOUNTER — TELEPHONE (OUTPATIENT)
Dept: ONCOLOGY | Facility: CLINIC | Age: 60
End: 2025-01-15
Payer: MEDICAID

## 2025-01-15 NOTE — TELEPHONE ENCOUNTER
Patient called he needs a call back about his lab results and getting something for some symptoms he has going on. Please call.

## 2025-01-15 NOTE — TELEPHONE ENCOUNTER
"Called patient and he states that he is still feeling terrible, informed him that his Hgb is better and he should not be having symptoms from his phlebotomy at this time.  Patient asks if there is a medication we can give him to treat his symptoms, he states that he took an oxycontin and it \"changed his life\" and helped him feel much better. He states he had this from a previous Rx. RN informed him that Dr. Myers does not prescribe narcotics for hematology patients and he should not be having pain from this diagnosis or his phlebotomy. RN recommended that he see his PCP for evaluation. Patient verbalized understanding.   "

## 2025-01-20 ENCOUNTER — TELEPHONE (OUTPATIENT)
Dept: GASTROENTEROLOGY | Facility: CLINIC | Age: 60
End: 2025-01-20

## 2025-01-20 NOTE — TELEPHONE ENCOUNTER
Caller: Malcolm Costa    Relationship: Self    Best call back number: [phone]    What is the best time to reach you: EMAIL    Who are you requesting to speak with (clinical staff, provider,  specific staff member):         What was the call regarding: PT HAS SCOPE ON 01.23.25. PT STATES HE HAS NOT REC'D HIS PREP INSTRUCTIONS. PLEASE SEND PREP, TIME OF ARRIVAL, LOCATION, ETC TO PTS EMAIL ADDRESS: [email or web address]    Is it okay if the provider responds through [employer]: NO

## 2025-01-23 ENCOUNTER — OUTSIDE FACILITY SERVICE (OUTPATIENT)
Dept: GASTROENTEROLOGY | Facility: CLINIC | Age: 60
End: 2025-01-23
Payer: MEDICAID

## 2025-01-23 DIAGNOSIS — E83.118 OTHER HEMOCHROMATOSIS: Primary | ICD-10-CM

## 2025-01-23 PROCEDURE — 45380 COLONOSCOPY AND BIOPSY: CPT | Performed by: INTERNAL MEDICINE

## 2025-01-23 PROCEDURE — 88341 IMHCHEM/IMCYTCHM EA ADD ANTB: CPT | Performed by: INTERNAL MEDICINE

## 2025-01-23 PROCEDURE — 88305 TISSUE EXAM BY PATHOLOGIST: CPT | Performed by: INTERNAL MEDICINE

## 2025-01-23 PROCEDURE — 43239 EGD BIOPSY SINGLE/MULTIPLE: CPT | Performed by: INTERNAL MEDICINE

## 2025-01-24 ENCOUNTER — LAB REQUISITION (OUTPATIENT)
Dept: LAB | Facility: HOSPITAL | Age: 60
DRG: 974 | End: 2025-01-24
Payer: MEDICAID

## 2025-01-24 ENCOUNTER — HOSPITAL ENCOUNTER (EMERGENCY)
Facility: HOSPITAL | Age: 60
Discharge: HOME | DRG: 974 | End: 2025-01-24
Attending: STUDENT IN AN ORGANIZED HEALTH CARE EDUCATION/TRAINING PROGRAM
Payer: MEDICAID

## 2025-01-24 ENCOUNTER — APPOINTMENT (OUTPATIENT)
Facility: HOSPITAL | Age: 60
DRG: 974 | End: 2025-01-24
Payer: MEDICAID

## 2025-01-24 VITALS
HEIGHT: 63 IN | DIASTOLIC BLOOD PRESSURE: 71 MMHG | SYSTOLIC BLOOD PRESSURE: 135 MMHG | TEMPERATURE: 97.9 F | RESPIRATION RATE: 16 BRPM | HEART RATE: 64 BPM | WEIGHT: 167.6 LBS | OXYGEN SATURATION: 99 % | BODY MASS INDEX: 29.7 KG/M2

## 2025-01-24 DIAGNOSIS — R07.89 OTHER CHEST PAIN: Primary | ICD-10-CM

## 2025-01-24 DIAGNOSIS — R19.7 DIARRHEA, UNSPECIFIED: ICD-10-CM

## 2025-01-24 DIAGNOSIS — E83.110 HEREDITARY HEMOCHROMATOSIS: ICD-10-CM

## 2025-01-24 DIAGNOSIS — K64.8 OTHER HEMORRHOIDS: ICD-10-CM

## 2025-01-24 DIAGNOSIS — K31.89 OTHER DISEASES OF STOMACH AND DUODENUM: ICD-10-CM

## 2025-01-24 DIAGNOSIS — Q39.9 CONGENITAL MALFORMATION OF ESOPHAGUS, UNSPECIFIED: ICD-10-CM

## 2025-01-24 DIAGNOSIS — K21.9 GASTRO-ESOPHAGEAL REFLUX DISEASE WITHOUT ESOPHAGITIS: ICD-10-CM

## 2025-01-24 DIAGNOSIS — K63.89 OTHER SPECIFIED DISEASES OF INTESTINE: ICD-10-CM

## 2025-01-24 DIAGNOSIS — E83.118 OTHER HEMOCHROMATOSIS: ICD-10-CM

## 2025-01-24 DIAGNOSIS — E61.1 IRON DEFICIENCY: ICD-10-CM

## 2025-01-24 DIAGNOSIS — Z12.11 ENCOUNTER FOR SCREENING FOR MALIGNANT NEOPLASM OF COLON: ICD-10-CM

## 2025-01-24 LAB
ALBUMIN SERPL-MCNC: 3.8 G/DL (ref 3.5–5.2)
ALBUMIN/GLOB SERPL: 1.1 G/DL
ALP SERPL-CCNC: 203 U/L (ref 39–117)
ALT SERPL W P-5'-P-CCNC: 40 U/L (ref 1–41)
ANION GAP SERPL CALCULATED.3IONS-SCNC: 13.8 MMOL/L (ref 5–15)
AST SERPL-CCNC: 100 U/L (ref 1–40)
BASOPHILS # BLD AUTO: 0 10*3/MM3 (ref 0–0.2)
BASOPHILS NFR BLD AUTO: 0 % (ref 0–1.5)
BILIRUB SERPL-MCNC: 0.8 MG/DL (ref 0–1.2)
BUN SERPL-MCNC: 10 MG/DL (ref 6–20)
BUN/CREAT SERPL: 10.9 (ref 7–25)
CALCIUM SPEC-SCNC: 8.7 MG/DL (ref 8.6–10.5)
CHLORIDE SERPL-SCNC: 102 MMOL/L (ref 98–107)
CO2 SERPL-SCNC: 23.2 MMOL/L (ref 22–29)
CREAT SERPL-MCNC: 0.92 MG/DL (ref 0.76–1.27)
D DIMER PPP FEU-MCNC: 0.5 MCGFEU/ML (ref 0–0.59)
DEPRECATED RDW RBC AUTO: 55.4 FL (ref 37–54)
EGFRCR SERPLBLD CKD-EPI 2021: 95.8 ML/MIN/1.73
EOSINOPHIL # BLD AUTO: 0.08 10*3/MM3 (ref 0–0.4)
EOSINOPHIL NFR BLD AUTO: 2.7 % (ref 0.3–6.2)
ERYTHROCYTE [DISTWIDTH] IN BLOOD BY AUTOMATED COUNT: 16.5 % (ref 12.3–15.4)
GEN 5 1HR TROPONIN T REFLEX: 19 NG/L
GLOBULIN UR ELPH-MCNC: 3.4 GM/DL
GLUCOSE SERPL-MCNC: 174 MG/DL (ref 65–99)
HCT VFR BLD AUTO: 36.1 % (ref 37.5–51)
HGB BLD-MCNC: 11.6 G/DL (ref 13–17.7)
HOLD SPECIMEN: NORMAL
IMM GRANULOCYTES # BLD AUTO: 0.01 10*3/MM3 (ref 0–0.05)
IMM GRANULOCYTES NFR BLD AUTO: 0.3 % (ref 0–0.5)
LIPASE SERPL-CCNC: 34 U/L (ref 13–60)
LYMPHOCYTES # BLD AUTO: 0.82 10*3/MM3 (ref 0.7–3.1)
LYMPHOCYTES NFR BLD AUTO: 28 % (ref 19.6–45.3)
MCH RBC QN AUTO: 29.4 PG (ref 26.6–33)
MCHC RBC AUTO-ENTMCNC: 32.1 G/DL (ref 31.5–35.7)
MCV RBC AUTO: 91.4 FL (ref 79–97)
MONOCYTES # BLD AUTO: 0.19 10*3/MM3 (ref 0.1–0.9)
MONOCYTES NFR BLD AUTO: 6.5 % (ref 5–12)
NEUTROPHILS NFR BLD AUTO: 1.83 10*3/MM3 (ref 1.7–7)
NEUTROPHILS NFR BLD AUTO: 62.5 % (ref 42.7–76)
NT-PROBNP SERPL-MCNC: 489 PG/ML (ref 0–900)
PLATELET # BLD AUTO: 123 10*3/MM3 (ref 140–450)
PMV BLD AUTO: 10.6 FL (ref 6–12)
POTASSIUM SERPL-SCNC: 3.8 MMOL/L (ref 3.5–5.2)
PROT SERPL-MCNC: 7.2 G/DL (ref 6–8.5)
RBC # BLD AUTO: 3.95 10*6/MM3 (ref 4.14–5.8)
SODIUM SERPL-SCNC: 139 MMOL/L (ref 136–145)
TROPONIN T NUMERIC DELTA: -2 NG/L
TROPONIN T SERPL HS-MCNC: 21 NG/L
WBC NRBC COR # BLD AUTO: 2.93 10*3/MM3 (ref 3.4–10.8)
WHOLE BLOOD HOLD COAG: NORMAL
WHOLE BLOOD HOLD SPECIMEN: NORMAL

## 2025-01-24 PROCEDURE — 83880 ASSAY OF NATRIURETIC PEPTIDE: CPT | Performed by: STUDENT IN AN ORGANIZED HEALTH CARE EDUCATION/TRAINING PROGRAM

## 2025-01-24 PROCEDURE — 84484 ASSAY OF TROPONIN QUANT: CPT | Performed by: STUDENT IN AN ORGANIZED HEALTH CARE EDUCATION/TRAINING PROGRAM

## 2025-01-24 PROCEDURE — 85025 COMPLETE CBC W/AUTO DIFF WBC: CPT | Performed by: STUDENT IN AN ORGANIZED HEALTH CARE EDUCATION/TRAINING PROGRAM

## 2025-01-24 PROCEDURE — 83690 ASSAY OF LIPASE: CPT | Performed by: STUDENT IN AN ORGANIZED HEALTH CARE EDUCATION/TRAINING PROGRAM

## 2025-01-24 PROCEDURE — 96376 TX/PRO/DX INJ SAME DRUG ADON: CPT

## 2025-01-24 PROCEDURE — 36415 COLL VENOUS BLD VENIPUNCTURE: CPT

## 2025-01-24 PROCEDURE — 93005 ELECTROCARDIOGRAM TRACING: CPT | Performed by: STUDENT IN AN ORGANIZED HEALTH CARE EDUCATION/TRAINING PROGRAM

## 2025-01-24 PROCEDURE — 25010000002 MORPHINE PER 10 MG: Performed by: EMERGENCY MEDICINE

## 2025-01-24 PROCEDURE — 85379 FIBRIN DEGRADATION QUANT: CPT

## 2025-01-24 PROCEDURE — 96374 THER/PROPH/DIAG INJ IV PUSH: CPT

## 2025-01-24 PROCEDURE — 25010000002 ONDANSETRON PER 1 MG: Performed by: EMERGENCY MEDICINE

## 2025-01-24 PROCEDURE — 96375 TX/PRO/DX INJ NEW DRUG ADDON: CPT

## 2025-01-24 PROCEDURE — 80053 COMPREHEN METABOLIC PANEL: CPT | Performed by: STUDENT IN AN ORGANIZED HEALTH CARE EDUCATION/TRAINING PROGRAM

## 2025-01-24 PROCEDURE — 71045 X-RAY EXAM CHEST 1 VIEW: CPT

## 2025-01-24 PROCEDURE — 99284 EMERGENCY DEPT VISIT MOD MDM: CPT

## 2025-01-24 PROCEDURE — 93005 ELECTROCARDIOGRAM TRACING: CPT

## 2025-01-24 RX ORDER — MORPHINE SULFATE 2 MG/ML
2 INJECTION, SOLUTION INTRAMUSCULAR; INTRAVENOUS ONCE
Status: COMPLETED | OUTPATIENT
Start: 2025-01-24 | End: 2025-01-24

## 2025-01-24 RX ORDER — SODIUM CHLORIDE 0.9 % (FLUSH) 0.9 %
10 SYRINGE (ML) INJECTION AS NEEDED
Status: DISCONTINUED | OUTPATIENT
Start: 2025-01-24 | End: 2025-01-24 | Stop reason: HOSPADM

## 2025-01-24 RX ORDER — ONDANSETRON 2 MG/ML
4 INJECTION INTRAMUSCULAR; INTRAVENOUS ONCE
Status: COMPLETED | OUTPATIENT
Start: 2025-01-24 | End: 2025-01-24

## 2025-01-24 RX ORDER — LIDOCAINE HYDROCHLORIDE 20 MG/ML
10 SOLUTION OROPHARYNGEAL ONCE
Status: COMPLETED | OUTPATIENT
Start: 2025-01-24 | End: 2025-01-24

## 2025-01-24 RX ORDER — ALUMINA, MAGNESIA, AND SIMETHICONE 2400; 2400; 240 MG/30ML; MG/30ML; MG/30ML
15 SUSPENSION ORAL ONCE
Status: COMPLETED | OUTPATIENT
Start: 2025-01-24 | End: 2025-01-24

## 2025-01-24 RX ORDER — ASPIRIN 81 MG/1
324 TABLET, CHEWABLE ORAL ONCE
Status: COMPLETED | OUTPATIENT
Start: 2025-01-24 | End: 2025-01-24

## 2025-01-24 RX ADMIN — ASPIRIN 81 MG CHEWABLE TABLET 324 MG: 81 TABLET CHEWABLE at 18:54

## 2025-01-24 RX ADMIN — ONDANSETRON 4 MG: 2 INJECTION INTRAMUSCULAR; INTRAVENOUS at 19:42

## 2025-01-24 RX ADMIN — MORPHINE SULFATE 2 MG: 2 INJECTION, SOLUTION INTRAMUSCULAR; INTRAVENOUS at 19:42

## 2025-01-24 RX ADMIN — LIDOCAINE HYDROCHLORIDE 10 ML: 20 SOLUTION ORAL at 20:39

## 2025-01-24 RX ADMIN — MORPHINE SULFATE 2 MG: 2 INJECTION, SOLUTION INTRAMUSCULAR; INTRAVENOUS at 21:14

## 2025-01-24 RX ADMIN — ALUMINUM HYDROXIDE, MAGNESIUM HYDROXIDE, AND DIMETHICONE 15 ML: 400; 400; 40 SUSPENSION ORAL at 20:39

## 2025-01-25 ENCOUNTER — HOSPITAL ENCOUNTER (INPATIENT)
Facility: HOSPITAL | Age: 60
LOS: 7 days | Discharge: HOME | End: 2025-02-01
Attending: EMERGENCY MEDICINE | Admitting: INTERNAL MEDICINE
Payer: MEDICAID

## 2025-01-25 ENCOUNTER — APPOINTMENT (OUTPATIENT)
Dept: CT IMAGING | Facility: HOSPITAL | Age: 60
End: 2025-01-25
Payer: MEDICAID

## 2025-01-25 ENCOUNTER — APPOINTMENT (OUTPATIENT)
Dept: GENERAL RADIOLOGY | Facility: HOSPITAL | Age: 60
End: 2025-01-25
Payer: MEDICAID

## 2025-01-25 DIAGNOSIS — I10 ESSENTIAL HYPERTENSION: Chronic | ICD-10-CM

## 2025-01-25 DIAGNOSIS — E43 SEVERE PROTEIN-CALORIE MALNUTRITION: ICD-10-CM

## 2025-01-25 DIAGNOSIS — K81.0 ACUTE CHOLECYSTITIS: ICD-10-CM

## 2025-01-25 DIAGNOSIS — R11.2 NAUSEA AND VOMITING, UNSPECIFIED VOMITING TYPE: ICD-10-CM

## 2025-01-25 DIAGNOSIS — R53.1 WEAKNESS: Primary | ICD-10-CM

## 2025-01-25 DIAGNOSIS — E78.00 PURE HYPERCHOLESTEROLEMIA: Chronic | ICD-10-CM

## 2025-01-25 DIAGNOSIS — R79.89 ELEVATED LFTS: ICD-10-CM

## 2025-01-25 DIAGNOSIS — E11.65 UNCONTROLLED TYPE 2 DIABETES MELLITUS WITH HYPERGLYCEMIA: Chronic | ICD-10-CM

## 2025-01-25 DIAGNOSIS — R74.8 ELEVATED LIVER ENZYMES: ICD-10-CM

## 2025-01-25 DIAGNOSIS — E83.110 HEMOCHROMATOSIS ASSOCIATED WITH MUTATION IN HFE GENE: ICD-10-CM

## 2025-01-25 LAB
ALBUMIN SERPL-MCNC: 3.9 G/DL (ref 3.5–5.2)
ALBUMIN/GLOB SERPL: 1.3 G/DL
ALP SERPL-CCNC: 379 U/L (ref 39–117)
ALT SERPL W P-5'-P-CCNC: 136 U/L (ref 1–41)
AMPHET+METHAMPHET UR QL: NEGATIVE
AMPHETAMINES UR QL: NEGATIVE
ANION GAP SERPL CALCULATED.3IONS-SCNC: 16 MMOL/L (ref 5–15)
AST SERPL-CCNC: 229 U/L (ref 1–40)
B PARAPERT DNA SPEC QL NAA+PROBE: NOT DETECTED
B PERT DNA SPEC QL NAA+PROBE: NOT DETECTED
BACTERIA UR QL AUTO: ABNORMAL /HPF
BARBITURATES UR QL SCN: NEGATIVE
BASOPHILS # BLD AUTO: 0.01 10*3/MM3 (ref 0–0.2)
BASOPHILS NFR BLD AUTO: 0.1 % (ref 0–1.5)
BENZODIAZ UR QL SCN: NEGATIVE
BILIRUB SERPL-MCNC: 3.5 MG/DL (ref 0–1.2)
BILIRUB UR QL STRIP: ABNORMAL
BUN SERPL-MCNC: 15 MG/DL (ref 6–20)
BUN/CREAT SERPL: 13.2 (ref 7–25)
BUPRENORPHINE SERPL-MCNC: NEGATIVE NG/ML
C PNEUM DNA NPH QL NAA+NON-PROBE: NOT DETECTED
CALCIUM SPEC-SCNC: 8.8 MG/DL (ref 8.6–10.5)
CANNABINOIDS SERPL QL: NEGATIVE
CHLORIDE SERPL-SCNC: 102 MMOL/L (ref 98–107)
CLARITY UR: ABNORMAL
CO2 SERPL-SCNC: 19 MMOL/L (ref 22–29)
COCAINE UR QL: NEGATIVE
COLOR UR: ABNORMAL
CREAT SERPL-MCNC: 1.14 MG/DL (ref 0.76–1.27)
D-LACTATE SERPL-SCNC: 1.1 MMOL/L (ref 0.5–2)
D-LACTATE SERPL-SCNC: 2.7 MMOL/L (ref 0.5–2)
DEPRECATED RDW RBC AUTO: 54.4 FL (ref 37–54)
EGFRCR SERPLBLD CKD-EPI 2021: 74.1 ML/MIN/1.73
EOSINOPHIL # BLD AUTO: 0.01 10*3/MM3 (ref 0–0.4)
EOSINOPHIL NFR BLD AUTO: 0.1 % (ref 0.3–6.2)
ERYTHROCYTE [DISTWIDTH] IN BLOOD BY AUTOMATED COUNT: 16.5 % (ref 12.3–15.4)
ETHANOL BLD-MCNC: <10 MG/DL (ref 0–10)
FENTANYL UR-MCNC: NEGATIVE NG/ML
FLUAV SUBTYP SPEC NAA+PROBE: NOT DETECTED
FLUBV RNA ISLT QL NAA+PROBE: NOT DETECTED
GEN 5 1HR TROPONIN T REFLEX: 29 NG/L
GLOBULIN UR ELPH-MCNC: 3.1 GM/DL
GLUCOSE BLDC GLUCOMTR-MCNC: 178 MG/DL (ref 70–130)
GLUCOSE BLDC GLUCOMTR-MCNC: 189 MG/DL (ref 70–130)
GLUCOSE BLDC GLUCOMTR-MCNC: 215 MG/DL (ref 70–130)
GLUCOSE SERPL-MCNC: 277 MG/DL (ref 65–99)
GLUCOSE UR STRIP-MCNC: ABNORMAL MG/DL
HADV DNA SPEC NAA+PROBE: NOT DETECTED
HAV IGM SERPL QL IA: NORMAL
HBV CORE IGM SERPL QL IA: NORMAL
HBV SURFACE AG SERPL QL IA: NORMAL
HCOV 229E RNA SPEC QL NAA+PROBE: NOT DETECTED
HCOV HKU1 RNA SPEC QL NAA+PROBE: NOT DETECTED
HCOV NL63 RNA SPEC QL NAA+PROBE: NOT DETECTED
HCOV OC43 RNA SPEC QL NAA+PROBE: NOT DETECTED
HCT VFR BLD AUTO: 33 % (ref 37.5–51)
HCV AB SER QL: NORMAL
HGB BLD-MCNC: 11.1 G/DL (ref 13–17.7)
HGB UR QL STRIP.AUTO: NEGATIVE
HMPV RNA NPH QL NAA+NON-PROBE: NOT DETECTED
HOLD SPECIMEN: NORMAL
HPIV1 RNA ISLT QL NAA+PROBE: NOT DETECTED
HPIV2 RNA SPEC QL NAA+PROBE: NOT DETECTED
HPIV3 RNA NPH QL NAA+PROBE: NOT DETECTED
HPIV4 P GENE NPH QL NAA+PROBE: NOT DETECTED
HYALINE CASTS UR QL AUTO: ABNORMAL /LPF
IMM GRANULOCYTES # BLD AUTO: 0.05 10*3/MM3 (ref 0–0.05)
IMM GRANULOCYTES NFR BLD AUTO: 0.6 % (ref 0–0.5)
KETONES UR QL STRIP: NEGATIVE
LEUKOCYTE ESTERASE UR QL STRIP.AUTO: NEGATIVE
LYMPHOCYTES # BLD AUTO: 1.13 10*3/MM3 (ref 0.7–3.1)
LYMPHOCYTES NFR BLD AUTO: 13.3 % (ref 19.6–45.3)
M PNEUMO IGG SER IA-ACNC: NOT DETECTED
MAGNESIUM SERPL-MCNC: 1.6 MG/DL (ref 1.6–2.6)
MCH RBC QN AUTO: 30.2 PG (ref 26.6–33)
MCHC RBC AUTO-ENTMCNC: 33.6 G/DL (ref 31.5–35.7)
MCV RBC AUTO: 89.9 FL (ref 79–97)
METHADONE UR QL SCN: NEGATIVE
MONOCYTES # BLD AUTO: 0.69 10*3/MM3 (ref 0.1–0.9)
MONOCYTES NFR BLD AUTO: 8.1 % (ref 5–12)
NEUTROPHILS NFR BLD AUTO: 6.59 10*3/MM3 (ref 1.7–7)
NEUTROPHILS NFR BLD AUTO: 77.8 % (ref 42.7–76)
NITRITE UR QL STRIP: NEGATIVE
NRBC BLD AUTO-RTO: 0 /100 WBC (ref 0–0.2)
OPIATES UR QL: POSITIVE
OXYCODONE UR QL SCN: POSITIVE
PCP UR QL SCN: NEGATIVE
PH UR STRIP.AUTO: 6 [PH] (ref 5–8)
PLATELET # BLD AUTO: 110 10*3/MM3 (ref 140–450)
PMV BLD AUTO: 11 FL (ref 6–12)
POTASSIUM SERPL-SCNC: 3.7 MMOL/L (ref 3.5–5.2)
PROCALCITONIN SERPL-MCNC: 7.37 NG/ML (ref 0–0.25)
PROT SERPL-MCNC: 7 G/DL (ref 6–8.5)
PROT UR QL STRIP: ABNORMAL
QT INTERVAL: 334 MS
QTC INTERVAL: 464 MS
RBC # BLD AUTO: 3.67 10*6/MM3 (ref 4.14–5.8)
RBC # UR STRIP: ABNORMAL /HPF
REF LAB TEST METHOD: ABNORMAL
RHINOVIRUS RNA SPEC NAA+PROBE: NOT DETECTED
RSV RNA NPH QL NAA+NON-PROBE: NOT DETECTED
SARS-COV-2 RNA NPH QL NAA+NON-PROBE: NOT DETECTED
SODIUM SERPL-SCNC: 137 MMOL/L (ref 136–145)
SP GR UR STRIP: 1.02 (ref 1–1.03)
SQUAMOUS #/AREA URNS HPF: ABNORMAL /HPF
TRICYCLICS UR QL SCN: NEGATIVE
TROPONIN T % DELTA: -6
TROPONIN T NUMERIC DELTA: -2 NG/L
TROPONIN T SERPL HS-MCNC: 31 NG/L
UROBILINOGEN UR QL STRIP: ABNORMAL
WBC # UR STRIP: ABNORMAL /HPF
WBC NRBC COR # BLD AUTO: 8.48 10*3/MM3 (ref 3.4–10.8)
WHOLE BLOOD HOLD COAG: NORMAL
WHOLE BLOOD HOLD SPECIMEN: NORMAL

## 2025-01-25 PROCEDURE — 71250 CT THORAX DX C-: CPT

## 2025-01-25 PROCEDURE — 83605 ASSAY OF LACTIC ACID: CPT | Performed by: PHYSICIAN ASSISTANT

## 2025-01-25 PROCEDURE — 80307 DRUG TEST PRSMV CHEM ANLYZR: CPT | Performed by: PHYSICIAN ASSISTANT

## 2025-01-25 PROCEDURE — 80053 COMPREHEN METABOLIC PANEL: CPT | Performed by: EMERGENCY MEDICINE

## 2025-01-25 PROCEDURE — 25010000002 CEFTRIAXONE PER 250 MG: Performed by: STUDENT IN AN ORGANIZED HEALTH CARE EDUCATION/TRAINING PROGRAM

## 2025-01-25 PROCEDURE — 82948 REAGENT STRIP/BLOOD GLUCOSE: CPT

## 2025-01-25 PROCEDURE — 85025 COMPLETE CBC W/AUTO DIFF WBC: CPT | Performed by: EMERGENCY MEDICINE

## 2025-01-25 PROCEDURE — 99285 EMERGENCY DEPT VISIT HI MDM: CPT

## 2025-01-25 PROCEDURE — 80074 ACUTE HEPATITIS PANEL: CPT | Performed by: INTERNAL MEDICINE

## 2025-01-25 PROCEDURE — 82077 ASSAY SPEC XCP UR&BREATH IA: CPT | Performed by: PHYSICIAN ASSISTANT

## 2025-01-25 PROCEDURE — 81001 URINALYSIS AUTO W/SCOPE: CPT | Performed by: EMERGENCY MEDICINE

## 2025-01-25 PROCEDURE — 63710000001 INSULIN LISPRO (HUMAN) PER 5 UNITS: Performed by: INTERNAL MEDICINE

## 2025-01-25 PROCEDURE — G0378 HOSPITAL OBSERVATION PER HR: HCPCS

## 2025-01-25 PROCEDURE — 87040 BLOOD CULTURE FOR BACTERIA: CPT | Performed by: INTERNAL MEDICINE

## 2025-01-25 PROCEDURE — 74176 CT ABD & PELVIS W/O CONTRAST: CPT

## 2025-01-25 PROCEDURE — 84145 PROCALCITONIN (PCT): CPT | Performed by: PHYSICIAN ASSISTANT

## 2025-01-25 PROCEDURE — 0202U NFCT DS 22 TRGT SARS-COV-2: CPT | Performed by: PHYSICIAN ASSISTANT

## 2025-01-25 PROCEDURE — 83735 ASSAY OF MAGNESIUM: CPT | Performed by: EMERGENCY MEDICINE

## 2025-01-25 PROCEDURE — 63710000001 INSULIN LISPRO (HUMAN) PER 5 UNITS: Performed by: STUDENT IN AN ORGANIZED HEALTH CARE EDUCATION/TRAINING PROGRAM

## 2025-01-25 PROCEDURE — 84484 ASSAY OF TROPONIN QUANT: CPT | Performed by: EMERGENCY MEDICINE

## 2025-01-25 PROCEDURE — 25010000002 THIAMINE HCL 200 MG/2ML SOLUTION: Performed by: STUDENT IN AN ORGANIZED HEALTH CARE EDUCATION/TRAINING PROGRAM

## 2025-01-25 PROCEDURE — 25810000003 SODIUM CHLORIDE 0.9 % SOLUTION: Performed by: PHYSICIAN ASSISTANT

## 2025-01-25 PROCEDURE — 25010000002 CEFTRIAXONE PER 250 MG: Performed by: INTERNAL MEDICINE

## 2025-01-25 PROCEDURE — 70450 CT HEAD/BRAIN W/O DYE: CPT

## 2025-01-25 PROCEDURE — 93005 ELECTROCARDIOGRAM TRACING: CPT | Performed by: EMERGENCY MEDICINE

## 2025-01-25 PROCEDURE — 25010000002 THIAMINE HCL 200 MG/2ML SOLUTION: Performed by: INTERNAL MEDICINE

## 2025-01-25 PROCEDURE — 99223 1ST HOSP IP/OBS HIGH 75: CPT | Performed by: INTERNAL MEDICINE

## 2025-01-25 PROCEDURE — 36415 COLL VENOUS BLD VENIPUNCTURE: CPT

## 2025-01-25 RX ORDER — BUPROPION HYDROCHLORIDE 150 MG/1
150 TABLET ORAL DAILY
Status: DISCONTINUED | OUTPATIENT
Start: 2025-01-26 | End: 2025-02-01 | Stop reason: HOSPADM

## 2025-01-25 RX ORDER — SODIUM CHLORIDE 0.9 % (FLUSH) 0.9 %
10 SYRINGE (ML) INJECTION AS NEEDED
Status: DISCONTINUED | OUTPATIENT
Start: 2025-01-25 | End: 2025-01-27

## 2025-01-25 RX ORDER — IBUPROFEN 600 MG/1
1 TABLET ORAL
Status: DISCONTINUED | OUTPATIENT
Start: 2025-01-25 | End: 2025-02-01 | Stop reason: HOSPADM

## 2025-01-25 RX ORDER — ACETAMINOPHEN 325 MG/1
325 TABLET ORAL EVERY 6 HOURS PRN
Status: DISCONTINUED | OUTPATIENT
Start: 2025-01-25 | End: 2025-01-27

## 2025-01-25 RX ORDER — SODIUM CHLORIDE 9 MG/ML
40 INJECTION, SOLUTION INTRAVENOUS AS NEEDED
Status: DISCONTINUED | OUTPATIENT
Start: 2025-01-25 | End: 2025-01-27

## 2025-01-25 RX ORDER — INSULIN LISPRO 100 [IU]/ML
2-7 INJECTION, SOLUTION INTRAVENOUS; SUBCUTANEOUS
Status: DISCONTINUED | OUTPATIENT
Start: 2025-01-25 | End: 2025-02-01 | Stop reason: HOSPADM

## 2025-01-25 RX ORDER — SODIUM CHLORIDE 0.9 % (FLUSH) 0.9 %
10 SYRINGE (ML) INJECTION EVERY 12 HOURS SCHEDULED
Status: DISCONTINUED | OUTPATIENT
Start: 2025-01-25 | End: 2025-01-27

## 2025-01-25 RX ORDER — POLYETHYLENE GLYCOL 3350 17 G/17G
17 POWDER, FOR SOLUTION ORAL DAILY PRN
Status: DISCONTINUED | OUTPATIENT
Start: 2025-01-25 | End: 2025-02-01 | Stop reason: HOSPADM

## 2025-01-25 RX ORDER — AMOXICILLIN 250 MG
2 CAPSULE ORAL 2 TIMES DAILY PRN
Status: DISCONTINUED | OUTPATIENT
Start: 2025-01-25 | End: 2025-02-01 | Stop reason: HOSPADM

## 2025-01-25 RX ORDER — THIAMINE HYDROCHLORIDE 100 MG/ML
200 INJECTION, SOLUTION INTRAMUSCULAR; INTRAVENOUS DAILY
Status: COMPLETED | OUTPATIENT
Start: 2025-01-25 | End: 2025-01-29

## 2025-01-25 RX ORDER — SODIUM CHLORIDE, SODIUM LACTATE, POTASSIUM CHLORIDE, CALCIUM CHLORIDE 600; 310; 30; 20 MG/100ML; MG/100ML; MG/100ML; MG/100ML
100 INJECTION, SOLUTION INTRAVENOUS CONTINUOUS
Status: ACTIVE | OUTPATIENT
Start: 2025-01-25 | End: 2025-01-26

## 2025-01-25 RX ORDER — LIDOCAINE 4 G/G
1 PATCH TOPICAL
Status: DISCONTINUED | OUTPATIENT
Start: 2025-01-25 | End: 2025-02-01 | Stop reason: HOSPADM

## 2025-01-25 RX ORDER — HEPARIN SODIUM 5000 [USP'U]/ML
5000 INJECTION, SOLUTION INTRAVENOUS; SUBCUTANEOUS EVERY 8 HOURS SCHEDULED
Status: DISCONTINUED | OUTPATIENT
Start: 2025-01-25 | End: 2025-01-25

## 2025-01-25 RX ORDER — NICOTINE POLACRILEX 4 MG
15 LOZENGE BUCCAL
Status: DISCONTINUED | OUTPATIENT
Start: 2025-01-25 | End: 2025-02-01 | Stop reason: HOSPADM

## 2025-01-25 RX ORDER — CITALOPRAM HYDROBROMIDE 20 MG/1
20 TABLET ORAL DAILY
Status: DISCONTINUED | OUTPATIENT
Start: 2025-01-25 | End: 2025-02-01 | Stop reason: HOSPADM

## 2025-01-25 RX ORDER — PANTOPRAZOLE SODIUM 40 MG/1
40 TABLET, DELAYED RELEASE ORAL
Status: DISCONTINUED | OUTPATIENT
Start: 2025-01-26 | End: 2025-02-01 | Stop reason: HOSPADM

## 2025-01-25 RX ORDER — SODIUM CHLORIDE 0.9 % (FLUSH) 0.9 %
10 SYRINGE (ML) INJECTION AS NEEDED
Status: DISCONTINUED | OUTPATIENT
Start: 2025-01-25 | End: 2025-02-01 | Stop reason: HOSPADM

## 2025-01-25 RX ORDER — BISACODYL 10 MG
10 SUPPOSITORY, RECTAL RECTAL DAILY PRN
Status: DISCONTINUED | OUTPATIENT
Start: 2025-01-25 | End: 2025-02-01 | Stop reason: HOSPADM

## 2025-01-25 RX ORDER — DEXTROSE MONOHYDRATE 25 G/50ML
25 INJECTION, SOLUTION INTRAVENOUS
Status: DISCONTINUED | OUTPATIENT
Start: 2025-01-25 | End: 2025-02-01 | Stop reason: HOSPADM

## 2025-01-25 RX ORDER — BISACODYL 5 MG/1
5 TABLET, DELAYED RELEASE ORAL DAILY PRN
Status: DISCONTINUED | OUTPATIENT
Start: 2025-01-25 | End: 2025-02-01 | Stop reason: HOSPADM

## 2025-01-25 RX ADMIN — SODIUM CHLORIDE 2000 MG: 900 INJECTION INTRAVENOUS at 20:44

## 2025-01-25 RX ADMIN — INSULIN LISPRO 2 UNITS: 100 INJECTION, SOLUTION INTRAVENOUS; SUBCUTANEOUS at 20:42

## 2025-01-25 RX ADMIN — Medication 10 ML: at 20:45

## 2025-01-25 RX ADMIN — CITALOPRAM HYDROBROMIDE 20 MG: 20 TABLET ORAL at 20:44

## 2025-01-25 RX ADMIN — LIDOCAINE 1 PATCH: 4 PATCH TOPICAL at 20:28

## 2025-01-25 RX ADMIN — SODIUM CHLORIDE 1000 ML: 9 INJECTION, SOLUTION INTRAVENOUS at 17:08

## 2025-01-25 RX ADMIN — THIAMINE HYDROCHLORIDE 200 MG: 100 INJECTION, SOLUTION INTRAMUSCULAR; INTRAVENOUS at 18:54

## 2025-01-25 NOTE — FSED PROVIDER NOTE
Subjective  History of Present Illness:    Patient is a 59-year-old male past medical history of hemochromatosis, currently being evaluated by hematologist Dr. Myers and GI doctor Dr. Mccrary, had colonoscopy and endoscopy yesterday.  Patient states today he started having some chest discomfort while sitting on the couch.  Patient denies shortness of breath, abdominal pain, vomiting, diarrhea.  Patient and family member in the room state patient has had multiple months of fatigue, nausea, weight loss, and they feel like his symptoms are not being addressed as this is continuing to go on and he spends most of the day in bed.  Patient denies fever, chills.       Nurses Notes reviewed and agree, including vitals, allergies, social history and prior medical history.     REVIEW OF SYSTEMS: All systems reviewed and not pertinent unless noted.  Review of Systems    Past Medical History:   Diagnosis Date    Allergic rhinitis     Anxiety     Dermatitis 8/2/2016    Diabetes mellitus     Diverticulitis     Gastroesophageal reflux disease 7/11/2016    GERD (gastroesophageal reflux disease)     History of alcohol abuse     Hypertension     Insomnia 7/11/2016    Visual impairment 7/11/2016    Vitamin D deficiency 7/11/2016       Allergies:    Shellfish allergy, Shellfish-derived products, and Codeine      Past Surgical History:   Procedure Laterality Date    APPENDECTOMY  1995    CHOLECYSTECTOMY WITH INTRAOPERATIVE CHOLANGIOGRAM N/A 2/1/2024    Procedure: CHOLECYSTECTOMY LAPAROSCOPIC WITH INTRAOPERATIVE CHOLANGIOGRAM, UMBILICAL HERNIA REPAIR;  Surgeon: Adam Ramos MD;  Location: [facility] OR;  Service: General;  Laterality: N/A;    ENDOSCOPY N/A 2/2/2024    Procedure: ESOPHAGOGASTRODUODENOSCOPY;  Surgeon: Dominik Chase MD;  Location: [facility] ENDOSCOPY;  Service: Gastroenterology;  Laterality: N/A;    ERCP N/A 2/2/2024    Procedure: ENDOSCOPIC RETROGRADE CHOLANGIOPANCREATOGRAPHY;  Surgeon: Domiink Chase MD;  Location: [facility]  "ENDOSCOPY;  Service: Gastroenterology;  Laterality: N/A;  Sphincterotomy made to common bile duct (CBD) ampulla. CBD swept with 9-12 mm balloon. ERCP scope removed with plastic cap intact.    HERNIA REPAIR  2010    TONSILLECTOMY  1974         Social History     Socioeconomic History    Marital status: Single   Tobacco Use    Smoking status: Never     Passive exposure: Never    Smokeless tobacco: Never    Tobacco comments:     Pt was diagnosed hemochromatosis March 2024   Vaping Use    Vaping status: Never Used   Substance and Sexual Activity    Alcohol use: Not Currently     Comment: sober since 2015    Drug use: No    Sexual activity: Defer         Family History   Problem Relation Age of Onset    Hypertension Mother     Multiple myeloma Mother     Hypertension Father     Alcohol abuse Father     Hypertension Maternal Grandmother     Hypertension Maternal Grandfather     Diabetes Paternal Grandmother     Hypertension Paternal Grandmother     Hypertension Paternal Grandfather     Thyroid disease Other        Objective  Physical Exam:  /71   Pulse 64   Temp 97.9 °F (36.6 °C) (Oral)   Resp 16   Ht 160 cm (63\")   Wt 76 kg (167 lb 9.6 oz)   SpO2 99%   BMI 29.69 kg/m²      Physical Exam  Constitutional:       Appearance: Well-developed. No acute distress.  Appears disheveled.  Disheveled appearing  HENT:      Head: Normocephalic and atraumatic.      Mouth/Throat:      Mouth: Mucous membranes are moist.      Eyes:      Extraocular Movements: Extraocular movements intact.   Cardiovascular:      Rate and Rhythm: Normal rate and regular rhythm.      Heart sounds: Normal heart sounds.   No peripheral edema noted  Pulmonary:      Effort: Pulmonary effort is normal. No respiratory distress.      Breath sounds: Normal breath sounds.   Abdominal:      General: There is no distension.      Palpations: Abdomen is soft and nontender. No rebound tenderness or guarding noted  Musculoskeletal:         General: No swelling " or tenderness.       Extremities: Moves all 4s   Skin:     General: Skin is warm and dry.      Capillary Refill: Capillary refill takes less than 2 seconds.   Neurological:      Mental Status:Alert and oriented to person, place, and time.   Mentation is normal   Psychiatric:         Mood and Affect: Mood normal.         Behavior: Behavior normal.     Procedures    ED Course:    ED Course as of 01/24/25 2112 Fri Jan 24, 2025 2055 An EKG was ordered, reviewed, and interpreted by myself:  Rate: 65.  Rhythm: Sinus rhythm  QTc: 440  ST elevation: n. STEMI: N  Low amplitude noted     [JOSE]      ED Course User Index  [JOSE] Thomas Curtis MD       Lab Results (last 24 hours)       Procedure Component Value Units Date/Time    High Sensitivity Troponin T [[id number]]  (Normal) Collected: 01/24/25 1832    Specimen: Blood Updated: 01/24/25 1854     HS Troponin T 21 ng/L     CBC & Differential [[id number]]  (Abnormal) Collected: 01/24/25 1832    Specimen: Blood Updated: 01/24/25 1839    Narrative:      The following orders were created for panel order CBC & Differential.  Procedure                               Abnormality         Status                     ---------                               -----------         ------                     CBC Auto Differential[[id number]]        Abnormal            Final result                 Please view results for these tests on the individual orders.    Comprehensive Metabolic Panel [[id number]]  (Abnormal) Collected: 01/24/25 1832    Specimen: Blood Updated: 01/24/25 1857     Glucose 174 mg/dL      BUN 10 mg/dL      Creatinine 0.92 mg/dL      Sodium 139 mmol/L      Potassium 3.8 mmol/L      Chloride 102 mmol/L      CO2 23.2 mmol/L      Calcium 8.7 mg/dL      Total Protein 7.2 g/dL      Albumin 3.8 g/dL      ALT (SGPT) 40 U/L      AST (SGOT) 100 U/L      Alkaline Phosphatase 203 U/L      Total Bilirubin 0.8 mg/dL      Globulin 3.4 gm/dL      A/G Ratio 1.1 g/dL      BUN/Creatinine  Ratio 10.9     Anion Gap 13.8 mmol/L      eGFR 95.8 mL/min/1.73     Narrative:      GFR Categories in Chronic Kidney Disease (CKD)      GFR Category          GFR (mL/min/1.73)    Interpretation  G1                     90 or greater         Normal or high (1)  G2                      60-89                Mild decrease (1)  G3a                   45-59                Mild to moderate decrease  G3b                   30-44                Moderate to severe decrease  G4                    15-29                Severe decrease  G5                    14 or less           Kidney failure          (1)In the absence of evidence of kidney disease, neither GFR category G1 or G2 fulfill the criteria for CKD.    eGFR calculation 2021 CKD-EPI creatinine equation, which does not include race as a factor    Lipase [[id number]]  (Normal) Collected: 01/24/25 1832    Specimen: Blood Updated: 01/24/25 1857     Lipase 34 U/L     BNP [[id number]]  (Normal) Collected: 01/24/25 1832    Specimen: Blood Updated: 01/24/25 1855     proBNP 489.0 pg/mL     Narrative:      This assay is used as an aid in the diagnosis of individuals suspected of having heart failure. It can be used as an aid in the diagnosis of acute decompensated heart failure (ADHF) in patients presenting with signs and symptoms of ADHF to the emergency department (ED). In addition, NT-proBNP of <300 pg/mL indicates ADHF is not likely.    Age Range Result Interpretation  NT-proBNP Concentration (pg/mL:      <50             Positive            >450                   Gray                 300-450                    Negative             <300    50-75           Positive            >900                  Gray                300-900                  Negative            <300      >75             Positive            >1800                  Gray                300-1800                  Negative            <300    CBC Auto Differential [[id number]]  (Abnormal) Collected: 01/24/25 1832    Specimen:  "Blood Updated: 01/24/25 1839     WBC 2.93 10*3/mm3      RBC 3.95 10*6/mm3      Hemoglobin 11.6 g/dL      Hematocrit 36.1 %      MCV 91.4 fL      MCH 29.4 pg      MCHC 32.1 g/dL      RDW 16.5 %      RDW-SD 55.4 fl      MPV 10.6 fL      Platelets 123 10*3/mm3      Neutrophil % 62.5 %      Lymphocyte % 28.0 %      Monocyte % 6.5 %      Eosinophil % 2.7 %      Basophil % 0.0 %      Immature Grans % 0.3 %      Neutrophils, Absolute 1.83 10*3/mm3      Lymphocytes, Absolute 0.82 10*3/mm3      Monocytes, Absolute 0.19 10*3/mm3      Eosinophils, Absolute 0.08 10*3/mm3      Basophils, Absolute 0.00 10*3/mm3      Immature Grans, Absolute 0.01 10*3/mm3     D-dimer, Quantitative [[id number]]  (Normal) Collected: 01/24/25 1832    Specimen: Blood Updated: 01/24/25 1852     D-Dimer, Quantitative 0.50 MCGFEU/mL     Narrative:      According to the assay 's published package insert, a normal (<0.50 MCGFEU/mL) D-dimer result in conjunction with a non-high clinical probability assessment, excludes deep vein thrombosis (DVT) and pulmonary embolism (PE) with high sensitivity.    D-dimer values increase with age and this can make VTE exclusion of an older population difficult. To address this, the [employer], based on best available evidence and recent guidelines, recommends that clinicians use age-adjusted D-dimer thresholds in patients greater than 50 years of age with: a) a low probability of PE who do not meet all Pulmonary Embolism Rule Out Criteria, or b) in those with intermediate probability of PE.   The formula for an age-adjusted D-dimer cut-off is \"age/100\".  For example, a 60 year old patient would have an age-adjusted cut-off of 0.60 MCGFEU/mL and an 80 year old 0.80 MCGFEU/mL.    High Sensitivity Troponin T 1Hr [[id number]]  (Normal) Collected: 01/24/25 1942    Specimen: Blood Updated: 01/24/25 2003     HS Troponin T 19 ng/L      Troponin T Numeric Delta -2 ng/L              XR Chest 1 " View    Result Date: 1/24/2025  XR CHEST 1 VW Date of Exam: 1/24/2025 6:38 PM EST Indication: Chest Pain Triage Protocol Comparison: 1/10/2025 Findings: 3 cm lordotic projection. Heart size is at the upper limits of normal. Pulmonary vessels are normal. Lungs are clear. No pleural effusion. No pneumothorax.     Impression: Impression: 1. No acute cardiopulmonary disease. Electronically Signed: Elijah Kraft MD  1/24/2025 7:09 PM EST  Workstation ID: [id number]        [facility]      Initial impression of presenting illness: Patient presents with acute chest pain that has resolved throughout visit. Chronic symptoms     DDX: includes but is not limited to: ACS, GERD, PE, hemochromatosis, CHF,    Patient arrives comfortable, vital signs stable with vitals interpreted by myself.     Pertinent results: Troponin 21 and 19, previous visits more elevated.  BNP within normal limits.  Lab work nonactionable.  AST and alk phos elevated, AST within normal limits.  Discussed this with patient and family by the room to have this rechecked.  Patient's hemoglobin similar to recent checks.    Diagnostic information from other sources: Chart review  Patient follows with Dr. Myers and Dr. Agarwal, hematology oncology and GI respectively.  Patient states that they do not want to see him until March and patient would like to see them sooner.  Discussed to call their office.    Interventions / Re-evaluation: Aspirin, morphine, GI cocktail  Patient states he is no longer experiencing any chest pain or any symptoms at this time besides his normal fatigue and nausea that he has been experiencing for multiple months.    Medications   sodium chloride 0.9 % flush 10 mL (has no administration in time range)   morphine injection 2 mg (has no administration in time range)   aspirin chewable tablet 324 mg (324 mg Oral Given 1/24/25 1854)   ondansetron (ZOFRAN) injection 4 mg (4 mg Intravenous Given 1/24/25 1942)   morphine injection 2 mg (2 mg Intravenous  Given 1/24/25 1942)   aluminum-magnesium hydroxide-simethicone (MAALOX MAX) 400-400-40 MG/5ML suspension 15 mL (15 mL Oral Given 1/24/25 2039)   Lidocaine Viscous HCl (XYLOCAINE) 2 % solution 10 mL (10 mL Mouth/Throat Given 1/24/25 2039)       Results/clinical rationale were discussed with patient and feeling member in the room    Consultations/Discussion of results with other physicians: Attending about case and disposition     Data interpreted: Nursing notes reviewed, vital signs reviewed.  Labs independently interpreted by me     Counseling: Discussed the results above with the patient regarding need for admission or discharge.  Patient understands and agrees plan of care.  Discussed with patients the results from today's visit. Discussed with patient strict return precautions and they verbalize understanding. Recommend to them following up with primary care as soon as possible. Patient is discharged hemodynamically stable and comfortable.   Discussed with patient and feeling ember at length about the diagnosis and his chronic symptoms along with the acute chest pain.  Discussed with patient and family member the need to follow-up with a specialist and continue care.  Offered patient CDU admission due to moderate risk heart score due to patient's age, risk factors of hypertension and diabetes and he states he would like to go home as he is feeling much better and does not want to stay in the hospital..  Patient verbalizes risks of not having further workup.  Patient denies known cardiac history.  Patient said his doctor wanted to follow-up with a cardiologist due to hemochromatosis eventually affecting the heart and discussed I will refer patient to ambulatory chest pain clinic. Discussed if They did not hear anything in the next couple days to call the clinic and make an appointment.    -----  ED Disposition       ED Disposition   Discharge    Condition   Stable    Comment   --             Final diagnoses:  "  Other chest pain   Other hemochromatosis      Your Follow-Up Providers       Schedule an appointment as soon as possible for a visit  with Sita Chase APRN.    Specialties: Nurse Practitioner, Family Medicine  [address] [place] [address]  [address]  [place] [id number] [phone]                       Contact information for after-discharge care    Follow-up information has not been specified.                    Your medication list        CONTINUE taking these medications        Instructions Last Dose Given Next Dose Due   accu-chek soft touch lancets      Used to test blood sugar for Diabetes E11.65 test up to twice a day       atorvastatin 10 MG tablet  Commonly known as: LIPITOR      Take 1 tablet by mouth Daily.       buPROPion  MG 24 hr tablet  Commonly known as: WELLBUTRIN XL      Take 1 tablet by mouth once daily       citalopram 20 MG tablet  Commonly known as: CeleXA      Take 1 tablet by mouth Daily.       FreeStyle Dagoberto 2 [place] device      1 Device Take As Directed.       FreeStyle Dagoberto 2 Sensor misc      Use 1 Device Every 14 (Fourteen) Days.       glipizide 5 MG tablet  Commonly known as: GLUCOTROL      TAKE 1 TABLET BY MOUTH ONCE DAILY IN THE MORNING       glucose blood test strip      Use as instructed       Insulin Syringe-Needle U-100 28G X 5/16\" 1 ML misc      Use 1 Device 2 (Two) Times a Day.       RELION INSULIN SYRINGE 1ML/31G 31G X 5/16\" 1 ML misc  Generic drug: Insulin Syringe-Needle U-100      2 (Two) Times a Day.       metFORMIN  MG 24 hr tablet  Commonly known as: GLUCOPHAGE-XR      TAKE 2 TABLETS BY MOUTH ONCE DAILY WITH BREAKFAST       NovoLIN N ReliOn 100 UNIT/ML injection  Generic drug: insulin NPH      INJECT 10 UNITS WITH MORNING MEAL AND 5 UNITS WITH EVENING MEAL       omeprazole 20 MG capsule  Commonly known as: priLOSEC      Take 1 capsule by mouth once daily       Sod Picosulfate-Mag Ox-Cit Acd 10-3.5-12 MG-GM -GM/160ML solution      Take 350 mL by mouth Take As " Directed for 1 dose.       vitamin B-12 1000 MCG tablet  Commonly known as: CYANOCOBALAMIN      Take 1 tablet by mouth Daily.

## 2025-01-25 NOTE — ED PROVIDER NOTES
Subjective  History of Present Illness:    Chief Complaint: Weakness, fatigue  History of Present Illness: 59-year-old male presents with weakness and fatigue, he arrived via EMS, history provided by his father.  History father reports that he was recently diagnosed with hemochromatosis, I reviewed notes from Dr. Myers with oncology dating back to 11/25/2024.  Patient has also had multiple labs and radiology studies performed including an EGD in February of last year, gallbladder removal last year, previous elevated liver enzymes.  Dr. Myers reports in his initial consultation that the patient does have hemochromatosis typically would present with elevated iron and iron saturation.  He felt that the hemochromatosis can have variable penetrance, his particular ferritin was high most recently in August with low iron and low iron saturation and a low total iron binding capacity.  Patient's father reports that he is increasingly more fatigued to the point that he cannot take care of himself.  He states he said multiple ED visits, and continues to decline  Onset: Gradual  Duration: Several months  Exacerbating / Alleviating factors: Recently diagnosed with hemochromatosis  Associated symptoms: Physical debility      Nurses Notes reviewed and agree, including vitals, allergies, social history and prior medical history.     REVIEW OF SYSTEMS: All systems reviewed and not pertinent unless noted.    Review of Systems   Neurological:  Positive for weakness.   All other systems reviewed and are negative.      Past Medical History:   Diagnosis Date    Allergic rhinitis     Anxiety     Dermatitis 8/2/2016    Diabetes mellitus     Diverticulitis     Gastroesophageal reflux disease 7/11/2016    GERD (gastroesophageal reflux disease)     History of alcohol abuse     Hypertension     Insomnia 7/11/2016    Visual impairment 7/11/2016    Vitamin D deficiency 7/11/2016       Allergies:    Shellfish allergy, Shellfish-derived products,  "and Codeine      Past Surgical History:   Procedure Laterality Date    APPENDECTOMY  1995    CHOLECYSTECTOMY WITH INTRAOPERATIVE CHOLANGIOGRAM N/A 2/1/2024    Procedure: CHOLECYSTECTOMY LAPAROSCOPIC WITH INTRAOPERATIVE CHOLANGIOGRAM, UMBILICAL HERNIA REPAIR;  Surgeon: Adam Ramos MD;  Location:  [facility];  Service: General;  Laterality: N/A;    ENDOSCOPY N/A 2/2/2024    Procedure: ESOPHAGOGASTRODUODENOSCOPY;  Surgeon: Dominik Chase MD;  Location:  [facility];  Service: Gastroenterology;  Laterality: N/A;    ERCP N/A 2/2/2024    Procedure: ENDOSCOPIC RETROGRADE CHOLANGIOPANCREATOGRAPHY;  Surgeon: Dominik Chase MD;  Location:  [facility];  Service: Gastroenterology;  Laterality: N/A;  Sphincterotomy made to common bile duct (CBD) ampulla. CBD swept with 9-12 mm balloon. ERCP scope removed with plastic cap intact.    HERNIA REPAIR  2010    TONSILLECTOMY  1974         Social History     Socioeconomic History    Marital status: Single   Tobacco Use    Smoking status: Never     Passive exposure: Never    Smokeless tobacco: Never    Tobacco comments:     Pt was diagnosed hemochromatosis March 2024   Vaping Use    Vaping status: Never Used   Substance and Sexual Activity    Alcohol use: Not Currently     Comment: sober since 2015    Drug use: No    Sexual activity: Defer         Family History   Problem Relation Age of Onset    Hypertension Mother     Multiple myeloma Mother     Hypertension Father     Alcohol abuse Father     Hypertension Maternal Grandmother     Hypertension Maternal Grandfather     Diabetes Paternal Grandmother     Hypertension Paternal Grandmother     Hypertension Paternal Grandfather     Thyroid disease Other        Objective  Physical Exam:  /75   Pulse 112   Temp 98.6 °F (37 °C) (Oral)   Resp 18   Ht 160 cm (63\")   Wt 76 kg (167 lb 9.6 oz)   SpO2 95%   BMI 29.69 kg/m²      Physical Exam  Vitals and nursing note reviewed.   Constitutional:       Comments: Lethargic   HENT: "      Head: Normocephalic and atraumatic.      Mouth/Throat:      Mouth: Mucous membranes are moist.   Eyes:      Extraocular Movements: Extraocular movements intact.   Cardiovascular:      Rate and Rhythm: Normal rate and regular rhythm.   Pulmonary:      Effort: Pulmonary effort is normal.      Breath sounds: Normal breath sounds.   Abdominal:      Palpations: Abdomen is soft.   Musculoskeletal:         General: Normal range of motion.      Cervical back: Normal range of motion.   Skin:     General: Skin is warm and dry.   Neurological:      Mental Status: He is oriented to person, place, and time.      GCS: GCS eye subscore is 4. GCS verbal subscore is 5. GCS motor subscore is 6.   Psychiatric:         Behavior: Behavior normal.         Thought Content: Thought content normal.         Judgment: Judgment normal.           Procedures    ED Course:    CT chest interpretation by me: No acute pneumonia    ECTabdomen/pelvis interpretation by me: No acute intra-abdominal abnormality no free air or bowel obstruction    ED Course as of 01/25/25 1653   Sat Jan 25, 2025   1545 I updated the patient/family  on all pending labs and lab results, and all pending radiology and  results and answered all questions.   [CS]   1615 Review of previous  non ED visits, prior labs, prior imaging, available notes from prior evaluations or visits with specialists, medication list, allergies, past medical history, past surgical history  Review of recent office visit from Dr. Myers on November 2024 [CS]   1615 I Personally reviewed all laboratory studies performed in the emergency department     Elevations in liver enzymes, lactic and procalcitonin, awaiting CT scan results [CS]   1615 Procalcitonin(!): 7.37 [CS]   1615 Lactate(!!): 2.7 [CS]   1615 ALT (SGPT)(!): 136 [CS]   1615 AST (SGOT)(!): 229 [CS]   1615 Alkaline Phosphatase(!): 379 [CS]   1615 Total Bilirubin(!): 3.5 [CS]   1616 EtOH, and urine drug screen at [CS]   1642 Message sent  to the hospitalist for admission, patient accepted for admission [CS]      ED Course User Index  [CS] Mathew Beach Jr., PA-C       Lab Results (last 24 hours)       Procedure Component Value Units Date/Time    High Sensitivity Troponin T [[id number]]  (Normal) Collected: 01/24/25 1832    Specimen: Blood Updated: 01/24/25 1854     HS Troponin T 21 ng/L     CBC & Differential [[id number]]  (Abnormal) Collected: 01/24/25 1832    Specimen: Blood Updated: 01/24/25 1839    Narrative:      The following orders were created for panel order CBC & Differential.  Procedure                               Abnormality         Status                     ---------                               -----------         ------                     CBC Auto Differential[[id number]]        Abnormal            Final result                 Please view results for these tests on the individual orders.    Comprehensive Metabolic Panel [[id number]]  (Abnormal) Collected: 01/24/25 1832    Specimen: Blood Updated: 01/24/25 1857     Glucose 174 mg/dL      BUN 10 mg/dL      Creatinine 0.92 mg/dL      Sodium 139 mmol/L      Potassium 3.8 mmol/L      Chloride 102 mmol/L      CO2 23.2 mmol/L      Calcium 8.7 mg/dL      Total Protein 7.2 g/dL      Albumin 3.8 g/dL      ALT (SGPT) 40 U/L      AST (SGOT) 100 U/L      Alkaline Phosphatase 203 U/L      Total Bilirubin 0.8 mg/dL      Globulin 3.4 gm/dL      A/G Ratio 1.1 g/dL      BUN/Creatinine Ratio 10.9     Anion Gap 13.8 mmol/L      eGFR 95.8 mL/min/1.73     Narrative:      GFR Categories in Chronic Kidney Disease (CKD)      GFR Category          GFR (mL/min/1.73)    Interpretation  G1                     90 or greater         Normal or high (1)  G2                      60-89                Mild decrease (1)  G3a                   45-59                Mild to moderate decrease  G3b                   30-44                Moderate to severe decrease  G4                    15-29                Severe  decrease  G5                    14 or less           Kidney failure          (1)In the absence of evidence of kidney disease, neither GFR category G1 or G2 fulfill the criteria for CKD.    eGFR calculation 2021 CKD-EPI creatinine equation, which does not include race as a factor    Lipase [[id number]]  (Normal) Collected: 01/24/25 1832    Specimen: Blood Updated: 01/24/25 1857     Lipase 34 U/L     BNP [[id number]]  (Normal) Collected: 01/24/25 1832    Specimen: Blood Updated: 01/24/25 1855     proBNP 489.0 pg/mL     Narrative:      This assay is used as an aid in the diagnosis of individuals suspected of having heart failure. It can be used as an aid in the diagnosis of acute decompensated heart failure (ADHF) in patients presenting with signs and symptoms of ADHF to the emergency department (ED). In addition, NT-proBNP of <300 pg/mL indicates ADHF is not likely.    Age Range Result Interpretation  NT-proBNP Concentration (pg/mL:      <50             Positive            >450                   Gray                 300-450                    Negative             <300    50-75           Positive            >900                  Gray                300-900                  Negative            <300      >75             Positive            >1800                  Gray                300-1800                  Negative            <300    CBC Auto Differential [[id number]]  (Abnormal) Collected: 01/24/25 1832    Specimen: Blood Updated: 01/24/25 1839     WBC 2.93 10*3/mm3      RBC 3.95 10*6/mm3      Hemoglobin 11.6 g/dL      Hematocrit 36.1 %      MCV 91.4 fL      MCH 29.4 pg      MCHC 32.1 g/dL      RDW 16.5 %      RDW-SD 55.4 fl      MPV 10.6 fL      Platelets 123 10*3/mm3      Neutrophil % 62.5 %      Lymphocyte % 28.0 %      Monocyte % 6.5 %      Eosinophil % 2.7 %      Basophil % 0.0 %      Immature Grans % 0.3 %      Neutrophils, Absolute 1.83 10*3/mm3      Lymphocytes, Absolute 0.82 10*3/mm3      Monocytes, Absolute 0.19  "10*3/mm3      Eosinophils, Absolute 0.08 10*3/mm3      Basophils, Absolute 0.00 10*3/mm3      Immature Grans, Absolute 0.01 10*3/mm3     D-dimer, Quantitative [[id number]]  (Normal) Collected: 01/24/25 1832    Specimen: Blood Updated: 01/24/25 1852     D-Dimer, Quantitative 0.50 MCGFEU/mL     Narrative:      According to the assay 's published package insert, a normal (<0.50 MCGFEU/mL) D-dimer result in conjunction with a non-high clinical probability assessment, excludes deep vein thrombosis (DVT) and pulmonary embolism (PE) with high sensitivity.    D-dimer values increase with age and this can make VTE exclusion of an older population difficult. To address this, the [employer], based on best available evidence and recent guidelines, recommends that clinicians use age-adjusted D-dimer thresholds in patients greater than 50 years of age with: a) a low probability of PE who do not meet all Pulmonary Embolism Rule Out Criteria, or b) in those with intermediate probability of PE.   The formula for an age-adjusted D-dimer cut-off is \"age/100\".  For example, a 60 year old patient would have an age-adjusted cut-off of 0.60 MCGFEU/mL and an 80 year old 0.80 MCGFEU/mL.    High Sensitivity Troponin T 1Hr [[id number]]  (Normal) Collected: 01/24/25 1942    Specimen: Blood Updated: 01/24/25 2003     HS Troponin T 19 ng/L      Troponin T Numeric Delta -2 ng/L     Urinalysis With Microscopic If Indicated (No Culture) - Urine, Clean Catch [[id number]]  (Abnormal) Collected: 01/25/25 1456    Specimen: Urine, Clean Catch Updated: 01/25/25 1510     Color, UA Dark Yellow     Appearance, UA Cloudy     pH, UA 6.0     Specific Gravity, UA 1.018     Glucose,  mg/dL (1+)     Ketones, UA Negative     Bilirubin, UA Moderate (2+)     Blood, UA Negative     Protein, UA >=300 mg/dL (3+)     Leuk Esterase, UA Negative     Nitrite, UA Negative     Urobilinogen, UA 2.0 E.U./dL    Urinalysis, Microscopic Only - " Urine, Clean Catch [[id number]]  (Abnormal) Collected: 01/25/25 1456    Specimen: Urine, Clean Catch Updated: 01/25/25 1510     RBC, UA 3-5 /HPF      WBC, UA 0-2 /HPF      Bacteria, UA None Seen /HPF      Squamous Epithelial Cells, UA 0-2 /HPF      Hyaline Casts, UA 0-6 /LPF      Methodology Automated Microscopy    Respiratory Panel PCR w/COVID-19(SARS-CoV-2) [facility]-House, NP Swab in UTM/VTM, 2 HR TAT - Swab, Nasopharynx [[id number]]  (Normal) Collected: 01/25/25 1506    Specimen: Swab from Nasopharynx Updated: 01/25/25 1615     ADENOVIRUS, PCR Not Detected     Coronavirus 229E Not Detected     Coronavirus HKU1 Not Detected     Coronavirus NL63 Not Detected     Coronavirus OC43 Not Detected     COVID19 Not Detected     Human Metapneumovirus Not Detected     Human Rhinovirus/Enterovirus Not Detected     Influenza A PCR Not Detected     Influenza B PCR Not Detected     Parainfluenza Virus 1 Not Detected     Parainfluenza Virus 2 Not Detected     Parainfluenza Virus 3 Not Detected     Parainfluenza Virus 4 Not Detected     RSV, PCR Not Detected     Bordetella pertussis pcr Not Detected     Bordetella parapertussis PCR Not Detected     Chlamydophila pneumoniae PCR Not Detected     Mycoplasma pneumo by PCR Not Detected    Narrative:      In the setting of a positive respiratory panel with a viral infection PLUS a negative procalcitonin without other underlying concern for bacterial infection, consider observing off antibiotics or discontinuation of antibiotics and continue supportive care. If the respiratory panel is positive for atypical bacterial infection (Bordetella pertussis, Chlamydophila pneumoniae, or Mycoplasma pneumoniae), consider antibiotic de-escalation to target atypical bacterial infection.    CBC & Differential [[id number]]  (Abnormal) Collected: 01/25/25 1510    Specimen: Blood Updated: 01/25/25 1525    Narrative:      The following orders were created for panel order CBC &  Differential.  Procedure                               Abnormality         Status                     ---------                               -----------         ------                     CBC Auto Differential[[id number]]        Abnormal            Final result               Scan Slide[[id number]]                                                                    Please view results for these tests on the individual orders.    Comprehensive Metabolic Panel [[id number]]  (Abnormal) Collected: 01/25/25 1510    Specimen: Blood Updated: 01/25/25 1545     Glucose 277 mg/dL      BUN 15 mg/dL      Creatinine 1.14 mg/dL      Sodium 137 mmol/L      Potassium 3.7 mmol/L      Chloride 102 mmol/L      CO2 19.0 mmol/L      Calcium 8.8 mg/dL      Total Protein 7.0 g/dL      Albumin 3.9 g/dL      ALT (SGPT) 136 U/L      AST (SGOT) 229 U/L      Alkaline Phosphatase 379 U/L      Total Bilirubin 3.5 mg/dL      Globulin 3.1 gm/dL      Comment: Calculated Result        A/G Ratio 1.3 g/dL      BUN/Creatinine Ratio 13.2     Anion Gap 16.0 mmol/L      eGFR 74.1 mL/min/1.73     Narrative:      GFR Categories in Chronic Kidney Disease (CKD)      GFR Category          GFR (mL/min/1.73)    Interpretation  G1                     90 or greater         Normal or high (1)  G2                      60-89                Mild decrease (1)  G3a                   45-59                Mild to moderate decrease  G3b                   30-44                Moderate to severe decrease  G4                    15-29                Severe decrease  G5                    14 or less           Kidney failure          (1)In the absence of evidence of kidney disease, neither GFR category G1 or G2 fulfill the criteria for CKD.    eGFR calculation 2021 CKD-EPI creatinine equation, which does not include race as a factor    High Sensitivity Troponin T [[id number]]  (Abnormal) Collected: 01/25/25 1510    Specimen: Blood Updated: 01/25/25 1539     HS Troponin T 31 ng/L      Narrative:      High Sensitive Troponin T Reference Range:  <14.0 ng/L- Negative Female for AMI  <22.0 ng/L- Negative Male for AMI  >=14 - Abnormal Female indicating possible myocardial injury.  >=22 - Abnormal Male indicating possible myocardial injury.   Clinicians would have to utilize clinical acumen, EKG, Troponin, and serial changes to determine if it is an Acute Myocardial Infarction or myocardial injury due to an underlying chronic condition.         Magnesium [[id number]]  (Normal) Collected: 01/25/25 1510    Specimen: Blood Updated: 01/25/25 1545     Magnesium 1.6 mg/dL     CBC Auto Differential [[id number]]  (Abnormal) Collected: 01/25/25 1510    Specimen: Blood Updated: 01/25/25 1525     WBC 8.48 10*3/mm3      RBC 3.67 10*6/mm3      Hemoglobin 11.1 g/dL      Hematocrit 33.0 %      MCV 89.9 fL      MCH 30.2 pg      MCHC 33.6 g/dL      RDW 16.5 %      RDW-SD 54.4 fl      MPV 11.0 fL      Platelets 110 10*3/mm3      Neutrophil % 77.8 %      Lymphocyte % 13.3 %      Monocyte % 8.1 %      Eosinophil % 0.1 %      Basophil % 0.1 %      Immature Grans % 0.6 %      Neutrophils, Absolute 6.59 10*3/mm3      Lymphocytes, Absolute 1.13 10*3/mm3      Monocytes, Absolute 0.69 10*3/mm3      Eosinophils, Absolute 0.01 10*3/mm3      Basophils, Absolute 0.01 10*3/mm3      Immature Grans, Absolute 0.05 10*3/mm3      nRBC 0.0 /100 WBC     Lactic Acid, Plasma [[id number]]  (Abnormal) Collected: 01/25/25 1510    Specimen: Blood Updated: 01/25/25 1537     Lactate 2.7 mmol/L      Comment: Falsely depressed results may occur on samples drawn from patients receiving N-Acetylcysteine (NAC) or Metamizole.       Procalcitonin [[id number]]  (Abnormal) Collected: 01/25/25 1510    Specimen: Blood Updated: 01/25/25 1545     Procalcitonin 7.37 ng/mL     Narrative:      As a Marker for Sepsis (Non-Neonates):    1. <0.5 ng/mL represents a low risk of severe sepsis and/or septic shock.  2. >2 ng/mL represents a high risk of severe sepsis and/or  "septic shock.    As a Marker for Lower Respiratory Tract Infections that require antibiotic therapy:    PCT on Admission    Antibiotic Therapy       6-12 Hrs later    >0.5                Strongly Recommended  >0.25 - <0.5        Recommended   0.1 - 0.25          Discouraged              Remeasure/reassess PCT  <0.1                Strongly Discouraged     Remeasure/reassess PCT    As 28 day mortality risk marker: \"Change in Procalcitonin Result\" (>80% or <=80%) if Day 0 (or Day 1) and Day 4 values are available. Refer to http://www.[email or web address]-pct-calculator.com    Change in PCT <=80%  A decrease of PCT levels below or equal to 80% defines a positive change in PCT test result representing a higher risk for 28-day all-cause mortality of patients diagnosed with severe sepsis for septic shock.    Change in PCT >80%  A decrease of PCT levels of more than 80% defines a negative change in PCT result representing a lower risk for 28-day all-cause mortality of patients diagnosed with severe sepsis or septic shock.       Ethanol [[id number]]  (Normal) Collected: 01/25/25 1510    Specimen: Blood Updated: 01/25/25 1628     Ethanol <10 mg/dL     Narrative:      Elevated lactic acid concentration and lactate dehydrogenase(LD) activity may falsely elevate enzymatically determined ethanol levels. Not for legal purposes.     High Sensitivity Troponin T 1Hr [[id number]] Collected: 01/25/25 1626    Specimen: Blood Updated: 01/25/25 1632             CT Chest Without Contrast Diagnostic    Result Date: 1/25/2025  CT CHEST WO CONTRAST DIAGNOSTIC Date of Exam: 1/25/2025 3:53 PM EST Indication: ams. Comparison: None available. Technique: Axial CT images were obtained of the chest without contrast administration.  Reconstructed coronal and sagittal images were also obtained. Automated exposure control and iterative construction methods were used. FINDINGS: Scattered atelectasis is noted. No well-defined consolidations or pleural effusions are " observed. Granulomas are noted in the left lower lobe. No suspicious pulmonary nodules or abnormal pulmonary masses are seen. No significant hilar, mediastinal, or axillary lymphadenopathy is observed. Calcified left hilar lymph nodes are noted. A normal aortic arch branching pattern is identified. Coronary artery calcifications are identified. The thyroid gland is unremarkable. The esophagus is unremarkable. The limited evaluation of the upper abdomen demonstrates no evidence for acute abnormality. There is evidence for diffuse hepatic fatty infiltration with associated hepatosplenomegaly. No acute osseous abnormalities are observed.     Impression: 1.No evidence for acute intrathoracic abnormality. 2.Coronary artery calcifications are noted. Electronically Signed: Andrew Tillman MD  1/25/2025 4:16 PM EST  Workstation ID: [id number]    CT Abdomen Pelvis Without Contrast    Result Date: 1/25/2025  CT ABDOMEN PELVIS WO CONTRAST Date of Exam: 1/25/2025 3:53 PM EST Indication: ams. Comparison: None available. Technique: Axial CT images were obtained of the abdomen and pelvis without the administration of contrast. Reconstructed coronal and sagittal images were also obtained. Automated exposure control and iterative construction methods were used. FINDINGS: Lung bases: Calcified left hilar lymph nodes. Left lower lobe calcified granulomata. Atheromatous disease of the coronary vessels. Liver: Geographic areas of decreased attenuation within the liver suggesting hepatic steatosis. Spleen: Splenic granulomata Pancreas:No pancreatic masses. No evidence of pancreatitis. Gallbladder and common bile duct: Previous cholecystectomy. Adrenal glands:No adrenal masses Kidneys and ureters:No kidney stones. No renal masses.No calculi present within the ureters. Normal caliber ureters. Urinary bladder:No urinary bladder wall thickening. No bladder masses. Small bowel:Normal caliber small bowel. Large bowel:No diverticulosis or  diverticulitis. No large bowel masses are appreciated Appendix: Not seen however there is no evidence of appendicitis GENITOURINARY: Normal prostate Ascites or pneumoperitoneum:None. Adenopathy:None present Osseous structures: The proximal femurs are intact. The pubic bones are intact. The sacrum and sacroiliac joints are normal. The spinous and transverse processes are intact. No rib fractures. Other findings: Fat-containing umbilical hernias.     Impression: 1.No acute findings in the abdomen or pelvis. 2.Hepatic steatosis. 3.Fat-containing umbilical hernias. Electronically Signed: Stefano Garcia MD  1/25/2025 4:11 PM EST  Workstation ID: [id number]    CT Head Without Contrast    Result Date: 1/25/2025  CT HEAD WO CONTRAST Date of Exam: 1/25/2025 3:53 PM EST Indication: ams. Comparison: 12/15/2014 Technique: Axial CT images were obtained of the head without contrast administration.  Automated exposure control and iterative construction methods were used. Findings: Gray-white matter differentiation is maintained without evidence of an acute infarction. No intracranial mass or mass effect. No extra-axial mass or collection. The ventricles and sulci are normal in size and configuration. The posterior fossa appears normal. Sellar and suprasellar structures are normal. Orbital and paranasal soft tissues are normal. Opacification of the left maxillary sinus with mucoperiosteal reactive changes consistent with chronic sinusitis. The bony calvarium appears intact. No acute fractures. No lytic or blastic bony diseases.     Impression: Impression: No acute intracranial pathology. Electronically Signed: Stefano Garcia MD  1/25/2025 4:07 PM EST  Workstation ID: [id number]    XR Chest 1 View    Result Date: 1/24/2025  XR CHEST 1 VW Date of Exam: 1/24/2025 6:38 PM EST Indication: Chest Pain Triage Protocol Comparison: 1/10/2025 Findings: 3 cm lordotic projection. Heart size is at the upper limits of normal. Pulmonary vessels are  normal. Lungs are clear. No pleural effusion. No pneumothorax.     Impression: Impression: 1. No acute cardiopulmonary disease. Electronically Signed: Elijah Kraft MD  1/24/2025 7:09 PM EST  Workstation ID: [id number]                     HEART Score: 4      Shared Decision Making  I discussed the findings with the patient/patient representative who is in agreement with the treatment plan and the final disposition.  Risks and benefits of discharge and/or observation/admission were discussed: Yes                                            Medical Decision Making  Patient Presentation 59-year-old male presented with severe weakness, and confusion    DDX electrolyte abnormality, acute kidney insufficiency, dehydration, sepsis, bacteremia, cranial mass, intracranial bleed,    Data Review/ Non ED Records /Analysis/Ordering unique tests  Review of previous  non ED visits, prior labs, prior imaging, available notes from prior evaluations or visits with specialists, medication list, allergies, past medical history, past surgical history        Independent Review Studies  I Personally reviewed all laboratory studies performed in the emergency department     Intervention/Re-evaluation numbers included fluids, reevaluation patient remained stable    Independent Clinician discussed the case with the on-call hospitalist accepted for admission    Risk Stratification tools/clinical decision rules patient presented with weakness, altered mental status or confusion, recently diagnosed with hemochromatosis, was concerned about infection such as a viral illness, pneumonia, bacteremia, urinary tract infection, labs were drawn as well as CT scan of the head chest and abdomen.  He has significant history for hemochromatosis.  He had elevations in his lactic acid and procalcitonin but no obvious source of infection, these results could be from an inflammatory process, based on the patient's presentation and pre-existing medical conditions,  he was admitted for further care due to worsening weakness and physical debilitation    Shared Decision Making discussed all findings with patient and family they were agreeable    Disposition stable for admission    Problems Addressed:  Weakness: complicated acute illness or injury    Amount and/or Complexity of Data Reviewed  External Data Reviewed: labs, radiology and notes.  Labs: ordered. Decision-making details documented in ED Course.  Radiology: ordered and independent interpretation performed. Decision-making details documented in ED Course.  ECG/medicine tests: ordered.    Risk  Prescription drug management.  Decision regarding hospitalization.          Final diagnoses:   Weakness           Disposition admission       Mathew Beach Jr., PA-C  01/25/25 9720

## 2025-01-25 NOTE — ED NOTES
Malcolm Costa    Nursing Report ED to Floor:  Mental status: A&OX3  Ambulatory status: BED BOUND  Oxygen Therapy:  RA  Cardiac Rhythm: SINUS TACHY  Admitted from: ED  Safety Concerns:  FALL RIDK  Social Issues: N/A  ED Room #:  8    ED Nurse Phone Extension - [phone] or may call [phone].      HPI:   Chief Complaint   Patient presents with    Altered Mental Status       Past Medical History:  Past Medical History:   Diagnosis Date    Allergic rhinitis     Anxiety     Dermatitis 8/2/2016    Diabetes mellitus     Diverticulitis     Gastroesophageal reflux disease 7/11/2016    GERD (gastroesophageal reflux disease)     History of alcohol abuse     Hypertension     Insomnia 7/11/2016    Visual impairment 7/11/2016    Vitamin D deficiency 7/11/2016        Past Surgical History:  Past Surgical History:   Procedure Laterality Date    APPENDECTOMY  1995    CHOLECYSTECTOMY WITH INTRAOPERATIVE CHOLANGIOGRAM N/A 2/1/2024    Procedure: CHOLECYSTECTOMY LAPAROSCOPIC WITH INTRAOPERATIVE CHOLANGIOGRAM, UMBILICAL HERNIA REPAIR;  Surgeon: Adam Ramos MD;  Location: [facility] OR;  Service: General;  Laterality: N/A;    ENDOSCOPY N/A 2/2/2024    Procedure: ESOPHAGOGASTRODUODENOSCOPY;  Surgeon: Dominik Chase MD;  Location:  [facility]COPY;  Service: Gastroenterology;  Laterality: N/A;    ERCP N/A 2/2/2024    Procedure: ENDOSCOPIC RETROGRADE CHOLANGIOPANCREATOGRAPHY;  Surgeon: Dominik Chase MD;  Location:  [facility];  Service: Gastroenterology;  Laterality: N/A;  Sphincterotomy made to common bile duct (CBD) ampulla. CBD swept with 9-12 mm balloon. ERCP scope removed with plastic cap intact.    HERNIA REPAIR  2010    TONSILLECTOMY  1974        Admitting Doctor:   No admitting provider for patient encounter.    Consulting Provider(s):  Consults       Date and Time Order Name Status Description    1/25/2025  5:26 PM Inpatient Gastroenterology Consult               Admitting Diagnosis:   The encounter diagnosis was Weakness.    Most  "Recent Vitals:   Vitals:    01/25/25 1426 01/25/25 1530 01/25/25 1630   BP: 129/75 122/68 114/75   BP Location: Right arm     Patient Position: Lying     Pulse: 116 112 112   Resp: 18     Temp: 98.6 °F (37 °C)     TempSrc: Oral     SpO2: 96% 95% 95%   Weight: 76 kg (167 lb 9.6 oz)     Height: 160 cm (63\")         Active LDAs/IV Access:   Lines, Drains & Airways       Active LDAs       Name Placement date Placement time Site Days    Peripheral IV 01/25/25 1400 Left;Posterior Hand 01/25/25  1400  Hand  less than 1                    Labs (abnormal labs have a star):   Labs Reviewed   COMPREHENSIVE METABOLIC PANEL - Abnormal; Notable for the following components:       Result Value    Glucose 277 (*)     CO2 19.0 (*)     ALT (SGPT) 136 (*)     AST (SGOT) 229 (*)     Alkaline Phosphatase 379 (*)     Total Bilirubin 3.5 (*)     Anion Gap 16.0 (*)     All other components within normal limits    Narrative:     GFR Categories in Chronic Kidney Disease (CKD)      GFR Category          GFR (mL/min/1.73)    Interpretation  G1                     90 or greater         Normal or high (1)  G2                      60-89                Mild decrease (1)  G3a                   45-59                Mild to moderate decrease  G3b                   30-44                Moderate to severe decrease  G4                    15-29                Severe decrease  G5                    14 or less           Kidney failure          (1)In the absence of evidence of kidney disease, neither GFR category G1 or G2 fulfill the criteria for CKD.    eGFR calculation 2021 CKD-EPI creatinine equation, which does not include race as a factor   TROPONIN - Abnormal; Notable for the following components:    HS Troponin T 31 (*)     All other components within normal limits    Narrative:     High Sensitive Troponin T Reference Range:  <14.0 ng/L- Negative Female for AMI  <22.0 ng/L- Negative Male for AMI  >=14 - Abnormal Female indicating possible myocardial " "injury.  >=22 - Abnormal Male indicating possible myocardial injury.   Clinicians would have to utilize clinical acumen, EKG, Troponin, and serial changes to determine if it is an Acute Myocardial Infarction or myocardial injury due to an underlying chronic condition.        URINALYSIS W/ MICROSCOPIC IF INDICATED (NO CULTURE) - Abnormal; Notable for the following components:    Color, UA Dark Yellow (*)     Appearance, UA Cloudy (*)     Glucose,  mg/dL (1+) (*)     Bilirubin, UA Moderate (2+) (*)     Protein, UA >=300 mg/dL (3+) (*)     Urobilinogen, UA 2.0 E.U./dL (*)     All other components within normal limits   CBC WITH AUTO DIFFERENTIAL - Abnormal; Notable for the following components:    RBC 3.67 (*)     Hemoglobin 11.1 (*)     Hematocrit 33.0 (*)     RDW 16.5 (*)     RDW-SD 54.4 (*)     Platelets 110 (*)     Neutrophil % 77.8 (*)     Lymphocyte % 13.3 (*)     Eosinophil % 0.1 (*)     Immature Grans % 0.6 (*)     All other components within normal limits   LACTIC ACID, PLASMA - Abnormal; Notable for the following components:    Lactate 2.7 (*)     All other components within normal limits   PROCALCITONIN - Abnormal; Notable for the following components:    Procalcitonin 7.37 (*)     All other components within normal limits    Narrative:     As a Marker for Sepsis (Non-Neonates):    1. <0.5 ng/mL represents a low risk of severe sepsis and/or septic shock.  2. >2 ng/mL represents a high risk of severe sepsis and/or septic shock.    As a Marker for Lower Respiratory Tract Infections that require antibiotic therapy:    PCT on Admission    Antibiotic Therapy       6-12 Hrs later    >0.5                Strongly Recommended  >0.25 - <0.5        Recommended   0.1 - 0.25          Discouraged              Remeasure/reassess PCT  <0.1                Strongly Discouraged     Remeasure/reassess PCT    As 28 day mortality risk marker: \"Change in Procalcitonin Result\" (>80% or <=80%) if Day 0 (or Day 1) and Day 4 " values are available. Refer to http://www.[facility]-pct-calculator.com    Change in PCT <=80%  A decrease of PCT levels below or equal to 80% defines a positive change in PCT test result representing a higher risk for 28-day all-cause mortality of patients diagnosed with severe sepsis for septic shock.    Change in PCT >80%  A decrease of PCT levels of more than 80% defines a negative change in PCT result representing a lower risk for 28-day all-cause mortality of patients diagnosed with severe sepsis or septic shock.      URINALYSIS, MICROSCOPIC ONLY - Abnormal; Notable for the following components:    RBC, UA 3-5 (*)     All other components within normal limits   HIGH SENSITIVITIY TROPONIN T 1HR - Abnormal; Notable for the following components:    HS Troponin T 29 (*)     All other components within normal limits    Narrative:     High Sensitive Troponin T Reference Range:  <14.0 ng/L- Negative Female for AMI  <22.0 ng/L- Negative Male for AMI  >=14 - Abnormal Female indicating possible myocardial injury.  >=22 - Abnormal Male indicating possible myocardial injury.   Clinicians would have to utilize clinical acumen, EKG, Troponin, and serial changes to determine if it is an Acute Myocardial Infarction or myocardial injury due to an underlying chronic condition.        URINE DRUG SCREEN - Abnormal; Notable for the following components:    Opiate Screen Positive (*)     Oxycodone Screen, Urine Positive (*)     All other components within normal limits    Narrative:     Cutoff For Drugs Screened:    Amphetamines               500 ng/ml  Barbiturates               200 ng/ml  Benzodiazepines            150 ng/ml  Cocaine                    150 ng/ml  Methadone                  200 ng/ml  Opiates                    100 ng/ml  Phencyclidine               25 ng/ml  THC                         50 ng/ml  Methamphetamine            500 ng/ml  Tricyclic Antidepressants  300 ng/ml  Oxycodone                  100  ng/ml  Buprenorphine               10 ng/ml    The normal value for all drugs tested is negative. This report includes unconfirmed screening results, with the cutoff values listed, to be used for medical treatment purposes only.  Unconfirmed results must not be used for non-medical purposes such as employment or legal testing.  Clinical consideration should be applied to any drug of abuse test, particularly when unconfirmed results are used.     RESPIRATORY PANEL PCR W/ COVID-19 (SARS-COV-2), NP SWAB IN UTM/VTP, 2 HR TAT - Normal    Narrative:     In the setting of a positive respiratory panel with a viral infection PLUS a negative procalcitonin without other underlying concern for bacterial infection, consider observing off antibiotics or discontinuation of antibiotics and continue supportive care. If the respiratory panel is positive for atypical bacterial infection (Bordetella pertussis, Chlamydophila pneumoniae, or Mycoplasma pneumoniae), consider antibiotic de-escalation to target atypical bacterial infection.   MAGNESIUM - Normal   ETHANOL - Normal    Narrative:     Elevated lactic acid concentration and lactate dehydrogenase(LD) activity may falsely elevate enzymatically determined ethanol levels. Not for legal purposes.    BLOOD CULTURE   BLOOD CULTURE   RAINBOW DRAW    Narrative:     The following orders were created for panel order [facility] Draw.  Procedure                               Abnormality         Status                     ---------                               -----------         ------                     Green Top (Gel)[[id number]]                                  Final result               Lavender Top[[id number]]                                     Final result               Gold Top - SST[[id number]]                                   Final result               Garcia Top[[id number]]                                         Final result               Light Blue Top[[id number]]                                    Final result                 Please view results for these tests on the individual orders.   LACTIC ACID, REFLEX   FENTANYL, URINE   CBC AND DIFFERENTIAL    Narrative:     The following orders were created for panel order CBC & Differential.  Procedure                               Abnormality         Status                     ---------                               -----------         ------                     CBC Auto Differential[[id number]]        Abnormal            Final result               Scan Slide[[id number]]                                                                    Please view results for these tests on the individual orders.   GREEN TOP   LAVENDER TOP   GOLD TOP - SST   GRAY TOP   LIGHT BLUE TOP       Meds Given in ED:   Medications   sodium chloride 0.9 % flush 10 mL (has no administration in time range)   sodium chloride 0.9 % bolus 1,000 mL (1,000 mL Intravenous New Bag 1/25/25 0272)   sodium chloride 0.9 % flush 10 mL (has no administration in time range)   sodium chloride 0.9 % flush 10 mL (has no administration in time range)   sodium chloride 0.9 % infusion 40 mL (has no administration in time range)   heparin (porcine) 5000 UNIT/ML injection 5,000 Units (has no administration in time range)   cefTRIAXone (ROCEPHIN) 2,000 mg in sodium chloride 0.9 % 100 mL MBP (has no administration in time range)   sennosides-docusate (PERICOLACE) 8.6-50 MG per tablet 2 tablet (has no administration in time range)     And   polyethylene glycol (MIRALAX) packet 17 g (has no administration in time range)     And   bisacodyl (DULCOLAX) EC tablet 5 mg (has no administration in time range)     And   bisacodyl (DULCOLAX) suppository 10 mg (has no administration in time range)   lactated ringers infusion (has no administration in time range)   thiamine (B-1) injection 200 mg (has no administration in time range)     lactated ringers, 100 mL/hr         Last NIH score:                                                           Dysphagia screening results:        Remigio Coma Scale:  No data recorded     CIWA:        Restraint Type:            Isolation Status:  No active isolations

## 2025-01-25 NOTE — DISCHARGE INSTRUCTIONS
Please return worsening or changing symptoms.  Please follow-up with ambulatory chest pain clinic if you do not hear in the next couple days.

## 2025-01-26 ENCOUNTER — APPOINTMENT (OUTPATIENT)
Dept: GENERAL RADIOLOGY | Facility: HOSPITAL | Age: 60
End: 2025-01-26
Payer: MEDICAID

## 2025-01-26 ENCOUNTER — APPOINTMENT (OUTPATIENT)
Dept: ULTRASOUND IMAGING | Facility: HOSPITAL | Age: 60
End: 2025-01-26
Payer: MEDICAID

## 2025-01-26 ENCOUNTER — ANESTHESIA (OUTPATIENT)
Dept: GASTROENTEROLOGY | Facility: HOSPITAL | Age: 60
End: 2025-01-26
Payer: MEDICAID

## 2025-01-26 ENCOUNTER — APPOINTMENT (OUTPATIENT)
Dept: MRI IMAGING | Facility: HOSPITAL | Age: 60
End: 2025-01-26
Payer: MEDICAID

## 2025-01-26 ENCOUNTER — ANESTHESIA EVENT (OUTPATIENT)
Dept: GASTROENTEROLOGY | Facility: HOSPITAL | Age: 60
End: 2025-01-26
Payer: MEDICAID

## 2025-01-26 PROBLEM — E43 SEVERE PROTEIN-CALORIE MALNUTRITION: Status: ACTIVE | Noted: 2025-01-26

## 2025-01-26 LAB
ALBUMIN SERPL-MCNC: 3.2 G/DL (ref 3.5–5.2)
ALBUMIN/GLOB SERPL: 1.1 G/DL
ALP SERPL-CCNC: 297 U/L (ref 39–117)
ALT SERPL W P-5'-P-CCNC: 100 U/L (ref 1–41)
AMMONIA BLD-SCNC: 21 UMOL/L (ref 16–60)
ANION GAP SERPL CALCULATED.3IONS-SCNC: 13 MMOL/L (ref 5–15)
AST SERPL-CCNC: 120 U/L (ref 1–40)
BASOPHILS # BLD AUTO: 0.01 10*3/MM3 (ref 0–0.2)
BASOPHILS NFR BLD AUTO: 0.2 % (ref 0–1.5)
BILIRUB CONJ SERPL-MCNC: 3.7 MG/DL (ref 0–0.3)
BILIRUB SERPL-MCNC: 3.8 MG/DL (ref 0–1.2)
BUN SERPL-MCNC: 14 MG/DL (ref 6–20)
BUN/CREAT SERPL: 15.9 (ref 7–25)
CALCIUM SPEC-SCNC: 8.3 MG/DL (ref 8.6–10.5)
CHLORIDE SERPL-SCNC: 106 MMOL/L (ref 98–107)
CO2 SERPL-SCNC: 21 MMOL/L (ref 22–29)
CORTIS AM PEAK SERPL-MCNC: 15.82 MCG/DL
CREAT SERPL-MCNC: 0.88 MG/DL (ref 0.76–1.27)
CRP SERPL-MCNC: 11.62 MG/DL (ref 0–0.5)
D-LACTATE SERPL-SCNC: 1 MMOL/L (ref 0.5–2)
DEPRECATED RDW RBC AUTO: 56 FL (ref 37–54)
EGFRCR SERPLBLD CKD-EPI 2021: 99.1 ML/MIN/1.73
EOSINOPHIL # BLD AUTO: 0.01 10*3/MM3 (ref 0–0.4)
EOSINOPHIL NFR BLD AUTO: 0.2 % (ref 0.3–6.2)
ERYTHROCYTE [DISTWIDTH] IN BLOOD BY AUTOMATED COUNT: 16.6 % (ref 12.3–15.4)
FERRITIN SERPL-MCNC: 2807 NG/ML (ref 30–400)
FOLATE SERPL-MCNC: 6.75 NG/ML (ref 4.78–24.2)
GLOBULIN UR ELPH-MCNC: 2.9 GM/DL
GLUCOSE BLDC GLUCOMTR-MCNC: 119 MG/DL (ref 70–130)
GLUCOSE BLDC GLUCOMTR-MCNC: 200 MG/DL (ref 70–130)
GLUCOSE BLDC GLUCOMTR-MCNC: 214 MG/DL (ref 70–130)
GLUCOSE BLDC GLUCOMTR-MCNC: 97 MG/DL (ref 70–130)
GLUCOSE SERPL-MCNC: 106 MG/DL (ref 65–99)
HBA1C MFR BLD: 6 % (ref 4.8–5.6)
HCT VFR BLD AUTO: 30.6 % (ref 37.5–51)
HGB BLD-MCNC: 9.9 G/DL (ref 13–17.7)
IMM GRANULOCYTES # BLD AUTO: 0.02 10*3/MM3 (ref 0–0.05)
IMM GRANULOCYTES NFR BLD AUTO: 0.5 % (ref 0–0.5)
INR PPP: 1.12 (ref 0.89–1.12)
IRON 24H UR-MRATE: 13 MCG/DL (ref 59–158)
IRON SATN MFR SERPL: 7 % (ref 20–50)
LYMPHOCYTES # BLD AUTO: 0.79 10*3/MM3 (ref 0.7–3.1)
LYMPHOCYTES NFR BLD AUTO: 18.3 % (ref 19.6–45.3)
MCH RBC QN AUTO: 30.1 PG (ref 26.6–33)
MCHC RBC AUTO-ENTMCNC: 32.4 G/DL (ref 31.5–35.7)
MCV RBC AUTO: 93 FL (ref 79–97)
MONOCYTES # BLD AUTO: 0.39 10*3/MM3 (ref 0.1–0.9)
MONOCYTES NFR BLD AUTO: 9 % (ref 5–12)
NEUTROPHILS NFR BLD AUTO: 3.09 10*3/MM3 (ref 1.7–7)
NEUTROPHILS NFR BLD AUTO: 71.8 % (ref 42.7–76)
NRBC BLD AUTO-RTO: 0 /100 WBC (ref 0–0.2)
PLATELET # BLD AUTO: 93 10*3/MM3 (ref 140–450)
PMV BLD AUTO: 11 FL (ref 6–12)
POTASSIUM SERPL-SCNC: 3.3 MMOL/L (ref 3.5–5.2)
POTASSIUM SERPL-SCNC: 3.4 MMOL/L (ref 3.5–5.2)
PROT SERPL-MCNC: 6.1 G/DL (ref 6–8.5)
PROTHROMBIN TIME: 14.5 SECONDS (ref 12.2–14.5)
RBC # BLD AUTO: 3.29 10*6/MM3 (ref 4.14–5.8)
SODIUM SERPL-SCNC: 140 MMOL/L (ref 136–145)
TIBC SERPL-MCNC: 189 MCG/DL (ref 298–536)
TRANSFERRIN SERPL-MCNC: 127 MG/DL (ref 200–360)
TSH SERPL DL<=0.05 MIU/L-ACNC: 0.82 UIU/ML (ref 0.27–4.2)
VIT B12 BLD-MCNC: 791 PG/ML (ref 211–946)
WBC NRBC COR # BLD AUTO: 4.31 10*3/MM3 (ref 3.4–10.8)

## 2025-01-26 PROCEDURE — 82948 REAGENT STRIP/BLOOD GLUCOSE: CPT

## 2025-01-26 PROCEDURE — 82728 ASSAY OF FERRITIN: CPT | Performed by: INTERNAL MEDICINE

## 2025-01-26 PROCEDURE — G0378 HOSPITAL OBSERVATION PER HR: HCPCS

## 2025-01-26 PROCEDURE — 43264 ERCP REMOVE DUCT CALCULI: CPT | Performed by: INTERNAL MEDICINE

## 2025-01-26 PROCEDURE — 43262 ENDO CHOLANGIOPANCREATOGRAPH: CPT | Performed by: INTERNAL MEDICINE

## 2025-01-26 PROCEDURE — 80053 COMPREHEN METABOLIC PANEL: CPT | Performed by: INTERNAL MEDICINE

## 2025-01-26 PROCEDURE — 63710000001 INSULIN LISPRO (HUMAN) PER 5 UNITS: Performed by: STUDENT IN AN ORGANIZED HEALTH CARE EDUCATION/TRAINING PROGRAM

## 2025-01-26 PROCEDURE — 25010000002 CEFTRIAXONE PER 250 MG: Performed by: INTERNAL MEDICINE

## 2025-01-26 PROCEDURE — 84443 ASSAY THYROID STIM HORMONE: CPT | Performed by: INTERNAL MEDICINE

## 2025-01-26 PROCEDURE — C1769 GUIDE WIRE: HCPCS | Performed by: INTERNAL MEDICINE

## 2025-01-26 PROCEDURE — 0FC98ZZ EXTIRPATION OF MATTER FROM COMMON BILE DUCT, VIA NATURAL OR ARTIFICIAL OPENING ENDOSCOPIC: ICD-10-PCS | Performed by: INTERNAL MEDICINE

## 2025-01-26 PROCEDURE — 25010000002 DEXAMETHASONE PER 1 MG: Performed by: NURSE ANESTHETIST, CERTIFIED REGISTERED

## 2025-01-26 PROCEDURE — 25010000002 THIAMINE HCL 200 MG/2ML SOLUTION: Performed by: STUDENT IN AN ORGANIZED HEALTH CARE EDUCATION/TRAINING PROGRAM

## 2025-01-26 PROCEDURE — 25010000002 ONDANSETRON PER 1 MG: Performed by: NURSE ANESTHETIST, CERTIFIED REGISTERED

## 2025-01-26 PROCEDURE — 25010000002 CEFTRIAXONE PER 250 MG: Performed by: STUDENT IN AN ORGANIZED HEALTH CARE EDUCATION/TRAINING PROGRAM

## 2025-01-26 PROCEDURE — 83036 HEMOGLOBIN GLYCOSYLATED A1C: CPT | Performed by: INTERNAL MEDICINE

## 2025-01-26 PROCEDURE — 82533 TOTAL CORTISOL: CPT | Performed by: INTERNAL MEDICINE

## 2025-01-26 PROCEDURE — 25010000002 MAGNESIUM SULFATE 2 GM/50ML SOLUTION: Performed by: STUDENT IN AN ORGANIZED HEALTH CARE EDUCATION/TRAINING PROGRAM

## 2025-01-26 PROCEDURE — 74330 X-RAY BILE/PANC ENDOSCOPY: CPT

## 2025-01-26 PROCEDURE — 83540 ASSAY OF IRON: CPT | Performed by: INTERNAL MEDICINE

## 2025-01-26 PROCEDURE — 25010000002 THIAMINE HCL 200 MG/2ML SOLUTION: Performed by: INTERNAL MEDICINE

## 2025-01-26 PROCEDURE — G0480 DRUG TEST DEF 1-7 CLASSES: HCPCS | Performed by: INTERNAL MEDICINE

## 2025-01-26 PROCEDURE — 85610 PROTHROMBIN TIME: CPT | Performed by: INTERNAL MEDICINE

## 2025-01-26 PROCEDURE — 25010000002 LIDOCAINE PF 1% 1 % SOLUTION: Performed by: NURSE ANESTHETIST, CERTIFIED REGISTERED

## 2025-01-26 PROCEDURE — 25010000002 PROCHLORPERAZINE 10 MG/2ML SOLUTION: Performed by: STUDENT IN AN ORGANIZED HEALTH CARE EDUCATION/TRAINING PROGRAM

## 2025-01-26 PROCEDURE — 99215 OFFICE O/P EST HI 40 MIN: CPT | Performed by: NURSE PRACTITIONER

## 2025-01-26 PROCEDURE — 25010000002 SUGAMMADEX 200 MG/2ML SOLUTION: Performed by: NURSE ANESTHETIST, CERTIFIED REGISTERED

## 2025-01-26 PROCEDURE — 25010000002 PROPOFOL 10 MG/ML EMULSION: Performed by: NURSE ANESTHETIST, CERTIFIED REGISTERED

## 2025-01-26 PROCEDURE — 86140 C-REACTIVE PROTEIN: CPT | Performed by: INTERNAL MEDICINE

## 2025-01-26 PROCEDURE — 85025 COMPLETE CBC W/AUTO DIFF WBC: CPT | Performed by: INTERNAL MEDICINE

## 2025-01-26 PROCEDURE — 82607 VITAMIN B-12: CPT | Performed by: INTERNAL MEDICINE

## 2025-01-26 PROCEDURE — 84132 ASSAY OF SERUM POTASSIUM: CPT | Performed by: ANESTHESIOLOGY

## 2025-01-26 PROCEDURE — 99232 SBSQ HOSP IP/OBS MODERATE 35: CPT | Performed by: STUDENT IN AN ORGANIZED HEALTH CARE EDUCATION/TRAINING PROGRAM

## 2025-01-26 PROCEDURE — 25810000003 LACTATED RINGERS PER 1000 ML: Performed by: ANESTHESIOLOGY

## 2025-01-26 PROCEDURE — 83605 ASSAY OF LACTIC ACID: CPT | Performed by: INTERNAL MEDICINE

## 2025-01-26 PROCEDURE — 82746 ASSAY OF FOLIC ACID SERUM: CPT | Performed by: INTERNAL MEDICINE

## 2025-01-26 PROCEDURE — 84466 ASSAY OF TRANSFERRIN: CPT | Performed by: INTERNAL MEDICINE

## 2025-01-26 PROCEDURE — 82140 ASSAY OF AMMONIA: CPT | Performed by: INTERNAL MEDICINE

## 2025-01-26 PROCEDURE — 82248 BILIRUBIN DIRECT: CPT | Performed by: STUDENT IN AN ORGANIZED HEALTH CARE EDUCATION/TRAINING PROGRAM

## 2025-01-26 RX ORDER — FAMOTIDINE 20 MG/1
20 TABLET, FILM COATED ORAL ONCE
Status: DISCONTINUED | OUTPATIENT
Start: 2025-01-26 | End: 2025-01-26 | Stop reason: HOSPADM

## 2025-01-26 RX ORDER — LIDOCAINE HYDROCHLORIDE 10 MG/ML
0.5 INJECTION, SOLUTION EPIDURAL; INFILTRATION; INTRACAUDAL; PERINEURAL ONCE AS NEEDED
Status: DISCONTINUED | OUTPATIENT
Start: 2025-01-26 | End: 2025-01-26 | Stop reason: HOSPADM

## 2025-01-26 RX ORDER — SODIUM CHLORIDE, SODIUM LACTATE, POTASSIUM CHLORIDE, CALCIUM CHLORIDE 600; 310; 30; 20 MG/100ML; MG/100ML; MG/100ML; MG/100ML
9 INJECTION, SOLUTION INTRAVENOUS CONTINUOUS
Status: ACTIVE | OUTPATIENT
Start: 2025-01-27 | End: 2025-01-27

## 2025-01-26 RX ORDER — PROPOFOL 10 MG/ML
VIAL (ML) INTRAVENOUS AS NEEDED
Status: DISCONTINUED | OUTPATIENT
Start: 2025-01-26 | End: 2025-01-26 | Stop reason: SURG

## 2025-01-26 RX ORDER — MIDAZOLAM HYDROCHLORIDE 1 MG/ML
1 INJECTION, SOLUTION INTRAMUSCULAR; INTRAVENOUS
Status: DISCONTINUED | OUTPATIENT
Start: 2025-01-26 | End: 2025-01-26 | Stop reason: HOSPADM

## 2025-01-26 RX ORDER — HYDROMORPHONE HYDROCHLORIDE 1 MG/ML
0.5 INJECTION, SOLUTION INTRAMUSCULAR; INTRAVENOUS; SUBCUTANEOUS
Status: DISCONTINUED | OUTPATIENT
Start: 2025-01-26 | End: 2025-01-26 | Stop reason: HOSPADM

## 2025-01-26 RX ORDER — FENTANYL CITRATE 50 UG/ML
50 INJECTION, SOLUTION INTRAMUSCULAR; INTRAVENOUS
Status: DISCONTINUED | OUTPATIENT
Start: 2025-01-26 | End: 2025-01-26 | Stop reason: HOSPADM

## 2025-01-26 RX ORDER — ONDANSETRON 2 MG/ML
4 INJECTION INTRAMUSCULAR; INTRAVENOUS ONCE AS NEEDED
Status: DISCONTINUED | OUTPATIENT
Start: 2025-01-26 | End: 2025-01-26 | Stop reason: HOSPADM

## 2025-01-26 RX ORDER — PROCHLORPERAZINE EDISYLATE 5 MG/ML
5 INJECTION INTRAMUSCULAR; INTRAVENOUS ONCE
Status: COMPLETED | OUTPATIENT
Start: 2025-01-26 | End: 2025-01-26

## 2025-01-26 RX ORDER — ROCURONIUM BROMIDE 10 MG/ML
INJECTION, SOLUTION INTRAVENOUS AS NEEDED
Status: DISCONTINUED | OUTPATIENT
Start: 2025-01-26 | End: 2025-01-26 | Stop reason: SURG

## 2025-01-26 RX ORDER — LIDOCAINE HYDROCHLORIDE 10 MG/ML
INJECTION, SOLUTION EPIDURAL; INFILTRATION; INTRACAUDAL; PERINEURAL AS NEEDED
Status: DISCONTINUED | OUTPATIENT
Start: 2025-01-26 | End: 2025-01-26 | Stop reason: SURG

## 2025-01-26 RX ORDER — DEXAMETHASONE SODIUM PHOSPHATE 4 MG/ML
INJECTION, SOLUTION INTRA-ARTICULAR; INTRALESIONAL; INTRAMUSCULAR; INTRAVENOUS; SOFT TISSUE AS NEEDED
Status: DISCONTINUED | OUTPATIENT
Start: 2025-01-26 | End: 2025-01-26 | Stop reason: SURG

## 2025-01-26 RX ORDER — ONDANSETRON 2 MG/ML
INJECTION INTRAMUSCULAR; INTRAVENOUS AS NEEDED
Status: DISCONTINUED | OUTPATIENT
Start: 2025-01-26 | End: 2025-01-26 | Stop reason: SURG

## 2025-01-26 RX ORDER — SODIUM CHLORIDE 0.9 % (FLUSH) 0.9 %
10 SYRINGE (ML) INJECTION AS NEEDED
Status: DISCONTINUED | OUTPATIENT
Start: 2025-01-26 | End: 2025-01-26 | Stop reason: HOSPADM

## 2025-01-26 RX ORDER — FAMOTIDINE 10 MG/ML
20 INJECTION, SOLUTION INTRAVENOUS ONCE
Status: DISCONTINUED | OUTPATIENT
Start: 2025-01-26 | End: 2025-01-26 | Stop reason: HOSPADM

## 2025-01-26 RX ORDER — MAGNESIUM SULFATE HEPTAHYDRATE 40 MG/ML
2 INJECTION, SOLUTION INTRAVENOUS ONCE
Status: COMPLETED | OUTPATIENT
Start: 2025-01-26 | End: 2025-01-26

## 2025-01-26 RX ORDER — SODIUM CHLORIDE 0.9 % (FLUSH) 0.9 %
10 SYRINGE (ML) INJECTION EVERY 12 HOURS SCHEDULED
Status: DISCONTINUED | OUTPATIENT
Start: 2025-01-26 | End: 2025-01-26 | Stop reason: HOSPADM

## 2025-01-26 RX ADMIN — MAGNESIUM SULFATE HEPTAHYDRATE 2 G: 2 INJECTION, SOLUTION INTRAVENOUS at 16:55

## 2025-01-26 RX ADMIN — PROPOFOL 200 MG: 10 INJECTION, EMULSION INTRAVENOUS at 12:10

## 2025-01-26 RX ADMIN — SUGAMMADEX 200 MG: 100 INJECTION, SOLUTION INTRAVENOUS at 12:28

## 2025-01-26 RX ADMIN — BUPROPION HYDROCHLORIDE 150 MG: 150 TABLET, EXTENDED RELEASE ORAL at 09:12

## 2025-01-26 RX ADMIN — INSULIN LISPRO 3 UNITS: 100 INJECTION, SOLUTION INTRAVENOUS; SUBCUTANEOUS at 17:35

## 2025-01-26 RX ADMIN — LIDOCAINE HYDROCHLORIDE 50 MG: 10 INJECTION, SOLUTION EPIDURAL; INFILTRATION; INTRACAUDAL; PERINEURAL at 12:10

## 2025-01-26 RX ADMIN — INSULIN LISPRO 3 UNITS: 100 INJECTION, SOLUTION INTRAVENOUS; SUBCUTANEOUS at 21:15

## 2025-01-26 RX ADMIN — Medication 10 ML: at 21:21

## 2025-01-26 RX ADMIN — Medication 10 ML: at 09:12

## 2025-01-26 RX ADMIN — LIDOCAINE 1 PATCH: 4 PATCH TOPICAL at 09:13

## 2025-01-26 RX ADMIN — DEXAMETHASONE SODIUM PHOSPHATE 4 MG: 4 INJECTION INTRA-ARTICULAR; INTRALESIONAL; INTRAMUSCULAR; INTRAVENOUS; SOFT TISSUE at 12:15

## 2025-01-26 RX ADMIN — CITALOPRAM HYDROBROMIDE 20 MG: 20 TABLET ORAL at 09:12

## 2025-01-26 RX ADMIN — ROCURONIUM BROMIDE 30 MG: 10 INJECTION INTRAVENOUS at 12:10

## 2025-01-26 RX ADMIN — THIAMINE HYDROCHLORIDE 200 MG: 100 INJECTION, SOLUTION INTRAMUSCULAR; INTRAVENOUS at 09:12

## 2025-01-26 RX ADMIN — SODIUM CHLORIDE 2000 MG: 900 INJECTION INTRAVENOUS at 21:15

## 2025-01-26 RX ADMIN — SODIUM CHLORIDE, POTASSIUM CHLORIDE, SODIUM LACTATE AND CALCIUM CHLORIDE: 600; 310; 30; 20 INJECTION, SOLUTION INTRAVENOUS at 12:07

## 2025-01-26 RX ADMIN — PROCHLORPERAZINE EDISYLATE 5 MG: 5 INJECTION INTRAMUSCULAR; INTRAVENOUS at 16:27

## 2025-01-26 RX ADMIN — ONDANSETRON 4 MG: 2 INJECTION INTRAMUSCULAR; INTRAVENOUS at 12:15

## 2025-01-26 RX ADMIN — PANTOPRAZOLE SODIUM 40 MG: 40 TABLET, DELAYED RELEASE ORAL at 09:12

## 2025-01-26 NOTE — CONSULTS
[facility]:  Inpatient Gastroenterology Consult      Inpatient Gastroenterology Consult  Consult performed by: Joaquin Montgomery APRN  Consult ordered by: Christiano Singh MD  Reason for consult: Elevated LFTs, hemochromatosis, recent bidirectional endoscopy      Referring Provider: No ref. provider found    PCP: Sita Chase APRN    Chief Complaint: Abdominal pain, nausea, vomiting, weight loss    History of present illness:    Malcolm Costa is a 59 y.o. male who is admitted with generalized weakness.  GI consult received for concerns of elevated liver enzymes, history of hemochromatosis, and recent unremarkable bidirectional endoscopy.    Patient and his father note that he has been feeling poorly for at least the past 2 to 3 months.  Notes he has lost a significant amount of weight on the same timeframe.  Notes he has not been eating well and is having some pain following certain meals.  Low-grade temperature noted today; unsure if these have been occurring at home or not.  Patient does report early satiety and frequent belching and general loss of appetite.  Does not report any shortness of breath though he has had episodes of chest discomfort.    At time of admission, LFTs found to be elevated in cholestatic fashion.  CT imaging reviewed and as noted below.  Patient does have a background history of cholelithiasis and sludge requiring ERCP approximately 1 year ago.  Did have recent course of antibiotics for recent I&D.  Patient also with history of hemochromatosis with homogeneous H63D gene mutation is followed by heme-onc.    Past medical, surgical, social, and family histories are reviewed for accuracy.  No documented alleviating or exacerbating factors.  Does not endorse pain at time of exam.    Colonoscopy, Scan (01/23/2025)  Endoscopy, Int (01/23/2025)    Allergies:  Shellfish allergy, Shellfish-derived products, and Codeine    Scheduled Meds:  buPROPion XL, 150 mg, Oral,  "Daily  cefTRIAXone, 2,000 mg, Intravenous, Q24H  citalopram, 20 mg, Oral, Daily  insulin lispro, 2-7 Units, Subcutaneous, 4x Daily AC & at Bedtime  Lidocaine, 1 patch, Transdermal, Q24H  pantoprazole, 40 mg, Oral, Q AM  sodium chloride, 10 mL, Intravenous, Q12H  thiamine (B-1) IV, 200 mg, Intravenous, Daily         Infusions:  lactated ringers, 100 mL/hr        PRN Meds:    acetaminophen    senna-docusate sodium **AND** polyethylene glycol **AND** bisacodyl **AND** bisacodyl    dextrose    dextrose    glucagon (human recombinant)    sodium chloride    sodium chloride    sodium chloride    Home Meds:  Medications Prior to Admission   Medication Sig Dispense Refill Last Dose/Taking    buPROPion XL (WELLBUTRIN XL) 150 MG 24 hr tablet Take 1 tablet by mouth once daily 30 tablet 0 1/24/2025    citalopram (CeleXA) 20 MG tablet Take 1 tablet by mouth Daily. 30 tablet 1 1/24/2025    metFORMIN ER (GLUCOPHAGE-XR) 750 MG 24 hr tablet TAKE 2 TABLETS BY MOUTH ONCE DAILY WITH BREAKFAST 60 tablet 0 1/24/2025    omeprazole (priLOSEC) 20 MG capsule Take 1 capsule by mouth once daily 30 capsule 1 1/24/2025    Continuous Blood Gluc  (FreeStyle Dagoberto 2 Downs) device 1 Device Take As Directed. 1 each 0     Continuous Blood Gluc Sensor (FreeStyle Dagoberto 2 Sensor) misc Use 1 Device Every 14 (Fourteen) Days. 2 each 11     glucose blood test strip Use as instructed 50 each 12     Insulin Syringe-Needle U-100 28G X 5/16\" 1 ML misc Use 1 Device 2 (Two) Times a Day. 300 each 3     Lancets (accu-chek soft touch) lancets Used to test blood sugar for Diabetes E11.65 test up to twice a day 100 each 12     RELION INSULIN SYRINGE 1ML/31G 31G X 5/16\" 1 ML misc 2 (Two) Times a Day.          ROS: Review of Systems   Constitutional:  Positive for activity change, appetite change, fatigue, fever and unexpected weight change. Negative for chills and diaphoresis.   HENT:  Negative for sore throat, trouble swallowing and voice change.    Eyes: " Negative.    Respiratory: Negative.  Negative for apnea, cough, choking, chest tightness, shortness of breath, wheezing and stridor.    Cardiovascular: Negative.  Negative for chest pain, palpitations and leg swelling.   Gastrointestinal:  Positive for abdominal pain, diarrhea, nausea and vomiting. Negative for abdominal distention, anal bleeding, blood in stool, constipation and rectal pain.   Endocrine: Negative.    Genitourinary: Negative.    Musculoskeletal: Negative.    Skin: Negative.  Negative for color change and pallor.   Allergic/Immunologic: Negative.    Neurological: Negative.    Hematological:  Negative for adenopathy. Does not bruise/bleed easily.   Psychiatric/Behavioral: Negative.     All other systems reviewed and are negative.      PAST MED HX:  Past Medical History:   Diagnosis Date    Allergic rhinitis     Anxiety     Dermatitis 8/2/2016    Diabetes mellitus     Diverticulitis     Gastroesophageal reflux disease 7/11/2016    GERD (gastroesophageal reflux disease)     History of alcohol abuse     Hypertension     Insomnia 7/11/2016    Visual impairment 7/11/2016    Vitamin D deficiency 7/11/2016       PAST SURG HX:  Past Surgical History:   Procedure Laterality Date    APPENDECTOMY  1995    CHOLECYSTECTOMY WITH INTRAOPERATIVE CHOLANGIOGRAM N/A 2/1/2024    Procedure: CHOLECYSTECTOMY LAPAROSCOPIC WITH INTRAOPERATIVE CHOLANGIOGRAM, UMBILICAL HERNIA REPAIR;  Surgeon: Adam Ramos MD;  Location: [facility];  Service: General;  Laterality: N/A;    ENDOSCOPY N/A 2/2/2024    Procedure: ESOPHAGOGASTRODUODENOSCOPY;  Surgeon: Dominik Chase MD;  Location:  [facility];  Service: Gastroenterology;  Laterality: N/A;    ERCP N/A 2/2/2024    Procedure: ENDOSCOPIC RETROGRADE CHOLANGIOPANCREATOGRAPHY;  Surgeon: Dominik Chase MD;  Location:  [facility];  Service: Gastroenterology;  Laterality: N/A;  Sphincterotomy made to common bile duct (CBD) ampulla. CBD swept with 9-12 mm balloon. ERCP scope removed  "with plastic cap intact.    HERNIA REPAIR  2010    TONSILLECTOMY  1974       FAM HX:  Family History   Problem Relation Age of Onset    Hypertension Mother     Multiple myeloma Mother     Hypertension Father     Alcohol abuse Father     Hypertension Maternal Grandmother     Hypertension Maternal Grandfather     Diabetes Paternal Grandmother     Hypertension Paternal Grandmother     Hypertension Paternal Grandfather     Thyroid disease Other        SOC HX:  Social History     Socioeconomic History    Marital status: Single   Tobacco Use    Smoking status: Never     Passive exposure: Never    Smokeless tobacco: Never    Tobacco comments:     Pt was diagnosed hemochromatosis March 2024   Vaping Use    Vaping status: Never Used   Substance and Sexual Activity    Alcohol use: Not Currently     Comment: sober since 2015    Drug use: No    Sexual activity: Defer       PHYSICAL EXAM  /83 (BP Location: Left arm, Patient Position: Lying)   Pulse 87   Temp 98.3 °F (36.8 °C) (Oral)   Resp 16   Ht 160 cm (63\")   Wt 77.4 kg (170 lb 11.2 oz)   SpO2 94%   BMI 30.24 kg/m²   Wt Readings from Last 3 Encounters:   01/25/25 77.4 kg (170 lb 11.2 oz)   01/24/25 76 kg (167 lb 9.6 oz)   01/10/25 79.4 kg (175 lb)   ,body mass index is 30.24 kg/m².  Physical Exam  Vitals and nursing note reviewed.   Constitutional:       General: He is not in acute distress.     Appearance: Normal appearance. He is normal weight. He is not ill-appearing or toxic-appearing.   HENT:      Head: Normocephalic and atraumatic.   Eyes:      General: No scleral icterus.     Extraocular Movements: Extraocular movements intact.      Conjunctiva/sclera: Conjunctivae normal.      Pupils: Pupils are equal, round, and reactive to light.   Cardiovascular:      Rate and Rhythm: Normal rate and regular rhythm.      Pulses: Normal pulses.      Heart sounds: Normal heart sounds.   Pulmonary:      Effort: Pulmonary effort is normal. No respiratory distress.      " Breath sounds: Normal breath sounds.   Abdominal:      General: Abdomen is flat. Bowel sounds are normal. There is no distension.      Palpations: Abdomen is soft. There is no mass.      Tenderness: There is abdominal tenderness (Right upper quadrant). There is no guarding or rebound.      Hernia: No hernia is present.   Genitourinary:     Rectum: Guaiac result negative.   Musculoskeletal:      Right lower leg: No edema.      Left lower leg: No edema.   Skin:     General: Skin is warm and dry.      Capillary Refill: Capillary refill takes less than 2 seconds.      Coloration: Skin is not jaundiced or pale.   Neurological:      General: No focal deficit present.      Mental Status: He is alert and oriented to person, place, and time.   Psychiatric:         Mood and Affect: Mood normal.         Behavior: Behavior normal.         Thought Content: Thought content normal.         Judgment: Judgment normal.         Results Review:   I reviewed the patient's new clinical results.  I reviewed the patient's new imaging results and agree with the interpretation.  I reviewed the patient's other test results and agree with the interpretation  I personally viewed and interpreted the patient's EKG/Telemetry data    Lab Results   Component Value Date    WBC 4.31 01/26/2025    HGB 9.9 (L) 01/26/2025    HGB 11.1 (L) 01/25/2025    HGB 11.6 (L) 01/24/2025    HCT 30.6 (L) 01/26/2025    MCV 93.0 01/26/2025    PLT 93 (L) 01/26/2025       Lab Results   Component Value Date    INR 1.12 01/26/2025    INR 1.02 02/01/2024       Lab Results   Component Value Date    GLUCOSE 106 (H) 01/26/2025    BUN 14 01/26/2025    CREATININE 0.88 01/26/2025    EGFRIFNONA 74 09/03/2021    BCR 15.9 01/26/2025     01/26/2025    K 3.3 (L) 01/26/2025    CO2 21.0 (L) 01/26/2025    CALCIUM 8.3 (L) 01/26/2025    ALBUMIN 3.2 (L) 01/26/2025    ALKPHOS 297 (H) 01/26/2025    BILITOT 3.8 (H) 01/26/2025    BILIDIR 0.2 11/25/2024     (H) 01/26/2025      (H) 01/26/2025       CT Chest Without Contrast Diagnostic    Result Date: 1/25/2025  CT CHEST WO CONTRAST DIAGNOSTIC Date of Exam: 1/25/2025 3:53 PM EST Indication: ams. Comparison: None available. Technique: Axial CT images were obtained of the chest without contrast administration.  Reconstructed coronal and sagittal images were also obtained. Automated exposure control and iterative construction methods were used. FINDINGS: Scattered atelectasis is noted. No well-defined consolidations or pleural effusions are observed. Granulomas are noted in the left lower lobe. No suspicious pulmonary nodules or abnormal pulmonary masses are seen. No significant hilar, mediastinal, or axillary lymphadenopathy is observed. Calcified left hilar lymph nodes are noted. A normal aortic arch branching pattern is identified. Coronary artery calcifications are identified. The thyroid gland is unremarkable. The esophagus is unremarkable. The limited evaluation of the upper abdomen demonstrates no evidence for acute abnormality. There is evidence for diffuse hepatic fatty infiltration with associated hepatosplenomegaly. No acute osseous abnormalities are observed.     1.No evidence for acute intrathoracic abnormality. 2.Coronary artery calcifications are noted. Electronically Signed: Andrew Tillman MD  1/25/2025 4:16 PM EST  Workstation ID: [id number]    CT Abdomen Pelvis Without Contrast    Result Date: 1/25/2025  CT ABDOMEN PELVIS WO CONTRAST Date of Exam: 1/25/2025 3:53 PM EST Indication: ams. Comparison: None available. Technique: Axial CT images were obtained of the abdomen and pelvis without the administration of contrast. Reconstructed coronal and sagittal images were also obtained. Automated exposure control and iterative construction methods were used. FINDINGS: Lung bases: Calcified left hilar lymph nodes. Left lower lobe calcified granulomata. Atheromatous disease of the coronary vessels. Liver: Geographic areas of decreased  attenuation within the liver suggesting hepatic steatosis. Spleen: Splenic granulomata Pancreas:No pancreatic masses. No evidence of pancreatitis. Gallbladder and common bile duct: Previous cholecystectomy. Adrenal glands:No adrenal masses Kidneys and ureters:No kidney stones. No renal masses.No calculi present within the ureters. Normal caliber ureters. Urinary bladder:No urinary bladder wall thickening. No bladder masses. Small bowel:Normal caliber small bowel. Large bowel:No diverticulosis or diverticulitis. No large bowel masses are appreciated Appendix: Not seen however there is no evidence of appendicitis GENITOURINARY: Normal prostate Ascites or pneumoperitoneum:None. Adenopathy:None present Osseous structures: The proximal femurs are intact. The pubic bones are intact. The sacrum and sacroiliac joints are normal. The spinous and transverse processes are intact. No rib fractures. Other findings: Fat-containing umbilical hernias.     1.No acute findings in the abdomen or pelvis. 2.Hepatic steatosis. 3.Fat-containing umbilical hernias. Electronically Signed: Stefano Garcia MD  1/25/2025 4:11 PM EST  Workstation ID: [id number]    CT Head Without Contrast    Result Date: 1/25/2025  CT HEAD WO CONTRAST Date of Exam: 1/25/2025 3:53 PM EST Indication: ams. Comparison: 12/15/2014 Technique: Axial CT images were obtained of the head without contrast administration.  Automated exposure control and iterative construction methods were used. Findings: Gray-white matter differentiation is maintained without evidence of an acute infarction. No intracranial mass or mass effect. No extra-axial mass or collection. The ventricles and sulci are normal in size and configuration. The posterior fossa appears normal. Sellar and suprasellar structures are normal. Orbital and paranasal soft tissues are normal. Opacification of the left maxillary sinus with mucoperiosteal reactive changes consistent with chronic sinusitis. The bony  calvarium appears intact. No acute fractures. No lytic or blastic bony diseases.     Impression: No acute intracranial pathology. Electronically Signed: Stefano Garcia MD  1/25/2025 4:07 PM EST  Workstation ID: [id number]    XR Chest 1 View    Result Date: 1/24/2025  XR CHEST 1 VW Date of Exam: 1/24/2025 6:38 PM EST Indication: Chest Pain Triage Protocol Comparison: 1/10/2025 Findings: 3 cm lordotic projection. Heart size is at the upper limits of normal. Pulmonary vessels are normal. Lungs are clear. No pleural effusion. No pneumothorax.     Impression: 1. No acute cardiopulmonary disease. Electronically Signed: Elijah Kraft MD  1/24/2025 7:09 PM EST  Workstation ID: [id number]    XR Chest 1 View    Result Date: 1/10/2025  XR CHEST 1 VW Date of Exam: 1/10/2025 3:39 PM EST Indication: Weak/Dizzy/AMS triage protocol Comparison: 5/11/2021 Findings: No focal consolidation. Unchanged elevation of the right hemidiaphragm. No pneumothorax or pleural effusion. Cardiac size is normal. The visualized clavicles appear intact. No displaced rib fractures. The visualized upper abdomen is normal.     Impression: No acute cardiopulmonary disease. Electronically Signed: Stefano Garcia MD  1/10/2025 3:53 PM EST  Workstation ID: [id number]    CT Soft Tissue Neck With Contrast    Result Date: 12/27/2024  CT SOFT TISSUE NECK W CONTRAST Date of Exam: 12/27/2024 7:24 PM EST Indication: Abscess right posterior scalp neck. Comparison: None available. Technique: Axial CT images were obtained of the neck after the uneventful intravenous administration of 85 mL Isovue-300.  Reconstructed coronal and sagittal images were also obtained. Automated exposure control and iterative construction methods were used. Findings: No exophytic lesion seen within the aerodigestive tract. Evaluation of the oral cavity is degraded by dental hardware artifact. No pathologic appearing lymph nodes by imaging criteria. The visualized glands appear unremarkable.  Atherosclerotic calcifications of the carotid arteries. Jugular veins appear patent. No acute findings in the included intracranial structures.likely chronic near complete opacification of the left maxillary sinus. Mild mucosal thickening elsewhere in the paranasal sinuses. Nonspecific bilateral mastoid effusions. There is a rim-enhancing fluid collection along the right posterior scalp that is incompletely imaged (extends superiorly out of field-of-view) and measures approximately 3 x 1.5 cm in AP by TV dimension and at least 3 cm in craniocaudal dimension. Mild adjacent inflammatory change without extension of inflammation into the deep spaces of the neck. No adjacent aggressive periostitis or erosions. Degenerative changes of the imaged spine. Multiple dental caries and periapical lucencies which can be correlated with dental exam. Included lung apices are clear.     Impression: Incompletely imaged rim-enhancing fluid collection along the right posterior scalp measuring approximately 3 cm, suspicious for abscess.. There is mild adjacent inflammatory change without extension of inflammation into the deep spaces of the neck. Electronically Signed: Darien Stuart  12/27/2024 7:51 PM EST  Workstation ID: [id number]     ASSESSMENTS/PLANS  1.  Abdominal pain, generalized  2.  Elevated LFTs, cholestatic fashion  3.  Unintentional weight loss, early satiety  4.  GERD  5.  History of choledocholithiasis with history of ERCP approximately 1 year ago  6.  Hemochromatosis, followed by hematology, H63D mutation positive, has been undergoing therapeutic phlebotomy.    Malcolm Costa is a 59 y.o. male presents to hospital with weakness and fatigue in a months long onset of generalized symptoms including abdominal pain, nausea, vomiting, and unintentional weight loss.  At time of admission, LFTs found to be elevated and markedly cholestatic fashion.  CT imaging reviewed and is concerning for possible retained common duct stone given  calcification appreciated at the level of the ampulla.  Given these findings, recommend ERCP today for further evaluation.  If ERCP is unremarkable, will need to expand workup to other etiologies.  LFTs could also be explained by drug-induced liver injury secondary to recent antibiotic exposure.    >>> N.p.o.  >>> ERCP today  >>> IV antibiotics  >>> Antiemetics and pain control measures  >>> GI prophylaxis PPI  >>> Monitor LFTs  >>> Patient will need to follow-up with hematology at discharge    I discussed the patient's findings and my recommendations with patient, family, and consulting provider    JOANA Short  01/26/25  10:57 EST

## 2025-01-26 NOTE — CONSULTS
"          Clinical Nutrition Assessment     Patient Name: Malcolm Costa  YOB: 1965  MRN: [id number]  Date of Encounter: 01/26/25 12:23 EST  Admission date: 1/25/2025  Reason for Visit: Consult, Unintentional weight loss    Assessment   Nutrition Assessment   Admission Diagnosis:  Weakness [R53.1]  Elevated LFTs [R79.89]    Problem List:    Elevated LFTs      PMH:   He  has a past medical history of Allergic rhinitis, Anxiety, Dermatitis (8/2/2016), Diabetes mellitus, Diverticulitis, Gastroesophageal reflux disease (7/11/2016), GERD (gastroesophageal reflux disease), History of alcohol abuse, Hypertension, Insomnia (7/11/2016), Visual impairment (7/11/2016), and Vitamin D deficiency (7/11/2016).    PSH:  He  has a past surgical history that includes Tonsillectomy (1974); Appendectomy (1995); Hernia repair (2010); cholecystectomy with intraoperative cholangiogram (N/A, 2/1/2024); Esophagogastroduodenoscopy (N/A, 2/2/2024); and ERCP (N/A, 2/2/2024).    Applicable Nutrition History:       Anthropometrics     Height: Height: 160 cm (63\")  Last Filed Weight: Weight: 77.4 kg (170 lb 11.2 oz) (01/25/25 1836)  Method: Weight Method: Bed scale  BMI: BMI (Calculated): 30.2    UBW:  185lbs  Weight change: weight loss of 13 lbs (9.3%) over 1 month(s)    Significant?  Yes   Weight       Weight (kg) Weight (lbs) Weight Method Visit Report   3/19/2024 91.264 kg  201 lb 3.2 oz   --    9/27/2024 83.915 kg  185 lb      10/29/2024 83.915 kg  185 lb  Stated     11/25/2024 84.369 kg  186 lb      12/6/2024 83.915 kg  185 lb   --    12/27/2024 79.379 kg  175 lb  Stated  --     80.287 kg  177 lb   --    1/3/2025 79.379 kg  175 lb  Stated     1/10/2025 79.379 kg  175 lb  Stated  --     79.379 kg  175 lb   --    1/24/2025 76.023 kg  167 lb 9.6 oz  Standing scale     1/25/2025 77.429 kg  170 lb 11.2 oz  Bed scale      76.023 kg  167 lb 9.6 oz  Estimated         Nutrition Focused Physical Exam    Date: 1/26    Patient meets criteria " for malnutrition diagnosis, see MSA note.     Subjective   Reported/Observed/Food/Nutrition Related History:     Nutrition hx obtained from pt and father at bedside. Pt has had poor intake x 2-3 months, pt states he is eating <25% of usual intake and father notes pt will take a few bites of food then want to lie down. Father states pt wt in ED was 167lbs standing scale and has had wt loss. Pt denies dysphagia, notes shellfish allergy (confirmed in EPIC). Pt agreeable to ONS once diet advanced s/p ERCP this afternoon.     Current Nutrition Prescription   PO: NPO Diet NPO Type: Strict NPO  Oral Nutrition Supplement:   Intake: N/A    Assessment & Plan   Nutrition Diagnosis   Date:  1/26            Updated:    Problem Malnutrition severe chronic   Etiology Pain, poor appetite, fatigue   Signs/Symptoms Po intake <75% EEN x >/=1 month, wt loss >5% x 1 month, severe muscle wasting and moderate subcutaneous fat loss   Status: New    Goal / Objectives:   Nutrition to support treatment and Advance diet as medically feasible/appropriate      Nutrition Intervention      Follow treatment progress, Care plan reviewed, Await begin PO, Supplement provided    Pt meets criteria for malnutrition (see MSA)  Will provide Boost (vanilla) once diet advanced    Monitoring/Evaluation:   Per protocol, PO intake, Supplement intake, Weight, GI status    Luci Alvarez, MS,RD,LD  Time Spent:30

## 2025-01-26 NOTE — ANESTHESIA PROCEDURE NOTES
Airway  Urgency: elective    Date/Time: 1/26/2025 12:12 PM  Airway not difficult    General Information and Staff    Patient location during procedure: OR  CRNA/CAA: Vasile Rossi CRNA    Indications and Patient Condition  Indications for airway management: airway protection    Preoxygenated: yes  MILS not maintained throughout  Mask difficulty assessment: 1 - vent by mask    Final Airway Details  Final airway type: endotracheal airway      Successful airway: ETT  Cuffed: yes   Successful intubation technique: direct laryngoscopy  Endotracheal tube insertion site: oral  Blade: Mason  Blade size: 2  ETT size (mm): 7.0  Cormack-Lehane Classification: grade I - full view of glottis  Placement verified by: chest auscultation and capnometry   Cuff volume (mL): 6  Measured from: lips  ETT/EBT  to lips (cm): 20  Number of attempts at approach: 1  Assessment: lips, teeth, and gum same as pre-op and atraumatic intubation    Additional Comments  Negative epigastric sounds, Breath sound equal bilaterally with symmetric chest rise and fall

## 2025-01-26 NOTE — BRIEF OP NOTE
ENDOSCOPIC RETROGRADE CHOLANGIOPANCREATOGRAPHY  Progress Note    Malcolm Costa  1/26/2025    ERCP reveals prior biliary sphincterotomy.  The common bile duct was wire cannulated via first (single) pass of the wire.  Cholangiogram reveals filling defect in the distal common bile duct.  Sphincterotomy extended without bleeding.  9 mm balloon sweeps yielded 1 black stone.  No stones remain.    >> Anticipate no further intervention.    Mark I. Brunner, MD     Date: 1/26/2025  Time: 12:38 EST

## 2025-01-26 NOTE — ANESTHESIA POSTPROCEDURE EVALUATION
Patient: Malcolm Costa    Procedure Summary       Date: 01/26/25 Room / Location: [facility], [facility] ENDOSCOPY 3 /  [facility]    Anesthesia Start: 1207 Anesthesia Stop: 1239    Procedure: ENDOSCOPIC RETROGRADE CHOLANGIOPANCREATOGRAPHY Diagnosis:     Surgeons: Brunner, Mark I, MD Provider: Mustapha Guerrero MD    Anesthesia Type: general ASA Status: 3            Anesthesia Type: general    Vitals  Vitals Value Taken Time   BP     Temp     Pulse 88 01/26/25 1239   Resp     SpO2 95 % 01/26/25 1239   Vitals shown include unfiled device data.        Post Anesthesia Care and Evaluation    Patient location during evaluation: PACU  Patient participation: complete - patient participated  Level of consciousness: awake and alert  Pain management: adequate    Airway patency: patent  Anesthetic complications: No anesthetic complications  PONV Status: none  Cardiovascular status: hemodynamically stable and acceptable  Respiratory status: nonlabored ventilation, acceptable and room air  Hydration status: acceptable

## 2025-01-26 NOTE — CONSULTS
Malnutrition Severity Assessment    Patient Name:  Malcolm Costa  YOB: 1965  MRN: [id number]  Admit Date:  1/25/2025    Patient meets criteria for : Severe Malnutrition    Comments:  Pt meets criteria for severe malnutrition in the context of chronic illness indicated by po intake <75% EEN x >/=1 month, wt loss >5% x 1 month, severe muscle wasting and moderate subcutaneous fat loss    Malnutrition Severity Assessment  Malnutrition Type: Chronic Disease - Related Malnutrition  Malnutrition Type (Last 8 Hours)       Malnutrition Severity Assessment       Row Name 01/26/25 1224       Malnutrition Severity Assessment    Malnutrition Type Chronic Disease - Related Malnutrition      Row Name 01/26/25 1224       Insufficient Energy Intake     Insufficient Energy Intake Findings Severe    Insufficient Energy Intake  <75% of est. energy requirement for > or equal to 1 month      Row Name 01/26/25 1224       Unintentional Weight Loss     Unintentional Weight Loss Findings --  13lb/9.3% x 1 month    Unintentional Weight Loss  Weight loss greater than 5% in one month      Row Name 01/26/25 1224       Muscle Loss    Loss of Muscle Mass Findings Severe    Clavicle Bone Region Moderate - some protrusion in females, visible in males    Acromion Bone Region Moderate - acromion may slightly protrude    Dorsal Hand Region Moderate - slight depression    Patellar Region Severe - prominent bone, square looking, very little muscle definition    Anterior Thigh Region Severe - line/depression along thigh, obviously thin    Posterior Calf Region Severe - thin with very little definition/firmness      Row Name 01/26/25 1224       Fat Loss    Subcutaneous Fat Loss Findings Moderate    Orbital Region  Moderate -  somewhat hollowness, slightly dark circles    Upper Arm Region Moderate - some fat tissue, not ample      Row Name 01/26/25 1224       Criteria Met (Must meet criteria for severity in at least 2 of these categories: M  Wasting, Fat Loss, Fluid, Secondary Signs, Wt. Status, Intake)    Patient meets criteria for  Severe Malnutrition                    Electronically signed by:  Luci Alvarez MS,RD,LD  01/26/25 12:29 EST

## 2025-01-26 NOTE — ANESTHESIA PREPROCEDURE EVALUATION
Anesthesia Evaluation     Patient summary reviewed and Nursing notes reviewed                Airway   Mallampati: II  TM distance: >3 FB  Neck ROM: full  No difficulty expected  Dental - normal exam     Pulmonary - negative pulmonary ROS and normal exam   Cardiovascular - normal exam    (+) hypertension, hyperlipidemia      Neuro/Psych  (+) psychiatric history Anxiety and Depression  GI/Hepatic/Renal/Endo    (+) GERD, liver disease history of elevated LFT, diabetes mellitus    Musculoskeletal     Abdominal  - normal exam    Bowel sounds: normal.   Substance History   (+) alcohol use     OB/GYN negative ob/gyn ROS         Other   arthritis,                   Anesthesia Plan    ASA 3     general     intravenous induction     Anesthetic plan, risks, benefits, and alternatives have been provided, discussed and informed consent has been obtained with: patient.    Plan discussed with CRNA.    CODE STATUS:

## 2025-01-26 NOTE — PLAN OF CARE
Goal Outcome Evaluation:              Outcome Evaluation: Patient admitted to floor around 1836, vitals obtained, currently on room air. Complaint of back pain, provider notified, see orders. Patient with weakness, Call Don't Fall video shown, patient reeducated about call light use, fall precautions.

## 2025-01-26 NOTE — PROGRESS NOTES
[facility] Medicine Services  PROGRESS NOTE    Patient Name: Malcolm Costa  : 1965  MRN: [id number]    Date of Admission: 2025  Primary Care Physician: Sita Chase APRN    Subjective   Subjective     CC:  Generalized weakness    HPI:  Patient reports feeling about the same today as yesterday.  Reports that today he now has a headache but otherwise no changes to his symptoms.      Objective   Objective     Vital Signs:   Temp:  [97.3 °F (36.3 °C)-100.1 °F (37.8 °C)] 98.9 °F (37.2 °C)  Heart Rate:  [] 73  Resp:  [15-21] 18  BP: (106-147)/(55-92) 143/80     Physical Exam  Cardiovascular:      Rate and Rhythm: Normal rate and regular rhythm.      Heart sounds: Normal heart sounds.   Pulmonary:      Effort: Pulmonary effort is normal.      Breath sounds: Normal breath sounds.   Abdominal:      General: There is no distension.      Palpations: Abdomen is soft.      Comments: Mild right upper quadrant tenderness   Musculoskeletal:      Right lower leg: No edema.      Left lower leg: No edema.   Neurological:      General: No focal deficit present.      Mental Status: He is alert.          Results Reviewed:  LAB RESULTS:      Lab 25  0843 25  1626 25  1510 25  1942 25  1832   WBC 4.31  --   --  8.48  --  2.93*   HEMOGLOBIN 9.9*  --   --  11.1*  --  11.6*   HEMATOCRIT 30.6*  --   --  33.0*  --  36.1*   PLATELETS 93*  --   --  110*  --  123*   NEUTROS ABS 3.09  --   --  6.59  --  1.83   IMMATURE GRANS (ABS) 0.02  --   --  0.05  --  0.01   LYMPHS ABS 0.79  --   --  1.13  --  0.82   MONOS ABS 0.39  --   --  0.69  --  0.19   EOS ABS 0.01  --   --  0.01  --  0.08   MCV 93.0  --   --  89.9  --  91.4   CRP 11.62*  --   --   --   --   --    PROCALCITONIN  --   --   --  7.37*  --   --    LACTATE 1.0 1.1  --  2.7*  --   --    PROTIME 14.5  --   --   --   --   --    D DIMER QUANT  --   --   --   --   --  0.50   HSTROP T  --   --  29* 31* 19 21          Lab 01/26/25  0843 01/25/25  1510 01/24/25  1832   SODIUM 140 137 139   POTASSIUM 3.4*  3.3* 3.7 3.8   CHLORIDE 106 102 102   CO2 21.0* 19.0* 23.2   ANION GAP 13.0 16.0* 13.8   BUN 14 15 10   CREATININE 0.88 1.14 0.92   EGFR 99.1 74.1 95.8   GLUCOSE 106* 277* 174*   CALCIUM 8.3* 8.8 8.7   MAGNESIUM  --  1.6  --    HEMOGLOBIN A1C 6.00*  --   --    TSH 0.816  --   --          Lab 01/26/25  0843 01/25/25  1510 01/24/25  1832   TOTAL PROTEIN 6.1 7.0 7.2   ALBUMIN 3.2* 3.9 3.8   GLOBULIN 2.9 3.1 3.4   ALT (SGPT) 100* 136* 40   AST (SGOT) 120* 229* 100*   BILIRUBIN 3.8* 3.5* 0.8   ALK PHOS 297* 379* 203*   LIPASE  --   --  34         Lab 01/26/25  0843 01/25/25  1626 01/25/25  1510 01/24/25  1942 01/24/25  1832   PROBNP  --   --   --   --  489.0   HSTROP T  --  29* 31* 19 21   PROTIME 14.5  --   --   --   --    INR 1.12  --   --   --   --              Lab 01/26/25  0843   IRON 13*   IRON SATURATION (TSAT) 7*   TIBC 189*   TRANSFERRIN 127*   FERRITIN 2,807.00*   FOLATE 6.75   VITAMIN B 12 791         Brief Urine Lab Results  (Last result in the past 365 days)        Color   Clarity   Blood   Leuk Est   Nitrite   Protein   CREAT   Urine HCG        01/25/25 1456 Dark Yellow   Cloudy   Negative   Negative   Negative   >=300 mg/dL (3+)                   Microbiology Results Abnormal       None            CT Chest Without Contrast Diagnostic    Result Date: 1/25/2025  CT CHEST WO CONTRAST DIAGNOSTIC Date of Exam: 1/25/2025 3:53 PM EST Indication: ams. Comparison: None available. Technique: Axial CT images were obtained of the chest without contrast administration.  Reconstructed coronal and sagittal images were also obtained. Automated exposure control and iterative construction methods were used. FINDINGS: Scattered atelectasis is noted. No well-defined consolidations or pleural effusions are observed. Granulomas are noted in the left lower lobe. No suspicious pulmonary nodules or abnormal pulmonary masses are seen.  No significant hilar, mediastinal, or axillary lymphadenopathy is observed. Calcified left hilar lymph nodes are noted. A normal aortic arch branching pattern is identified. Coronary artery calcifications are identified. The thyroid gland is unremarkable. The esophagus is unremarkable. The limited evaluation of the upper abdomen demonstrates no evidence for acute abnormality. There is evidence for diffuse hepatic fatty infiltration with associated hepatosplenomegaly. No acute osseous abnormalities are observed.     Impression: 1.No evidence for acute intrathoracic abnormality. 2.Coronary artery calcifications are noted. Electronically Signed: Andrew Tillman MD  1/25/2025 4:16 PM EST  Workstation ID: [id number]    CT Abdomen Pelvis Without Contrast    Result Date: 1/25/2025  CT ABDOMEN PELVIS WO CONTRAST Date of Exam: 1/25/2025 3:53 PM EST Indication: ams. Comparison: None available. Technique: Axial CT images were obtained of the abdomen and pelvis without the administration of contrast. Reconstructed coronal and sagittal images were also obtained. Automated exposure control and iterative construction methods were used. FINDINGS: Lung bases: Calcified left hilar lymph nodes. Left lower lobe calcified granulomata. Atheromatous disease of the coronary vessels. Liver: Geographic areas of decreased attenuation within the liver suggesting hepatic steatosis. Spleen: Splenic granulomata Pancreas:No pancreatic masses. No evidence of pancreatitis. Gallbladder and common bile duct: Previous cholecystectomy. Adrenal glands:No adrenal masses Kidneys and ureters:No kidney stones. No renal masses.No calculi present within the ureters. Normal caliber ureters. Urinary bladder:No urinary bladder wall thickening. No bladder masses. Small bowel:Normal caliber small bowel. Large bowel:No diverticulosis or diverticulitis. No large bowel masses are appreciated Appendix: Not seen however there is no evidence of appendicitis GENITOURINARY:  Normal prostate Ascites or pneumoperitoneum:None. Adenopathy:None present Osseous structures: The proximal femurs are intact. The pubic bones are intact. The sacrum and sacroiliac joints are normal. The spinous and transverse processes are intact. No rib fractures. Other findings: Fat-containing umbilical hernias.     Impression: 1.No acute findings in the abdomen or pelvis. 2.Hepatic steatosis. 3.Fat-containing umbilical hernias. Electronically Signed: Stefano Garcia MD  1/25/2025 4:11 PM EST  Workstation ID: [id number]    CT Head Without Contrast    Result Date: 1/25/2025  CT HEAD WO CONTRAST Date of Exam: 1/25/2025 3:53 PM EST Indication: ams. Comparison: 12/15/2014 Technique: Axial CT images were obtained of the head without contrast administration.  Automated exposure control and iterative construction methods were used. Findings: Gray-white matter differentiation is maintained without evidence of an acute infarction. No intracranial mass or mass effect. No extra-axial mass or collection. The ventricles and sulci are normal in size and configuration. The posterior fossa appears normal. Sellar and suprasellar structures are normal. Orbital and paranasal soft tissues are normal. Opacification of the left maxillary sinus with mucoperiosteal reactive changes consistent with chronic sinusitis. The bony calvarium appears intact. No acute fractures. No lytic or blastic bony diseases.     Impression: Impression: No acute intracranial pathology. Electronically Signed: Stefano Garcia MD  1/25/2025 4:07 PM EST  Workstation ID: [id number]    XR Chest 1 View    Result Date: 1/24/2025  XR CHEST 1 VW Date of Exam: 1/24/2025 6:38 PM EST Indication: Chest Pain Triage Protocol Comparison: 1/10/2025 Findings: 3 cm lordotic projection. Heart size is at the upper limits of normal. Pulmonary vessels are normal. Lungs are clear. No pleural effusion. No pneumothorax.     Impression: Impression: 1. No acute cardiopulmonary disease.  Electronically Signed: Elijah Kraft MD  1/24/2025 7:09 PM EST  Workstation ID: [id number]         Current medications:  Scheduled Meds:buPROPion XL, 150 mg, Oral, Daily  cefTRIAXone, 2,000 mg, Intravenous, Q24H  citalopram, 20 mg, Oral, Daily  [Transfer Hold] insulin lispro, 2-7 Units, Subcutaneous, 4x Daily AC & at Bedtime  [Transfer Hold] Lidocaine, 1 patch, Transdermal, Q24H  [Transfer Hold] pantoprazole, 40 mg, Oral, Q AM  [Transfer Hold] sodium chloride, 10 mL, Intravenous, Q12H  [Transfer Hold] thiamine (B-1) IV, 200 mg, Intravenous, Daily      Continuous Infusions:[START ON 1/27/2025] lactated ringers, 9 mL/hr      PRN Meds:.  [Transfer Hold] acetaminophen    [Transfer Hold] senna-docusate sodium **AND** [Transfer Hold] polyethylene glycol **AND** [Transfer Hold] bisacodyl **AND** [Transfer Hold] bisacodyl    [Transfer Hold] dextrose    [Transfer Hold] dextrose    [Transfer Hold] glucagon (human recombinant)    [Transfer Hold] sodium chloride    [Transfer Hold] sodium chloride    [Transfer Hold] sodium chloride    Assessment & Plan   Assessment & Plan     Active Hospital Problems    Diagnosis  POA    **Elevated LFTs [R79.89]  Yes      Resolved Hospital Problems   No resolved problems to display.        Brief Hospital Course to date:  Malcolm Costa is a 59 y.o. male with hemochromatosis, type 2 diabetes, hyperlipidemia, hypertension, GERD, depression and anxiety who presents due to generalized weakness.  Patient has had problems with weakness and overall malaise for several months however his symptoms worsened over the last week.    Elevated LFTs  Thrombocytopenia  -bili 3.8 today, temp elevated to 100.1 but no true fevers   - prior cholecystectomy 2/2024  -CT scan on admit shows concern for possible retained common bile duct stone   -pt without leukocytosis but has elevated CRP worse than prior and elevated procal raising concern for infection, possible developing cholecystitis   - IV fluids  -GI consulted,  patient to undergo ERCP today   - INR wnl  -IV rocephin, continue     Hemachromatosis  -pt has homozygous H63D mutation  - MRI abdomen 12/18/14 showed mild iron deposition in the liver with suggestion of hepatic steatosis.  Mild splenomegaly also noted.  -pt followed by Dr Myers with hem/onc  - unfortunately pt has low iron saturation and iron as well as anemia so he has been unable to undergo phlebotomy to lower his ferritin  -iron studies still show low iron, low iron sat and high ferritin   -worsening ferritin elevation this admit likely due to infection    -Echo pending   -will consider brain MRI   -treat infection as above     Generalized weakness  Unintentional weight loss  - progressive weakness over three months, worse in the last week likely secondary to infection   - PT/OT  - nutrition consult  - b12/folate levels wnl  - IV thiamine  - underwent EGD and colonoscopy earlier this month   -TSH and cortisol wnl  - consider Neurology consultation after ERCP/infection under control   - ECHO pending as above  - will consider brain MRI given hemachromatosis      GERD  Colonic inflammation  - EGD and colonoscopy 1/23/25 showed upper erythematous stomach mucosa and lower: congested mucosa with ulcerations on the ileocecal valve  - protonix     DMII  HTN  - patient states he was taken off his diabetes and antihypertensive medications one month ago by his PCP  - check A1c  - SSI     Depression and anxiety  - continue citalopram  - father concerned patient recently took oxycodone from his mother's home supply.  Patient says he had one dose earlier this week.  - check UDS    Expected Discharge Location and Transportation: tbd  Expected Discharge   Expected Discharge Date: 1/29/2025; Expected Discharge Time:      VTE Prophylaxis:  Mechanical VTE prophylaxis orders are present.         AM-PAC 6 Clicks Score (PT): 12 (01/25/25 2044)    CODE STATUS:   There are no questions and answers to display.       Crystal Sherman  MD [place]  01/26/25

## 2025-01-27 ENCOUNTER — APPOINTMENT (OUTPATIENT)
Dept: MRI IMAGING | Facility: HOSPITAL | Age: 60
End: 2025-01-27
Payer: MEDICAID

## 2025-01-27 ENCOUNTER — APPOINTMENT (OUTPATIENT)
Dept: CARDIOLOGY | Facility: HOSPITAL | Age: 60
End: 2025-01-27
Payer: MEDICAID

## 2025-01-27 ENCOUNTER — APPOINTMENT (OUTPATIENT)
Dept: ULTRASOUND IMAGING | Facility: HOSPITAL | Age: 60
End: 2025-01-27
Payer: MEDICAID

## 2025-01-27 LAB
ALBUMIN SERPL-MCNC: 3.1 G/DL (ref 3.5–5.2)
ALP SERPL-CCNC: 290 U/L (ref 39–117)
ALT SERPL W P-5'-P-CCNC: 86 U/L (ref 1–41)
ANION GAP SERPL CALCULATED.3IONS-SCNC: 11 MMOL/L (ref 5–15)
ASCENDING AORTA: 3 CM
AST SERPL-CCNC: 74 U/L (ref 1–40)
AV MEAN PRESS GRAD SYS DOP V1V2: 3.5 MMHG
AV VMAX SYS DOP: 128.5 CM/SEC
BH CV ECHO MEAS - AO MAX PG: 6.6 MMHG
BH CV ECHO MEAS - AO ROOT DIAM: 2.8 CM
BH CV ECHO MEAS - AO V2 VTI: 26.4 CM
BH CV ECHO MEAS - AVA(I,D): 2.09 CM2
BH CV ECHO MEAS - EDV(CUBED): 103.8 ML
BH CV ECHO MEAS - EDV(MOD-SP2): 79 ML
BH CV ECHO MEAS - EDV(MOD-SP4): 99.5 ML
BH CV ECHO MEAS - EF(MOD-SP2): 57.7 %
BH CV ECHO MEAS - EF(MOD-SP4): 57.4 %
BH CV ECHO MEAS - ESV(CUBED): 19.7 ML
BH CV ECHO MEAS - ESV(MOD-SP2): 33.4 ML
BH CV ECHO MEAS - ESV(MOD-SP4): 42.4 ML
BH CV ECHO MEAS - FS: 42.6 %
BH CV ECHO MEAS - IVS/LVPW: 1 CM
BH CV ECHO MEAS - IVSD: 1.2 CM
BH CV ECHO MEAS - LA DIMENSION: 3.8 CM
BH CV ECHO MEAS - LAT PEAK E' VEL: 11.4 CM/SEC
BH CV ECHO MEAS - LV MASS(C)D: 212 GRAMS
BH CV ECHO MEAS - LV MAX PG: 4.2 MMHG
BH CV ECHO MEAS - LV MEAN PG: 2 MMHG
BH CV ECHO MEAS - LV V1 MAX: 102 CM/SEC
BH CV ECHO MEAS - LV V1 VTI: 17.5 CM
BH CV ECHO MEAS - LVIDD: 4.7 CM
BH CV ECHO MEAS - LVIDS: 2.7 CM
BH CV ECHO MEAS - LVOT AREA: 3.1 CM2
BH CV ECHO MEAS - LVOT DIAM: 2 CM
BH CV ECHO MEAS - LVPWD: 1.2 CM
BH CV ECHO MEAS - MED PEAK E' VEL: 5.8 CM/SEC
BH CV ECHO MEAS - MV A MAX VEL: 68.7 CM/SEC
BH CV ECHO MEAS - MV DEC SLOPE: 299 CM/SEC2
BH CV ECHO MEAS - MV DEC TIME: 0.2 SEC
BH CV ECHO MEAS - MV E MAX VEL: 80.7 CM/SEC
BH CV ECHO MEAS - MV E/A: 1.17
BH CV ECHO MEAS - MV MAX PG: 3.1 MMHG
BH CV ECHO MEAS - MV MEAN PG: 2 MMHG
BH CV ECHO MEAS - MV P1/2T: 83.6 MSEC
BH CV ECHO MEAS - MV V2 VTI: 31.2 CM
BH CV ECHO MEAS - MVA(P1/2T): 2.6 CM2
BH CV ECHO MEAS - MVA(VTI): 1.76 CM2
BH CV ECHO MEAS - PA ACC TIME: 0.13 SEC
BH CV ECHO MEAS - PA V2 MAX: 99.2 CM/SEC
BH CV ECHO MEAS - RAP SYSTOLE: 3 MMHG
BH CV ECHO MEAS - RVSP: 22 MMHG
BH CV ECHO MEAS - SV(LVOT): 55 ML
BH CV ECHO MEAS - SV(MOD-SP2): 45.6 ML
BH CV ECHO MEAS - SV(MOD-SP4): 57.1 ML
BH CV ECHO MEAS - TAPSE (>1.6): 2.33 CM
BH CV ECHO MEAS - TR MAX PG: 19 MMHG
BH CV ECHO MEAS - TR MAX VEL: 217 CM/SEC
BH CV ECHO MEASUREMENTS AVERAGE E/E' RATIO: 9.38
BH CV VAS BP LEFT ARM: NORMAL MMHG
BH CV XLRA - RV BASE: 3.4 CM
BH CV XLRA - RV LENGTH: 6.9 CM
BH CV XLRA - RV MID: 3 CM
BH CV XLRA - TDI S': 15.4 CM/SEC
BILIRUB CONJ SERPL-MCNC: 1.8 MG/DL (ref 0–0.3)
BILIRUB INDIRECT SERPL-MCNC: 0.5 MG/DL
BILIRUB SERPL-MCNC: 2.3 MG/DL (ref 0–1.2)
BUN SERPL-MCNC: 17 MG/DL (ref 6–20)
BUN/CREAT SERPL: 18.3 (ref 7–25)
CALCIUM SPEC-SCNC: 8.6 MG/DL (ref 8.6–10.5)
CHLORIDE SERPL-SCNC: 107 MMOL/L (ref 98–107)
CO2 SERPL-SCNC: 22 MMOL/L (ref 22–29)
CREAT SERPL-MCNC: 0.93 MG/DL (ref 0.76–1.27)
DEPRECATED RDW RBC AUTO: 58.1 FL (ref 37–54)
EGFRCR SERPLBLD CKD-EPI 2021: 94.6 ML/MIN/1.73
ERYTHROCYTE [DISTWIDTH] IN BLOOD BY AUTOMATED COUNT: 16.8 % (ref 12.3–15.4)
GLUCOSE BLDC GLUCOMTR-MCNC: 199 MG/DL (ref 70–130)
GLUCOSE BLDC GLUCOMTR-MCNC: 215 MG/DL (ref 70–130)
GLUCOSE BLDC GLUCOMTR-MCNC: 222 MG/DL (ref 70–130)
GLUCOSE BLDC GLUCOMTR-MCNC: 246 MG/DL (ref 70–130)
GLUCOSE SERPL-MCNC: 244 MG/DL (ref 65–99)
HCT VFR BLD AUTO: 31 % (ref 37.5–51)
HCV AB SER QL: NORMAL
HGB BLD-MCNC: 9.6 G/DL (ref 13–17.7)
HIV 1+2 AB+HIV1 P24 AG SERPL QL IA: REACTIVE
LEFT ATRIUM VOLUME INDEX: 17.3 ML/M2
LV EF BIPLANE MOD: 57.5 %
MAGNESIUM SERPL-MCNC: 2.4 MG/DL (ref 1.6–2.6)
MCH RBC QN AUTO: 29.5 PG (ref 26.6–33)
MCHC RBC AUTO-ENTMCNC: 31 G/DL (ref 31.5–35.7)
MCV RBC AUTO: 95.4 FL (ref 79–97)
PLATELET # BLD AUTO: 89 10*3/MM3 (ref 140–450)
PMV BLD AUTO: 11.3 FL (ref 6–12)
POTASSIUM SERPL-SCNC: 4.2 MMOL/L (ref 3.5–5.2)
PROT SERPL-MCNC: 6 G/DL (ref 6–8.5)
RBC # BLD AUTO: 3.25 10*6/MM3 (ref 4.14–5.8)
RPR SER QL: NORMAL
SODIUM SERPL-SCNC: 140 MMOL/L (ref 136–145)
WBC NRBC COR # BLD AUTO: 2.02 10*3/MM3 (ref 3.4–10.8)

## 2025-01-27 PROCEDURE — 86803 HEPATITIS C AB TEST: CPT | Performed by: STUDENT IN AN ORGANIZED HEALTH CARE EDUCATION/TRAINING PROGRAM

## 2025-01-27 PROCEDURE — 99232 SBSQ HOSP IP/OBS MODERATE 35: CPT | Performed by: NURSE PRACTITIONER

## 2025-01-27 PROCEDURE — 85027 COMPLETE CBC AUTOMATED: CPT | Performed by: STUDENT IN AN ORGANIZED HEALTH CARE EDUCATION/TRAINING PROGRAM

## 2025-01-27 PROCEDURE — 80076 HEPATIC FUNCTION PANEL: CPT | Performed by: NURSE PRACTITIONER

## 2025-01-27 PROCEDURE — 25010000002 CEFTRIAXONE PER 250 MG: Performed by: STUDENT IN AN ORGANIZED HEALTH CARE EDUCATION/TRAINING PROGRAM

## 2025-01-27 PROCEDURE — 93306 TTE W/DOPPLER COMPLETE: CPT | Performed by: INTERNAL MEDICINE

## 2025-01-27 PROCEDURE — 86592 SYPHILIS TEST NON-TREP QUAL: CPT | Performed by: STUDENT IN AN ORGANIZED HEALTH CARE EDUCATION/TRAINING PROGRAM

## 2025-01-27 PROCEDURE — 97162 PT EVAL MOD COMPLEX 30 MIN: CPT

## 2025-01-27 PROCEDURE — 63710000001 INSULIN LISPRO (HUMAN) PER 5 UNITS: Performed by: STUDENT IN AN ORGANIZED HEALTH CARE EDUCATION/TRAINING PROGRAM

## 2025-01-27 PROCEDURE — 87491 CHLMYD TRACH DNA AMP PROBE: CPT | Performed by: STUDENT IN AN ORGANIZED HEALTH CARE EDUCATION/TRAINING PROGRAM

## 2025-01-27 PROCEDURE — 80048 BASIC METABOLIC PNL TOTAL CA: CPT | Performed by: STUDENT IN AN ORGANIZED HEALTH CARE EDUCATION/TRAINING PROGRAM

## 2025-01-27 PROCEDURE — 82948 REAGENT STRIP/BLOOD GLUCOSE: CPT

## 2025-01-27 PROCEDURE — 87591 N.GONORRHOEAE DNA AMP PROB: CPT | Performed by: STUDENT IN AN ORGANIZED HEALTH CARE EDUCATION/TRAINING PROGRAM

## 2025-01-27 PROCEDURE — 86701 HIV-1ANTIBODY: CPT | Performed by: STUDENT IN AN ORGANIZED HEALTH CARE EDUCATION/TRAINING PROGRAM

## 2025-01-27 PROCEDURE — 25010000002 THIAMINE HCL 200 MG/2ML SOLUTION: Performed by: STUDENT IN AN ORGANIZED HEALTH CARE EDUCATION/TRAINING PROGRAM

## 2025-01-27 PROCEDURE — 86361 T CELL ABSOLUTE COUNT: CPT | Performed by: STUDENT IN AN ORGANIZED HEALTH CARE EDUCATION/TRAINING PROGRAM

## 2025-01-27 PROCEDURE — 83735 ASSAY OF MAGNESIUM: CPT | Performed by: STUDENT IN AN ORGANIZED HEALTH CARE EDUCATION/TRAINING PROGRAM

## 2025-01-27 PROCEDURE — 87536 HIV-1 QUANT&REVRSE TRNSCRPJ: CPT | Performed by: STUDENT IN AN ORGANIZED HEALTH CARE EDUCATION/TRAINING PROGRAM

## 2025-01-27 PROCEDURE — G0432 EIA HIV-1/HIV-2 SCREEN: HCPCS | Performed by: STUDENT IN AN ORGANIZED HEALTH CARE EDUCATION/TRAINING PROGRAM

## 2025-01-27 PROCEDURE — 86702 HIV-2 ANTIBODY: CPT | Performed by: STUDENT IN AN ORGANIZED HEALTH CARE EDUCATION/TRAINING PROGRAM

## 2025-01-27 PROCEDURE — 99232 SBSQ HOSP IP/OBS MODERATE 35: CPT | Performed by: STUDENT IN AN ORGANIZED HEALTH CARE EDUCATION/TRAINING PROGRAM

## 2025-01-27 PROCEDURE — 97166 OT EVAL MOD COMPLEX 45 MIN: CPT

## 2025-01-27 PROCEDURE — 93306 TTE W/DOPPLER COMPLETE: CPT

## 2025-01-27 PROCEDURE — 76705 ECHO EXAM OF ABDOMEN: CPT

## 2025-01-27 RX ORDER — ACETAMINOPHEN 325 MG/1
650 TABLET ORAL EVERY 6 HOURS PRN
Status: DISCONTINUED | OUTPATIENT
Start: 2025-01-27 | End: 2025-02-01 | Stop reason: HOSPADM

## 2025-01-27 RX ORDER — INSULIN LISPRO 100 [IU]/ML
3 INJECTION, SOLUTION INTRAVENOUS; SUBCUTANEOUS
Status: DISCONTINUED | OUTPATIENT
Start: 2025-01-27 | End: 2025-02-01 | Stop reason: HOSPADM

## 2025-01-27 RX ADMIN — INSULIN LISPRO 3 UNITS: 100 INJECTION, SOLUTION INTRAVENOUS; SUBCUTANEOUS at 17:11

## 2025-01-27 RX ADMIN — SODIUM CHLORIDE 2000 MG: 900 INJECTION INTRAVENOUS at 21:05

## 2025-01-27 RX ADMIN — Medication 10 ML: at 08:13

## 2025-01-27 RX ADMIN — INSULIN LISPRO 3 UNITS: 100 INJECTION, SOLUTION INTRAVENOUS; SUBCUTANEOUS at 11:55

## 2025-01-27 RX ADMIN — INSULIN LISPRO 2 UNITS: 100 INJECTION, SOLUTION INTRAVENOUS; SUBCUTANEOUS at 20:45

## 2025-01-27 RX ADMIN — INSULIN LISPRO 3 UNITS: 100 INJECTION, SOLUTION INTRAVENOUS; SUBCUTANEOUS at 08:13

## 2025-01-27 RX ADMIN — LIDOCAINE 1 PATCH: 4 PATCH TOPICAL at 08:12

## 2025-01-27 RX ADMIN — THIAMINE HYDROCHLORIDE 200 MG: 100 INJECTION, SOLUTION INTRAMUSCULAR; INTRAVENOUS at 08:15

## 2025-01-27 RX ADMIN — BUPROPION HYDROCHLORIDE 150 MG: 150 TABLET, EXTENDED RELEASE ORAL at 08:17

## 2025-01-27 RX ADMIN — PANTOPRAZOLE SODIUM 40 MG: 40 TABLET, DELAYED RELEASE ORAL at 08:17

## 2025-01-27 RX ADMIN — CITALOPRAM HYDROBROMIDE 20 MG: 20 TABLET ORAL at 08:17

## 2025-01-27 RX ADMIN — ACETAMINOPHEN 650 MG: 325 TABLET ORAL at 20:45

## 2025-01-27 NOTE — CONSULTS
"          Clinical Nutrition Assessment     Patient Name: Malcolm Costa  YOB: 1965  MRN: [id number]  Date of Encounter: 01/27/25 16:55 EST  Admission date: 1/25/2025  Reason for Visit: Follow-up protocol    Assessment   Nutrition Assessment   Admission Diagnosis:  Weakness [R53.1]  Elevated LFTs [R79.89]    Problem List:    Elevated LFTs    Severe protein-calorie malnutrition    + HIV antibody test    PMH:   He  has a past medical history of Allergic rhinitis, Anxiety, Dermatitis (8/2/2016), Diabetes mellitus, Diverticulitis, Gastroesophageal reflux disease (7/11/2016), GERD (gastroesophageal reflux disease), History of alcohol abuse, Hypertension, Insomnia (7/11/2016), Visual impairment (7/11/2016), and Vitamin D deficiency (7/11/2016).    PSH:  He  has a past surgical history that includes Tonsillectomy (1974); Appendectomy (1995); Hernia repair (2010); cholecystectomy with intraoperative cholangiogram (N/A, 2/1/2024); Esophagogastroduodenoscopy (N/A, 2/2/2024); ERCP (N/A, 2/2/2024); and ERCP (N/A, 1/26/2025).    Applicable Nutrition History:     s/p ERCP 1-26-25:  Prior biliary sphincterotomy appeared open. - A filling defect consistent with a stone was seen on the cholangiogram. - Choledocholithiasis was found. Complete removal was accomplished by biliary sphincterotomy and balloon extraction. - A biliary sphincterotomy was performed. - The biliary tree was swept.    Anthropometrics     Height: Height: 160 cm (62.99\")  Last Filed Weight: Weight: 77.1 kg (170 lb) (01/27/25 1414)  Method: Weight Method: Bed scale  BMI: BMI (Calculated): 30.1    UBW:  185lbs  Weight change: weight loss of 13 lbs (9.3%) over 1 month(s)    Significant?  Yes   Weight       Weight (kg) Weight (lbs) Weight Method Visit Report   3/19/2024 91.264 kg  201 lb 3.2 oz   --    9/27/2024 83.915 kg  185 lb      10/29/2024 83.915 kg  185 lb  Stated     11/25/2024 84.369 kg  186 lb      12/6/2024 83.915 kg  185 lb   --    12/27/2024 " 79.379 kg  175 lb  Stated  --     80.287 kg  177 lb   --    1/3/2025 79.379 kg  175 lb  Stated     1/10/2025 79.379 kg  175 lb  Stated  --     79.379 kg  175 lb   --    1/24/2025 76.023 kg  167 lb 9.6 oz  Standing scale     1/25/2025 77.429 kg  170 lb 11.2 oz  Bed scale      76.023 kg  167 lb 9.6 oz  Estimated         Nutrition Focused Physical Exam    Date: 1/26    Patient meets criteria for malnutrition diagnosis, see MSA note.     Subjective   Reported/Observed/Food/Nutrition Related History:       1-27: pt resting in bed, report tolerating solid food, no complaints    1-26:Nutrition hx obtained from pt and father at bedside. Pt has had poor intake x 2-3 months, pt states he is eating <25% of usual intake and father notes pt will take a few bites of food then want to lie down. Father states pt wt in ED was 167lbs standing scale and has had wt loss. Pt denies dysphagia, notes shellfish allergy (confirmed in EPIC). Pt agreeable to ONS once diet advanced s/p ERCP this afternoon.     Current Nutrition Prescription   PO: Diet: Diabetic; Consistent Carbohydrate; Fluid Consistency: Thin (IDDSI 0)  Oral Nutrition Supplement:   Intake: Insufficient data    Assessment & Plan   Nutrition Diagnosis   Date:  1/26            Updated:  1-27  Problem Malnutrition severe chronic   Etiology Pain, poor appetite, fatigue   Signs/Symptoms Po intake <75% EEN x >/=1 month, wt loss >5% x 1 month, severe muscle wasting and moderate subcutaneous fat loss   Status: New    Goal / Objectives:   Nutrition to support treatment and Establish PO      Nutrition Intervention      Follow treatment progress, Care plan reviewed, Advised available snacks, Interview for preferences, Menu provided, Supplement provided    Add low fat restriction per GI    Add Boost GC 2x/day    Monitoring/Evaluation:   Per protocol, I&O, PO intake, Supplement intake, Pertinent labs, Weight, Skin status, GI status, Symptoms    Luz Elena Ortiz RD  Time Spent:30min

## 2025-01-27 NOTE — PLAN OF CARE
Goal Outcome Evaluation:              Outcome Evaluation: VSS on room air. NSR. NPO midnight.

## 2025-01-27 NOTE — THERAPY EVALUATION
Patient Name: Malcolm Costa  : 1965    MRN: [id number]                              Today's Date: 2025       Admit Date: 2025    Visit Dx:     ICD-10-CM ICD-9-CM   1. Weakness  R53.1 780.79     Patient Active Problem List   Diagnosis    Arthritis    Uncontrolled type 2 diabetes mellitus with hyperglycemia    Essential hypertension    Pure hypercholesterolemia    Recurrent major depressive disorder, in partial remission    Anxiety    Blues    Cutaneous eruption    Diverticulitis of intestine    Nausea and vomiting    Acute cholecystitis    Elevated liver enzymes    Hemochromatosis associated with mutation in HFE gene    Elevated LFTs    Severe protein-calorie malnutrition     Past Medical History:   Diagnosis Date    Allergic rhinitis     Anxiety     Dermatitis 2016    Diabetes mellitus     Diverticulitis     Gastroesophageal reflux disease 2016    GERD (gastroesophageal reflux disease)     History of alcohol abuse     Hypertension     Insomnia 2016    Visual impairment 2016    Vitamin D deficiency 2016     Past Surgical History:   Procedure Laterality Date    APPENDECTOMY      CHOLECYSTECTOMY WITH INTRAOPERATIVE CHOLANGIOGRAM N/A 2024    Procedure: CHOLECYSTECTOMY LAPAROSCOPIC WITH INTRAOPERATIVE CHOLANGIOGRAM, UMBILICAL HERNIA REPAIR;  Surgeon: Adam Ramos MD;  Location: [facility] OR;  Service: General;  Laterality: N/A;    ENDOSCOPY N/A 2024    Procedure: ESOPHAGOGASTRODUODENOSCOPY;  Surgeon: Dominik Chase MD;  Location:  [facility];  Service: Gastroenterology;  Laterality: N/A;    ERCP N/A 2024    Procedure: ENDOSCOPIC RETROGRADE CHOLANGIOPANCREATOGRAPHY;  Surgeon: Dominik Chase MD;  Location:  [facility];  Service: Gastroenterology;  Laterality: N/A;  Sphincterotomy made to common bile duct (CBD) ampulla. CBD swept with 9-12 mm balloon. ERCP scope removed with plastic cap intact.    ERCP N/A 2025    Procedure: ENDOSCOPIC RETROGRADE  CHOLANGIOPANCREATOGRAPHY;  Surgeon: Brunner, Mark I, MD;  Location: [facility] END[facility]COPY;  Service: Gastroenterology;  Laterality: N/A;  Sphincterotomy, 9-12mm balloon sweep    HERNIA REPAIR  2010    TONSILLECTOMY  1974      General Information       Row Name 01/27/25 1446          OT Time and Intention    Document Type evaluation  -KF     Mode of Treatment occupational therapy  -KF       Row Name 01/27/25 1446          General Information    Patient Profile Reviewed yes  -KF     Prior Level of Function independent:;all household mobility;bed mobility;ADL's;driving  Independent prior, no AD  -KF     Existing Precautions/Restrictions fall  -KF     Barriers to Rehab medically complex  -KF       Row Name 01/27/25 1446          Living Environment    People in Home alone;other (see comments)  [facility]  -KF       Row Name 01/27/25 1446          Home Main Entrance    Number of Stairs, Main Entrance two  -KF     Stair Railings, Main Entrance railing on left side (ascending)  -KF       Row Name 01/27/25 1446          Stairs Within Home, Primary    Number of Stairs, Within Home, Primary none  -KF       Row Name 01/27/25 1446          Cognition    Orientation Status (Cognition) oriented to;person;time;verbal cues/prompts needed for orientation;place;other (see comments)  Pt initially stated [employer] [place]  -KF       Row Name 01/27/25 1446          Safety Issues/Impairments Affecting Functional Mobility    Safety Issues Affecting Function (Mobility) insight into deficits/self-awareness;safety precaution awareness;safety precautions follow-through/compliance  -KF     Impairments Affecting Function (Mobility) balance;coordination;endurance/activity tolerance;strength  -KF               User Key  (r) = Recorded By, (t) = Taken By, (c) = Cosigned By      Initials Name Provider Type    KF Sully Arizmendi OT Occupational Therapist                     Mobility/ADL's       Row Name 01/27/25 1448           Bed Mobility    Bed Mobility supine-sit  -KF     Supine-Sit [place] (Bed Mobility) standby assist  -KF     Assistive Device (Bed Mobility) bed rails;head of bed elevated  -KF       Row Name 01/27/25 1448          Transfers    Transfers sit-stand transfer;stand-sit transfer  -KF       Row Name 01/27/25 1448          Sit-Stand Transfer    Sit-Stand [place] (Transfers) contact guard  -KF       Row Name 01/27/25 1448          Stand-Sit Transfer    Stand-Sit [place] (Transfers) contact guard  -KF       Row Name 01/27/25 1448          Functional Mobility    Functional Mobility- Ind. Level contact guard assist  -KF     Functional Mobility- Device other (see comments)  no AD  -KF     Functional Mobility-Distance (Feet) --  household distance  -KF     Patient was able to Ambulate yes  -KF       Row Name 01/27/25 1448          Activities of Daily Living    BADL Assessment/Intervention upper body dressing;grooming;toileting  -       Row Name 01/27/25 1448          Upper Body Dressing Assessment/Training    [place] Level (Upper Body Dressing) don;front opening garment;minimum assist (75% patient effort)  -KF     Position (Upper Body Dressing) edge of bed sitting  -KF       Row Name 01/27/25 1448          Grooming Assessment/Training    [place] Level (Grooming) wash face, hands;set up;standby assist  -KF     Position (Grooming) sink side;unsupported standing  -KF       Row Name 01/27/25 1448          Toileting Assessment/Training    [place] Level (Toileting) adjust/manage clothing;perform perineal hygiene;contact guard assist  -KF     Assistive Devices (Toileting) urinal  -KF     Position (Toileting) supported standing  -KF               User Key  (r) = Recorded By, (t) = Taken By, (c) = Cosigned By      Initials Name Provider Type    KF Sully Arizmendi OT Occupational Therapist                   Obj/Interventions       Row Name 01/27/25 1449          Sensory Assessment (Somatosensory)    Sensory  Assessment (Somatosensory) UE sensation intact  -KF       Row Name 01/27/25 1449          Range of Motion Comprehensive    General Range of Motion bilateral upper extremity ROM WFL  -       Row Name 01/27/25 1449          Strength Comprehensive (MMT)    General Manual Muscle Testing (MMT) Assessment upper extremity strength deficits identified  -KF     Comment, General Manual Muscle Testing (MMT) Assessment BUE grossly 4/5  -KF       Row Name 01/27/25 1449          Balance    Balance Assessment sitting static balance;sitting dynamic balance;sit to stand dynamic balance;standing static balance;standing dynamic balance  -KF     Static Sitting Balance standby assist  -KF     Dynamic Sitting Balance standby assist  -KF     Position, Sitting Balance unsupported;sitting edge of bed  -KF     Sit to Stand Dynamic Balance contact guard  -KF     Static Standing Balance standby assist  -KF     Dynamic Standing Balance contact guard  -KF     Position/Device Used, Standing Balance unsupported  -KF     Balance Interventions sitting;standing;sit to stand;supported;static;dynamic;occupation based/functional task  -KF               User Key  (r) = Recorded By, (t) = Taken By, (c) = Cosigned By      Initials Name Provider Type    KF Sully Arizmendi OT Occupational Therapist                   Goals/Plan       Row Name 01/27/25 1451          Transfer Goal 1 (OT)    Activity/Assistive Device (Transfer Goal 1, OT) commode;bed-to-chair/chair-to-bed  -KF     [place] Level/Cues Needed (Transfer Goal 1, OT) supervision required  -KF     Time Frame (Transfer Goal 1, OT) long term goal (LTG);10 days  -KF     Progress/Outcome (Transfer Goal 1, OT) goal ongoing  -       Row Name 01/27/25 1451          Dressing Goal 1 (OT)    Activity/Device (Dressing Goal 1, OT) upper body dressing;lower body dressing  -KF     [place]/Cues Needed (Dressing Goal 1, OT) supervision required  -KF     Time Frame (Dressing Goal 1, OT) short term goal  (STG);5 days  -KF     Progress/Outcome (Dressing Goal 1, OT) goal ongoing  -KF       Row Name 01/27/25 1451          Grooming Goal 1 (OT)    Activity/Device (Grooming Goal 1, OT) hair care;oral care;wash face, hands  -KF     [place] (Grooming Goal 1, OT) supervision required  -KF     Time Frame (Grooming Goal 1, OT) long term goal (LTG);10 days  -KF     Strategies/Barriers (Grooming Goal 1, OT) standing sink side  -KF     Progress/Outcome (Grooming Goal 1, OT) goal ongoing  -KF       Row Name 01/27/25 1451          Therapy Assessment/Plan (OT)    Planned Therapy Interventions (OT) activity tolerance training;adaptive equipment training;BADL retraining;functional balance retraining;occupation/activity based interventions;patient/caregiver education/training;ROM/therapeutic exercise;strengthening exercise;transfer/mobility retraining  -KF               User Key  (r) = Recorded By, (t) = Taken By, (c) = Cosigned By      Initials Name Provider Type    KF Sully Arizmendi, OT Occupational Therapist                   Clinical Impression       Row Name 01/27/25 1449          Pain Assessment    Pretreatment Pain Rating 0/10 - no pain  -KF     Posttreatment Pain Rating 0/10 - no pain  -KF       Row Name 01/27/25 1449          Plan of Care Review    Plan of Care Reviewed With patient;father  -KF     Progress no change  -KF     Outcome Evaluation OT evaluation completed. The pt presents below baseline with generalized weakness, decreased activity tolerance, and minor balance deficits warranting continued IP OT services. Pt ambulated a household distance with CGA, no AD. Pt performed toileting and standing sink side hand hygiene with SBA/CGA. Anticipate short term IP OT needs. Recommend a d/c home with assist when medically ready.  -KF       Row Name 01/27/25 1449          Therapy Assessment/Plan (OT)    Patient/Family Therapy Goal Statement (OT) Restore PLOF  -KF     Rehab Potential (OT) good  -KF     Criteria for Skilled  Therapeutic Interventions Met (OT) yes;skilled treatment is necessary  -KF     Therapy Frequency (OT) daily  -KF     Predicted Duration of Therapy Intervention (OT) 7 days  -KF       Row Name 01/27/25 1449          Therapy Plan Review/Discharge Plan (OT)    Anticipated Discharge Disposition (OT) home with assist  -KF       Row Name 01/27/25 1449          Vital Signs    Pre Systolic BP Rehab 153  -KF     Pre Treatment Diastolic BP 79  -KF     Pretreatment Heart Rate (beats/min) 62  -KF     Pre SpO2 (%) 100  -KF     O2 Delivery Pre Treatment room air  -KF     Pre Patient Position Supine  -KF     Intra Patient Position Standing  -KF     Post Patient Position Sitting  -KF       Row Name 01/27/25 1449          Positioning and Restraints    Pre-Treatment Position in bed  -KF     Post Treatment Position chair  -KF     In Chair notified nsg;reclined;call light within reach;encouraged to call for assist;exit alarm on;with family/caregiver;waffle cushion;legs elevated  -KF               User Key  (r) = Recorded By, (t) = Taken By, (c) = Cosigned By      Initials Name Provider Type    KF Sully Arizmendi, BAN Occupational Therapist                   Outcome Measures       Row Name 01/27/25 1451          How much help from another is currently needed...    Putting on and taking off regular lower body clothing? 3  -KF     Bathing (including washing, rinsing, and drying) 3  -KF     Toileting (which includes using toilet bed pan or urinal) 3  -KF     Putting on and taking off regular upper body clothing 3  -KF     Taking care of personal grooming (such as brushing teeth) 3  -KF     Eating meals 4  -KF     AM-PAC 6 Clicks Score (OT) 19  -KF       Row Name 01/27/25 1034          How much help from another person do you currently need...    Turning from your back to your side while in flat bed without using bedrails? 4  -NS     Moving from lying on back to sitting on the side of a flat bed without bedrails? 3  -NS     Moving to and  from a bed to a chair (including a wheelchair)? 3  -NS     Standing up from a chair using your arms (e.g., wheelchair, bedside chair)? 3  -NS     Climbing 3-5 steps with a railing? 2  -NS     To walk in hospital room? 3  -NS     AM-PAC 6 Clicks Score (PT) 18  -NS     Highest Level of Mobility Goal 6 --> Walk 10 steps or more  -NS       Row Name 01/27/25 1451 01/27/25 1034       Functional Assessment    Outcome Measure Options AM-PAC 6 Clicks Daily Activity (OT)  -KF AM-PAC 6 Clicks Basic Mobility (PT)  -NS              User Key  (r) = Recorded By, (t) = Taken By, (c) = Cosigned By      Initials Name Provider Type    Sharmin Lepe, PT Physical Therapist    Sully Vidal OT Occupational Therapist                    Occupational Therapy Education       Title: PT OT SLP Therapies (In Progress)       Topic: Occupational Therapy (In Progress)       Point: ADL training (Done)       Description:   Instruct learner(s) on proper safety adaptation and remediation techniques during self care or transfers.   Instruct in proper use of assistive devices.                  Learning Progress Summary            Patient Acceptance, E,TB, VU,DU by KF at 1/27/2025 1038                      Point: Home exercise program (Not Started)       Description:   Instruct learner(s) on appropriate technique for monitoring, assisting and/or progressing therapeutic exercises/activities.                  Learner Progress:  Not documented in this visit.              Point: Precautions (Done)       Description:   Instruct learner(s) on prescribed precautions during self-care and functional transfers.                  Learning Progress Summary            Patient Acceptance, E,TB, VU,DU by KF at 1/27/2025 1038                      Point: Body mechanics (Done)       Description:   Instruct learner(s) on proper positioning and spine alignment during self-care, functional mobility activities and/or exercises.                  Learning Progress  Summary            Patient Acceptance, E,TB, VU,DU by KF at 1/27/2025 1038                                      User Key       Initials Effective Dates Name Provider Type Discipline     08/09/23 -  Sully Arizmendi, OT Occupational Therapist OT                  OT Recommendation and Plan  Planned Therapy Interventions (OT): activity tolerance training, adaptive equipment training, BADL retraining, functional balance retraining, occupation/activity based interventions, patient/caregiver education/training, ROM/therapeutic exercise, strengthening exercise, transfer/mobility retraining  Therapy Frequency (OT): daily  Plan of Care Review  Plan of Care Reviewed With: patient, father  Progress: no change  Outcome Evaluation: OT evaluation completed. The pt presents below baseline with generalized weakness, decreased activity tolerance, and minor balance deficits warranting continued IP OT services. Pt ambulated a household distance with CGA, no AD. Pt performed toileting and standing sink side hand hygiene with SBA/CGA. Anticipate short term IP OT needs. Recommend a d/c home with assist when medically ready.     Time Calculation:   Evaluation Complexity (OT)  Review Occupational Profile/Medical/Therapy History Complexity: expanded/moderate complexity  Assessment, Occupational Performance/Identification of Deficit Complexity: 3-5 performance deficits  Clinical Decision Making Complexity (OT): detailed assessment/moderate complexity  Overall Complexity of Evaluation (OT): moderate complexity     Time Calculation- OT       Row Name 01/27/25 1452             Time Calculation- OT    OT Start Time 1038  -KF      OT Received On 01/27/25  -KF      OT Goal Re-Cert Due Date 02/06/25  -KF         Untimed Charges    OT Eval/Re-eval Minutes 47  -KF         Total Minutes    Untimed Charges Total Minutes 47  -KF       Total Minutes 47  -KF                User Key  (r) = Recorded By, (t) = Taken By, (c) = Cosigned By      Initials Name  Provider Type    KF Sully Arizmendi OT Occupational Therapist                  Therapy Charges for Today       Code Description Service Date Service Provider Modifiers Qty    27018424231 HC OT EVAL MOD COMPLEXITY 4 1/27/2025 Sully Arizmendi OT GO 1                 Sully Arizmendi OT  1/27/2025

## 2025-01-27 NOTE — CASE MANAGEMENT/SOCIAL WORK
Discharge Planning Assessment  [facility]     Patient Name: Malcolm Costa  MRN: [id number]  Today's Date: 1/27/2025    Admit Date: 1/25/2025    Plan: Home with family   Discharge Needs Assessment       Row Name 01/27/25 0844       Living Environment    People in Home alone    Current Living Arrangements home    Potentially Unsafe Housing Conditions none    In the past 12 months has the electric, gas, oil, or water company threatened to shut off services in your home? No    Primary Care Provided by self    Provides Primary Care For no one    Family Caregiver if Needed parent(s)    Family Caregiver Names Danie (father) [phone]    Able to Return to Prior Arrangements yes       Resource/Environmental Concerns    Resource/Environmental Concerns none    Transportation Concerns none       Transportation Needs    In the past 12 months, has lack of transportation kept you from medical appointments or from getting medications? no    In the past 12 months, has lack of transportation kept you from meetings, work, or from getting things needed for daily living? No       Food Insecurity    Within the past 12 months, you worried that your food would run out before you got the money to buy more. Never true    Within the past 12 months, the food you bought just didn't last and you didn't have money to get more. Never true       Transition Planning    Patient/Family Anticipates Transition to home with family    Patient/Family Anticipated Services at Transition none    Transportation Anticipated family or friend will provide       Discharge Needs Assessment    Readmission Within the Last 30 Days no previous admission in last 30 days    Equipment Currently Used at Home none    Concerns to be Addressed denies needs/concerns at this time    Do you want help finding or keeping work or a job? I do not need or want help    Do you want help with school or training? For example, starting or completing job training or getting a high  school diploma, GED or equivalent No    Anticipated Changes Related to Illness none    Equipment Needed After Discharge none                   Discharge Plan       Row Name 01/27/25 0845       Plan    Plan Home with family    Patient/Family in Agreement with Plan yes    Plan Comments Spoke to patient at bedside. Lives alone in [place]. Contact is Danie (father) [phone]. Is independent with ADL's. No problems with [facility] or medications. Uses no DME. Uses [facility] pharmacy. Uses no DME. PCP is JOANA Sinclair. Plan is home with father. Father will transport. CM will continue to follow.    Final Discharge Disposition Code 01 - home or self-care                  Continued Care and Services - Admitted Since 1/25/2025    No active coordination exists for this encounter.       Expected Discharge Date and Time       Expected Discharge Date Expected Discharge Time    Jan 29, 2025            Demographic Summary       Row Name 01/27/25 0843       General Information    Admission Type observation    Arrived From emergency department    Referral Source admission list    Reason for Consult discharge planning    Preferred Language English       Contact Information    Permission Granted to Share Info With     Contact Information Obtained for                    Functional Status       Row Name 01/27/25 0843       Functional Status    Usual Activity Tolerance excellent    Current Activity Tolerance good       Physical Activity    On average, how many days per week do you engage in moderate to strenuous exercise (like a brisk walk)? 5 days    On average, how many minutes do you engage in exercise at this level? 30 min    Number of minutes of exercise per week 150       Functional Status, IADL    Medications independent    Meal Preparation independent    Housekeeping independent    Laundry independent    Shopping independent    If for any reason you need help with day-to-day activities  such as bathing, preparing meals, shopping, managing finances, etc., do you get the help you need? I don't need any help       Mental Status    General Appearance WDL WDL       Mental Status Summary    Recent Changes in Mental Status/Cognitive Functioning no changes       Employment/    Employment Status employed full-time                   Psychosocial    No documentation.                  Abuse/Neglect    No documentation.                  Legal    No documentation.                  Substance Abuse    No documentation.                  Patient Forms    No documentation.                     Junaid Thapa RN

## 2025-01-27 NOTE — PLAN OF CARE
Goal Outcome Evaluation:  Plan of Care Reviewed With: patient, father        Progress: no change  Outcome Evaluation: OT evaluation completed. The pt presents below baseline with generalized weakness, decreased activity tolerance, and minor balance deficits warranting continued IP OT services. Pt ambulated a household distance with CGA, no AD. Pt performed toileting and standing sink side hand hygiene with SBA/CGA. Anticipate short term IP OT needs. Recommend a d/c home with assist when medically ready.    Anticipated Discharge Disposition (OT): home with assist

## 2025-01-27 NOTE — PLAN OF CARE
Goal Outcome Evaluation:              Outcome Evaluation: Pt on RA, NSR/SB on tele, VSS. No complaints of pain this shift, pt rested through the night. Pt remains NPO. Will continue with POC.

## 2025-01-27 NOTE — PAYOR COMM NOTE
"Hero Costa (59 y.o. Male)     [id number]    Teresa Greene RN  Utilization Review  [phone]  [phone]        Date of Birth   1965    Social Security Number       Address   [address] [address]    Home Phone   [phone]    MRN   [id number]       Church   [employer]    Marital Status   Single                            Admission Date   25    Admission Type   Emergency    Admitting Provider   Crystal Pa MD    Attending Provider   Crystal Pa MD    Department, Room/Bed   [facility] 6B, N627/1       Discharge Date       Discharge Disposition       Discharge Destination                                 Attending Provider: Crystal Pa MD    Allergies: Shellfish Allergy, Shellfish-derived Products, Codeine    Isolation: None   Infection: MRSA (24)   Code Status: Prior    Ht: 160 cm (63\")   Wt: 77.4 kg (170 lb 11.2 oz)    Admission Cmt: None   Principal Problem: Elevated LFTs [R79.89]                   Active Insurance as of 2025       Primary Coverage       Payor Plan Insurance Group Employer/Plan Group    [facility] COMMUNITY PLAN [facility] COMMUNITY PLAN [facility]       Payor Plan Address Payor Plan Phone Number Payor Plan Fax Number Effective Dates    [address]   2024 - None Entered    [facility] [id number]         Subscriber Name Subscriber Birth Date Member ID       HERO COSTA 1965 [id number]                     Emergency Contacts        (Rel.) Home Phone Work Phone Mobile Phone    Danie Costa (Father) [phone] -- [phone]                 History & Physical        Christiano Singh MD at 25 1738                [facility]  HISTORY AND PHYSICAL    Patient Name: Hero Costa  : 1965  MRN: [id number]  Primary Care Physician: Sita Chase, JOANA  Date of admission: 2025      Subjective " "  Subjective     Chief Complaint:  Generalized weakness    HPI:  Malcolm Costa is a 59 y.o. male with history of hemochromatosis, DM2, hyperlipidemia, hypertension, GERD, depression and anxiety, who presents with generalized weakness and poor p.o. intake.  The patient is a poor historian and his father at bedside provides most details.    Mr. Costa underwent elective upper and lower endoscopy on 1/24/2025 (Dr. Mccrary).  According to the patient and his father, Mr. Costa has been extremely fatigued with occasional nausea and loose stool for at least the past 3 months.  He states he has unintentionally lost 60 pounds.  The patient says he was taken off of his blood pressure and diabetes medications last month by his PCP.  He has been very weak, and according to his father sometimes \"staggers around like an old man.\"      The patient was seen in the [place] ED yesterday for chest discomfort.  Symptoms improved with IV morphine.  Labs were reassuring and the patient was discharged home.     Today Mr Costa's father says the patient was very weak and confused, and therefore brought him to the ED.      The patient chest pain.  He endorses a cough which he says in chronic.  No nausea, abdominal pain or loose stool.  Prior history of Campylobacter infection that he says was treated last year.  No fevers or chills.  Recent I&D of right scalp abscess, states he has completed antibiotics and the area behind his right ear feels much better.  Denies loss of sensation in his lower extremities.          Personal History     Past Medical History:   Diagnosis Date    Allergic rhinitis     Anxiety     Dermatitis 8/2/2016    Diabetes mellitus     Diverticulitis     Gastroesophageal reflux disease 7/11/2016    GERD (gastroesophageal reflux disease)     History of alcohol abuse     Hypertension     Insomnia 7/11/2016    Visual impairment 7/11/2016    Vitamin D deficiency 7/11/2016           Past Surgical History:   Procedure Laterality " Date    APPENDECTOMY  1995    CHOLECYSTECTOMY WITH INTRAOPERATIVE CHOLANGIOGRAM N/A 2/1/2024    Procedure: CHOLECYSTECTOMY LAPAROSCOPIC WITH INTRAOPERATIVE CHOLANGIOGRAM, UMBILICAL HERNIA REPAIR;  Surgeon: Adam Ramos MD;  [facility];  Service: General;  Laterality: N/A;    ENDOSCOPY N/A 2/2/2024    Procedure: ESOPHAGOGASTRODUODENOSCOPY;  Surgeon: Dominik Chase MD;  Location:  [facility];  Service: Gastroenterology;  Laterality: N/A;    ERCP N/A 2/2/2024    Procedure: ENDOSCOPIC RETROGRADE CHOLANGIOPANCREATOGRAPHY;  Surgeon: Dominik Chase MD;  Location:  [facility];  Service: Gastroenterology;  Laterality: N/A;  Sphincterotomy made to common bile duct (CBD) ampulla. CBD swept with 9-12 mm balloon. ERCP scope removed with plastic cap intact.    HERNIA REPAIR  2010    TONSILLECTOMY  1974       Family History: family history includes Alcohol abuse in his father; Diabetes in his paternal grandmother; Hypertension in his father, maternal grandfather, maternal grandmother, mother, paternal grandfather, and paternal grandmother; Multiple myeloma in his mother; Thyroid disease in an other family member.     Social History:  reports that he has never smoked. He has never been exposed to tobacco smoke. He has never used smokeless tobacco. He reports that he does not currently use alcohol. He reports that he does not use drugs.  Social History     Social History Narrative    Not on file       Medications:  Available home medication information reviewed.  FreeStyle Dagoberto 2 [place], FreeStyle Dagoberto 2 Sensor, Insulin Syringe-Needle U-100, accu-chek soft touch, buPROPion XL, citalopram, glucose blood, metFORMIN ER, and omeprazole    Allergies   Allergen Reactions    Shellfish Allergy Anaphylaxis    Shellfish-Derived Products Anaphylaxis    Codeine Nausea Only     Not sure of reaction but thinks it is just nausea       Objective   Objective     Vital Signs:   Temp:  [98.6 °F (37 °C)] 98.6 °F (37 °C)  Heart  Rate:  [112-116] 112  Resp:  [18] 18  BP: (114-129)/(68-75) 114/75       Physical Exam   Appears fatigued, in bed  MM dry  Tachycardic, regular  Breath sounds slightly diminished bilaterally, no rhonchi, rales or wheezes  Abdomen soft   equal, no drift or asterixis  Lifts each leg against resistance.  No clonus  Awake, speech soft and brief at times.  Knows year, place [place].  Flat affect  Quarter sized area of induration behind right ear with no fluctuance, warmth or significant erythema    Result Review:  I have personally reviewed the results from the time of this admission to 1/25/2025 17:38 EST and agree with these findings:  [x]  Laboratory list / accordion  []  Microbiology  [x]  Radiology  [x]  EKG/Telemetry   []  Cardiology/Vascular   []  Pathology  [x]  Old records  []  Other:  Most notable findings include:       LAB RESULTS:      Lab 01/25/25  1510 01/24/25  1832   WBC 8.48 2.93*   HEMOGLOBIN 11.1* 11.6*   HEMATOCRIT 33.0* 36.1*   PLATELETS 110* 123*   NEUTROS ABS 6.59 1.83   IMMATURE GRANS (ABS) 0.05 0.01   LYMPHS ABS 1.13 0.82   MONOS ABS 0.69 0.19   EOS ABS 0.01 0.08   MCV 89.9 91.4   PROCALCITONIN 7.37*  --    LACTATE 2.7*  --    D DIMER QUANT  --  0.50         Lab 01/25/25  1510 01/24/25  1832   SODIUM 137 139   POTASSIUM 3.7 3.8   CHLORIDE 102 102   CO2 19.0* 23.2   ANION GAP 16.0* 13.8   BUN 15 10   CREATININE 1.14 0.92   EGFR 74.1 95.8   GLUCOSE 277* 174*   CALCIUM 8.8 8.7   MAGNESIUM 1.6  --          Lab 01/25/25  1510 01/24/25  1832   TOTAL PROTEIN 7.0 7.2   ALBUMIN 3.9 3.8   GLOBULIN 3.1 3.4   ALT (SGPT) 136* 40   AST (SGOT) 229* 100*   BILIRUBIN 3.5* 0.8   ALK PHOS 379* 203*   LIPASE  --  34         Lab 01/25/25  1626 01/25/25  1510 01/24/25  1942 01/24/25  1832   PROBNP  --   --   --  489.0   HSTROP T 29* 31* 19 21                 UA          2/1/2024    00:33 1/10/2025    15:41 1/25/2025    14:56   Urinalysis   Squamous Epithelial Cells, UA None Seen  0-2  0-2    Specific [place], UA  1.044  1.020  1.018    Ketones, UA 15 mg/dL (1+)  Negative  Negative    Blood, UA Small (1+)  Negative  Negative    Leukocytes, UA Negative  Negative  Negative    Nitrite, UA Negative  Negative  Negative    RBC, UA 0-2  None Seen  3-5    WBC, UA 0-2  0-2  0-2    Bacteria, UA None Seen  None Seen  None Seen        Microbiology Results (last 10 days)       Procedure Component Value - Date/Time    Respiratory Panel PCR w/COVID-19(SARS-CoV-2) [facility] In-House, NP Swab in UTM/VTM, 2 HR TAT - Swab, Nasopharynx [[id number]]  (Normal) Collected: 01/25/25 1506    Lab Status: Final result Specimen: Swab from Nasopharynx Updated: 01/25/25 1615     ADENOVIRUS, PCR Not Detected     Coronavirus 229E Not Detected     Coronavirus HKU1 Not Detected     Coronavirus NL63 Not Detected     Coronavirus OC43 Not Detected     COVID19 Not Detected     Human Metapneumovirus Not Detected     Human Rhinovirus/Enterovirus Not Detected     Influenza A PCR Not Detected     Influenza B PCR Not Detected     Parainfluenza Virus 1 Not Detected     Parainfluenza Virus 2 Not Detected     Parainfluenza Virus 3 Not Detected     Parainfluenza Virus 4 Not Detected     RSV, PCR Not Detected     Bordetella pertussis pcr Not Detected     Bordetella parapertussis PCR Not Detected     Chlamydophila pneumoniae PCR Not Detected     Mycoplasma pneumo by PCR Not Detected    Narrative:      In the setting of a positive respiratory panel with a viral infection PLUS a negative procalcitonin without other underlying concern for bacterial infection, consider observing off antibiotics or discontinuation of antibiotics and continue supportive care. If the respiratory panel is positive for atypical bacterial infection (Bordetella pertussis, Chlamydophila pneumoniae, or Mycoplasma pneumoniae), consider antibiotic de-escalation to target atypical bacterial infection.            CT Chest Without Contrast Diagnostic    Result Date: 1/25/2025  CT CHEST WO  CONTRAST DIAGNOSTIC Date of Exam: 1/25/2025 3:53 PM EST Indication: ams. Comparison: None available. Technique: Axial CT images were obtained of the chest without contrast administration.  Reconstructed coronal and sagittal images were also obtained. Automated exposure control and iterative construction methods were used. FINDINGS: Scattered atelectasis is noted. No well-defined consolidations or pleural effusions are observed. Granulomas are noted in the left lower lobe. No suspicious pulmonary nodules or abnormal pulmonary masses are seen. No significant hilar, mediastinal, or axillary lymphadenopathy is observed. Calcified left hilar lymph nodes are noted. A normal aortic arch branching pattern is identified. Coronary artery calcifications are identified. The thyroid gland is unremarkable. The esophagus is unremarkable. The limited evaluation of the upper abdomen demonstrates no evidence for acute abnormality. There is evidence for diffuse hepatic fatty infiltration with associated hepatosplenomegaly. No acute osseous abnormalities are observed.     Impression: 1.No evidence for acute intrathoracic abnormality. 2.Coronary artery calcifications are noted. Electronically Signed: Andrew Tillman MD  1/25/2025 4:16 PM EST  Workstation ID: [id number]    CT Abdomen Pelvis Without Contrast    Result Date: 1/25/2025  CT ABDOMEN PELVIS WO CONTRAST Date of Exam: 1/25/2025 3:53 PM EST Indication: ams. Comparison: None available. Technique: Axial CT images were obtained of the abdomen and pelvis without the administration of contrast. Reconstructed coronal and sagittal images were also obtained. Automated exposure control and iterative construction methods were used. FINDINGS: Lung bases: Calcified left hilar lymph nodes. Left lower lobe calcified granulomata. Atheromatous disease of the coronary vessels. Liver: Geographic areas of decreased attenuation within the liver suggesting hepatic steatosis. Spleen: Splenic granulomata  Pancreas:No pancreatic masses. No evidence of pancreatitis. Gallbladder and common bile duct: Previous cholecystectomy. Adrenal glands:No adrenal masses Kidneys and ureters:No kidney stones. No renal masses.No calculi present within the ureters. Normal caliber ureters. Urinary bladder:No urinary bladder wall thickening. No bladder masses. Small bowel:Normal caliber small bowel. Large bowel:No diverticulosis or diverticulitis. No large bowel masses are appreciated Appendix: Not seen however there is no evidence of appendicitis GENITOURINARY: Normal prostate Ascites or pneumoperitoneum:None. Adenopathy:None present Osseous structures: The proximal femurs are intact. The pubic bones are intact. The sacrum and sacroiliac joints are normal. The spinous and transverse processes are intact. No rib fractures. Other findings: Fat-containing umbilical hernias.     Impression: 1.No acute findings in the abdomen or pelvis. 2.Hepatic steatosis. 3.Fat-containing umbilical hernias. Electronically Signed: Stefano Garcia MD  1/25/2025 4:11 PM EST  Workstation ID: [id number]    CT Head Without Contrast    Result Date: 1/25/2025  CT HEAD WO CONTRAST Date of Exam: 1/25/2025 3:53 PM EST Indication: ams. Comparison: 12/15/2014 Technique: Axial CT images were obtained of the head without contrast administration.  Automated exposure control and iterative construction methods were used. Findings: Gray-white matter differentiation is maintained without evidence of an acute infarction. No intracranial mass or mass effect. No extra-axial mass or collection. The ventricles and sulci are normal in size and configuration. The posterior fossa appears normal. Sellar and suprasellar structures are normal. Orbital and paranasal soft tissues are normal. Opacification of the left maxillary sinus with mucoperiosteal reactive changes consistent with chronic sinusitis. The bony calvarium appears intact. No acute fractures. No lytic or blastic bony diseases.      Impression: Impression: No acute intracranial pathology. Electronically Signed: Stefano Garcia MD  1/25/2025 4:07 PM EST  Workstation ID: [id number]    XR Chest 1 View    Result Date: 1/24/2025  XR CHEST 1 VW Date of Exam: 1/24/2025 6:38 PM EST Indication: Chest Pain Triage Protocol Comparison: 1/10/2025 Findings: 3 cm lordotic projection. Heart size is at the upper limits of normal. Pulmonary vessels are normal. Lungs are clear. No pleural effusion. No pneumothorax.     Impression: Impression: 1. No acute cardiopulmonary disease. Electronically Signed: Elijah Kraft MD  1/24/2025 7:09 PM EST  Workstation ID: [id number]         Assessment & Plan   Assessment & Plan       Elevated LFTs      Generalized weakness  Unintentional weight loss  - progressive weakness over three months  - PT/OT  - nutrition consult  - check b12/folate levels  - IV thiamine  - consider Neurology consultation    Elevated LFTs  Hemachromatosis  Thrombocytopenia  - normal bili on yesterday, bilirubin 3.5 today  - prior cholecystectomy 2/2024  - MRI abdomen 12/18/14 showed mild iron deposition in the liver with suggestion of hepatic steatosis.  Mild splenomegaly also noted.  - elevated lactate and procalcitonin -- will obtain blood cultures, start empiric rocephin  - IV fluids  - check liver ultrasound for retained stone  - iron studies am  - check INR and ammonia levels  - check echo given diagnosis of hemachromatosis  - GI consult am    GERD  Colonic inflammation  - EGD and colonoscopy 1/23/25 showed upper: erythematous stomach mucosa and lower: congested mucosa with ulcerations on the ileocecal valve  - protonix    DMII  HTN  - patient states he was taken off his diabetes and antihypertensive medications one month ago by his PCP  - check A1c  - SSI    Depression and anxiety  - continue citalopram  - father concerned patient recently took oxycodone from his mother's home supply.  Patient says he had one dose earlier this week.  - check  UDS      VTE Prophylaxis:  mechanical for now given thrombocytopenia and erythemetous gastric mucosa on recent EGD  Pharmacologic VTE prophylaxis orders are present.          CODE STATUS:    There are no questions and answers to display.       Expected Discharge   Expected discharge date/ time has not been documented.     Christiano Singh MD  01/25/25       Electronically signed by Christiano Singh MD at 01/25/25 1940          Emergency Department Notes        Saritha Mcqueen, RN at 01/25/25 1740           Malcolm Costa    Nursing Report ED to Floor:  Mental status: A&OX3  Ambulatory status: BED BOUND  Oxygen Therapy:  RA  Cardiac Rhythm: SINUS TACHY  Admitted from: ED  Safety Concerns:  FALL RIDK  Social Issues: N/A  ED Room #:  8    ED Nurse Phone Extension - [phone] or may call [phone].      HPI:   Chief Complaint   Patient presents with    Altered Mental Status       Past Medical History:  Past Medical History:   Diagnosis Date    Allergic rhinitis     Anxiety     Dermatitis 8/2/2016    Diabetes mellitus     Diverticulitis     Gastroesophageal reflux disease 7/11/2016    GERD (gastroesophageal reflux disease)     History of alcohol abuse     Hypertension     Insomnia 7/11/2016    Visual impairment 7/11/2016    Vitamin D deficiency 7/11/2016        Past Surgical History:  Past Surgical History:   Procedure Laterality Date    APPENDECTOMY  1995    CHOLECYSTECTOMY WITH INTRAOPERATIVE CHOLANGIOGRAM N/A 2/1/2024    Procedure: CHOLECYSTECTOMY LAPAROSCOPIC WITH INTRAOPERATIVE CHOLANGIOGRAM, UMBILICAL HERNIA REPAIR;  Surgeon: Adam Ramos MD;  Location: [facility] OR;  Service: General;  Laterality: N/A;    ENDOSCOPY N/A 2/2/2024    Procedure: ESOPHAGOGASTRODUODENOSCOPY;  Surgeon: Dominik Chase MD;  Location:  [facility];  Service: Gastroenterology;  Laterality: N/A;    ERCP N/A 2/2/2024    Procedure: ENDOSCOPIC RETROGRADE CHOLANGIOPANCREATOGRAPHY;  Surgeon: Dominik Chase MD;  Location: [facility] ENDOSCOPY;  Service:  "Gastroenterology;  Laterality: N/A;  Sphincterotomy made to common bile duct (CBD) ampulla. CBD swept with 9-12 mm balloon. ERCP scope removed with plastic cap intact.    HERNIA REPAIR  2010    TONSILLECTOMY  1974        Admitting Doctor:   No admitting provider for patient encounter.    Consulting Provider(s):  Consults       Date and Time Order Name Status Description    1/25/2025  5:26 PM Inpatient Gastroenterology Consult               Admitting Diagnosis:   The encounter diagnosis was Weakness.    Most Recent Vitals:   Vitals:    01/25/25 1426 01/25/25 1530 01/25/25 1630   BP: 129/75 122/68 114/75   BP Location: Right arm     Patient Position: Lying     Pulse: 116 112 112   Resp: 18     Temp: 98.6 °F (37 °C)     TempSrc: Oral     SpO2: 96% 95% 95%   Weight: 76 kg (167 lb 9.6 oz)     Height: 160 cm (63\")         Active LDAs/IV Access:   Lines, Drains & Airways       Active LDAs       Name Placement date Placement time Site Days    Peripheral IV 01/25/25 1400 Left;Posterior Hand 01/25/25  1400  Hand  less than 1                    Labs (abnormal labs have a star):   Labs Reviewed   COMPREHENSIVE METABOLIC PANEL - Abnormal; Notable for the following components:       Result Value    Glucose 277 (*)     CO2 19.0 (*)     ALT (SGPT) 136 (*)     AST (SGOT) 229 (*)     Alkaline Phosphatase 379 (*)     Total Bilirubin 3.5 (*)     Anion Gap 16.0 (*)     All other components within normal limits    Narrative:     GFR Categories in Chronic Kidney Disease (CKD)      GFR Category          GFR (mL/min/1.73)    Interpretation  G1                     90 or greater         Normal or high (1)  G2                      60-89                Mild decrease (1)  G3a                   45-59                Mild to moderate decrease  G3b                   30-44                Moderate to severe decrease  G4                    15-29                Severe decrease  G5                    14 or less           Kidney failure          (1)In the " absence of evidence of kidney disease, neither GFR category G1 or G2 fulfill the criteria for CKD.    eGFR calculation 2021 CKD-EPI creatinine equation, which does not include race as a factor   TROPONIN - Abnormal; Notable for the following components:    HS Troponin T 31 (*)     All other components within normal limits    Narrative:     High Sensitive Troponin T Reference Range:  <14.0 ng/L- Negative Female for AMI  <22.0 ng/L- Negative Male for AMI  >=14 - Abnormal Female indicating possible myocardial injury.  >=22 - Abnormal Male indicating possible myocardial injury.   Clinicians would have to utilize clinical acumen, EKG, Troponin, and serial changes to determine if it is an Acute Myocardial Infarction or myocardial injury due to an underlying chronic condition.        URINALYSIS W/ MICROSCOPIC IF INDICATED (NO CULTURE) - Abnormal; Notable for the following components:    Color, UA Dark Yellow (*)     Appearance, UA Cloudy (*)     Glucose,  mg/dL (1+) (*)     Bilirubin, UA Moderate (2+) (*)     Protein, UA >=300 mg/dL (3+) (*)     Urobilinogen, UA 2.0 E.U./dL (*)     All other components within normal limits   CBC WITH AUTO DIFFERENTIAL - Abnormal; Notable for the following components:    RBC 3.67 (*)     Hemoglobin 11.1 (*)     Hematocrit 33.0 (*)     RDW 16.5 (*)     RDW-SD 54.4 (*)     Platelets 110 (*)     Neutrophil % 77.8 (*)     Lymphocyte % 13.3 (*)     Eosinophil % 0.1 (*)     Immature Grans % 0.6 (*)     All other components within normal limits   LACTIC ACID, PLASMA - Abnormal; Notable for the following components:    Lactate 2.7 (*)     All other components within normal limits   PROCALCITONIN - Abnormal; Notable for the following components:    Procalcitonin 7.37 (*)     All other components within normal limits    Narrative:     As a Marker for Sepsis (Non-Neonates):    1. <0.5 ng/mL represents a low risk of severe sepsis and/or septic shock.  2. >2 ng/mL represents a high risk of severe  "sepsis and/or septic shock.    As a Marker for Lower Respiratory Tract Infections that require antibiotic therapy:    PCT on Admission    Antibiotic Therapy       6-12 Hrs later    >0.5                Strongly Recommended  >0.25 - <0.5        Recommended   0.1 - 0.25          Discouraged              Remeasure/reassess PCT  <0.1                Strongly Discouraged     Remeasure/reassess PCT    As 28 day mortality risk marker: \"Change in Procalcitonin Result\" (>80% or <=80%) if Day 0 (or Day 1) and Day 4 values are available. Refer to http://[email or web address]-pct-[email or web address]    Change in PCT <=80%  A decrease of PCT levels below or equal to 80% defines a positive change in PCT test result representing a higher risk for 28-day all-cause mortality of patients diagnosed with severe sepsis for septic shock.    Change in PCT >80%  A decrease of PCT levels of more than 80% defines a negative change in PCT result representing a lower risk for 28-day all-cause mortality of patients diagnosed with severe sepsis or septic shock.      URINALYSIS, MICROSCOPIC ONLY - Abnormal; Notable for the following components:    RBC, UA 3-5 (*)     All other components within normal limits   HIGH SENSITIVITIY TROPONIN T 1HR - Abnormal; Notable for the following components:    HS Troponin T 29 (*)     All other components within normal limits    Narrative:     High Sensitive Troponin T Reference Range:  <14.0 ng/L- Negative Female for AMI  <22.0 ng/L- Negative Male for AMI  >=14 - Abnormal Female indicating possible myocardial injury.  >=22 - Abnormal Male indicating possible myocardial injury.   Clinicians would have to utilize clinical acumen, EKG, Troponin, and serial changes to determine if it is an Acute Myocardial Infarction or myocardial injury due to an underlying chronic condition.        URINE DRUG SCREEN - Abnormal; Notable for the following components:    Opiate Screen Positive (*)     Oxycodone Screen, Urine Positive (*)     All " other components within normal limits    Narrative:     Cutoff For Drugs Screened:    Amphetamines               500 ng/ml  Barbiturates               200 ng/ml  Benzodiazepines            150 ng/ml  Cocaine                    150 ng/ml  Methadone                  200 ng/ml  Opiates                    100 ng/ml  Phencyclidine               25 ng/ml  THC                         50 ng/ml  Methamphetamine            500 ng/ml  Tricyclic Antidepressants  300 ng/ml  Oxycodone                  100 ng/ml  Buprenorphine               10 ng/ml    The normal value for all drugs tested is negative. This report includes unconfirmed screening results, with the cutoff values listed, to be used for medical treatment purposes only.  Unconfirmed results must not be used for non-medical purposes such as employment or legal testing.  Clinical consideration should be applied to any drug of abuse test, particularly when unconfirmed results are used.     RESPIRATORY PANEL PCR W/ COVID-19 (SARS-COV-2), NP SWAB IN UTM/VTP, 2 HR TAT - Normal    Narrative:     In the setting of a positive respiratory panel with a viral infection PLUS a negative procalcitonin without other underlying concern for bacterial infection, consider observing off antibiotics or discontinuation of antibiotics and continue supportive care. If the respiratory panel is positive for atypical bacterial infection (Bordetella pertussis, Chlamydophila pneumoniae, or Mycoplasma pneumoniae), consider antibiotic de-escalation to target atypical bacterial infection.   MAGNESIUM - Normal   ETHANOL - Normal    Narrative:     Elevated lactic acid concentration and lactate dehydrogenase(LD) activity may falsely elevate enzymatically determined ethanol levels. Not for legal purposes.    BLOOD CULTURE   BLOOD CULTURE   RAINBOW DRAW    Narrative:     The following orders were created for panel order [employer] Draw.  Procedure                               Abnormality         Status                      ---------                               -----------         ------                     Green Top (Gel)[[id number]]                                  Final result               Lavender Top[[id number]]                                     Final result               Gold Top - SST[[id number]]                                   Final result               Garcia Top[[id number]]                                         Final result               Light Blue Top[[id number]]                                   Final result                 Please view results for these tests on the individual orders.   LACTIC ACID, REFLEX   FENTANYL, URINE   CBC AND DIFFERENTIAL    Narrative:     The following orders were created for panel order CBC & Differential.  Procedure                               Abnormality         Status                     ---------                               -----------         ------                     CBC Auto Differential[[id number]]        Abnormal            Final result               Scan Slide[[id number]]                                                                    Please view results for these tests on the individual orders.   GREEN TOP   LAVENDER TOP   GOLD TOP - SST   GRAY TOP   LIGHT BLUE TOP       Meds Given in ED:   Medications   sodium chloride 0.9 % flush 10 mL (has no administration in time range)   sodium chloride 0.9 % bolus 1,000 mL (1,000 mL Intravenous New Bag 1/25/25 1708)   sodium chloride 0.9 % flush 10 mL (has no administration in time range)   sodium chloride 0.9 % flush 10 mL (has no administration in time range)   sodium chloride 0.9 % infusion 40 mL (has no administration in time range)   heparin (porcine) 5000 UNIT/ML injection 5,000 Units (has no administration in time range)   cefTRIAXone (ROCEPHIN) 2,000 mg in sodium chloride 0.9 % 100 mL MBP (has no administration in time range)   sennosides-docusate (PERICOLACE) 8.6-50 MG per tablet 2 tablet (has no administration in  time range)     And   polyethylene glycol (MIRALAX) packet 17 g (has no administration in time range)     And   bisacodyl (DULCOLAX) EC tablet 5 mg (has no administration in time range)     And   bisacodyl (DULCOLAX) suppository 10 mg (has no administration in time range)   lactated ringers infusion (has no administration in time range)   thiamine (B-1) injection 200 mg (has no administration in time range)     lactated ringers, 100 mL/hr         Last NIH score:                                                          Dysphagia screening results:        [place] Coma Scale:  No data recorded     CIWA:        Restraint Type:            Isolation Status:  No active isolations          Electronically signed by Saritha Mcqueen RN at 01/25/25 1743       Mathew Beach Jr., PA-C at 01/25/25 1518          Subjective  History of Present Illness:    Chief Complaint: Weakness, fatigue  History of Present Illness: 59-year-old male presents with weakness and fatigue, he arrived via EMS, history provided by his father.  History father reports that he was recently diagnosed with hemochromatosis, I reviewed notes from Dr. Myers with oncology dating back to 11/25/2024.  Patient has also had multiple labs and radiology studies performed including an EGD in February of last year, gallbladder removal last year, previous elevated liver enzymes.  Dr. Myers reports in his initial consultation that the patient does have hemochromatosis typically would present with elevated iron and iron saturation.  He felt that the hemochromatosis can have variable penetrance, his particular ferritin was high most recently in August with low iron and low iron saturation and a low total iron binding capacity.  Patient's father reports that he is increasingly more fatigued to the point that he cannot take care of himself.  He states he said multiple ED visits, and continues to decline  Onset: Gradual  Duration: Several months  Exacerbating /  Alleviating factors: Recently diagnosed with hemochromatosis  Associated symptoms: Physical debility      Nurses Notes reviewed and agree, including vitals, allergies, social history and prior medical history.     REVIEW OF SYSTEMS: All systems reviewed and not pertinent unless noted.    Review of Systems   Neurological:  Positive for weakness.   All other systems reviewed and are negative.      Past Medical History:   Diagnosis Date    Allergic rhinitis     Anxiety     Dermatitis 8/2/2016    Diabetes mellitus     Diverticulitis     Gastroesophageal reflux disease 7/11/2016    GERD (gastroesophageal reflux disease)     History of alcohol abuse     Hypertension     Insomnia 7/11/2016    Visual impairment 7/11/2016    Vitamin D deficiency 7/11/2016       Allergies:    Shellfish allergy, Shellfish-derived products, and Codeine      Past Surgical History:   Procedure Laterality Date    APPENDECTOMY  1995    CHOLECYSTECTOMY WITH INTRAOPERATIVE CHOLANGIOGRAM N/A 2/1/2024    Procedure: CHOLECYSTECTOMY LAPAROSCOPIC WITH INTRAOPERATIVE CHOLANGIOGRAM, UMBILICAL HERNIA REPAIR;  Surgeon: Adam Ramos MD;  Location: [facility] OR;  Service: General;  Laterality: N/A;    ENDOSCOPY N/A 2/2/2024    Procedure: ESOPHAGOGASTRODUODENOSCOPY;  Surgeon: Dominik Chase MD;  Location:  [facility];  Service: Gastroenterology;  Laterality: N/A;    ERCP N/A 2/2/2024    Procedure: ENDOSCOPIC RETROGRADE CHOLANGIOPANCREATOGRAPHY;  Surgeon: Dominik Chase MD;  Location:  [facility];  Service: Gastroenterology;  Laterality: N/A;  Sphincterotomy made to common bile duct (CBD) ampulla. CBD swept with 9-12 mm balloon. ERCP scope removed with plastic cap intact.    HERNIA REPAIR  2010    TONSILLECTOMY  1974         Social History     Socioeconomic History    Marital status: Single   Tobacco Use    Smoking status: Never     Passive exposure: Never    Smokeless tobacco: Never    Tobacco comments:     Pt was diagnosed hemochromatosis March 2024  "  Vaping Use    Vaping status: Never Used   Substance and Sexual Activity    Alcohol use: Not Currently     Comment: sober since 2015    Drug use: No    Sexual activity: Defer         Family History   Problem Relation Age of Onset    Hypertension Mother     Multiple myeloma Mother     Hypertension Father     Alcohol abuse Father     Hypertension Maternal Grandmother     Hypertension Maternal Grandfather     Diabetes Paternal Grandmother     Hypertension Paternal Grandmother     Hypertension Paternal Grandfather     Thyroid disease Other        Objective  Physical Exam:  /75   Pulse 112   Temp 98.6 °F (37 °C) (Oral)   Resp 18   Ht 160 cm (63\")   Wt 76 kg (167 lb 9.6 oz)   SpO2 95%   BMI 29.69 kg/m²      Physical Exam  Vitals and nursing note reviewed.   Constitutional:       Comments: Lethargic   HENT:      Head: Normocephalic and atraumatic.      Mouth/Throat:      Mouth: Mucous membranes are moist.   Eyes:      Extraocular Movements: Extraocular movements intact.   Cardiovascular:      Rate and Rhythm: Normal rate and regular rhythm.   Pulmonary:      Effort: Pulmonary effort is normal.      Breath sounds: Normal breath sounds.   Abdominal:      Palpations: Abdomen is soft.   Musculoskeletal:         General: Normal range of motion.      Cervical back: Normal range of motion.   Skin:     General: Skin is warm and dry.   Neurological:      Mental Status: He is oriented to person, place, and time.      GCS: GCS eye subscore is 4. GCS verbal subscore is 5. GCS motor subscore is 6.   Psychiatric:         Behavior: Behavior normal.         Thought Content: Thought content normal.         Judgment: Judgment normal.           Procedures    ED Course:    CT chest interpretation by me: No acute pneumonia    ECTabdomen/pelvis interpretation by me: No acute intra-abdominal abnormality no free air or bowel obstruction    ED Course as of 01/25/25 1653   Sat Jan 25, 2025   1875 I updated the patient/family  on all " pending labs and lab results, and all pending radiology and  results and answered all questions.   [CS]   1615 Review of previous  non ED visits, prior labs, prior imaging, available notes from prior evaluations or visits with specialists, medication list, allergies, past medical history, past surgical history  Review of recent office visit from Dr. Myers on November 2024 [CS]   1615 I Personally reviewed all laboratory studies performed in the emergency department     Elevations in liver enzymes, lactic and procalcitonin, awaiting CT scan results [CS]   1615 Procalcitonin(!): 7.37 [CS]   1615 Lactate(!!): 2.7 [CS]   1615 ALT (SGPT)(!): 136 [CS]   1615 AST (SGOT)(!): 229 [CS]   1615 Alkaline Phosphatase(!): 379 [CS]   1615 Total Bilirubin(!): 3.5 [CS]   1616 EtOH, and urine drug screen at [CS]   1642 Message sent to the hospitalist for admission, patient accepted for admission [CS]      ED Course User Index  [CS] Mathew Beach Jr., [place]       Lab Results (last 24 hours)       Procedure Component Value Units Date/Time    High Sensitivity Troponin T [[id number]]  (Normal) Collected: 01/24/25 1832    Specimen: Blood Updated: 01/24/25 1854     HS Troponin T 21 ng/L     CBC & Differential [[id number]]  (Abnormal) Collected: 01/24/25 1832    Specimen: Blood Updated: 01/24/25 1839    Narrative:      The following orders were created for panel order CBC & Differential.  Procedure                               Abnormality         Status                     ---------                               -----------         ------                     CBC Auto Differential[[id number]]        Abnormal            Final result                 Please view results for these tests on the individual orders.    Comprehensive Metabolic Panel [[id number]]  (Abnormal) Collected: 01/24/25 1832    Specimen: Blood Updated: 01/24/25 1857     Glucose 174 mg/dL      BUN 10 mg/dL      Creatinine 0.92 mg/dL      Sodium 139 mmol/L      Potassium 3.8  mmol/L      Chloride 102 mmol/L      CO2 23.2 mmol/L      Calcium 8.7 mg/dL      Total Protein 7.2 g/dL      Albumin 3.8 g/dL      ALT (SGPT) 40 U/L      AST (SGOT) 100 U/L      Alkaline Phosphatase 203 U/L      Total Bilirubin 0.8 mg/dL      Globulin 3.4 gm/dL      A/G Ratio 1.1 g/dL      BUN/Creatinine Ratio 10.9     Anion Gap 13.8 mmol/L      eGFR 95.8 mL/min/1.73     Narrative:      GFR Categories in Chronic Kidney Disease (CKD)      GFR Category          GFR (mL/min/1.73)    Interpretation  G1                     90 or greater         Normal or high (1)  G2                      60-89                Mild decrease (1)  G3a                   45-59                Mild to moderate decrease  G3b                   30-44                Moderate to severe decrease  G4                    15-29                Severe decrease  G5                    14 or less           Kidney failure          (1)In the absence of evidence of kidney disease, neither GFR category G1 or G2 fulfill the criteria for CKD.    eGFR calculation 2021 CKD-EPI creatinine equation, which does not include race as a factor    Lipase [[id number]]  (Normal) Collected: 01/24/25 1832    Specimen: Blood Updated: 01/24/25 1857     Lipase 34 U/L     BNP [[id number]]  (Normal) Collected: 01/24/25 1832    Specimen: Blood Updated: 01/24/25 1855     proBNP 489.0 pg/mL     Narrative:      This assay is used as an aid in the diagnosis of individuals suspected of having heart failure. It can be used as an aid in the diagnosis of acute decompensated heart failure (ADHF) in patients presenting with signs and symptoms of ADHF to the emergency department (ED). In addition, NT-proBNP of <300 pg/mL indicates ADHF is not likely.    Age Range Result Interpretation  NT-proBNP Concentration (pg/mL:      <50             Positive            >450                   Gray                 300-450                    Negative             <300    50-75           Positive            >900     "              Garcia                300-900                  Negative            <300      >75             Positive            >1800                  Gray                300-1800                  Negative            <300    CBC Auto Differential [[id number]]  (Abnormal) Collected: 01/24/25 1832    Specimen: Blood Updated: 01/24/25 1839     WBC 2.93 10*3/mm3      RBC 3.95 10*6/mm3      Hemoglobin 11.6 g/dL      Hematocrit 36.1 %      MCV 91.4 fL      MCH 29.4 pg      MCHC 32.1 g/dL      RDW 16.5 %      RDW-SD 55.4 fl      MPV 10.6 fL      Platelets 123 10*3/mm3      Neutrophil % 62.5 %      Lymphocyte % 28.0 %      Monocyte % 6.5 %      Eosinophil % 2.7 %      Basophil % 0.0 %      Immature Grans % 0.3 %      Neutrophils, Absolute 1.83 10*3/mm3      Lymphocytes, Absolute 0.82 10*3/mm3      Monocytes, Absolute 0.19 10*3/mm3      Eosinophils, Absolute 0.08 10*3/mm3      Basophils, Absolute 0.00 10*3/mm3      Immature Grans, Absolute 0.01 10*3/mm3     D-dimer, Quantitative [[id number]]  (Normal) Collected: 01/24/25 1832    Specimen: Blood Updated: 01/24/25 1852     D-Dimer, Quantitative 0.50 MCGFEU/mL     Narrative:      According to the assay 's published package insert, a normal (<0.50 MCGFEU/mL) D-dimer result in conjunction with a non-high clinical probability assessment, excludes deep vein thrombosis (DVT) and pulmonary embolism (PE) with high sensitivity.    D-dimer values increase with age and this can make VTE exclusion of an older population difficult. To address this, the [employer], based on best available evidence and recent guidelines, recommends that clinicians use age-adjusted D-dimer thresholds in patients greater than 50 years of age with: a) a low probability of PE who do not meet all Pulmonary Embolism Rule Out Criteria, or b) in those with intermediate probability of PE.   The formula for an age-adjusted D-dimer cut-off is \"age/100\".  For example, a 60 year old patient " would have an age-adjusted cut-off of 0.60 MCGFEU/mL and an 80 year old 0.80 MCGFEU/mL.    High Sensitivity Troponin T 1Hr [[id number]]  (Normal) Collected: 01/24/25 1942    Specimen: Blood Updated: 01/24/25 2003     HS Troponin T 19 ng/L      Troponin T Numeric Delta -2 ng/L     Urinalysis With Microscopic If Indicated (No Culture) - Urine, Clean Catch [[id number]]  (Abnormal) Collected: 01/25/25 1456    Specimen: Urine, Clean Catch Updated: 01/25/25 1510     Color, UA Dark Yellow     Appearance, UA Cloudy     pH, UA 6.0     Specific Gravity, UA 1.018     Glucose,  mg/dL (1+)     Ketones, UA Negative     Bilirubin, UA Moderate (2+)     Blood, UA Negative     Protein, UA >=300 mg/dL (3+)     Leuk Esterase, UA Negative     Nitrite, UA Negative     Urobilinogen, UA 2.0 E.U./dL    Urinalysis, Microscopic Only - Urine, Clean Catch [[id number]]  (Abnormal) Collected: 01/25/25 1456    Specimen: Urine, Clean Catch Updated: 01/25/25 1510     RBC, UA 3-5 /HPF      WBC, UA 0-2 /HPF      Bacteria, UA None Seen /HPF      Squamous Epithelial Cells, UA 0-2 /HPF      Hyaline Casts, UA 0-6 /LPF      Methodology Automated Microscopy    Respiratory Panel PCR w/COVID-19(SARS-CoV-2) [facility] In-House, NP Swab in UTM/VTM, 2 HR TAT - Swab, Nasopharynx [[id number]]  (Normal) Collected: 01/25/25 1506    Specimen: Swab from Nasopharynx Updated: 01/25/25 1615     ADENOVIRUS, PCR Not Detected     Coronavirus 229E Not Detected     Coronavirus HKU1 Not Detected     Coronavirus NL63 Not Detected     Coronavirus OC43 Not Detected     COVID19 Not Detected     Human Metapneumovirus Not Detected     Human Rhinovirus/Enterovirus Not Detected     Influenza A PCR Not Detected     Influenza B PCR Not Detected     Parainfluenza Virus 1 Not Detected     Parainfluenza Virus 2 Not Detected     Parainfluenza Virus 3 Not Detected     Parainfluenza Virus 4 Not Detected     RSV, PCR Not Detected     Bordetella pertussis pcr Not Detected      Bordetella parapertussis PCR Not Detected     Chlamydophila pneumoniae PCR Not Detected     Mycoplasma pneumo by PCR Not Detected    Narrative:      In the setting of a positive respiratory panel with a viral infection PLUS a negative procalcitonin without other underlying concern for bacterial infection, consider observing off antibiotics or discontinuation of antibiotics and continue supportive care. If the respiratory panel is positive for atypical bacterial infection (Bordetella pertussis, Chlamydophila pneumoniae, or Mycoplasma pneumoniae), consider antibiotic de-escalation to target atypical bacterial infection.    CBC & Differential [[id number]]  (Abnormal) Collected: 01/25/25 1510    Specimen: Blood Updated: 01/25/25 1525    Narrative:      The following orders were created for panel order CBC & Differential.  Procedure                               Abnormality         Status                     ---------                               -----------         ------                     CBC Auto Differential[[id number]]        Abnormal            Final result               Scan Slide[[id number]]                                                                    Please view results for these tests on the individual orders.    Comprehensive Metabolic Panel [[id number]]  (Abnormal) Collected: 01/25/25 1510    Specimen: Blood Updated: 01/25/25 1545     Glucose 277 mg/dL      BUN 15 mg/dL      Creatinine 1.14 mg/dL      Sodium 137 mmol/L      Potassium 3.7 mmol/L      Chloride 102 mmol/L      CO2 19.0 mmol/L      Calcium 8.8 mg/dL      Total Protein 7.0 g/dL      Albumin 3.9 g/dL      ALT (SGPT) 136 U/L      AST (SGOT) 229 U/L      Alkaline Phosphatase 379 U/L      Total Bilirubin 3.5 mg/dL      Globulin 3.1 gm/dL      Comment: Calculated Result        A/G Ratio 1.3 g/dL      BUN/Creatinine Ratio 13.2     Anion Gap 16.0 mmol/L      eGFR 74.1 mL/min/1.73     Narrative:      GFR Categories in Chronic Kidney Disease  (CKD)      GFR Category          GFR (mL/min/1.73)    Interpretation  G1                     90 or greater         Normal or high (1)  G2                      60-89                Mild decrease (1)  G3a                   45-59                Mild to moderate decrease  G3b                   30-44                Moderate to severe decrease  G4                    15-29                Severe decrease  G5                    14 or less           Kidney failure          (1)In the absence of evidence of kidney disease, neither GFR category G1 or G2 fulfill the criteria for CKD.    eGFR calculation 2021 CKD-EPI creatinine equation, which does not include race as a factor    High Sensitivity Troponin T [[id number]]  (Abnormal) Collected: 01/25/25 1510    Specimen: Blood Updated: 01/25/25 1539     HS Troponin T 31 ng/L     Narrative:      High Sensitive Troponin T Reference Range:  <14.0 ng/L- Negative Female for AMI  <22.0 ng/L- Negative Male for AMI  >=14 - Abnormal Female indicating possible myocardial injury.  >=22 - Abnormal Male indicating possible myocardial injury.   Clinicians would have to utilize clinical acumen, EKG, Troponin, and serial changes to determine if it is an Acute Myocardial Infarction or myocardial injury due to an underlying chronic condition.         Magnesium [[id number]]  (Normal) Collected: 01/25/25 1510    Specimen: Blood Updated: 01/25/25 1545     Magnesium 1.6 mg/dL     CBC Auto Differential [[id number]]  (Abnormal) Collected: 01/25/25 1510    Specimen: Blood Updated: 01/25/25 1525     WBC 8.48 10*3/mm3      RBC 3.67 10*6/mm3      Hemoglobin 11.1 g/dL      Hematocrit 33.0 %      MCV 89.9 fL      MCH 30.2 pg      MCHC 33.6 g/dL      RDW 16.5 %      RDW-SD 54.4 fl      MPV 11.0 fL      Platelets 110 10*3/mm3      Neutrophil % 77.8 %      Lymphocyte % 13.3 %      Monocyte % 8.1 %      Eosinophil % 0.1 %      Basophil % 0.1 %      Immature Grans % 0.6 %      Neutrophils, Absolute 6.59 10*3/mm3       "Lymphocytes, Absolute 1.13 10*3/mm3      Monocytes, Absolute 0.69 10*3/mm3      Eosinophils, Absolute 0.01 10*3/mm3      Basophils, Absolute 0.01 10*3/mm3      Immature Grans, Absolute 0.05 10*3/mm3      nRBC 0.0 /100 WBC     Lactic Acid, Plasma [[id number]]  (Abnormal) Collected: 01/25/25 1510    Specimen: Blood Updated: 01/25/25 1537     Lactate 2.7 mmol/L      Comment: Falsely depressed results may occur on samples drawn from patients receiving N-Acetylcysteine (NAC) or Metamizole.       Procalcitonin [[id number]]  (Abnormal) Collected: 01/25/25 1510    Specimen: Blood Updated: 01/25/25 1545     Procalcitonin 7.37 ng/mL     Narrative:      As a Marker for Sepsis (Non-Neonates):    1. <0.5 ng/mL represents a low risk of severe sepsis and/or septic shock.  2. >2 ng/mL represents a high risk of severe sepsis and/or septic shock.    As a Marker for Lower Respiratory Tract Infections that require antibiotic therapy:    PCT on Admission    Antibiotic Therapy       6-12 Hrs later    >0.5                Strongly Recommended  >0.25 - <0.5        Recommended   0.1 - 0.25          Discouraged              Remeasure/reassess PCT  <0.1                Strongly Discouraged     Remeasure/reassess PCT    As 28 day mortality risk marker: \"Change in Procalcitonin Result\" (>80% or <=80%) if Day 0 (or Day 1) and Day 4 values are available. Refer to [email or web address]://[email or web address]-pct-[email or web address]    Change in PCT <=80%  A decrease of PCT levels below or equal to 80% defines a positive change in PCT test result representing a higher risk for 28-day all-cause mortality of patients diagnosed with severe sepsis for septic shock.    Change in PCT >80%  A decrease of PCT levels of more than 80% defines a negative change in PCT result representing a lower risk for 28-day all-cause mortality of patients diagnosed with severe sepsis or septic shock.       Ethanol [[id number]]  (Normal) Collected: 01/25/25 1510    Specimen: Blood Updated: 01/25/25 1628     " Ethanol <10 mg/dL     Narrative:      Elevated lactic acid concentration and lactate dehydrogenase(LD) activity may falsely elevate enzymatically determined ethanol levels. Not for legal purposes.     High Sensitivity Troponin T 1Hr [[id number]] Collected: 01/25/25 1626    Specimen: Blood Updated: 01/25/25 1632             CT Chest Without Contrast Diagnostic    Result Date: 1/25/2025  CT CHEST WO CONTRAST DIAGNOSTIC Date of Exam: 1/25/2025 3:53 PM EST Indication: ams. Comparison: None available. Technique: Axial CT images were obtained of the chest without contrast administration.  Reconstructed coronal and sagittal images were also obtained. Automated exposure control and iterative construction methods were used. FINDINGS: Scattered atelectasis is noted. No well-defined consolidations or pleural effusions are observed. Granulomas are noted in the left lower lobe. No suspicious pulmonary nodules or abnormal pulmonary masses are seen. No significant hilar, mediastinal, or axillary lymphadenopathy is observed. Calcified left hilar lymph nodes are noted. A normal aortic arch branching pattern is identified. Coronary artery calcifications are identified. The thyroid gland is unremarkable. The esophagus is unremarkable. The limited evaluation of the upper abdomen demonstrates no evidence for acute abnormality. There is evidence for diffuse hepatic fatty infiltration with associated hepatosplenomegaly. No acute osseous abnormalities are observed.     Impression: 1.No evidence for acute intrathoracic abnormality. 2.Coronary artery calcifications are noted. Electronically Signed: Andrew Tillman MD  1/25/2025 4:16 PM EST  Workstation ID: [id number]    CT Abdomen Pelvis Without Contrast    Result Date: 1/25/2025  CT ABDOMEN PELVIS WO CONTRAST Date of Exam: 1/25/2025 3:53 PM EST Indication: ams. Comparison: None available. Technique: Axial CT images were obtained of the abdomen and pelvis without the administration of contrast.  Reconstructed coronal and sagittal images were also obtained. Automated exposure control and iterative construction methods were used. FINDINGS: Lung bases: Calcified left hilar lymph nodes. Left lower lobe calcified granulomata. Atheromatous disease of the coronary vessels. Liver: Geographic areas of decreased attenuation within the liver suggesting hepatic steatosis. Spleen: Splenic granulomata Pancreas:No pancreatic masses. No evidence of pancreatitis. Gallbladder and common bile duct: Previous cholecystectomy. Adrenal glands:No adrenal masses Kidneys and ureters:No kidney stones. No renal masses.No calculi present within the ureters. Normal caliber ureters. Urinary bladder:No urinary bladder wall thickening. No bladder masses. Small bowel:Normal caliber small bowel. Large bowel:No diverticulosis or diverticulitis. No large bowel masses are appreciated Appendix: Not seen however there is no evidence of appendicitis GENITOURINARY: Normal prostate Ascites or pneumoperitoneum:None. Adenopathy:None present Osseous structures: The proximal femurs are intact. The pubic bones are intact. The sacrum and sacroiliac joints are normal. The spinous and transverse processes are intact. No rib fractures. Other findings: Fat-containing umbilical hernias.     Impression: 1.No acute findings in the abdomen or pelvis. 2.Hepatic steatosis. 3.Fat-containing umbilical hernias. Electronically Signed: Stefano Garcia MD  1/25/2025 4:11 PM EST  Workstation ID: [id number]    CT Head Without Contrast    Result Date: 1/25/2025  CT HEAD WO CONTRAST Date of Exam: 1/25/2025 3:53 PM EST Indication: ams. Comparison: 12/15/2014 Technique: Axial CT images were obtained of the head without contrast administration.  Automated exposure control and iterative construction methods were used. Findings: Gray-white matter differentiation is maintained without evidence of an acute infarction. No intracranial mass or mass effect. No extra-axial mass or  collection. The ventricles and sulci are normal in size and configuration. The posterior fossa appears normal. Sellar and suprasellar structures are normal. Orbital and paranasal soft tissues are normal. Opacification of the left maxillary sinus with mucoperiosteal reactive changes consistent with chronic sinusitis. The bony calvarium appears intact. No acute fractures. No lytic or blastic bony diseases.     Impression: Impression: No acute intracranial pathology. Electronically Signed: Stefano Garcia MD  1/25/2025 4:07 PM EST  Workstation ID: [id number]    XR Chest 1 View    Result Date: 1/24/2025  XR CHEST 1 VW Date of Exam: 1/24/2025 6:38 PM EST Indication: Chest Pain Triage Protocol Comparison: 1/10/2025 Findings: 3 cm lordotic projection. Heart size is at the upper limits of normal. Pulmonary vessels are normal. Lungs are clear. No pleural effusion. No pneumothorax.     Impression: Impression: 1. No acute cardiopulmonary disease. Electronically Signed: Elijah Kraft MD  1/24/2025 7:09 PM EST  Workstation ID: [id number]                     HEART Score: 4      Shared Decision Making  I discussed the findings with the patient/patient representative who is in agreement with the treatment plan and the final disposition.  Risks and benefits of discharge and/or observation/admission were discussed: Yes                                            Medical Decision Making  Patient Presentation 59-year-old male presented with severe weakness, and confusion    DDX electrolyte abnormality, acute kidney insufficiency, dehydration, sepsis, bacteremia, cranial mass, intracranial bleed,    Data Review/ Non ED Records /Analysis/Ordering unique tests  Review of previous  non ED visits, prior labs, prior imaging, available notes from prior evaluations or visits with specialists, medication list, allergies, past medical history, past surgical history        Independent Review Studies  I Personally reviewed all laboratory studies  performed in the emergency department     Intervention/Re-evaluation numbers included fluids, reevaluation patient remained stable    Independent Clinician discussed the case with the on-call hospitalist accepted for admission    Risk Stratification tools/clinical decision rules patient presented with weakness, altered mental status or confusion, recently diagnosed with hemochromatosis, was concerned about infection such as a viral illness, pneumonia, bacteremia, urinary tract infection, labs were drawn as well as CT scan of the head chest and abdomen.  He has significant history for hemochromatosis.  He had elevations in his lactic acid and procalcitonin but no obvious source of infection, these results could be from an inflammatory process, based on the patient's presentation and pre-existing medical conditions, he was admitted for further care due to worsening weakness and physical debilitation    Shared Decision Making discussed all findings with patient and family they were agreeable    Disposition stable for admission    Problems Addressed:  Weakness: complicated acute illness or injury    Amount and/or Complexity of Data Reviewed  External Data Reviewed: labs, radiology and notes.  Labs: ordered. Decision-making details documented in ED Course.  Radiology: ordered and independent interpretation performed. Decision-making details documented in ED Course.  ECG/medicine tests: ordered.    Risk  Prescription drug management.  Decision regarding hospitalization.          Final diagnoses:   Weakness           Disposition admission       Mathew Beach Jr., PA-C  01/25/25 1653      Electronically signed by Mathew Beach Jr., PA-C at 01/25/25 1653       Vital Signs (last 2 days)       Date/Time Temp Temp src Pulse Resp BP Patient Position SpO2    01/27/25 0818 -- -- 50 -- -- -- 93    01/27/25 0749 97.7 (36.5) Oral 57 18 153/79 Lying 96    01/27/25 0739 -- -- 57 -- -- -- 95    01/27/25 0500 97.7 (36.5) Oral 53  16 152/83 Lying 98    01/27/25 0050 97.7 (36.5) Oral 61 16 147/81 Lying 97    01/26/25 2100 -- -- 65 -- -- -- 94    01/26/25 2039 97.5 (36.4) Oral 64 16 137/83 Lying 95    01/26/25 1900 -- -- 69 -- -- -- 96    01/26/25 1800 -- -- 72 -- -- -- 97    01/26/25 1700 -- -- 71 -- -- -- 96    01/26/25 1600 -- -- 73 -- -- -- 93    01/26/25 1551 98 (36.7) Oral 69 20 149/75 Lying 95    01/26/25 1500 -- -- 71 -- -- -- 93    01/26/25 1400 -- -- 69 -- -- -- 95    01/26/25 1343 -- -- 73 18 143/80 Lying 95    01/26/25 1325 -- -- 71 -- -- -- 92    01/26/25 1320 98.9 (37.2) -- 75 20 121/62 -- 92    01/26/25 1315 98.9 (37.2) -- 71 21 112/67 -- 92    01/26/25 1310 99 (37.2) -- 75 20 111/68 -- 93    01/26/25 1305 99 (37.2) -- 73 21 106/63 -- 92    01/26/25 1300 99 (37.2) -- 76 21 108/66 -- 93    01/26/25 1255 97.6 (36.4) -- 79 21 111/59 -- 93    01/26/25 1250 97.6 (36.4) -- 83 21 110/60 -- 94    01/26/25 1245 97.3 (36.3) -- 80 21 107/55 -- 92    01/26/25 1238 97.3 (36.3) Temporal 82 15 123/67 Lying 98    01/26/25 1108 -- -- 85 16 137/75 -- 94    01/26/25 1100 -- -- 101 -- -- -- --    01/26/25 1000 -- -- 83 -- -- -- 95    01/26/25 0900 -- -- 82 -- -- -- 93    01/26/25 0800 -- -- 79 -- -- -- 94    01/26/25 0731 98.3 (36.8) Oral 87 16 147/83 Lying 94    01/26/25 0700 -- -- 85 -- -- -- 95    01/26/25 0445 100.1 (37.8)  Oral -- -- -- -- --    Temp: RN notified at 01/26/25 0445    01/26/25 0255 100 (37.8)  Oral 91 18 137/85 Lying --    Temp: RN notified at 01/26/25 0255    01/25/25 2315 98.4 (36.9) Oral 88 18 131/92 Lying --    01/25/25 1910 98.7 (37.1) Oral 90 18 123/66 Lying --    01/25/25 1836 98.1 (36.7) Oral 92 18 129/82 Lying 96    01/25/25 1744 -- -- 94 -- -- -- 94    01/25/25 1730 -- -- 96 -- 135/76 -- 94    01/25/25 1704 -- -- 106 -- -- -- 95    01/25/25 1703 -- -- 107 -- -- -- 94    01/25/25 1630 -- -- 112 -- 114/75 -- 95    01/25/25 1530 -- -- 112 -- 122/68 -- 95    01/25/25 1426 98.6 (37) Oral 116 18 129/75 Lying 96           Oxygen Therapy (last 2 days)       Date/Time SpO2 Device (Oxygen Therapy) Flow (L/min) (Oxygen Therapy) Oxygen Concentration (%) ETCO2 (mmHg)    01/27/25 0818 93 -- -- -- --    01/27/25 0749 96 -- -- -- --    01/27/25 0739 95 room air -- -- --    01/27/25 0600 -- room air -- -- --    01/27/25 0500 98 -- -- -- --    01/27/25 0400 -- room air -- -- --    01/27/25 0200 -- room air -- -- --    01/27/25 0050 97 -- -- -- --    01/27/25 0000 -- room air -- -- --    01/26/25 2200 -- room air -- -- --    01/26/25 2115 -- room air -- -- --    01/26/25 2100 94 -- -- -- --    01/26/25 2039 95 -- -- -- --    01/26/25 2000 -- room air -- -- --    01/26/25 1900 96 -- -- -- --    01/26/25 1800 97 -- -- -- --    01/26/25 1700 96 -- -- -- --    01/26/25 1600 93 room air -- -- --    01/26/25 1551 95 -- -- -- --    01/26/25 1500 93 -- -- -- --    01/26/25 1400 95 room air -- -- --    01/26/25 1343 95 room air -- -- --    01/26/25 1325 92 -- -- -- --    01/26/25 1320 92 room air -- -- --    01/26/25 1315 92 room air -- -- --    01/26/25 1310 93 room air -- -- --    01/26/25 1305 92 room air -- -- --    01/26/25 1300 93 room air -- -- --    01/26/25 1255 93 room air -- -- --    01/26/25 1250 94 room air -- -- --    01/26/25 1245 92 room air -- -- --    01/26/25 1238 98 room air -- -- --    01/26/25 1108 94 room air -- -- --    01/26/25 1000 95 room air -- -- --    01/26/25 0900 93 -- -- -- --    01/26/25 0800 94 room air -- -- --    01/26/25 0731 94 -- -- -- --    01/26/25 0700 95 -- -- -- --    01/26/25 0600 -- room air -- -- --    01/26/25 0400 -- room air -- -- --    01/26/25 0200 -- room air -- -- --    01/26/25 0000 -- room air -- -- --    01/25/25 2200 -- room air -- -- --    01/25/25 2044 -- room air -- -- --    01/25/25 1836 96 room air -- -- --    01/25/25 1744 94 -- -- -- --    01/25/25 1730 94 -- -- -- --    01/25/25 1704 95 -- -- -- --    01/25/25 1703 94 -- -- -- --    01/25/25 1630 95 -- -- -- --    01/25/25 1530 95 --  -- -- --    01/25/25 1426 96 room air -- -- --          Lines, Drains & Airways       Active LDAs       Name Placement date Placement time Site Days    Peripheral IV 01/26/25 1655 Anterior;Distal;Right Forearm 01/26/25  1655  Forearm  less than 1    External Urinary Catheter 01/26/25  1738  --  less than 1                  Current Facility-Administered Medications   Medication Dose Route Frequency Provider Last Rate Last Admin    acetaminophen (TYLENOL) tablet 325 mg  325 mg Oral Q6H PRN Crystal Pa MD        sennosides-docusate (PERICOLACE) 8.6-50 MG per tablet 2 tablet  2 tablet Oral BID PRN Crystal Pa MD        And    polyethylene glycol (MIRALAX) packet 17 g  17 g Oral Daily PRN Crystal Pa MD        And    bisacodyl (DULCOLAX) EC tablet 5 mg  5 mg Oral Daily PRN Crystal Pa MD        And    bisacodyl (DULCOLAX) suppository 10 mg  10 mg Rectal Daily PRN Crystal Pa MD        buPROPion XL (WELLBUTRIN XL) 24 hr tablet 150 mg  150 mg Oral Daily Brunner, Mark I, MD   150 mg at 01/27/25 0817    cefTRIAXone (ROCEPHIN) 2,000 mg in sodium chloride 0.9 % 100 mL MBP  2,000 mg Intravenous Q24H Brunner, Mark I,  mL/hr at 01/26/25 2115 2,000 mg at 01/26/25 2115    citalopram (CeleXA) tablet 20 mg  20 mg Oral Daily Brunner, Mark I, MD   20 mg at 01/27/25 0817    dextrose (D50W) (25 g/50 mL) IV injection 25 g  25 g Intravenous Q15 Min PRN Crystal Pa MD        dextrose (GLUTOSE) oral gel 15 g  15 g Oral Q15 Min PRN Crystal Pa MD        glucagon (GLUCAGEN) injection 1 mg  1 mg Intramuscular Q15 Min PRN Crystal Pa MD        Insulin Lispro (humaLOG) injection 2-7 Units  2-7 Units Subcutaneous 4x Daily AC & at Bedtime Crystal Pa MD   3 Units at 01/27/25 0813    lactated ringers infusion  9 mL/hr Intravenous Continuous Brunner, Mark I,  MD   New Bag at 01/26/25 1207    Lidocaine 4 % 1 patch  1 patch Transdermal Q24H Crystal Pa MD   1 patch at 01/27/25 0812    pantoprazole (PROTONIX) EC tablet 40 mg  40 mg Oral Q AM Crystal Pa MD   40 mg at 01/27/25 0817    sodium chloride 0.9 % flush 10 mL  10 mL Intravenous PRN Crystal Pa MD        sodium chloride 0.9 % flush 10 mL  10 mL Intravenous Q12H Crystal Pa MD   10 mL at 01/27/25 0813    sodium chloride 0.9 % flush 10 mL  10 mL Intravenous PRN Crystal Pa MD        sodium chloride 0.9 % infusion 40 mL  40 mL Intravenous PRN Crystal Pa MD        thiamine (B-1) injection 200 mg  200 mg Intravenous Daily Crystal Pa MD   200 mg at 01/27/25 0815     Lab Results (last 48 hours)       Procedure Component Value Units Date/Time    Hepatic Function Panel [[id number]]  (Abnormal) Collected: 01/27/25 0350    Specimen: Blood Updated: 01/27/25 0812     Total Protein 6.0 g/dL      Albumin 3.1 g/dL      ALT (SGPT) 86 U/L      AST (SGOT) 74 U/L      Alkaline Phosphatase 290 U/L      Total Bilirubin 2.3 mg/dL      Bilirubin, Direct 1.8 mg/dL      Bilirubin, Indirect 0.5 mg/dL     POC Glucose Once [[id number]]  (Abnormal) Collected: 01/27/25 0751    Specimen: Blood Updated: 01/27/25 0753     Glucose 215 mg/dL     Blood Culture - Blood, Arm, Left [[id number]]  (Normal) Collected: 01/25/25 2012    Specimen: Blood from Arm, Left Updated: 01/27/25 0645     Blood Culture No growth at 24 hours    Blood Culture - Blood, Arm, Right [[id number]]  (Normal) Collected: 01/25/25 2005    Specimen: Blood from Arm, Right Updated: 01/27/25 0645     Blood Culture No growth at 24 hours    HIV-1 / O / 2 Ag / Antibody [[id number]]  (Abnormal) Collected: 01/27/25 0350    Specimen: Blood Updated: 01/27/25 0534     HIV-1/ HIV-2 Reactive    Narrative:      The HIV antibody/antigen combo assay is a  qualitative assay for HIV that includes the p24 antigen as well as antibodies to HIV types 1 and 2. This test is intended to be used as a screening assay in the diagnosis of HIV infection in patients over the age of 2.  Results may be falsely decreased if patient taking Biotin.      HIV Panel 901005 [[id number]] Collected: 01/27/25 0350    Specimen: Blood Updated: 01/27/25 0534    Magnesium [[id number]]  (Normal) Collected: 01/27/25 0350    Specimen: Blood Updated: 01/27/25 0450     Magnesium 2.4 mg/dL     Basic Metabolic Panel [[id number]]  (Abnormal) Collected: 01/27/25 0350    Specimen: Blood Updated: 01/27/25 0449     Glucose 244 mg/dL      BUN 17 mg/dL      Creatinine 0.93 mg/dL      Sodium 140 mmol/L      Potassium 4.2 mmol/L      Chloride 107 mmol/L      CO2 22.0 mmol/L      Calcium 8.6 mg/dL      BUN/Creatinine Ratio 18.3     Anion Gap 11.0 mmol/L      eGFR 94.6 mL/min/1.73     Narrative:      GFR Categories in Chronic Kidney Disease (CKD)      GFR Category          GFR (mL/min/1.73)    Interpretation  G1                     90 or greater         Normal or high (1)  G2                      60-89                Mild decrease (1)  G3a                   45-59                Mild to moderate decrease  G3b                   30-44                Moderate to severe decrease  G4                    15-29                Severe decrease  G5                    14 or less           Kidney failure          (1)In the absence of evidence of kidney disease, neither GFR category G1 or G2 fulfill the criteria for CKD.    eGFR calculation 2021 CKD-EPI creatinine equation, which does not include race as a factor    CBC (No Diff) [[id number]]  (Abnormal) Collected: 01/27/25 0350    Specimen: Blood Updated: 01/27/25 0439     WBC 2.02 10*3/mm3      Comment: Result chucked        RBC 3.25 10*6/mm3      Hemoglobin 9.6 g/dL      Hematocrit 31.0 %      MCV 95.4 fL      MCH 29.5 pg      MCHC 31.0 g/dL      RDW 16.8 %      RDW-SD 58.1 fl       MPV 11.3 fL      Platelets 89 10*3/mm3     POC Glucose Once [[id number]]  (Abnormal) Collected: 01/26/25 2043    Specimen: Blood Updated: 01/26/25 2044     Glucose 214 mg/dL     POC Glucose Once [[id number]]  (Abnormal) Collected: 01/26/25 1728    Specimen: Blood Updated: 01/26/25 1729     Glucose 200 mg/dL     Bilirubin, Direct [[id number]]  (Abnormal) Collected: 01/26/25 0843    Specimen: Blood Updated: 01/26/25 1505     Bilirubin, Direct 3.7 mg/dL     POC Glucose Once [[id number]]  (Normal) Collected: 01/26/25 1345    Specimen: Blood Updated: 01/26/25 1346     Glucose 119 mg/dL     Vitamin B12 [[id number]]  (Normal) Collected: 01/26/25 0843    Specimen: Blood Updated: 01/26/25 1234     Vitamin B-12 791 pg/mL     Narrative:      Results may be falsely increased if patient taking Biotin.      Folate [[id number]]  (Normal) Collected: 01/26/25 0843    Specimen: Blood Updated: 01/26/25 1234     Folate 6.75 ng/mL     Narrative:      Results may be falsely increased if patient taking Biotin.      Potassium [[id number]]  (Abnormal) Collected: 01/26/25 0843    Specimen: Blood Updated: 01/26/25 1218     Potassium 3.4 mmol/L     Cortisol - AM [[id number]] Collected: 01/26/25 0843    Specimen: Blood Updated: 01/26/25 1136     Cortisol - AM 15.82 mcg/dL     Narrative:      Cortisol Reference Ranges:    Cortisol 6AM - 10AM Range: 6.02-18.40 mcg/dl  Cortisol 4PM - 8PM Range: 2.68-10.50 mcg/dl        Ferritin [[id number]]  (Abnormal) Collected: 01/26/25 0843    Specimen: Blood Updated: 01/26/25 1020     Ferritin 2,807.00 ng/mL     Narrative:      Results may be falsely decreased if patient taking Biotin.      TSH Rfx On Abnormal To Free T4 [[id number]]  (Normal) Collected: 01/26/25 0843    Specimen: Blood Updated: 01/26/25 0959     TSH 0.816 uIU/mL     Comprehensive Metabolic Panel [[id number]]  (Abnormal) Collected: 01/26/25 0843    Specimen: Blood Updated: 01/26/25 0955     Glucose 106 mg/dL      BUN 14 mg/dL      Creatinine 0.88 mg/dL       Sodium 140 mmol/L      Potassium 3.3 mmol/L      Chloride 106 mmol/L      CO2 21.0 mmol/L      Calcium 8.3 mg/dL      Total Protein 6.1 g/dL      Albumin 3.2 g/dL      ALT (SGPT) 100 U/L      AST (SGOT) 120 U/L      Alkaline Phosphatase 297 U/L      Total Bilirubin 3.8 mg/dL      Globulin 2.9 gm/dL      Comment: Calculated Result        A/G Ratio 1.1 g/dL      BUN/Creatinine Ratio 15.9     Anion Gap 13.0 mmol/L      eGFR 99.1 mL/min/1.73     Narrative:      GFR Categories in Chronic Kidney Disease (CKD)      GFR Category          GFR (mL/min/1.73)    Interpretation  G1                     90 or greater         Normal or high (1)  G2                      60-89                Mild decrease (1)  G3a                   45-59                Mild to moderate decrease  G3b                   30-44                Moderate to severe decrease  G4                    15-29                Severe decrease  G5                    14 or less           Kidney failure          (1)In the absence of evidence of kidney disease, neither GFR category G1 or G2 fulfill the criteria for CKD.    eGFR calculation 2021 CKD-EPI creatinine equation, which does not include race as a factor    Iron Profile [[id number]]  (Abnormal) Collected: 01/26/25 0843    Specimen: Blood Updated: 01/26/25 0955     Iron 13 mcg/dL      Iron Saturation (TSAT) 7 %      Transferrin 127 mg/dL      TIBC 189 mcg/dL     C-reactive Protein [[id number]]  (Abnormal) Collected: 01/26/25 0843    Specimen: Blood Updated: 01/26/25 0955     C-Reactive Protein 11.62 mg/dL     Lactic Acid, Plasma [[id number]]  (Normal) Collected: 01/26/25 0843    Specimen: Blood Updated: 01/26/25 0925     Lactate 1.0 mmol/L      Comment: Falsely depressed results may occur on samples drawn from patients receiving N-Acetylcysteine (NAC) or Metamizole.       Hemoglobin A1c [[id number]]  (Abnormal) Collected: 01/26/25 0843    Specimen: Blood Updated: 01/26/25 0924     Hemoglobin A1C 6.00 %      Narrative:      Hemoglobin A1C Ranges:    Increased Risk for Diabetes  5.7% to 6.4%  Diabetes                     >= 6.5%  Diabetic Goal                < 7.0%    Protime-INR [[id number]]  (Normal) Collected: 01/26/25 0843    Specimen: Blood Updated: 01/26/25 0924     Protime 14.5 Seconds      INR 1.12    Ammonia [[id number]]  (Normal) Collected: 01/26/25 0843    Specimen: Blood Updated: 01/26/25 0923     Ammonia 21 umol/L     CBC Auto Differential [[id number]]  (Abnormal) Collected: 01/26/25 0843    Specimen: Blood Updated: 01/26/25 0916     WBC 4.31 10*3/mm3      RBC 3.29 10*6/mm3      Hemoglobin 9.9 g/dL      Hematocrit 30.6 %      MCV 93.0 fL      MCH 30.1 pg      MCHC 32.4 g/dL      RDW 16.6 %      RDW-SD 56.0 fl      MPV 11.0 fL      Platelets 93 10*3/mm3      Neutrophil % 71.8 %      Lymphocyte % 18.3 %      Monocyte % 9.0 %      Eosinophil % 0.2 %      Basophil % 0.2 %      Immature Grans % 0.5 %      Neutrophils, Absolute 3.09 10*3/mm3      Lymphocytes, Absolute 0.79 10*3/mm3      Monocytes, Absolute 0.39 10*3/mm3      Eosinophils, Absolute 0.01 10*3/mm3      Basophils, Absolute 0.01 10*3/mm3      Immature Grans, Absolute 0.02 10*3/mm3      nRBC 0.0 /100 WBC     Phosphatidylethanol [[id number]] Collected: 01/26/25 0843    Specimen: Blood Updated: 01/26/25 0903    POC Glucose Once [[id number]]  (Normal) Collected: 01/26/25 0734    Specimen: Blood Updated: 01/26/25 0735     Glucose 97 mg/dL     POC Glucose Once [[id number]]  (Abnormal) Collected: 01/25/25 2115    Specimen: Blood Updated: 01/25/25 2116     Glucose 178 mg/dL     STAT Lactic Acid, Reflex [[id number]]  (Normal) Collected: 01/25/25 2011    Specimen: Blood Updated: 01/25/25 2102     Lactate 1.1 mmol/L      Comment: Falsely depressed results may occur on samples drawn from patients receiving N-Acetylcysteine (NAC) or Metamizole.       POC Glucose Once [[id number]]  (Abnormal) Collected: 01/25/25 2008    Specimen: Blood Updated: 01/25/25 2010     Glucose 189  mg/dL     Hepatitis Panel, Acute [[id number]]  (Normal) Collected: 01/25/25 1510    Specimen: Blood Updated: 01/25/25 1949     Hepatitis B Surface Ag Non-Reactive     Hep A IgM Non-Reactive     Hep B C IgM Non-Reactive     Hepatitis C Ab Non-Reactive    Narrative:      Results may be falsely decreased if patient taking Biotin.     POC Glucose Once [[id number]]  (Abnormal) Collected: 01/25/25 1859    Specimen: Blood Updated: 01/25/25 1900     Glucose 215 mg/dL     Fentanyl, Urine - Urine, Clean Catch [[id number]]  (Normal) Collected: 01/25/25 1456    Specimen: Urine, Clean Catch Updated: 01/25/25 1751     Fentanyl, Urine Negative    Narrative:      Negative Threshold:      Fentanyl 5 ng/mL     The normal value for the drug tested is negative. This report includes final unconfirmed screening results to be used for medical treatment purposes only. Unconfirmed results must not be used for non-medical purposes such as employment or legal testing. Clinical consideration should be applied to any drug of abuse test, particularly when unconfirmed results are used.           Urine Drug Screen - Urine, Clean Catch [[id number]]  (Abnormal) Collected: 01/25/25 1456    Specimen: Urine, Clean Catch Updated: 01/25/25 1734     THC, Screen, Urine Negative     Phencyclidine (PCP), Urine Negative     Cocaine Screen, Urine Negative     Methamphetamine, Ur Negative     Opiate Screen Positive     Amphetamine Screen, Urine Negative     Benzodiazepine Screen, Urine Negative     Tricyclic Antidepressants Screen Negative     Methadone Screen, Urine Negative     Barbiturates Screen, Urine Negative     Oxycodone Screen, Urine Positive     Buprenorphine, Screen, Urine Negative    Narrative:      Cutoff For Drugs Screened:    Amphetamines               500 ng/ml  Barbiturates               200 ng/ml  Benzodiazepines            150 ng/ml  Cocaine                    150 ng/ml  Methadone                  200 ng/ml  Opiates                    100  ng/ml  Phencyclidine               25 ng/ml  THC                         50 ng/ml  Methamphetamine            500 ng/ml  Tricyclic Antidepressants  300 ng/ml  Oxycodone                  100 ng/ml  Buprenorphine               10 ng/ml    The normal value for all drugs tested is negative. This report includes unconfirmed screening results, with the cutoff values listed, to be used for medical treatment purposes only.  Unconfirmed results must not be used for non-medical purposes such as employment or legal testing.  Clinical consideration should be applied to any drug of abuse test, particularly when unconfirmed results are used.      High Sensitivity Troponin T 1Hr [[id number]]  (Abnormal) Collected: 01/25/25 1626    Specimen: Blood Updated: 01/25/25 1653     HS Troponin T 29 ng/L      Troponin T Numeric Delta -2 ng/L      Troponin T % Delta -6    Narrative:      High Sensitive Troponin T Reference Range:  <14.0 ng/L- Negative Female for AMI  <22.0 ng/L- Negative Male for AMI  >=14 - Abnormal Female indicating possible myocardial injury.  >=22 - Abnormal Male indicating possible myocardial injury.   Clinicians would have to utilize clinical acumen, EKG, Troponin, and serial changes to determine if it is an Acute Myocardial Infarction or myocardial injury due to an underlying chronic condition.         Ethanol [[id number]]  (Normal) Collected: 01/25/25 1510    Specimen: Blood Updated: 01/25/25 1628     Ethanol <10 mg/dL     Narrative:      Elevated lactic acid concentration and lactate dehydrogenase(LD) activity may falsely elevate enzymatically determined ethanol levels. Not for legal purposes.     Respiratory Panel PCR w/COVID-19(SARS-CoV-2) ERNESTO/LORRAINE/JEMMA/PAD/COR/ALYCIA In-House, NP Swab in [facility] (formerly [facility] 2 HR TAT - Swab, Nasopharynx [[id number]]  (Normal) Collected: 01/25/25 1506    Specimen: Swab from Nasopharynx Updated: 01/25/25 1615     ADENOVIRUS, PCR Not Detected     Coronavirus 229E Not Detected     Coronavirus HKU1 Not  Detected     Coronavirus NL63 Not Detected     Coronavirus OC43 Not Detected     COVID19 Not Detected     Human Metapneumovirus Not Detected     Human Rhinovirus/Enterovirus Not Detected     Influenza A PCR Not Detected     Influenza B PCR Not Detected     Parainfluenza Virus 1 Not Detected     Parainfluenza Virus 2 Not Detected     Parainfluenza Virus 3 Not Detected     Parainfluenza Virus 4 Not Detected     RSV, PCR Not Detected     Bordetella pertussis pcr Not Detected     Bordetella parapertussis PCR Not Detected     Chlamydophila pneumoniae PCR Not Detected     Mycoplasma pneumo by PCR Not Detected    Narrative:      In the setting of a positive respiratory panel with a viral infection PLUS a negative procalcitonin without other underlying concern for bacterial infection, consider observing off antibiotics or discontinuation of antibiotics and continue supportive care. If the respiratory panel is positive for atypical bacterial infection (Bordetella pertussis, Chlamydophila pneumoniae, or Mycoplasma pneumoniae), consider antibiotic de-escalation to target atypical bacterial infection.    Comprehensive Metabolic Panel [[id number]]  (Abnormal) Collected: 01/25/25 1510    Specimen: Blood Updated: 01/25/25 1545     Glucose 277 mg/dL      BUN 15 mg/dL      Creatinine 1.14 mg/dL      Sodium 137 mmol/L      Potassium 3.7 mmol/L      Chloride 102 mmol/L      CO2 19.0 mmol/L      Calcium 8.8 mg/dL      Total Protein 7.0 g/dL      Albumin 3.9 g/dL      ALT (SGPT) 136 U/L      AST (SGOT) 229 U/L      Alkaline Phosphatase 379 U/L      Total Bilirubin 3.5 mg/dL      Globulin 3.1 gm/dL      Comment: Calculated Result        A/G Ratio 1.3 g/dL      BUN/Creatinine Ratio 13.2     Anion Gap 16.0 mmol/L      eGFR 74.1 mL/min/1.73     Narrative:      GFR Categories in Chronic Kidney Disease (CKD)      GFR Category          GFR (mL/min/1.73)    Interpretation  G1                     90 or greater         Normal or high (1)  G2        "               60-89                Mild decrease (1)  G3a                   45-59                Mild to moderate decrease  G3b                   30-44                Moderate to severe decrease  G4                    15-29                Severe decrease  G5                    14 or less           Kidney failure          (1)In the absence of evidence of kidney disease, neither GFR category G1 or G2 fulfill the criteria for CKD.    eGFR calculation 2021 CKD-EPI creatinine equation, which does not include race as a factor    Magnesium [[id number]]  (Normal) Collected: 01/25/25 1510    Specimen: Blood Updated: 01/25/25 1545     Magnesium 1.6 mg/dL     Procalcitonin [[id number]]  (Abnormal) Collected: 01/25/25 1510    Specimen: Blood Updated: 01/25/25 1545     Procalcitonin 7.37 ng/mL     Narrative:      As a Marker for Sepsis (Non-Neonates):    1. <0.5 ng/mL represents a low risk of severe sepsis and/or septic shock.  2. >2 ng/mL represents a high risk of severe sepsis and/or septic shock.    As a Marker for Lower Respiratory Tract Infections that require antibiotic therapy:    PCT on Admission    Antibiotic Therapy       6-12 Hrs later    >0.5                Strongly Recommended  >0.25 - <0.5        Recommended   0.1 - 0.25          Discouraged              Remeasure/reassess PCT  <0.1                Strongly Discouraged     Remeasure/reassess PCT    As 28 day mortality risk marker: \"Change in Procalcitonin Result\" (>80% or <=80%) if Day 0 (or Day 1) and Day 4 values are available. Refer to http://[email or web address]-pct-[email or web address]    Change in PCT <=80%  A decrease of PCT levels below or equal to 80% defines a positive change in PCT test result representing a higher risk for 28-day all-cause mortality of patients diagnosed with severe sepsis for septic shock.    Change in PCT >80%  A decrease of PCT levels of more than 80% defines a negative change in PCT result representing a lower risk for 28-day all-cause mortality of " patients diagnosed with severe sepsis or septic shock.       High Sensitivity Troponin T [[id number]]  (Abnormal) Collected: 01/25/25 1510    Specimen: Blood Updated: 01/25/25 1539     HS Troponin T 31 ng/L     Narrative:      High Sensitive Troponin T Reference Range:  <14.0 ng/L- Negative Female for AMI  <22.0 ng/L- Negative Male for AMI  >=14 - Abnormal Female indicating possible myocardial injury.  >=22 - Abnormal Male indicating possible myocardial injury.   Clinicians would have to utilize clinical acumen, EKG, Troponin, and serial changes to determine if it is an Acute Myocardial Infarction or myocardial injury due to an underlying chronic condition.         Lactic Acid, Plasma [[id number]]  (Abnormal) Collected: 01/25/25 1510    Specimen: Blood Updated: 01/25/25 1537     Lactate 2.7 mmol/L      Comment: Falsely depressed results may occur on samples drawn from patients receiving N-Acetylcysteine (NAC) or Metamizole.       [place] Draw [[id number]] Collected: 01/25/25 1510    Specimen: Blood Updated: 01/25/25 1531    Narrative:      The following orders were created for panel order [place] Draw.  Procedure                               Abnormality         Status                     ---------                               -----------         ------                     Green Top (Gel)[[id number]]                                  Final result               Lavender Top[[id number]]                                     Final result               Gold Top - SST[[id number]]                                   Final result               Garcia Top[[id number]]                                         Final result               Light Blue Top[[id number]]                                   Final result                 Please view results for these tests on the individual orders.    Green Top (Gel) [[id number]] Collected: 01/25/25 1510    Specimen: Blood Updated: 01/25/25 1531     Extra Tube Hold for add-ons.     Comment: Auto resulted.        CBC & Differential [[id number]]  (Abnormal) Collected: 01/25/25 1510    Specimen: Blood Updated: 01/25/25 1525    Narrative:      The following orders were created for panel order CBC & Differential.  Procedure                               Abnormality         Status                     ---------                               -----------         ------                     CBC Auto Differential[[id number]]        Abnormal            Final result               Scan Slide[[id number]]                                                                    Please view results for these tests on the individual orders.    CBC Auto Differential [[id number]]  (Abnormal) Collected: 01/25/25 1510    Specimen: Blood Updated: 01/25/25 1525     WBC 8.48 10*3/mm3      RBC 3.67 10*6/mm3      Hemoglobin 11.1 g/dL      Hematocrit 33.0 %      MCV 89.9 fL      MCH 30.2 pg      MCHC 33.6 g/dL      RDW 16.5 %      RDW-SD 54.4 fl      MPV 11.0 fL      Platelets 110 10*3/mm3      Neutrophil % 77.8 %      Lymphocyte % 13.3 %      Monocyte % 8.1 %      Eosinophil % 0.1 %      Basophil % 0.1 %      Immature Grans % 0.6 %      Neutrophils, Absolute 6.59 10*3/mm3      Lymphocytes, Absolute 1.13 10*3/mm3      Monocytes, Absolute 0.69 10*3/mm3      Eosinophils, Absolute 0.01 10*3/mm3      Basophils, Absolute 0.01 10*3/mm3      Immature Grans, Absolute 0.05 10*3/mm3      nRBC 0.0 /100 WBC     Lavender Top [[id number]] Collected: 01/25/25 1510    Specimen: Blood Updated: 01/25/25 1515     Extra Tube hold for add-on     Comment: Auto resulted       Gold Top - [facility] [[id number]] Collected: 01/25/25 1510    Specimen: Blood Updated: 01/25/25 1515     Extra Tube Hold for add-ons.     Comment: Auto resulted.       Garcia Top [[id number]] Collected: 01/25/25 1510    Specimen: Blood Updated: 01/25/25 1515     Extra Tube Hold for add-ons.     Comment: Auto resulted.       Light Blue Top [[id number]] Collected: 01/25/25 1510    Specimen: Blood Updated: 01/25/25 1515      Extra Tube Hold for add-ons.     Comment: Auto resulted       Urinalysis With Microscopic If Indicated (No Culture) - Urine, Clean Catch [[id number]]  (Abnormal) Collected: 01/25/25 1456    Specimen: Urine, Clean Catch Updated: 01/25/25 1510     Color, UA Dark Yellow     Appearance, UA Cloudy     pH, UA 6.0     Specific Gravity, UA 1.018     Glucose,  mg/dL (1+)     Ketones, UA Negative     Bilirubin, UA Moderate (2+)     Blood, UA Negative     Protein, UA >=300 mg/dL (3+)     Leuk Esterase, UA Negative     Nitrite, UA Negative     Urobilinogen, UA 2.0 E.U./dL    Urinalysis, Microscopic Only - Urine, Clean Catch [[id number]]  (Abnormal) Collected: 01/25/25 1456    Specimen: Urine, Clean Catch Updated: 01/25/25 1510     RBC, UA 3-5 /HPF      WBC, UA 0-2 /HPF      Bacteria, UA None Seen /HPF      Squamous Epithelial Cells, UA 0-2 /HPF      Hyaline Casts, UA 0-6 /LPF      Methodology Automated Microscopy          Imaging Results (Last 48 Hours)       Procedure Component Value Units Date/Time    US Liver [[id number]] Collected: 01/27/25 0820     Updated: 01/27/25 0826    Narrative:      US LIVER    Date of Exam: 1/27/2025 7:40 AM EST    Indication: elevated bilirubin.    Comparison: CT abdomen pelvis 1/25/2025    Technique: Grayscale and color Doppler ultrasound evaluation of the right upper quadrant was performed.      Findings:  Diffuse increased hepatic echogenicity. Mildly heterogeneous echotexture. No solid appearing liver lesions. Nonspecific dilation of main portal vein measuring 1.8 cm with otherwise hepatopetal flow. No visualized ascites.    Cholecystectomy    No intrahepatic duct dilation. The common bile duct measures maximally 3 mm, normal..    The visualized portions of the head and body of the pancreas are grossly unremarkable. The pancreatic tail is not seen due to bowel gas.    Homogeneous cortical echotexture of the right kidney. No hydronephrosis, shadowing echogenicities or solid masses.           Impression:      1. No evidence of biliary cholestasis status post cholecystectomy.  2. Hepatic steatosis, likely marked severity. Mild parenchymal heterogeneity could reflect sequela of chronic hepatocellular dysfunction.  3. Nonspecific dilation of main portal vein with otherwise normal hepatopetal flow.        Electronically Signed: Lul Burroughs MD    1/27/2025 8:23 AM EST    Workstation ID: [id number]    FL ERCP pancreatic and biliary ducts [[id number]] Collected: 01/27/25 0812     Updated: 01/27/25 0816    Narrative:      FL ERCP PANCREATIC AND BILIARY DUCTS    Date of Exam: 1/26/2025 12:03 PM EST    Indication: ENDOSCOPIC RETROGRADE CHOLANGIOPANCREATOGRAPHY.       Comparison: 2/2/2024    Technique:  A series of radiographic digital spot films were obtained in conjunction with an endoscopic catheterization of the biliary and pancreatic ductal system, performed by the gastroenterologist.    Fluoroscopic Time: 38 seconds    Number of Images: 2    Findings:  Dictation is to record 38 seconds of fluoroscopy time during ERCP. 2 images obtained show endoscope and side cannula placement, contrast injection of the common duct and apparent balloon sweep. Please see the procedure report for full details.      Impression:      Impression:  Fluoroscopy provided during ERCP.      Electronically Signed: Myron Means MD    1/27/2025 8:13 AM EST    Workstation ID: [id number]    CT Chest Without Contrast Diagnostic [[id number]] Collected: 01/25/25 1613     Updated: 01/25/25 1619    Narrative:      CT CHEST WO CONTRAST DIAGNOSTIC    Date of Exam: 1/25/2025 3:53 PM EST    Indication: ams.    Comparison: None available.    Technique: Axial CT images were obtained of the chest without contrast administration.  Reconstructed coronal and sagittal images were also obtained. Automated exposure control and iterative construction methods were used.      FINDINGS:  Scattered atelectasis is noted. No well-defined consolidations or pleural  effusions are observed. Granulomas are noted in the left lower lobe. No suspicious pulmonary nodules or abnormal pulmonary masses are seen.    No significant hilar, mediastinal, or axillary lymphadenopathy is observed. Calcified left hilar lymph nodes are noted. A normal aortic arch branching pattern is identified. Coronary artery calcifications are identified. The thyroid gland is   unremarkable. The esophagus is unremarkable.    The limited evaluation of the upper abdomen demonstrates no evidence for acute abnormality. There is evidence for diffuse hepatic fatty infiltration with associated hepatosplenomegaly.    No acute osseous abnormalities are observed.      Impression:      1.No evidence for acute intrathoracic abnormality.  2.Coronary artery calcifications are noted.      Electronically Signed: Andrew Tillman MD    1/25/2025 4:16 PM EST    Workstation ID: [id number]    CT Abdomen Pelvis Without Contrast [[id number]] Collected: 01/25/25 1608     Updated: 01/25/25 1614    Narrative:      CT ABDOMEN PELVIS WO CONTRAST    Date of Exam: 1/25/2025 3:53 PM EST    Indication: ams.    Comparison: None available.    Technique: Axial CT images were obtained of the abdomen and pelvis without the administration of contrast. Reconstructed coronal and sagittal images were also obtained. Automated exposure control and iterative construction methods were used.    FINDINGS:    Lung bases: Calcified left hilar lymph nodes. Left lower lobe calcified granulomata. Atheromatous disease of the coronary vessels.    Liver: Geographic areas of decreased attenuation within the liver suggesting hepatic steatosis.    Spleen: Splenic granulomata    Pancreas:No pancreatic masses. No evidence of pancreatitis.    Gallbladder and common bile duct: Previous cholecystectomy.    Adrenal glands:No adrenal masses    Kidneys and ureters:No kidney stones. No renal masses.No calculi present within the ureters. Normal caliber ureters.    Urinary  bladder:No urinary bladder wall thickening. No bladder masses.    Small bowel:Normal caliber small bowel.    Large bowel:No diverticulosis or diverticulitis. No large bowel masses are appreciated    Appendix: Not seen however there is no evidence of appendicitis    GENITOURINARY: Normal prostate    Ascites or pneumoperitoneum:None.    Adenopathy:None present    Osseous structures: The proximal femurs are intact. The pubic bones are intact. The sacrum and sacroiliac joints are normal. The spinous and transverse processes are intact. No rib fractures.    Other findings: Fat-containing umbilical hernias.      Impression:      1.No acute findings in the abdomen or pelvis.  2.Hepatic steatosis.  3.Fat-containing umbilical hernias.        Electronically Signed: Stefano Garcia MD    1/25/2025 4:11 PM EST    Workstation ID: [id number]    CT Head Without Contrast [[id number]] Collected: 01/25/25 1606     Updated: 01/25/25 1611    Narrative:      CT HEAD WO CONTRAST    Date of Exam: 1/25/2025 3:53 PM EST    Indication: ams.    Comparison: 12/15/2014    Technique: Axial CT images were obtained of the head without contrast administration.  Automated exposure control and iterative construction methods were used.    Findings: Gray-white matter differentiation is maintained without evidence of an acute infarction. No intracranial mass or mass effect. No extra-axial mass or collection. The ventricles and sulci are normal in size and configuration. The posterior fossa   appears normal. Sellar and suprasellar structures are normal.    Orbital and paranasal soft tissues are normal. Opacification of the left maxillary sinus with mucoperiosteal reactive changes consistent with chronic sinusitis. The bony calvarium appears intact. No acute fractures. No lytic or blastic bony diseases.      Impression:      Impression: No acute intracranial pathology.          Electronically Signed: Stefano Garcia MD    1/25/2025 4:07 PM EST    Workstation ID:  [id number]          ECG/EMG Results (last 48 hours)       Procedure Component Value Units Date/Time    ECG 12 Lead ED Triage Standing Order; Weak / Dizzy / AMS [[id number]] Collected: 01/25/25 1502     Updated: 01/25/25 1503     QT Interval 334 ms      QTC Interval 464 ms     Narrative:      Test Reason : ED Triage Standing Order~  Blood Pressure :   */*   mmHG  Vent. Rate : 116 BPM     Atrial Rate : 116 BPM     P-R Int : 126 ms          QRS Dur :  86 ms      QT Int : 334 ms       P-R-T Axes :  28  63   2 degrees    QTcB Int : 464 ms    Sinus tachycardia  Inferior infarct (cited on or before 01-Feb-2024)  Abnormal ECG  When compared with ECG of 24-Jan-2025 20:28, (Unconfirmed)  Vent. rate has increased by  51 bpm  Minimal criteria for Anteroseptal infarct are no longer present    Referred By: ED MD           Confirmed By:              Operative/Procedure Notes (last 48 hours)        Procedures signed by Brunner, Mark I, MD at 01/26/25 1234         Procedure Orders    1. ERCP [[id number]] ordered by Brunner, Mark I, MD at 01/26/25 1201               [Media Unavailable] Scan on 1/26/2025 1233 by Brunner, Mark I, MD: ERCP          Electronically signed by Brunner, Mark I, MD at 01/26/25 1234       Brunner, Mark I, MD at 01/26/25 1220          ENDOSCOPIC RETROGRADE CHOLANGIOPANCREATOGRAPHY  Progress Note    Malcolm Costa  1/26/2025    ERCP reveals prior biliary sphincterotomy.  The common bile duct was wire cannulated via first (single) pass of the wire.  Cholangiogram reveals filling defect in the distal common bile duct.  Sphincterotomy extended without bleeding.  9 mm balloon sweeps yielded 1 black stone.  No stones remain.    >> Anticipate no further intervention.    Mark I. 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 Consider 4th generation test, acute hep panel vs ID consult.                  Crystal Pa MD   Physician  Hospitalist     Progress Notes     Addendum     Date of Service: 25  Creation Time: 25     Expand All Collapse All         [facility] Medicine Services  PROGRESS NOTE     Patient Name: Malcolm Costa  : 1965  MRN: [id number]     Date of Admission: 2025  Primary Care Physician: Sita Chase, JOANA        Subjective  Subjective      CC:  Generalized weakness     HPI:  Patient reports feeling about the same today as yesterday.  Reports that today he now has a headache but otherwise no changes to his symptoms.           Objective  Objective      Vital Signs:   Temp:  [97.3 °F (36.3 °C)-100.1 °F (37.8 °C)] 98.9 °F (37.2 °C)  Heart Rate:  [] 73  Resp:  [15-21] 18  BP: (106-147)/(55-92) 143/80     Physical Exam  Cardiovascular:      Rate and Rhythm: Normal rate and regular rhythm.      Heart sounds: Normal heart sounds.   Pulmonary:      Effort: Pulmonary effort is normal.      Breath sounds: Normal breath sounds.   Abdominal:      General: There is no distension.      Palpations: Abdomen is soft.      Comments: Mild right upper quadrant tenderness   Musculoskeletal:      Right lower leg: No edema.      Left lower leg: No edema.   Neurological:      General: No focal deficit present.      Mental Status: He is alert.            Results Reviewed:  LAB RESULTS:               Lab 25  0843 25  1626 25  1510 25  1942 25  1832   WBC 4.31  --   --  8.48  --  2.93*   HEMOGLOBIN 9.9*  --   --  11.1*  --  11.6*   HEMATOCRIT 30.6*  --   --  33.0*  --  36.1*   PLATELETS 93*  --   --  110*  --  123*   NEUTROS ABS 3.09  --   --  6.59  --  1.83   IMMATURE GRANS (ABS) 0.02  --   --  0.05  --  0.01   LYMPHS ABS  0.79  --   --  1.13  --  0.82   MONOS ABS 0.39  --   --  0.69  --  0.19   EOS ABS 0.01  --   --  0.01  --  0.08   MCV 93.0  --   --  89.9  --  91.4   CRP 11.62*  --   --   --   --   --    PROCALCITONIN  --   --   --  7.37*  --   --    LACTATE 1.0 1.1  --  2.7*  --   --    PROTIME 14.5  --   --   --   --   --    D DIMER QUANT  --   --   --   --   --  0.50   HSTROP T  --   --  29* 31* 19 21                Lab 01/26/25  0843 01/25/25  1510 01/24/25  1832   SODIUM 140 137 139   POTASSIUM 3.4*  3.3* 3.7 3.8   CHLORIDE 106 102 102   CO2 21.0* 19.0* 23.2   ANION GAP 13.0 16.0* 13.8   BUN 14 15 10   CREATININE 0.88 1.14 0.92   EGFR 99.1 74.1 95.8   GLUCOSE 106* 277* 174*   CALCIUM 8.3* 8.8 8.7   MAGNESIUM  --  1.6  --    HEMOGLOBIN A1C 6.00*  --   --    TSH 0.816  --   --                 Lab 01/26/25  0843 01/25/25  1510 01/24/25  1832   TOTAL PROTEIN 6.1 7.0 7.2   ALBUMIN 3.2* 3.9 3.8   GLOBULIN 2.9 3.1 3.4   ALT (SGPT) 100* 136* 40   AST (SGOT) 120* 229* 100*   BILIRUBIN 3.8* 3.5* 0.8   ALK PHOS 297* 379* 203*   LIPASE  --   --  34                  Lab 01/26/25  0843 01/25/25  1626 01/25/25  1510 01/24/25  1942 01/24/25  1832   PROBNP  --   --   --   --  489.0   HSTROP T  --  29* 31* 19 21   PROTIME 14.5  --   --   --   --    INR 1.12  --   --   --   --                   Lab 01/26/25  0843   IRON 13*   IRON SATURATION (TSAT) 7*   TIBC 189*   TRANSFERRIN 127*   FERRITIN 2,807.00*   FOLATE 6.75   VITAMIN B 12 791          Brief Urine Lab Results  (Last result in the past 365 days)          Color   Clarity   Blood   Leuk Est   Nitrite   Protein   CREAT   Urine HCG         01/25/25 1456 Dark Yellow    Cloudy    Negative    Negative    Negative    >=300 mg/dL (3+)                             Microbiology Results Abnormal         None                  Radiology results from the last 24 hours:   CT Chest Without Contrast Diagnostic     Result Date: 1/25/2025  CT CHEST WO CONTRAST DIAGNOSTIC Date of Exam: 1/25/2025 3:53 PM EST  Indication: ams. Comparison: None available. Technique: Axial CT images were obtained of the chest without contrast administration.  Reconstructed coronal and sagittal images were also obtained. Automated exposure control and iterative construction methods were used. FINDINGS: Scattered atelectasis is noted. No well-defined consolidations or pleural effusions are observed. Granulomas are noted in the left lower lobe. No suspicious pulmonary nodules or abnormal pulmonary masses are seen. No significant hilar, mediastinal, or axillary lymphadenopathy is observed. Calcified left hilar lymph nodes are noted. A normal aortic arch branching pattern is identified. Coronary artery calcifications are identified. The thyroid gland is unremarkable. The esophagus is unremarkable. The limited evaluation of the upper abdomen demonstrates no evidence for acute abnormality. There is evidence for diffuse hepatic fatty infiltration with associated hepatosplenomegaly. No acute osseous abnormalities are observed.      Impression: 1.No evidence for acute intrathoracic abnormality. 2.Coronary artery calcifications are noted. Electronically Signed: Andrew Tillman MD  1/25/2025 4:16 PM EST  Workstation ID: [id number]     CT Abdomen Pelvis Without Contrast     Result Date: 1/25/2025  CT ABDOMEN PELVIS WO CONTRAST Date of Exam: 1/25/2025 3:53 PM EST Indication: ams. Comparison: None available. Technique: Axial CT images were obtained of the abdomen and pelvis without the administration of contrast. Reconstructed coronal and sagittal images were also obtained. Automated exposure control and iterative construction methods were used. FINDINGS: Lung bases: Calcified left hilar lymph nodes. Left lower lobe calcified granulomata. Atheromatous disease of the coronary vessels. Liver: Geographic areas of decreased attenuation within the liver suggesting hepatic steatosis. Spleen: Splenic granulomata Pancreas:No pancreatic masses. No evidence of  pancreatitis. Gallbladder and common bile duct: Previous cholecystectomy. Adrenal glands:No adrenal masses Kidneys and ureters:No kidney stones. No renal masses.No calculi present within the ureters. Normal caliber ureters. Urinary bladder:No urinary bladder wall thickening. No bladder masses. Small bowel:Normal caliber small bowel. Large bowel:No diverticulosis or diverticulitis. No large bowel masses are appreciated Appendix: Not seen however there is no evidence of appendicitis GENITOURINARY: Normal prostate Ascites or pneumoperitoneum:None. Adenopathy:None present Osseous structures: The proximal femurs are intact. The pubic bones are intact. The sacrum and sacroiliac joints are normal. The spinous and transverse processes are intact. No rib fractures. Other findings: Fat-containing umbilical hernias.      Impression: 1.No acute findings in the abdomen or pelvis. 2.Hepatic steatosis. 3.Fat-containing umbilical hernias. Electronically Signed: Stefano Garcia MD  1/25/2025 4:11 PM EST  Workstation ID: [id number]     CT Head Without Contrast     Result Date: 1/25/2025  CT HEAD WO CONTRAST Date of Exam: 1/25/2025 3:53 PM EST Indication: ams. Comparison: 12/15/2014 Technique: Axial CT images were obtained of the head without contrast administration.  Automated exposure control and iterative construction methods were used. Findings: Gray-white matter differentiation is maintained without evidence of an acute infarction. No intracranial mass or mass effect. No extra-axial mass or collection. The ventricles and sulci are normal in size and configuration. The posterior fossa appears normal. Sellar and suprasellar structures are normal. Orbital and paranasal soft tissues are normal. Opacification of the left maxillary sinus with mucoperiosteal reactive changes consistent with chronic sinusitis. The bony calvarium appears intact. No acute fractures. No lytic or blastic bony diseases.      Impression: Impression: No acute  intracranial pathology. Electronically Signed: Stefano Garcia MD  1/25/2025 4:07 PM EST  Workstation ID: [id number]     XR Chest 1 View     Result Date: 1/24/2025  XR CHEST 1 VW Date of Exam: 1/24/2025 6:38 PM EST Indication: Chest Pain Triage Protocol Comparison: 1/10/2025 Findings: 3 cm lordotic projection. Heart size is at the upper limits of normal. Pulmonary vessels are normal. Lungs are clear. No pleural effusion. No pneumothorax.      Impression: Impression: 1. No acute cardiopulmonary disease. Electronically Signed: Elijah Kraft MD  1/24/2025 7:09 PM EST  Workstation ID: [id number]               Current medications:  Scheduled Meds:  Scheduled Medication   buPROPion XL, 150 mg, Oral, Daily  cefTRIAXone, 2,000 mg, Intravenous, Q24H  citalopram, 20 mg, Oral, Daily  [Transfer Hold] insulin lispro, 2-7 Units, Subcutaneous, 4x Daily AC & at Bedtime  [Transfer Hold] Lidocaine, 1 patch, Transdermal, Q24H  [Transfer Hold] pantoprazole, 40 mg, Oral, Q AM  [Transfer Hold] sodium chloride, 10 mL, Intravenous, Q12H  [Transfer Hold] thiamine (B-1) IV, 200 mg, Intravenous, Daily         Continuous Infusions:  Infusion Medications   [START ON 1/27/2025] lactated ringers, 9 mL/hr         PRN Meds:.  PRN Medication     [Transfer Hold] acetaminophen    [Transfer Hold] senna-docusate sodium **AND** [Transfer Hold] polyethylene glycol **AND** [Transfer Hold] bisacodyl **AND** [Transfer Hold] bisacodyl    [Transfer Hold] dextrose    [Transfer Hold] dextrose    [Transfer Hold] glucagon (human recombinant)    [Transfer Hold] sodium chloride    [Transfer Hold] sodium chloride    [Transfer Hold] sodium chloride           Assessment & Plan  Assessment & Plan            Active Hospital Problems     Diagnosis   POA    **Elevated LFTs [R79.89]   Yes       Resolved Hospital Problems   No resolved problems to display.         Brief Hospital Course to date:  Malcolm Costa is a 59 y.o. male with hemochromatosis, type 2 diabetes, hyperlipidemia,  hypertension, GERD, depression and anxiety who presents due to generalized weakness.  Patient has had problems with weakness and overall malaise for several months however his symptoms worsened over the last week.     Elevated LFTs  Thrombocytopenia  -bili 3.8 today, temp elevated to 100.1 but no true fevers   - prior cholecystectomy 2/2024  -CT scan on admit shows concern for possible retained common bile duct stone   -pt without leukocytosis but has elevated CRP worse than prior and elevated procal raising concern for infection, possible developing cholecystitis   - IV fluids  -GI consulted, patient to undergo ERCP today   - INR wnl  -IV rocephin, continue      Hemachromatosis  -pt has homozygous H63D mutation  - MRI abdomen 12/18/14 showed mild iron deposition in the liver with suggestion of hepatic steatosis.  Mild splenomegaly also noted.  -pt followed by Dr Myers with hem/onc  - unfortunately pt has low iron saturation and iron as well as anemia so he has been unable to undergo phlebotomy to lower his ferritin  -iron studies still show low iron, low iron sat and high ferritin   -worsening ferritin elevation this admit likely due to infection    -Echo pending   -will consider brain MRI   -treat infection as above      Generalized weakness  Unintentional weight loss  - progressive weakness over three months, worse in the last week likely secondary to infection   - PT/OT  - nutrition consult  - b12/folate levels wnl  - IV thiamine  - underwent EGD and colonoscopy earlier this month   -TSH and cortisol wnl  - consider Neurology consultation after ERCP/infection under control   - ECHO pending as above  - will consider brain MRI given hemachromatosis      GERD  Colonic inflammation  - EGD and colonoscopy 1/23/25 showed upper erythematous stomach mucosa and lower: congested mucosa with ulcerations on the ileocecal valve  - protonix     DMII  HTN  - patient states he was taken off his diabetes and antihypertensive  medications one month ago by his PCP  - check A1c  - SSI     Depression and anxiety  - continue citalopram  - father concerned patient recently took oxycodone from his mother's home supply.  Patient says he had one dose earlier this week.  - check UDS     Expected Discharge Location and Transportation: tbd  Expected Discharge   Expected Discharge Date: 1/29/2025; Expected Discharge Time:       VTE Prophylaxis:  Mechanical VTE prophylaxis orders are present.           AM-PAC 6 Clicks Score (PT): 12 (01/25/25 2044)     CODE STATUS:   There are no questions and answers to display.         Crystal Pa MD  01/26/25                 Revision History            Consult Notes (last 48 hours)        Joaquin Montgomery APRN at 01/26/25 1057        Consult Orders    1. Inpatient Gastroenterology Consult [[id number]] ordered by Christiano Singh MD              Attestation signed by Brunner, Mark I, MD at 01/26/25 1218    I have reviewed this documentation and agree.  Patient seen and examined in preop, prior to ERCP.  Reviewed CT films with Mr. Montgomery.                      [facility]:  Inpatient Gastroenterology Consult      Inpatient Gastroenterology Consult  Consult performed by: Joaquin Montgomery APRN  Consult ordered by: Christiano Singh MD  Reason for consult: Elevated LFTs, hemochromatosis, recent bidirectional endoscopy      Referring Provider: No ref. provider found    PCP: Sita Chase APRN    Chief Complaint: Abdominal pain, nausea, vomiting, weight loss    History of present illness:    Malcolm Costa is a 59 y.o. male who is admitted with generalized weakness.  GI consult received for concerns of elevated liver enzymes, history of hemochromatosis, and recent unremarkable bidirectional endoscopy.    Patient and his father note that he has been feeling poorly for at least the past 2 to 3 months.  Notes he has lost a significant amount of weight on the same timeframe.  Notes  he has not been eating well and is having some pain following certain meals.  Low-grade temperature noted today; unsure if these have been occurring at home or not.  Patient does report early satiety and frequent belching and general loss of appetite.  Does not report any shortness of breath though he has had episodes of chest discomfort.    At time of admission, LFTs found to be elevated in cholestatic fashion.  CT imaging reviewed and as noted below.  Patient does have a background history of cholelithiasis and sludge requiring ERCP approximately 1 year ago.  Did have recent course of antibiotics for recent I&D.  Patient also with history of hemochromatosis with homogeneous H63D gene mutation is followed by heme-onc.    Past medical, surgical, social, and family histories are reviewed for accuracy.  No documented alleviating or exacerbating factors.  Does not endorse pain at time of exam.    Colonoscopy, Scan (01/23/2025)  Endoscopy, Int (01/23/2025)    Allergies:  Shellfish allergy, Shellfish-derived products, and Codeine    Scheduled Meds:  buPROPion XL, 150 mg, Oral, Daily  cefTRIAXone, 2,000 mg, Intravenous, Q24H  citalopram, 20 mg, Oral, Daily  insulin lispro, 2-7 Units, Subcutaneous, 4x Daily AC & at Bedtime  Lidocaine, 1 patch, Transdermal, Q24H  pantoprazole, 40 mg, Oral, Q AM  sodium chloride, 10 mL, Intravenous, Q12H  thiamine (B-1) IV, 200 mg, Intravenous, Daily         Infusions:  lactated ringers, 100 mL/hr        PRN Meds:    acetaminophen    senna-docusate sodium **AND** polyethylene glycol **AND** bisacodyl **AND** bisacodyl    dextrose    dextrose    glucagon (human recombinant)    sodium chloride    sodium chloride    sodium chloride    Home Meds:  Medications Prior to Admission   Medication Sig Dispense Refill Last Dose/Taking    buPROPion XL (WELLBUTRIN XL) 150 MG 24 hr tablet Take 1 tablet by mouth once daily 30 tablet 0 1/24/2025    citalopram (CeleXA) 20 MG tablet Take 1 tablet by mouth  "Daily. 30 tablet 1 1/24/2025    metFORMIN ER (GLUCOPHAGE-XR) 750 MG 24 hr tablet TAKE 2 TABLETS BY MOUTH ONCE DAILY WITH BREAKFAST 60 tablet 0 1/24/2025    omeprazole (priLOSEC) 20 MG capsule Take 1 capsule by mouth once daily 30 capsule 1 1/24/2025    Continuous Blood Gluc  (FreeStyle Dagoberto 2 [place]) device 1 Device Take As Directed. 1 each 0     Continuous Blood Gluc Sensor (FreeStyle Dagoberto 2 Sensor) misc Use 1 Device Every 14 (Fourteen) Days. 2 each 11     glucose blood test strip Use as instructed 50 each 12     Insulin Syringe-Needle U-100 28G X 5/16\" 1 ML misc Use 1 Device 2 (Two) Times a Day. 300 each 3     Lancets (accu-chek soft touch) lancets Used to test blood sugar for Diabetes E11.65 test up to twice a day 100 each 12     RELION INSULIN SYRINGE 1ML/31G 31G X 5/16\" 1 ML misc 2 (Two) Times a Day.          ROS: Review of Systems   Constitutional:  Positive for activity change, appetite change, fatigue, fever and unexpected weight change. Negative for chills and diaphoresis.   HENT:  Negative for sore throat, trouble swallowing and voice change.    Eyes: Negative.    Respiratory: Negative.  Negative for apnea, cough, choking, chest tightness, shortness of breath, wheezing and stridor.    Cardiovascular: Negative.  Negative for chest pain, palpitations and leg swelling.   Gastrointestinal:  Positive for abdominal pain, diarrhea, nausea and vomiting. Negative for abdominal distention, anal bleeding, blood in stool, constipation and rectal pain.   Endocrine: Negative.    Genitourinary: Negative.    Musculoskeletal: Negative.    Skin: Negative.  Negative for color change and pallor.   Allergic/Immunologic: Negative.    Neurological: Negative.    Hematological:  Negative for adenopathy. Does not bruise/bleed easily.   Psychiatric/Behavioral: Negative.     All other systems reviewed and are negative.      PAST MED HX:  Past Medical History:   Diagnosis Date    Allergic rhinitis     Anxiety     Dermatitis " 8/2/2016    Diabetes mellitus     Diverticulitis     Gastroesophageal reflux disease 7/11/2016    GERD (gastroesophageal reflux disease)     History of alcohol abuse     Hypertension     Insomnia 7/11/2016    Visual impairment 7/11/2016    Vitamin D deficiency 7/11/2016       PAST SURG HX:  Past Surgical History:   Procedure Laterality Date    APPENDECTOMY  1995    CHOLECYSTECTOMY WITH INTRAOPERATIVE CHOLANGIOGRAM N/A 2/1/2024    Procedure: CHOLECYSTECTOMY LAPAROSCOPIC WITH INTRAOPERATIVE CHOLANGIOGRAM, UMBILICAL HERNIA REPAIR;  Surgeon: Adam Ramos MD;  Location:  [facility];  Service: General;  Laterality: N/A;    ENDOSCOPY N/A 2/2/2024    Procedure: ESOPHAGOGASTRODUODENOSCOPY;  Surgeon: Dominik Chase MD;  Location:  [facility];  Service: Gastroenterology;  Laterality: N/A;    ERCP N/A 2/2/2024    Procedure: ENDOSCOPIC RETROGRADE CHOLANGIOPANCREATOGRAPHY;  Surgeon: Dominik Chase MD;  Location:  [facility];  Service: Gastroenterology;  Laterality: N/A;  Sphincterotomy made to common bile duct (CBD) ampulla. CBD swept with 9-12 mm balloon. ERCP scope removed with plastic cap intact.    HERNIA REPAIR  2010    TONSILLECTOMY  1974       FAM HX:  Family History   Problem Relation Age of Onset    Hypertension Mother     Multiple myeloma Mother     Hypertension Father     Alcohol abuse Father     Hypertension Maternal Grandmother     Hypertension Maternal Grandfather     Diabetes Paternal Grandmother     Hypertension Paternal Grandmother     Hypertension Paternal Grandfather     Thyroid disease Other        SOC HX:  Social History     Socioeconomic History    Marital status: Single   Tobacco Use    Smoking status: Never     Passive exposure: Never    Smokeless tobacco: Never    Tobacco comments:     Pt was diagnosed hemochromatosis March 2024   Vaping Use    Vaping status: Never Used   Substance and Sexual Activity    Alcohol use: Not Currently     Comment: sober since 2015    Drug use: No    Sexual  "activity: Defer       PHYSICAL EXAM  /83 (BP Location: Left arm, Patient Position: Lying)   Pulse 87   Temp 98.3 °F (36.8 °C) (Oral)   Resp 16   Ht 160 cm (63\")   Wt 77.4 kg (170 lb 11.2 oz)   SpO2 94%   BMI 30.24 kg/m²   Wt Readings from Last 3 Encounters:   01/25/25 77.4 kg (170 lb 11.2 oz)   01/24/25 76 kg (167 lb 9.6 oz)   01/10/25 79.4 kg (175 lb)   ,body mass index is 30.24 kg/m².  Physical Exam  Vitals and nursing note reviewed.   Constitutional:       General: He is not in acute distress.     Appearance: Normal appearance. He is normal weight. He is not ill-appearing or toxic-appearing.   HENT:      Head: Normocephalic and atraumatic.   Eyes:      General: No scleral icterus.     Extraocular Movements: Extraocular movements intact.      Conjunctiva/sclera: Conjunctivae normal.      Pupils: Pupils are equal, round, and reactive to light.   Cardiovascular:      Rate and Rhythm: Normal rate and regular rhythm.      Pulses: Normal pulses.      Heart sounds: Normal heart sounds.   Pulmonary:      Effort: Pulmonary effort is normal. No respiratory distress.      Breath sounds: Normal breath sounds.   Abdominal:      General: Abdomen is flat. Bowel sounds are normal. There is no distension.      Palpations: Abdomen is soft. There is no mass.      Tenderness: There is abdominal tenderness (Right upper quadrant). There is no guarding or rebound.      Hernia: No hernia is present.   Genitourinary:     Rectum: Guaiac result negative.   Musculoskeletal:      Right lower leg: No edema.      Left lower leg: No edema.   Skin:     General: Skin is warm and dry.      Capillary Refill: Capillary refill takes less than 2 seconds.      Coloration: Skin is not jaundiced or pale.   Neurological:      General: No focal deficit present.      Mental Status: He is alert and oriented to person, place, and time.   Psychiatric:         Mood and Affect: Mood normal.         Behavior: Behavior normal.         Thought Content: " Thought content normal.         Judgment: Judgment normal.         Results Review:   I reviewed the patient's new clinical results.  I reviewed the patient's new imaging results and agree with the interpretation.  I reviewed the patient's other test results and agree with the interpretation  I personally viewed and interpreted the patient's EKG/Telemetry data    Lab Results   Component Value Date    WBC 4.31 01/26/2025    HGB 9.9 (L) 01/26/2025    HGB 11.1 (L) 01/25/2025    HGB 11.6 (L) 01/24/2025    HCT 30.6 (L) 01/26/2025    MCV 93.0 01/26/2025    PLT 93 (L) 01/26/2025       Lab Results   Component Value Date    INR 1.12 01/26/2025    INR 1.02 02/01/2024       Lab Results   Component Value Date    GLUCOSE 106 (H) 01/26/2025    BUN 14 01/26/2025    CREATININE 0.88 01/26/2025    EGFRIFNONA 74 09/03/2021    BCR 15.9 01/26/2025     01/26/2025    K 3.3 (L) 01/26/2025    CO2 21.0 (L) 01/26/2025    CALCIUM 8.3 (L) 01/26/2025    ALBUMIN 3.2 (L) 01/26/2025    ALKPHOS 297 (H) 01/26/2025    BILITOT 3.8 (H) 01/26/2025    BILIDIR 0.2 11/25/2024     (H) 01/26/2025     (H) 01/26/2025       CT Chest Without Contrast Diagnostic    Result Date: 1/25/2025  CT CHEST WO CONTRAST DIAGNOSTIC Date of Exam: 1/25/2025 3:53 PM EST Indication: ams. Comparison: None available. Technique: Axial CT images were obtained of the chest without contrast administration.  Reconstructed coronal and sagittal images were also obtained. Automated exposure control and iterative construction methods were used. FINDINGS: Scattered atelectasis is noted. No well-defined consolidations or pleural effusions are observed. Granulomas are noted in the left lower lobe. No suspicious pulmonary nodules or abnormal pulmonary masses are seen. No significant hilar, mediastinal, or axillary lymphadenopathy is observed. Calcified left hilar lymph nodes are noted. A normal aortic arch branching pattern is identified. Coronary artery calcifications are  identified. The thyroid gland is unremarkable. The esophagus is unremarkable. The limited evaluation of the upper abdomen demonstrates no evidence for acute abnormality. There is evidence for diffuse hepatic fatty infiltration with associated hepatosplenomegaly. No acute osseous abnormalities are observed.     1.No evidence for acute intrathoracic abnormality. 2.Coronary artery calcifications are noted. Electronically Signed: Andrew Tillman MD  1/25/2025 4:16 PM EST  Workstation ID: [id number]    CT Abdomen Pelvis Without Contrast    Result Date: 1/25/2025  CT ABDOMEN PELVIS WO CONTRAST Date of Exam: 1/25/2025 3:53 PM EST Indication: ams. Comparison: None available. Technique: Axial CT images were obtained of the abdomen and pelvis without the administration of contrast. Reconstructed coronal and sagittal images were also obtained. Automated exposure control and iterative construction methods were used. FINDINGS: Lung bases: Calcified left hilar lymph nodes. Left lower lobe calcified granulomata. Atheromatous disease of the coronary vessels. Liver: Geographic areas of decreased attenuation within the liver suggesting hepatic steatosis. Spleen: Splenic granulomata Pancreas:No pancreatic masses. No evidence of pancreatitis. Gallbladder and common bile duct: Previous cholecystectomy. Adrenal glands:No adrenal masses Kidneys and ureters:No kidney stones. No renal masses.No calculi present within the ureters. Normal caliber ureters. Urinary bladder:No urinary bladder wall thickening. No bladder masses. Small bowel:Normal caliber small bowel. Large bowel:No diverticulosis or diverticulitis. No large bowel masses are appreciated Appendix: Not seen however there is no evidence of appendicitis GENITOURINARY: Normal prostate Ascites or pneumoperitoneum:None. Adenopathy:None present Osseous structures: The proximal femurs are intact. The pubic bones are intact. The sacrum and sacroiliac joints are normal. The spinous and  transverse processes are intact. No rib fractures. Other findings: Fat-containing umbilical hernias.     1.No acute findings in the abdomen or pelvis. 2.Hepatic steatosis. 3.Fat-containing umbilical hernias. Electronically Signed: Stefano Garcia MD  1/25/2025 4:11 PM EST  Workstation ID: [id number]    CT Head Without Contrast    Result Date: 1/25/2025  CT HEAD WO CONTRAST Date of Exam: 1/25/2025 3:53 PM EST Indication: ams. Comparison: 12/15/2014 Technique: Axial CT images were obtained of the head without contrast administration.  Automated exposure control and iterative construction methods were used. Findings: Gray-white matter differentiation is maintained without evidence of an acute infarction. No intracranial mass or mass effect. No extra-axial mass or collection. The ventricles and sulci are normal in size and configuration. The posterior fossa appears normal. Sellar and suprasellar structures are normal. Orbital and paranasal soft tissues are normal. Opacification of the left maxillary sinus with mucoperiosteal reactive changes consistent with chronic sinusitis. The bony calvarium appears intact. No acute fractures. No lytic or blastic bony diseases.     Impression: No acute intracranial pathology. Electronically Signed: Stefano Garcia MD  1/25/2025 4:07 PM EST  Workstation ID: [id number]    XR Chest 1 View    Result Date: 1/24/2025  XR CHEST 1 VW Date of Exam: 1/24/2025 6:38 PM EST Indication: Chest Pain Triage Protocol Comparison: 1/10/2025 Findings: 3 cm lordotic projection. Heart size is at the upper limits of normal. Pulmonary vessels are normal. Lungs are clear. No pleural effusion. No pneumothorax.     Impression: 1. No acute cardiopulmonary disease. Electronically Signed: Elijah Kraft MD  1/24/2025 7:09 PM EST  Workstation ID: [id number]    XR Chest 1 View    Result Date: 1/10/2025  XR CHEST 1 VW Date of Exam: 1/10/2025 3:39 PM EST Indication: Weak/Dizzy/AMS triage protocol Comparison: 5/11/2021 Findings:  No focal consolidation. Unchanged elevation of the right hemidiaphragm. No pneumothorax or pleural effusion. Cardiac size is normal. The visualized clavicles appear intact. No displaced rib fractures. The visualized upper abdomen is normal.     Impression: No acute cardiopulmonary disease. Electronically Signed: Stefano Garcia MD  1/10/2025 3:53 PM EST  Workstation ID: [id number]    CT Soft Tissue Neck With Contrast    Result Date: 12/27/2024  CT SOFT TISSUE NECK W CONTRAST Date of Exam: 12/27/2024 7:24 PM EST Indication: Abscess right posterior scalp neck. Comparison: None available. Technique: Axial CT images were obtained of the neck after the uneventful intravenous administration of 85 mL Isovue-300.  Reconstructed coronal and sagittal images were also obtained. Automated exposure control and iterative construction methods were used. Findings: No exophytic lesion seen within the aerodigestive tract. Evaluation of the oral cavity is degraded by dental hardware artifact. No pathologic appearing lymph nodes by imaging criteria. The visualized glands appear unremarkable. Atherosclerotic calcifications of the carotid arteries. Jugular veins appear patent. No acute findings in the included intracranial structures.likely chronic near complete opacification of the left maxillary sinus. Mild mucosal thickening elsewhere in the paranasal sinuses. Nonspecific bilateral mastoid effusions. There is a rim-enhancing fluid collection along the right posterior scalp that is incompletely imaged (extends superiorly out of field-of-view) and measures approximately 3 x 1.5 cm in AP by TV dimension and at least 3 cm in craniocaudal dimension. Mild adjacent inflammatory change without extension of inflammation into the deep spaces of the neck. No adjacent aggressive periostitis or erosions. Degenerative changes of the imaged spine. Multiple dental caries and periapical lucencies which can be correlated with dental exam. Included lung  apices are clear.     Impression: Incompletely imaged rim-enhancing fluid collection along the right posterior scalp measuring approximately 3 cm, suspicious for abscess.. There is mild adjacent inflammatory change without extension of inflammation into the deep spaces of the neck. Electronically Signed: Darien CHARITO Stuart  12/27/2024 7:51 PM EST  Workstation ID: [id number]     ASSESSMENTS/PLANS  1.  Abdominal pain, generalized  2.  Elevated LFTs, cholestatic fashion  3.  Unintentional weight loss, early satiety  4.  GERD  5.  History of choledocholithiasis with history of ERCP approximately 1 year ago  6.  Hemochromatosis, followed by hematology, H63D mutation positive, has been undergoing therapeutic phlebotomy.    Malcolm Costa is a 59 y.o. male presents to hospital with weakness and fatigue in a months long onset of generalized symptoms including abdominal pain, nausea, vomiting, and unintentional weight loss.  At time of admission, LFTs found to be elevated and markedly cholestatic fashion.  CT imaging reviewed and is concerning for possible retained common duct stone given calcification appreciated at the level of the ampulla.  Given these findings, recommend ERCP today for further evaluation.  If ERCP is unremarkable, will need to expand workup to other etiologies.  LFTs could also be explained by drug-induced liver injury secondary to recent antibiotic exposure.    >>> N.p.o.  >>> ERCP today  >>> IV antibiotics  >>> Antiemetics and pain control measures  >>> GI prophylaxis PPI  >>> Monitor LFTs  >>> Patient will need to follow-up with hematology at discharge    I discussed the patient's findings and my recommendations with patient, family, and consulting provider    JOANA Short  01/26/25  10:57 EST        Electronically signed by Brunner, Mark I, MD at 01/26/25 2886

## 2025-01-27 NOTE — PROGRESS NOTES
[facility] Medicine Services  PROGRESS NOTE    Patient Name: Malcolm Costa  : 1965  MRN: [id number]    Date of Admission: 2025  Primary Care Physician: Sita Chase APRN    Subjective   Subjective     CC:  Generalized weakness    HPI:  Patient feeling much better this morning.  He denies any abdominal pain or nausea or vomiting.  He was able to walk with PT.      Objective   Objective     Vital Signs:   Temp:  [97.5 °F (36.4 °C)-98 °F (36.7 °C)] 98 °F (36.7 °C)  Heart Rate:  [50-75] 64  Resp:  [16-20] 18  BP: (131-153)/(75-83) 132/77     Physical Exam  Cardiovascular:      Rate and Rhythm: Normal rate and regular rhythm.      Heart sounds: Normal heart sounds.   Pulmonary:      Effort: Pulmonary effort is normal.      Breath sounds: Normal breath sounds.   Abdominal:      General: There is no distension.      Palpations: Abdomen is soft.   Musculoskeletal:      Right lower leg: No edema.      Left lower leg: No edema.   Neurological:      General: No focal deficit present.      Mental Status: He is alert.          Results Reviewed:  LAB RESULTS:      Lab 25  0350 25  0843 25  1626 25  1510 25  1942 25  1832   WBC 2.02* 4.31  --   --  8.48  --  2.93*   HEMOGLOBIN 9.6* 9.9*  --   --  11.1*  --  11.6*   HEMATOCRIT 31.0* 30.6*  --   --  33.0*  --  36.1*   PLATELETS 89* 93*  --   --  110*  --  123*   NEUTROS ABS  --  3.09  --   --  6.59  --  1.83   IMMATURE GRANS (ABS)  --  0.02  --   --  0.05  --  0.01   LYMPHS ABS  --  0.79  --   --  1.13  --  0.82   MONOS ABS  --  0.39  --   --  0.69  --  0.19   EOS ABS  --  0.01  --   --  0.01  --  0.08   MCV 95.4 93.0  --   --  89.9  --  91.4   CRP  --  11.62*  --   --   --   --   --    PROCALCITONIN  --   --   --   --  7.37*  --   --    LACTATE  --  1.0 1.1  --  2.7*  --   --    PROTIME  --  14.5  --   --   --   --   --    D DIMER QUANT  --   --   --   --   --   --  0.50   HSTROP T  --   --    --  29* 31* 19 21         Lab 01/27/25  0350 01/26/25  0843 01/25/25  1510 01/24/25  1832   SODIUM 140 140 137 139   POTASSIUM 4.2 3.4*  3.3* 3.7 3.8   CHLORIDE 107 106 102 102   CO2 22.0 21.0* 19.0* 23.2   ANION GAP 11.0 13.0 16.0* 13.8   BUN 17 14 15 10   CREATININE 0.93 0.88 1.14 0.92   EGFR 94.6 99.1 74.1 95.8   GLUCOSE 244* 106* 277* 174*   CALCIUM 8.6 8.3* 8.8 8.7   MAGNESIUM 2.4  --  1.6  --    HEMOGLOBIN A1C  --  6.00*  --   --    TSH  --  0.816  --   --          Lab 01/27/25  0350 01/26/25  0843 01/25/25  1510 01/24/25  1832   TOTAL PROTEIN 6.0 6.1 7.0 7.2   ALBUMIN 3.1* 3.2* 3.9 3.8   GLOBULIN  --  2.9 3.1 3.4   ALT (SGPT) 86* 100* 136* 40   AST (SGOT) 74* 120* 229* 100*   BILIRUBIN 2.3* 3.8* 3.5* 0.8   INDIRECT BILIRUBIN 0.5  --   --   --    BILIRUBIN DIRECT 1.8* 3.7*  --   --    ALK PHOS 290* 297* 379* 203*   LIPASE  --   --   --  34         Lab 01/26/25  0843 01/25/25  1626 01/25/25  1510 01/24/25  1942 01/24/25  1832   PROBNP  --   --   --   --  489.0   HSTROP T  --  29* 31* 19 21   PROTIME 14.5  --   --   --   --    INR 1.12  --   --   --   --              Lab 01/26/25  0843   IRON 13*   IRON SATURATION (TSAT) 7*   TIBC 189*   TRANSFERRIN 127*   FERRITIN 2,807.00*   FOLATE 6.75   VITAMIN B 12 791         Brief Urine Lab Results  (Last result in the past 365 days)        Color   Clarity   Blood   Leuk Est   Nitrite   Protein   CREAT   Urine HCG        01/25/25 1456 Dark Yellow   Cloudy   Negative   Negative   Negative   >=300 mg/dL (3+)                   Microbiology Results Abnormal       None            US Liver    Result Date: 1/27/2025  US LIVER Date of Exam: 1/27/2025 7:40 AM EST Indication: elevated bilirubin. Comparison: CT abdomen pelvis 1/25/2025 Technique: Grayscale and color Doppler ultrasound evaluation of the right upper quadrant was performed. Findings: Diffuse increased hepatic echogenicity. Mildly heterogeneous echotexture. No solid appearing liver lesions. Nonspecific dilation of  main portal vein measuring 1.8 cm with otherwise hepatopetal flow. No visualized ascites. Cholecystectomy No intrahepatic duct dilation. The common bile duct measures maximally 3 mm, normal.. The visualized portions of the head and body of the pancreas are grossly unremarkable. The pancreatic tail is not seen due to bowel gas. Homogeneous cortical echotexture of the right kidney. No hydronephrosis, shadowing echogenicities or solid masses.     Impression: 1. No evidence of biliary cholestasis status post cholecystectomy. 2. Hepatic steatosis, likely marked severity. Mild parenchymal heterogeneity could reflect sequela of chronic hepatocellular dysfunction. 3. Nonspecific dilation of main portal vein with otherwise normal hepatopetal flow. Electronically Signed: Lul Burroughs MD  1/27/2025 8:23 AM EST  Workstation ID: [id number]    FL ERCP pancreatic and biliary ducts    Result Date: 1/27/2025  FL ERCP PANCREATIC AND BILIARY DUCTS Date of Exam: 1/26/2025 12:03 PM EST Indication: ENDOSCOPIC RETROGRADE CHOLANGIOPANCREATOGRAPHY.   Comparison: 2/2/2024 Technique:  A series of radiographic digital spot films were obtained in conjunction with an endoscopic catheterization of the biliary and pancreatic ductal system, performed by the gastroenterologist. Fluoroscopic Time: 38 seconds Number of Images: 2 Findings: Dictation is to record 38 seconds of fluoroscopy time during ERCP. 2 images obtained show endoscope and side cannula placement, contrast injection of the common duct and apparent balloon sweep. Please see the procedure report for full details.     Impression: Impression: Fluoroscopy provided during ERCP. Electronically Signed: Myron Means MD  1/27/2025 8:13 AM EST  Workstation ID: [id number]    CT Chest Without Contrast Diagnostic    Result Date: 1/25/2025  CT CHEST WO CONTRAST DIAGNOSTIC Date of Exam: 1/25/2025 3:53 PM EST Indication: ams. Comparison: None available. Technique: Axial CT images were obtained of the  chest without contrast administration.  Reconstructed coronal and sagittal images were also obtained. Automated exposure control and iterative construction methods were used. FINDINGS: Scattered atelectasis is noted. No well-defined consolidations or pleural effusions are observed. Granulomas are noted in the left lower lobe. No suspicious pulmonary nodules or abnormal pulmonary masses are seen. No significant hilar, mediastinal, or axillary lymphadenopathy is observed. Calcified left hilar lymph nodes are noted. A normal aortic arch branching pattern is identified. Coronary artery calcifications are identified. The thyroid gland is unremarkable. The esophagus is unremarkable. The limited evaluation of the upper abdomen demonstrates no evidence for acute abnormality. There is evidence for diffuse hepatic fatty infiltration with associated hepatosplenomegaly. No acute osseous abnormalities are observed.     Impression: 1.No evidence for acute intrathoracic abnormality. 2.Coronary artery calcifications are noted. Electronically Signed: Andrew Tillman MD  1/25/2025 4:16 PM EST  Workstation ID: [id number]    CT Abdomen Pelvis Without Contrast    Result Date: 1/25/2025  CT ABDOMEN PELVIS WO CONTRAST Date of Exam: 1/25/2025 3:53 PM EST Indication: ams. Comparison: None available. Technique: Axial CT images were obtained of the abdomen and pelvis without the administration of contrast. Reconstructed coronal and sagittal images were also obtained. Automated exposure control and iterative construction methods were used. FINDINGS: Lung bases: Calcified left hilar lymph nodes. Left lower lobe calcified granulomata. Atheromatous disease of the coronary vessels. Liver: Geographic areas of decreased attenuation within the liver suggesting hepatic steatosis. Spleen: Splenic granulomata Pancreas:No pancreatic masses. No evidence of pancreatitis. Gallbladder and common bile duct: Previous cholecystectomy. Adrenal glands:No adrenal  masses Kidneys and ureters:No kidney stones. No renal masses.No calculi present within the ureters. Normal caliber ureters. Urinary bladder:No urinary bladder wall thickening. No bladder masses. Small bowel:Normal caliber small bowel. Large bowel:No diverticulosis or diverticulitis. No large bowel masses are appreciated Appendix: Not seen however there is no evidence of appendicitis GENITOURINARY: Normal prostate Ascites or pneumoperitoneum:None. Adenopathy:None present Osseous structures: The proximal femurs are intact. The pubic bones are intact. The sacrum and sacroiliac joints are normal. The spinous and transverse processes are intact. No rib fractures. Other findings: Fat-containing umbilical hernias.     Impression: 1.No acute findings in the abdomen or pelvis. 2.Hepatic steatosis. 3.Fat-containing umbilical hernias. Electronically Signed: Stefano Garcia MD  1/25/2025 4:11 PM EST  Workstation ID: [id number]    CT Head Without Contrast    Result Date: 1/25/2025  CT HEAD WO CONTRAST Date of Exam: 1/25/2025 3:53 PM EST Indication: ams. Comparison: 12/15/2014 Technique: Axial CT images were obtained of the head without contrast administration.  Automated exposure control and iterative construction methods were used. Findings: Gray-white matter differentiation is maintained without evidence of an acute infarction. No intracranial mass or mass effect. No extra-axial mass or collection. The ventricles and sulci are normal in size and configuration. The posterior fossa appears normal. Sellar and suprasellar structures are normal. Orbital and paranasal soft tissues are normal. Opacification of the left maxillary sinus with mucoperiosteal reactive changes consistent with chronic sinusitis. The bony calvarium appears intact. No acute fractures. No lytic or blastic bony diseases.     Impression: Impression: No acute intracranial pathology. Electronically Signed: Stefano Garcia MD  1/25/2025 4:07 PM EST  Workstation ID:  [id number]     Results for orders placed during the hospital encounter of 01/25/25    Adult Transthoracic Echo Complete W/ Cont if Necessary Per Protocol    Interpretation Summary    Left ventricular systolic function is normal. Calculated left ventricular EF = 57.5% Left ventricular ejection fraction appears to be 56 - 60%.    Left ventricular wall thickness is consistent with borderline concentric hypertrophy. Left ventricular diastolic function was normal.    The right ventricular cavity is borderline dilated with normal systolic function.    No hemodynamically significant valvular dysfunction.      Current medications:  Scheduled Meds:buPROPion XL, 150 mg, Oral, Daily  cefTRIAXone, 2,000 mg, Intravenous, Q24H  citalopram, 20 mg, Oral, Daily  insulin lispro, 2-7 Units, Subcutaneous, 4x Daily AC & at Bedtime  Lidocaine, 1 patch, Transdermal, Q24H  pantoprazole, 40 mg, Oral, Q AM  thiamine (B-1) IV, 200 mg, Intravenous, Daily      Continuous Infusions:     PRN Meds:.  acetaminophen    senna-docusate sodium **AND** polyethylene glycol **AND** bisacodyl **AND** bisacodyl    dextrose    dextrose    glucagon (human recombinant)    sodium chloride    Assessment & Plan   Assessment & Plan     Active Hospital Problems    Diagnosis  POA    **Elevated LFTs [R79.89]  Yes    Severe protein-calorie malnutrition [E43]  Yes      Resolved Hospital Problems   No resolved problems to display.        Brief Hospital Course to date:  Malcolm Costa is a 59 y.o. male with hemochromatosis, type 2 diabetes, hyperlipidemia, hypertension, GERD, depression and anxiety who presents due to generalized weakness.  Patient has had problems with weakness and overall malaise for several months however his symptoms worsened over the last week.    HIV, newly diagnosed  Generalized weakness  Unintentional weight loss   -Patient's HIV antibody test came back positive today  -Patient MSM however family does not know and patient does not want them told  yet  -Patient denies any IV drug use, needlesticks, new sexual partners.  His fatigue and weight loss have been going on for several months.  He is unsure when he was exposed  -Patient does have a history of syphilis that was treated back in 2014.  No other known STIs  -Spoke to infectious disease over the phone today.  Due to his insurance he will have to follow at .  Will get this set up at discharge with the help of case management  -Confirmatory test is pending.  Have also sent a viral panel and a CD4 count as well as STI panel  -No respiratory issues and no history of swallowing problems or known oral ulcers    Elevated LFTs  Thrombocytopenia  -LFTs and bili improving  - prior cholecystectomy 2/2024  -ERCP done 1/26 with removal of stone likely the cause of his symptoms  -Concern patient does have infection given his elevated inflammatory markers and symptoms.  Patient's immunocompromise status could be the reason why he did not have a true fever or leukocytosis  -Gallstone was removed and patient improving.  Given presumed source control will plan on 5 days of antibiotic therapy for presumed cholangitis    Hemachromatosis  -pt has homozygous H63D mutation  - MRI abdomen 12/18/14 showed mild iron deposition in the liver with suggestion of hepatic steatosis.  Mild splenomegaly also noted.  -pt followed by Dr Myers with hem/onc  - unfortunately pt has low iron saturation and iron as well as anemia so he has been unable to undergo phlebotomy to lower his ferritin until December  -iron studies still show low iron, low iron sat and high ferritin   -worsening ferritin elevation this admit likely due to infection    -Echo pending   -Patient would like to defer brain MRI at this time, this is reasonable given above reason for fatigue  -treat infection as above      GERD  Colonic inflammation  - EGD and colonoscopy 1/23/25 showed upper erythematous stomach mucosa and lower: congested mucosa with ulcerations on the  ileocecal valve  - protonix     DMII  HTN  - patient states he was taken off his diabetes and antihypertensive medications one month ago by his PCP  -A1c 6  - SSI + 3u prandial, adjust as needed     Depression and anxiety  - continue citalopram  - father concerned patient recently took oxycodone from his mother's home supply.  Patient says he had one dose earlier this week and it was only because of generalized pain.   -pt denies SI  - check UDS    Expected Discharge Location and Transportation: tbd  Expected Discharge   Expected Discharge Date: 1/29/2025; Expected Discharge Time:      VTE Prophylaxis:  Mechanical VTE prophylaxis orders are present.         AM-PAC 6 Clicks Score (PT): 18 (01/27/25 9549)    CODE STATUS:   There are no questions and answers to display.       Crystal Pa MD  01/27/25

## 2025-01-27 NOTE — PLAN OF CARE
Goal Outcome Evaluation:   Up to chair w therapy today.   VSS on RA.   SB/NSR on tele.   PW in place.  Appetite better today.

## 2025-01-27 NOTE — PLAN OF CARE
Goal Outcome Evaluation:  Plan of Care Reviewed With: patient        Progress: no change  Outcome Evaluation: Patient presents with weakness, decreased endurance, and balance impairments affecting functional mobility. Skilled IP PT services warranted to address deficits and support return to independence. Recommend home with assist and OP PT at d/c.    Anticipated Discharge Disposition (PT): home with assist, home with outpatient therapy services

## 2025-01-27 NOTE — PROGRESS NOTES
"  GI Daily Progress Note  Subjective:    Chief Complaint: Follow-up choledocholithiasis    Patient resting in bed no acute distress.  Does not report any further abdominal pain.  Denies any nausea or vomiting today either; was able to eat eggs this morning.  No new worsening GI complaints.  Denies pain.    Objective:    /80 (BP Location: Right arm, Patient Position: Sitting)   Pulse 75   Temp 97.7 °F (36.5 °C) (Oral)   Resp 18   Ht 160 cm (63\")   Wt 77.4 kg (170 lb 11.2 oz)   SpO2 99%   BMI 30.24 kg/m²     Physical Exam  Vitals and nursing note reviewed.   Constitutional:       General: He is not in acute distress.     Appearance: Normal appearance. He is normal weight. He is not ill-appearing or toxic-appearing.   HENT:      Head: Normocephalic and atraumatic.   Cardiovascular:      Rate and Rhythm: Normal rate and regular rhythm.      Pulses: Normal pulses.      Heart sounds: Normal heart sounds.   Pulmonary:      Effort: Pulmonary effort is normal. No respiratory distress.      Breath sounds: Normal breath sounds.   Abdominal:      General: Abdomen is flat. Bowel sounds are normal. There is no distension.      Palpations: Abdomen is soft. There is no mass.      Tenderness: There is no abdominal tenderness. There is no guarding or rebound.      Hernia: No hernia is present.   Neurological:      General: No focal deficit present.      Mental Status: He is alert and oriented to person, place, and time.   Psychiatric:         Mood and Affect: Mood normal.         Behavior: Behavior normal.         Thought Content: Thought content normal.         Judgment: Judgment normal.         Lab  I have personally reviewed most recent cardiac tracings, lab results, and radiology images and interpretations and agree with findings.    Lab Results   Component Value Date    WBC 2.02 (L) 01/27/2025    HGB 9.6 (L) 01/27/2025    HGB 9.9 (L) 01/26/2025    HGB 11.1 (L) 01/25/2025    MCV 95.4 01/27/2025    PLT 89 (L) " 01/27/2025    INR 1.12 01/26/2025    INR 1.02 02/01/2024       Lab Results   Component Value Date    GLUCOSE 244 (H) 01/27/2025    BUN 17 01/27/2025    CREATININE 0.93 01/27/2025    EGFRIFNONA 74 09/03/2021    BCR 18.3 01/27/2025     01/27/2025    K 4.2 01/27/2025    CO2 22.0 01/27/2025    CALCIUM 8.6 01/27/2025    ALBUMIN 3.1 (L) 01/27/2025    ALKPHOS 290 (H) 01/27/2025    BILITOT 2.3 (H) 01/27/2025    BILIDIR 1.8 (H) 01/27/2025    ALT 86 (H) 01/27/2025    AST 74 (H) 01/27/2025     ERCP (01/26/2025 12:01)   S/p ERCP per Dr. Brunner  ERCP reveals prior sphincterotomy.  Common bile duct was wire cannulated with first-pass of the wire.  Cholangiogram with pills.  Effect distal common bile duct; sphincterotomy extended and balloon sweep found 1 black stones.  No further stones appreciated on final cholangiogram.    Assessment:      Elevated LFTs    Severe protein-calorie malnutrition    1.  Abdominal pain, generalized, s/p ERCP with findings concerning common duct stone that was removed via balloon extraction.  2.  Elevated LFTs, cholestatic fashion, improving following above  3.  Unintentional weight loss, early satiety  4.  GERD  5.  History of choledocholithiasis with history of ERCP approximately 1 year ago  6.  Hemochromatosis, followed by hematology, H63D mutation positive, has been undergoing therapeutic phlebotomy.    Plan:  Patient doing well today.  Pain improved.  LFTs improving.    Discussed results of ERCP with patient and his dad; noted that there was a distal common duct stone as we suspected and that we have since removed this during ERCP.  LFTs are improving; anticipate full resolution in the next few days.    >> Anticipate no further need of any additional hepatobiliary interventions.  >> Continue as needed antiemetics and pain control measures  >> May advance diet as tolerated; would keep diet low-fat  >> Patient will need to follow-up with his PCP at discharge for outpatient labs x 1 week to  ensure improvement in LFTs.  >> Continue GI prophylaxis with PPI given GERD/dyspepsia symptoms.    GI will sign off at present.  Please feel free to contact us with any further questions or concerns.    Joaquin Montgomery, JOANA  01/27/25  11:46 EST

## 2025-01-27 NOTE — THERAPY EVALUATION
Patient Name: Malcolm Costa  : 1965    MRN: [id number]                              Today's Date: 2025       Admit Date: 2025    Visit Dx:     ICD-10-CM ICD-9-CM   1. Weakness  R53.1 780.79     Patient Active Problem List   Diagnosis    Arthritis    Uncontrolled type 2 diabetes mellitus with hyperglycemia    Essential hypertension    Pure hypercholesterolemia    Recurrent major depressive disorder, in partial remission    Anxiety    Blues    Cutaneous eruption    Diverticulitis of intestine    Nausea and vomiting    Acute cholecystitis    Elevated liver enzymes    Hemochromatosis associated with mutation in HFE gene    Elevated LFTs    Severe protein-calorie malnutrition     Past Medical History:   Diagnosis Date    Allergic rhinitis     Anxiety     Dermatitis 2016    Diabetes mellitus     Diverticulitis     Gastroesophageal reflux disease 2016    GERD (gastroesophageal reflux disease)     History of alcohol abuse     Hypertension     Insomnia 2016    Visual impairment 2016    Vitamin D deficiency 2016     Past Surgical History:   Procedure Laterality Date    APPENDECTOMY      CHOLECYSTECTOMY WITH INTRAOPERATIVE CHOLANGIOGRAM N/A 2024    Procedure: CHOLECYSTECTOMY LAPAROSCOPIC WITH INTRAOPERATIVE CHOLANGIOGRAM, UMBILICAL HERNIA REPAIR;  Surgeon: Adam Ramos MD;  Location: [facility] OR;  Service: General;  Laterality: N/A;    ENDOSCOPY N/A 2024    Procedure: ESOPHAGOGASTRODUODENOSCOPY;  Surgeon: Dominik Chase MD;  Location:  [facility];  Service: Gastroenterology;  Laterality: N/A;    ERCP N/A 2024    Procedure: ENDOSCOPIC RETROGRADE CHOLANGIOPANCREATOGRAPHY;  Surgeon: Dominik Chase MD;  Location:  [facility];  Service: Gastroenterology;  Laterality: N/A;  Sphincterotomy made to common bile duct (CBD) ampulla. CBD swept with 9-12 mm balloon. ERCP scope removed with plastic cap intact.    ERCP N/A 2025    Procedure: ENDOSCOPIC RETROGRADE  CHOLANGIOPANCREATOGRAPHY;  Surgeon: Brunner, Mark I, MD;  Location: [facility] ENDOSCOPY;  Service: Gastroenterology;  Laterality: N/A;  Sphincterotomy, 9-12mm balloon sweep    HERNIA REPAIR  2010    TONSILLECTOMY  1974      General Information       Row Name 01/27/25 1034          Physical Therapy Time and Intention    Document Type evaluation  -NS     Mode of Treatment physical therapy  -NS       Row Name 01/27/25 1034          General Information    Patient Profile Reviewed yes  -NS     Prior Level of Function independent:;all household mobility;gait;transfer;bed mobility;ADL's  -NS     Existing Precautions/Restrictions fall  -NS     Barriers to Rehab medically complex  -NS       Row Name 01/27/25 1034          Living Environment    People in Home alone  going to stay with Dad upon discharge  -NS       Row Name 01/27/25 1034          Home Main Entrance    Number of Stairs, Main Entrance two  -NS     Stair Railings, Main Entrance railing on left side (ascending)  -NS       Row Name 01/27/25 1034          Stairs Within Home, Primary    Number of Stairs, Within Home, Primary none  -NS       Row Name 01/27/25 1034          Cognition    Orientation Status (Cognition) oriented to;person;time;verbal cues/prompts needed for orientation;place  knew he was at [facility], stated [facility]  -NS       Row Name 01/27/25 1034          Safety Issues/Impairments Affecting Functional Mobility    Safety Issues Affecting Function (Mobility) insight into deficits/self-awareness;safety precaution awareness;safety precautions follow-through/compliance  -NS     Impairments Affecting Function (Mobility) balance;coordination;endurance/activity tolerance;strength  -NS               User Key  (r) = Recorded By, (t) = Taken By, (c) = Cosigned By      Initials Name Provider Type    NS Sharmin Miramontes PT Physical Therapist                   Mobility       Row Name 01/27/25 1034          Bed Mobility    Bed Mobility supine-sit  -NS      Supine-Sit [place] (Bed Mobility) contact guard  -NS       Row Name 01/27/25 1034          Sit-Stand Transfer    Sit-Stand [place] (Transfers) contact guard  -NS       Row Name 01/27/25 1034          Gait/Stairs (Locomotion)    [place] Level (Gait) contact guard  -NS     Patient was able to Ambulate yes  -NS     Distance in Feet (Gait) 160  -NS     Deviations/Abnormal Patterns (Gait) serge decreased;festinating/shuffling;gait speed decreased;stride length decreased  -NS     Bilateral Gait Deviations heel strike decreased  -NS     Comment, (Gait/Stairs) Patient ambulated at slow pace, demonstrating decreased heel strike and mild unsteadiness.  -NS               User Key  (r) = Recorded By, (t) = Taken By, (c) = Cosigned By      Initials Name Provider Type    Sharmin Lepe PT Physical Therapist                   Obj/Interventions       Row Name 01/27/25 1034          Range of Motion Comprehensive    General Range of Motion bilateral lower extremity ROM WFL  -NS       [facility] Name 01/27/25 1034          Strength Comprehensive (MMT)    General Manual Muscle Testing (MMT) Assessment lower extremity strength deficits identified  -NS     Comment, General Manual Muscle Testing (MMT) Assessment B ankle DF: 4+/5, B knee ext: 4/5, B hip flexion: 4/5  -NS       Row Name 01/27/25 1034          Balance    Balance Assessment sitting static balance;sitting dynamic balance;standing static balance;standing dynamic balance  -NS     Static Sitting Balance standby assist  -NS     Dynamic Sitting Balance standby assist  -NS     Position, Sitting Balance unsupported;sitting edge of bed  -NS     Static Standing Balance contact guard  -NS     Dynamic Standing Balance contact guard  -NS     Position/Device Used, Standing Balance unsupported  -NS       Row Name 01/27/25 1034          Sensory Assessment (Somatosensory)    Sensory Assessment (Somatosensory) LE sensation intact  -NS               User Key  (r) = Recorded By, (t)  = Taken By, (c) = Cosigned By      Initials Name Provider Type    Sharmin Lepe, PT Physical Therapist                   Goals/Plan       Row Name 01/27/25 1034          Bed Mobility Goal 1 (PT)    Activity/Assistive Device (Bed Mobility Goal 1, PT) supine to sit  -NS     [place] Level/Cues Needed (Bed Mobility Goal 1, PT) independent  -NS     Time Frame (Bed Mobility Goal 1, PT) short term goal (STG);1 week  -NS       [facility] Name 01/27/25 1034          Transfer Goal 1 (PT)    Activity/Assistive Device (Transfer Goal 1, PT) sit-to-stand/stand-to-sit;bed-to-chair/chair-to-bed  -NS     [place] Level/Cues Needed (Transfer Goal 1, PT) independent  -NS     Time Frame (Transfer Goal 1, PT) long term goal (LTG);10 days  -NS       Row Name 01/27/25 1034          Gait Training Goal 1 (PT)    Activity/Assistive Device (Gait Training Goal 1, PT) gait (walking locomotion)  -NS     [place] Level (Gait Training Goal 1, PT) independent  -NS     Distance (Gait Training Goal 1, PT) 300  -NS     Time Frame (Gait Training Goal 1, PT) long term goal (LTG);10 days  -[facility] Name 01/27/25 1034          Stairs Goal 1 (PT)    Activity/Assistive Device (Stairs Goal 1, PT) ascending stairs;descending stairs  -NS     [place] Level/Cues Needed (Stairs Goal 1, PT) standby assist  -NS     Number of Stairs (Stairs Goal 1, PT) 2  -NS     Time Frame (Stairs Goal 1, PT) long term goal (LTG);10 days  -NS       [facility] Name 01/27/25 1034          Therapy Assessment/Plan (PT)    Planned Therapy Interventions (PT) balance training;bed mobility training;gait training;home exercise program;neuromuscular re-education;stair training;strengthening;patient/family education;transfer training  -NS               User Key  (r) = Recorded By, (t) = Taken By, (c) = Cosigned By      Initials Name Provider Type    Sharmin Lepe, PT Physical Therapist                   Clinical Impression       Row Name 01/27/25 1034          Pain     Pretreatment Pain Rating 0/10 - no pain  -NS     Posttreatment Pain Rating 0/10 - no pain  -NS       Row Name 01/27/25 1034          Plan of Care Review    Plan of Care Reviewed With patient  -NS     Progress no change  -NS     Outcome Evaluation Patient presents with weakness, decreased endurance, and balance impairments affecting functional mobility. Skilled IP PT services warranted to address deficits and support return to independence. Recommend home with assist and OP PT at d/c.  -NS       Row Name 01/27/25 1034          Therapy Assessment/Plan (PT)    Patient/Family Therapy Goals Statement (PT) return to PLOF  -NS     Rehab Potential (PT) good  -NS     Criteria for Skilled Interventions Met (PT) yes;meets criteria;skilled treatment is necessary  -NS     Therapy Frequency (PT) daily  -NS     Predicted Duration of Therapy Intervention (PT) 10 days  -NS       Row Name 01/27/25 1034          Vital Signs    Pre Systolic BP Rehab 153  -NS     Pre Treatment Diastolic BP 79  -NS     Post Systolic BP Rehab 148  -NS     Post Treatment Diastolic BP 69  -NS     Pretreatment Heart Rate (beats/min) 59  -NS     Posttreatment Heart Rate (beats/min) 79  -NS     Pre SpO2 (%) 98  -NS     O2 Delivery Pre Treatment room air  -NS     Post SpO2 (%) 100  -NS     O2 Delivery Post Treatment room air  -NS     Pre Patient Position Supine  -NS     Intra Patient Position Standing  -NS     Post Patient Position Sitting  -NS       Row Name 01/27/25 1034          Positioning and Restraints    Pre-Treatment Position in bed  -NS     Post Treatment Position chair  -NS     In Chair notified nsg;reclined;call light within reach;encouraged to call for assist;exit alarm on;waffle cushion;legs elevated;with family/caregiver  -NS               User Key  (r) = Recorded By, (t) = Taken By, (c) = Cosigned By      Initials Name Provider Type    Sharmin Lepe, PT Physical Therapist                   Outcome Measures       Row Name 01/27/25 1034           How much help from another person do you currently need...    Turning from your back to your side while in flat bed without using bedrails? 4  -NS     Moving from lying on back to sitting on the side of a flat bed without bedrails? 3  -NS     Moving to and from a bed to a chair (including a wheelchair)? 3  -NS     Standing up from a chair using your arms (e.g., wheelchair, bedside chair)? 3  -NS     Climbing 3-5 steps with a railing? 2  -NS     To walk in hospital room? 3  -NS     AM-PAC 6 Clicks Score (PT) 18  -NS     Highest Level of Mobility Goal 6 --> Walk 10 steps or more  -NS       Row Name 01/27/25 1034          Functional Assessment    Outcome Measure Options AM-PAC 6 Clicks Basic Mobility (PT)  -NS               User Key  (r) = Recorded By, (t) = Taken By, (c) = Cosigned By      Initials Name Provider Type    Sharmin Lepe PT Physical Therapist                                 Physical Therapy Education       Title: PT OT SLP Therapies (In Progress)       Topic: Physical Therapy (In Progress)       Point: Mobility training (Done)       Learning Progress Summary            Patient Acceptance, E, VU,NR by NS at 1/27/2025 1323                      Point: Home exercise program (Not Started)       Learner Progress:  Not documented in this visit.              Point: Body mechanics (Done)       Learning Progress Summary            Patient Acceptance, E, VU,NR by NS at 1/27/2025 1323                      Point: Precautions (Done)       Learning Progress Summary            Patient Acceptance, E, VU,NR by NS at 1/27/2025 1323                                      User Key       Initials Effective Dates Name Provider Type Discipline    NS 06/16/21 -  Sharmin Miramontes PT Physical Therapist PT                  PT Recommendation and Plan  Planned Therapy Interventions (PT): balance training, bed mobility training, gait training, home exercise program, neuromuscular re-education, stair training, strengthening,  patient/family education, transfer training  Progress: no change  Outcome Evaluation: Patient presents with weakness, decreased endurance, and balance impairments affecting functional mobility. Skilled IP PT services warranted to address deficits and support return to independence. Recommend home with assist and OP PT at d/c.     Time Calculation:   PT Evaluation Complexity  History, PT Evaluation Complexity: 3 or more personal factors and/or comorbidities  Examination of Body Systems (PT Eval Complexity): total of 4 or more elements  Clinical Presentation (PT Evaluation Complexity): evolving  Clinical Decision Making (PT Evaluation Complexity): moderate complexity  Overall Complexity (PT Evaluation Complexity): moderate complexity     PT Charges       Row Name 01/27/25 1034             Time Calculation    Start Time 1034  -NS      PT Received On 01/27/25  -NS      PT Goal Re-Cert Due Date 02/06/25  -NS         Untimed Charges    PT Eval/Re-eval Minutes 46  -NS         Total Minutes    Untimed Charges Total Minutes 46  -NS       Total Minutes 46  -NS                User Key  (r) = Recorded By, (t) = Taken By, (c) = Cosigned By      Initials Name Provider Type    Sharmin Lepe, PRADEEP Physical Therapist                  Therapy Charges for Today       Code Description Service Date Service Provider Modifiers Qty    [id number]  PT EVAL MOD COMPLEXITY 4 1/27/2025 Sharmin Miramontes, PT GP 1            PT G-Codes  Outcome Measure Options: AM-PAC 6 Clicks Basic Mobility (PT)  AM-PAC 6 Clicks Score (PT): 18  PT Discharge Summary  Anticipated Discharge Disposition (PT): home with assist, home with outpatient therapy services    Sharmin Miramontes PT  1/27/2025

## 2025-01-28 LAB
ALBUMIN SERPL-MCNC: 3 G/DL (ref 3.5–5.2)
ALBUMIN/GLOB SERPL: 1 G/DL
ALP SERPL-CCNC: 249 U/L (ref 39–117)
ALT SERPL W P-5'-P-CCNC: 60 U/L (ref 1–41)
ANION GAP SERPL CALCULATED.3IONS-SCNC: 12 MMOL/L (ref 5–15)
AST SERPL-CCNC: 45 U/L (ref 1–40)
BASOPHILS # BLD AUTO: 0 10*3/MM3 (ref 0–0.2)
BASOPHILS # BLD AUTO: 0 X10E3/UL (ref 0–0.2)
BASOPHILS NFR BLD AUTO: 0 %
BASOPHILS NFR BLD AUTO: 0 % (ref 0–1.5)
BILIRUB SERPL-MCNC: 1.2 MG/DL (ref 0–1.2)
BUN SERPL-MCNC: 21 MG/DL (ref 6–20)
BUN/CREAT SERPL: 23.6 (ref 7–25)
CALCIUM SPEC-SCNC: 8.5 MG/DL (ref 8.6–10.5)
CD3+CD4+ CELLS # BLD: 23 /UL (ref 359–1519)
CD3+CD4+ CELLS NFR BLD: 5.7 % (ref 30.8–58.5)
CHLORIDE SERPL-SCNC: 107 MMOL/L (ref 98–107)
CO2 SERPL-SCNC: 22 MMOL/L (ref 22–29)
CREAT SERPL-MCNC: 0.89 MG/DL (ref 0.76–1.27)
DEPRECATED RDW RBC AUTO: 56.1 FL (ref 37–54)
EGFRCR SERPLBLD CKD-EPI 2021: 98.7 ML/MIN/1.73
EOSINOPHIL # BLD AUTO: 0 X10E3/UL (ref 0–0.4)
EOSINOPHIL # BLD AUTO: 0.02 10*3/MM3 (ref 0–0.4)
EOSINOPHIL NFR BLD AUTO: 0 %
EOSINOPHIL NFR BLD AUTO: 0.9 % (ref 0.3–6.2)
ERYTHROCYTE [DISTWIDTH] IN BLOOD BY AUTOMATED COUNT: 14.9 % (ref 11.6–15.4)
ERYTHROCYTE [DISTWIDTH] IN BLOOD BY AUTOMATED COUNT: 16.5 % (ref 12.3–15.4)
GLOBULIN UR ELPH-MCNC: 2.9 GM/DL
GLUCOSE BLDC GLUCOMTR-MCNC: 115 MG/DL (ref 70–130)
GLUCOSE BLDC GLUCOMTR-MCNC: 123 MG/DL (ref 70–130)
GLUCOSE BLDC GLUCOMTR-MCNC: 186 MG/DL (ref 70–130)
GLUCOSE BLDC GLUCOMTR-MCNC: 200 MG/DL (ref 70–130)
GLUCOSE SERPL-MCNC: 128 MG/DL (ref 65–99)
HCT VFR BLD AUTO: 28.7 % (ref 37.5–51)
HCT VFR BLD AUTO: 30.8 % (ref 37.5–51)
HGB BLD-MCNC: 10.1 G/DL (ref 13–17.7)
HGB BLD-MCNC: 9.4 G/DL (ref 13–17.7)
HIV 1 & 2 AB SERPLBLD IA.RAPID: ABNORMAL
HIV 2 AB SERPLBLD QL IA.RAPID: NON REACTIVE
HIV1 AB SERPLBLD QL IA.RAPID: REACTIVE
IMM GRANULOCYTES # BLD AUTO: 0 X10E3/UL (ref 0–0.1)
IMM GRANULOCYTES # BLD AUTO: 0.01 10*3/MM3 (ref 0–0.05)
IMM GRANULOCYTES NFR BLD AUTO: 0 %
IMM GRANULOCYTES NFR BLD AUTO: 0.5 % (ref 0–0.5)
LYMPHOCYTES # BLD AUTO: 0.4 X10E3/UL (ref 0.7–3.1)
LYMPHOCYTES # BLD AUTO: 0.5 10*3/MM3 (ref 0.7–3.1)
LYMPHOCYTES NFR BLD AUTO: 19 %
LYMPHOCYTES NFR BLD AUTO: 22.5 % (ref 19.6–45.3)
MAGNESIUM SERPL-MCNC: 1.8 MG/DL (ref 1.6–2.6)
MCH RBC QN AUTO: 30.3 PG (ref 26.6–33)
MCH RBC QN AUTO: 30.5 PG (ref 26.6–33)
MCHC RBC AUTO-ENTMCNC: 32.8 G/DL (ref 31.5–35.7)
MCHC RBC AUTO-ENTMCNC: 32.8 G/DL (ref 31.5–35.7)
MCV RBC AUTO: 92.6 FL (ref 79–97)
MCV RBC AUTO: 93 FL (ref 79–97)
MONOCYTES # BLD AUTO: 0.19 10*3/MM3 (ref 0.1–0.9)
MONOCYTES # BLD AUTO: 0.2 X10E3/UL (ref 0.1–0.9)
MONOCYTES NFR BLD AUTO: 7 %
MONOCYTES NFR BLD AUTO: 8.6 % (ref 5–12)
NEUTROPHILS # BLD AUTO: 1.7 X10E3/UL (ref 1.4–7)
NEUTROPHILS NFR BLD AUTO: 1.5 10*3/MM3 (ref 1.7–7)
NEUTROPHILS NFR BLD AUTO: 67.5 % (ref 42.7–76)
NEUTROPHILS NFR BLD AUTO: 74 %
NRBC BLD AUTO-RTO: 0 /100 WBC (ref 0–0.2)
PLATELET # BLD AUTO: 112 10*3/MM3 (ref 140–450)
PLATELET # BLD AUTO: 115 X10E3/UL (ref 150–450)
PMV BLD AUTO: 11.4 FL (ref 6–12)
POTASSIUM SERPL-SCNC: 3.2 MMOL/L (ref 3.5–5.2)
PROT SERPL-MCNC: 5.9 G/DL (ref 6–8.5)
RBC # BLD AUTO: 3.1 10*6/MM3 (ref 4.14–5.8)
RBC # BLD AUTO: 3.31 X10E6/UL (ref 4.14–5.8)
SODIUM SERPL-SCNC: 141 MMOL/L (ref 136–145)
WBC # BLD AUTO: 2.3 X10E3/UL (ref 3.4–10.8)
WBC NRBC COR # BLD AUTO: 2.22 10*3/MM3 (ref 3.4–10.8)

## 2025-01-28 PROCEDURE — 25010000002 CEFTRIAXONE PER 250 MG: Performed by: STUDENT IN AN ORGANIZED HEALTH CARE EDUCATION/TRAINING PROGRAM

## 2025-01-28 PROCEDURE — 63710000001 INSULIN LISPRO (HUMAN) PER 5 UNITS: Performed by: STUDENT IN AN ORGANIZED HEALTH CARE EDUCATION/TRAINING PROGRAM

## 2025-01-28 PROCEDURE — 25010000002 THIAMINE HCL 200 MG/2ML SOLUTION: Performed by: STUDENT IN AN ORGANIZED HEALTH CARE EDUCATION/TRAINING PROGRAM

## 2025-01-28 PROCEDURE — 85025 COMPLETE CBC W/AUTO DIFF WBC: CPT | Performed by: STUDENT IN AN ORGANIZED HEALTH CARE EDUCATION/TRAINING PROGRAM

## 2025-01-28 PROCEDURE — 83735 ASSAY OF MAGNESIUM: CPT | Performed by: STUDENT IN AN ORGANIZED HEALTH CARE EDUCATION/TRAINING PROGRAM

## 2025-01-28 PROCEDURE — 99232 SBSQ HOSP IP/OBS MODERATE 35: CPT | Performed by: INTERNAL MEDICINE

## 2025-01-28 PROCEDURE — 80053 COMPREHEN METABOLIC PANEL: CPT | Performed by: STUDENT IN AN ORGANIZED HEALTH CARE EDUCATION/TRAINING PROGRAM

## 2025-01-28 PROCEDURE — 82948 REAGENT STRIP/BLOOD GLUCOSE: CPT

## 2025-01-28 RX ORDER — POLYETHYLENE GLYCOL 3350 17 G/17G
17 POWDER, FOR SOLUTION ORAL DAILY
Status: DISCONTINUED | OUTPATIENT
Start: 2025-01-28 | End: 2025-02-01 | Stop reason: HOSPADM

## 2025-01-28 RX ORDER — AMOXICILLIN 250 MG
2 CAPSULE ORAL 2 TIMES DAILY
Status: DISCONTINUED | OUTPATIENT
Start: 2025-01-28 | End: 2025-02-01 | Stop reason: HOSPADM

## 2025-01-28 RX ORDER — POTASSIUM CHLORIDE 1500 MG/1
40 TABLET, EXTENDED RELEASE ORAL EVERY 4 HOURS
Status: COMPLETED | OUTPATIENT
Start: 2025-01-28 | End: 2025-01-28

## 2025-01-28 RX ADMIN — INSULIN LISPRO 3 UNITS: 100 INJECTION, SOLUTION INTRAVENOUS; SUBCUTANEOUS at 17:34

## 2025-01-28 RX ADMIN — POLYETHYLENE GLYCOL 3350 17 G: 17 POWDER, FOR SOLUTION ORAL at 15:37

## 2025-01-28 RX ADMIN — INSULIN LISPRO 2 UNITS: 100 INJECTION, SOLUTION INTRAVENOUS; SUBCUTANEOUS at 17:34

## 2025-01-28 RX ADMIN — POTASSIUM CHLORIDE 40 MEQ: 1500 TABLET, EXTENDED RELEASE ORAL at 15:37

## 2025-01-28 RX ADMIN — SODIUM CHLORIDE 2000 MG: 900 INJECTION INTRAVENOUS at 21:02

## 2025-01-28 RX ADMIN — SENNOSIDES AND DOCUSATE SODIUM 2 TABLET: 50; 8.6 TABLET ORAL at 21:02

## 2025-01-28 RX ADMIN — CITALOPRAM HYDROBROMIDE 20 MG: 20 TABLET ORAL at 08:37

## 2025-01-28 RX ADMIN — INSULIN LISPRO 3 UNITS: 100 INJECTION, SOLUTION INTRAVENOUS; SUBCUTANEOUS at 08:37

## 2025-01-28 RX ADMIN — INSULIN LISPRO 3 UNITS: 100 INJECTION, SOLUTION INTRAVENOUS; SUBCUTANEOUS at 21:02

## 2025-01-28 RX ADMIN — PANTOPRAZOLE SODIUM 40 MG: 40 TABLET, DELAYED RELEASE ORAL at 06:27

## 2025-01-28 RX ADMIN — THIAMINE HYDROCHLORIDE 200 MG: 100 INJECTION, SOLUTION INTRAMUSCULAR; INTRAVENOUS at 08:37

## 2025-01-28 RX ADMIN — LIDOCAINE 1 PATCH: 4 PATCH TOPICAL at 08:38

## 2025-01-28 RX ADMIN — BUPROPION HYDROCHLORIDE 150 MG: 150 TABLET, EXTENDED RELEASE ORAL at 08:37

## 2025-01-28 RX ADMIN — POTASSIUM CHLORIDE 40 MEQ: 1500 TABLET, EXTENDED RELEASE ORAL at 21:02

## 2025-01-28 NOTE — CASE MANAGEMENT/SOCIAL WORK
Continued Stay Note  [facility]     Patient Name: Malcolm Costa  MRN: [id number]  Today's Date: 1/28/2025    Admit Date: 1/25/2025    Plan: Home   Discharge Plan       Row Name 01/28/25 1333       Plan    Plan Home    Plan Comments Pt c/o L flank pain and is on room air. He is receiving IV Abx. He walked 160ft with contact guard yesterday. Therapy recommended home with outpatient PT. I spoke with Pt, in room. He was given an ambulatory order for outpatient PT and told he may schedule at the facility of his choice. CM will continue to follow for medical readiness.    Final Discharge Disposition Code 01 - home or self-care                   Discharge Codes    No documentation.                 Expected Discharge Date and Time       Expected Discharge Date Expected Discharge Time    Jan 29, 2025               Mikayla Costa RN

## 2025-01-28 NOTE — PLAN OF CARE
Goal Outcome Evaluation:              Outcome Evaluation: Pt on RA, NSR/SB on tele, VSS. PRN tylenol given once for complaints of back pain with improvement. Pt slept on and off through the night. Will continue with POC.

## 2025-01-28 NOTE — PROGRESS NOTES
"    [facility] Medicine Services  PROGRESS NOTE    Patient Name: Malcolm Costa  : 1965  MRN: [id number]    Date of Admission: 2025  Primary Care Physician: Sita Chase APRN    Subjective   Subjective     CC:  Generalized weakness    HPI:  States that he's \"fair.\"  C/o left flank pain.  No BM in 3 days      Objective   Objective     Vital Signs:   Temp:  [97.6 °F (36.4 °C)-98 °F (36.7 °C)] 98 °F (36.7 °C)  Heart Rate:  [60-78] 78  Resp:  [16-18] 16  BP: (125-148)/(75-88) 125/75     PE:  Constitutional: No acute distress, awake, alert, up in chair, father at bedside  HENT: NCAT, mucous membranes moist  Respiratory: Clear to auscultation bilaterally, respiratory effort normal on RA  Cardiovascular: RRR, no murmurs, rubs, or gallops  Gastrointestinal: Positive bowel sounds, soft, nontender, nondistended  Musculoskeletal: No bilateral ankle edema  Psychiatric: Appropriate affect, cooperative  Neurologic: Oriented x 3, JARRELL, speech clear  Skin: No rashes       Results Reviewed:  LAB RESULTS:      Lab 25  0717 25  0350 25  0843 25  1626 25  1510 25  1942 25  1832   WBC 2.22* 2.02* 4.31  --   --  8.48  --  2.93*   HEMOGLOBIN 9.4* 9.6* 9.9*  --   --  11.1*  --  11.6*   HEMATOCRIT 28.7* 31.0* 30.6*  --   --  33.0*  --  36.1*   PLATELETS 112* 89* 93*  --   --  110*  --  123*   NEUTROS ABS 1.50*  --  3.09  --   --  6.59  --  1.83   IMMATURE GRANS (ABS) 0.01  --  0.02  --   --  0.05  --  0.01   LYMPHS ABS 0.50*  --  0.79  --   --  1.13  --  0.82   MONOS ABS 0.19  --  0.39  --   --  0.69  --  0.19   EOS ABS 0.02  --  0.01  --   --  0.01  --  0.08   MCV 92.6 95.4 93.0  --   --  89.9  --  91.4   CRP  --   --  11.62*  --   --   --   --   --    PROCALCITONIN  --   --   --   --   --  7.37*  --   --    LACTATE  --   --  1.0 1.1  --  2.7*  --   --    PROTIME  --   --  14.5  --   --   --   --   --    D DIMER QUANT  --   --   --   --   --   --   " --  0.50   HSTROP T  --   --   --   --  29* 31* 19 21         Lab 01/28/25  0718 01/27/25  0350 01/26/25  0843 01/25/25  1510 01/24/25  1832   SODIUM 141 140 140 137 139   POTASSIUM 3.2* 4.2 3.4*  3.3* 3.7 3.8   CHLORIDE 107 107 106 102 102   CO2 22.0 22.0 21.0* 19.0* 23.2   ANION GAP 12.0 11.0 13.0 16.0* 13.8   BUN 21* 17 14 15 10   CREATININE 0.89 0.93 0.88 1.14 0.92   EGFR 98.7 94.6 99.1 74.1 95.8   GLUCOSE 128* 244* 106* 277* 174*   CALCIUM 8.5* 8.6 8.3* 8.8 8.7   MAGNESIUM 1.8 2.4  --  1.6  --    HEMOGLOBIN A1C  --   --  6.00*  --   --    TSH  --   --  0.816  --   --          Lab 01/28/25  0718 01/27/25  0350 01/26/25  0843 01/25/25  1510 01/24/25  1832   TOTAL PROTEIN 5.9* 6.0 6.1 7.0 7.2   ALBUMIN 3.0* 3.1* 3.2* 3.9 3.8   GLOBULIN 2.9  --  2.9 3.1 3.4   ALT (SGPT) 60* 86* 100* 136* 40   AST (SGOT) 45* 74* 120* 229* 100*   BILIRUBIN 1.2 2.3* 3.8* 3.5* 0.8   INDIRECT BILIRUBIN  --  0.5  --   --   --    BILIRUBIN DIRECT  --  1.8* 3.7*  --   --    ALK PHOS 249* 290* 297* 379* 203*   LIPASE  --   --   --   --  34         Lab 01/26/25  0843 01/25/25  1626 01/25/25  1510 01/24/25  1942 01/24/25  1832   PROBNP  --   --   --   --  489.0   HSTROP T  --  29* 31* 19 21   PROTIME 14.5  --   --   --   --    INR 1.12  --   --   --   --              Lab 01/26/25  0843   IRON 13*   IRON SATURATION (TSAT) 7*   TIBC 189*   TRANSFERRIN 127*   FERRITIN 2,807.00*   FOLATE 6.75   VITAMIN B 12 791         Brief Urine Lab Results  (Last result in the past 365 days)        Color   Clarity   Blood   Leuk Est   Nitrite   Protein   CREAT   Urine HCG        01/25/25 1456 Dark Yellow   Cloudy   Negative   Negative   Negative   >=300 mg/dL (3+)                   Microbiology Results Abnormal       None            US Liver    Result Date: 1/27/2025  US LIVER Date of Exam: 1/27/2025 7:40 AM EST Indication: elevated bilirubin. Comparison: CT abdomen pelvis 1/25/2025 Technique: Grayscale and color Doppler ultrasound evaluation of the right  upper quadrant was performed. Findings: Diffuse increased hepatic echogenicity. Mildly heterogeneous echotexture. No solid appearing liver lesions. Nonspecific dilation of main portal vein measuring 1.8 cm with otherwise hepatopetal flow. No visualized ascites. Cholecystectomy No intrahepatic duct dilation. The common bile duct measures maximally 3 mm, normal.. The visualized portions of the head and body of the pancreas are grossly unremarkable. The pancreatic tail is not seen due to bowel gas. Homogeneous cortical echotexture of the right kidney. No hydronephrosis, shadowing echogenicities or solid masses.     Impression: 1. No evidence of biliary cholestasis status post cholecystectomy. 2. Hepatic steatosis, likely marked severity. Mild parenchymal heterogeneity could reflect sequela of chronic hepatocellular dysfunction. 3. Nonspecific dilation of main portal vein with otherwise normal hepatopetal flow. Electronically Signed: Lul Burroughs MD  1/27/2025 8:23 AM EST  Workstation ID: [id number]     Results for orders placed during the hospital encounter of 01/25/25    Adult Transthoracic Echo Complete W/ Cont if Necessary Per Protocol    Interpretation Summary    Left ventricular systolic function is normal. Calculated left ventricular EF = 57.5% Left ventricular ejection fraction appears to be 56 - 60%.    Left ventricular wall thickness is consistent with borderline concentric hypertrophy. Left ventricular diastolic function was normal.    The right ventricular cavity is borderline dilated with normal systolic function.    No hemodynamically significant valvular dysfunction.      Current medications:  Scheduled Meds:buPROPion XL, 150 mg, Oral, Daily  cefTRIAXone, 2,000 mg, Intravenous, Q24H  citalopram, 20 mg, Oral, Daily  insulin lispro, 2-7 Units, Subcutaneous, 4x Daily AC & at Bedtime  Insulin Lispro, 3 Units, Subcutaneous, TID With Meals  Lidocaine, 1 patch, Transdermal, Q24H  pantoprazole, 40 mg, Oral, Q  AM  thiamine (B-1) IV, 200 mg, Intravenous, Daily      Continuous Infusions:     PRN Meds:.  acetaminophen    senna-docusate sodium **AND** polyethylene glycol **AND** bisacodyl **AND** bisacodyl    dextrose    dextrose    glucagon (human recombinant)    sodium chloride    Assessment & Plan   Assessment & Plan     Active Hospital Problems    Diagnosis  POA    **Elevated LFTs [R79.89]  Yes    Severe protein-calorie malnutrition [E43]  Yes      Resolved Hospital Problems   No resolved problems to display.        Brief Hospital Course to date:  Malcolm Costa is a 59 y.o. male with hemochromatosis, type 2 diabetes, hyperlipidemia, hypertension, GERD, depression and anxiety who presents due to generalized weakness.  Patient has had problems with weakness and overall malaise for several months however his symptoms worsened over the last week.    HIV, newly diagnosed  Generalized weakness  Unintentional weight loss   -Patient's HIV antibody test came back positive today  -Patient MSM however family does not know and patient does not want them told yet  -Patient denies any IV drug use, needlesticks, new sexual partners.  His fatigue and weight loss have been going on for several months.  He is unsure when he was exposed  -Patient does have a history of syphilis that was treated back in 2014.  No other known STIs  -Spoke to infectious disease over the phone today.  Due to his insurance he will have to follow at .  Will get this set up at discharge with the help of case management  -Confirmatory test,viral panel, CD4 count, and STI panel pending  -No respiratory issues and no history of swallowing problems or known oral ulcers    Elevated LFTs  Thrombocytopenia  -LFTs and bili improving  - prior cholecystectomy 2/2024  -ERCP done 1/26 with removal of stone likely the cause of his symptoms  -Concern patient does have infection given his elevated inflammatory markers and symptoms.  Patient's immunocompromise status could be the  reason why he did not have a true fever or leukocytosis  -Gallstone was removed and patient improving.  Given presumed source control will plan on 5 days of antibiotic therapy for presumed cholangitis    Hemachromatosis  -pt has homozygous H63D mutation  - MRI abdomen 12/18/14 showed mild iron deposition in the liver with suggestion of hepatic steatosis.  Mild splenomegaly also noted.  -pt followed by Dr Myers with hem/onc  - unfortunately pt has low iron saturation and iron as well as anemia so he has been unable to undergo phlebotomy to lower his ferritin until December  -iron studies still show low iron, low iron sat and high ferritin   -worsening ferritin elevation this admit likely due to infection    -Echo pending   -Patient would like to defer brain MRI at this time, this is reasonable given above reason for fatigue  -treat infection as above      GERD  Colonic inflammation  - EGD and colonoscopy 1/23/25 showed upper erythematous stomach mucosa and lower: congested mucosa with ulcerations on the ileocecal valve  - protonix     DMII  HTN  - patient states he was taken off his diabetes and antihypertensive medications one month ago by his PCP  -A1c 6  - SSI + 3u prandial, adjust as needed     Depression and anxiety  - continue citalopram  - father concerned patient recently took oxycodone from his mother's home supply.  Patient says he had one dose earlier this week and it was only because of generalized pain.   -pt denies SI      Expected Discharge Location and Transportation: tbd  Expected Discharge   Expected Discharge Date: 1/29/2025; Expected Discharge Time:      VTE Prophylaxis:  Mechanical VTE prophylaxis orders are present.         AM-PAC 6 Clicks Score (PT): 18 (01/27/25 6033)    CODE STATUS:   There are no questions and answers to display.       Nathan Varma MD  01/28/25

## 2025-01-29 DIAGNOSIS — Z21 HIV INFECTION, UNSPECIFIED SYMPTOM STATUS: Primary | ICD-10-CM

## 2025-01-29 LAB
ALBUMIN SERPL-MCNC: 3.1 G/DL (ref 3.5–5.2)
ALBUMIN/GLOB SERPL: 1 G/DL
ALP SERPL-CCNC: 272 U/L (ref 39–117)
ALT SERPL W P-5'-P-CCNC: 70 U/L (ref 1–41)
ANION GAP SERPL CALCULATED.3IONS-SCNC: 9 MMOL/L (ref 5–15)
AST SERPL-CCNC: 73 U/L (ref 1–40)
BILIRUB SERPL-MCNC: 0.9 MG/DL (ref 0–1.2)
BUN SERPL-MCNC: 15 MG/DL (ref 6–20)
BUN/CREAT SERPL: 15.8 (ref 7–25)
C TRACH RRNA SPEC QL NAA+PROBE: NEGATIVE
CALCIUM SPEC-SCNC: 8.6 MG/DL (ref 8.6–10.5)
CHLORIDE SERPL-SCNC: 105 MMOL/L (ref 98–107)
CO2 SERPL-SCNC: 24 MMOL/L (ref 22–29)
CREAT SERPL-MCNC: 0.95 MG/DL (ref 0.76–1.27)
DEPRECATED RDW RBC AUTO: 55 FL (ref 37–54)
EGFRCR SERPLBLD CKD-EPI 2021: 92.2 ML/MIN/1.73
ERYTHROCYTE [DISTWIDTH] IN BLOOD BY AUTOMATED COUNT: 16.1 % (ref 12.3–15.4)
GLOBULIN UR ELPH-MCNC: 3 GM/DL
GLUCOSE BLDC GLUCOMTR-MCNC: 101 MG/DL (ref 70–130)
GLUCOSE BLDC GLUCOMTR-MCNC: 121 MG/DL (ref 70–130)
GLUCOSE BLDC GLUCOMTR-MCNC: 196 MG/DL (ref 70–130)
GLUCOSE BLDC GLUCOMTR-MCNC: 88 MG/DL (ref 70–130)
GLUCOSE SERPL-MCNC: 98 MG/DL (ref 65–99)
HCT VFR BLD AUTO: 29.9 % (ref 37.5–51)
HGB BLD-MCNC: 9.5 G/DL (ref 13–17.7)
HIV1 RNA # SERPL NAA+PROBE: NORMAL COPIES/ML
HIV1 RNA SERPL NAA+PROBE-LOG#: 5.99 LOG10COPY/ML
MAGNESIUM SERPL-MCNC: 1.6 MG/DL (ref 1.6–2.6)
MCH RBC QN AUTO: 29.4 PG (ref 26.6–33)
MCHC RBC AUTO-ENTMCNC: 31.8 G/DL (ref 31.5–35.7)
MCV RBC AUTO: 92.6 FL (ref 79–97)
N GONORRHOEA RRNA SPEC QL NAA+PROBE: NEGATIVE
PLATELET # BLD AUTO: 120 10*3/MM3 (ref 140–450)
PMV BLD AUTO: 10.8 FL (ref 6–12)
POTASSIUM SERPL-SCNC: 4.5 MMOL/L (ref 3.5–5.2)
PROT SERPL-MCNC: 6.1 G/DL (ref 6–8.5)
RBC # BLD AUTO: 3.23 10*6/MM3 (ref 4.14–5.8)
SODIUM SERPL-SCNC: 138 MMOL/L (ref 136–145)
WBC NRBC COR # BLD AUTO: 2.28 10*3/MM3 (ref 3.4–10.8)

## 2025-01-29 PROCEDURE — 86664 EPSTEIN-BARR NUCLEAR ANTIGEN: CPT | Performed by: INTERNAL MEDICINE

## 2025-01-29 PROCEDURE — 86778 TOXOPLASMA ANTIBODY IGM: CPT | Performed by: INTERNAL MEDICINE

## 2025-01-29 PROCEDURE — 87385 HISTOPLASMA CAPSUL AG IA: CPT | Performed by: INTERNAL MEDICINE

## 2025-01-29 PROCEDURE — 63710000001 INSULIN LISPRO (HUMAN) PER 5 UNITS: Performed by: STUDENT IN AN ORGANIZED HEALTH CARE EDUCATION/TRAINING PROGRAM

## 2025-01-29 PROCEDURE — 80053 COMPREHEN METABOLIC PANEL: CPT | Performed by: INTERNAL MEDICINE

## 2025-01-29 PROCEDURE — 25010000002 THIAMINE HCL 200 MG/2ML SOLUTION: Performed by: STUDENT IN AN ORGANIZED HEALTH CARE EDUCATION/TRAINING PROGRAM

## 2025-01-29 PROCEDURE — 99232 SBSQ HOSP IP/OBS MODERATE 35: CPT | Performed by: NURSE PRACTITIONER

## 2025-01-29 PROCEDURE — 86645 CMV ANTIBODY IGM: CPT | Performed by: INTERNAL MEDICINE

## 2025-01-29 PROCEDURE — 82948 REAGENT STRIP/BLOOD GLUCOSE: CPT

## 2025-01-29 PROCEDURE — 86665 EPSTEIN-BARR CAPSID VCA: CPT | Performed by: INTERNAL MEDICINE

## 2025-01-29 PROCEDURE — 86593 SYPHILIS TEST NON-TREP QUANT: CPT | Performed by: INTERNAL MEDICINE

## 2025-01-29 PROCEDURE — 25010000002 CEFTRIAXONE PER 250 MG: Performed by: STUDENT IN AN ORGANIZED HEALTH CARE EDUCATION/TRAINING PROGRAM

## 2025-01-29 PROCEDURE — 85027 COMPLETE CBC AUTOMATED: CPT | Performed by: INTERNAL MEDICINE

## 2025-01-29 PROCEDURE — 83735 ASSAY OF MAGNESIUM: CPT | Performed by: INTERNAL MEDICINE

## 2025-01-29 PROCEDURE — 87899 AGENT NOS ASSAY W/OPTIC: CPT | Performed by: INTERNAL MEDICINE

## 2025-01-29 PROCEDURE — 86777 TOXOPLASMA ANTIBODY: CPT | Performed by: INTERNAL MEDICINE

## 2025-01-29 PROCEDURE — 86682 HELMINTH ANTIBODY: CPT | Performed by: INTERNAL MEDICINE

## 2025-01-29 PROCEDURE — 87449 NOS EACH ORGANISM AG IA: CPT | Performed by: INTERNAL MEDICINE

## 2025-01-29 PROCEDURE — 86644 CMV ANTIBODY: CPT | Performed by: INTERNAL MEDICINE

## 2025-01-29 PROCEDURE — 86480 TB TEST CELL IMMUN MEASURE: CPT | Performed by: INTERNAL MEDICINE

## 2025-01-29 RX ORDER — SULFAMETHOXAZOLE AND TRIMETHOPRIM 800; 160 MG/1; MG/1
1 TABLET ORAL
Status: DISCONTINUED | OUTPATIENT
Start: 2025-01-29 | End: 2025-02-01 | Stop reason: HOSPADM

## 2025-01-29 RX ADMIN — INSULIN LISPRO 3 UNITS: 100 INJECTION, SOLUTION INTRAVENOUS; SUBCUTANEOUS at 13:17

## 2025-01-29 RX ADMIN — SODIUM CHLORIDE 2000 MG: 900 INJECTION INTRAVENOUS at 20:41

## 2025-01-29 RX ADMIN — PANTOPRAZOLE SODIUM 40 MG: 40 TABLET, DELAYED RELEASE ORAL at 06:46

## 2025-01-29 RX ADMIN — SULFAMETHOXAZOLE AND TRIMETHOPRIM 1 TABLET: 800; 160 TABLET ORAL at 15:32

## 2025-01-29 RX ADMIN — THIAMINE HYDROCHLORIDE 200 MG: 100 INJECTION, SOLUTION INTRAMUSCULAR; INTRAVENOUS at 08:24

## 2025-01-29 RX ADMIN — INSULIN LISPRO 3 UNITS: 100 INJECTION, SOLUTION INTRAVENOUS; SUBCUTANEOUS at 17:22

## 2025-01-29 RX ADMIN — ACETAMINOPHEN 650 MG: 325 TABLET ORAL at 13:17

## 2025-01-29 RX ADMIN — BUPROPION HYDROCHLORIDE 150 MG: 150 TABLET, EXTENDED RELEASE ORAL at 08:22

## 2025-01-29 RX ADMIN — POLYETHYLENE GLYCOL 3350 17 G: 17 POWDER, FOR SOLUTION ORAL at 08:23

## 2025-01-29 RX ADMIN — SENNOSIDES AND DOCUSATE SODIUM 2 TABLET: 50; 8.6 TABLET ORAL at 08:22

## 2025-01-29 RX ADMIN — CITALOPRAM HYDROBROMIDE 20 MG: 20 TABLET ORAL at 08:23

## 2025-01-29 RX ADMIN — INSULIN LISPRO 2 UNITS: 100 INJECTION, SOLUTION INTRAVENOUS; SUBCUTANEOUS at 20:43

## 2025-01-29 NOTE — PLAN OF CARE
Goal Outcome Evaluation:              Outcome Evaluation: Pt on RA, NSR on tele, VSS. No complaints of pain this shift, pt rested through the night. Will continue with POC.

## 2025-01-29 NOTE — CONSULTS
[facility]    INFECTIOUS DISEASE CONSULT/INITIAL HOSPITAL VISIT    Malcolm Costa  1965  [id number]    Date of consult: 1/29/2025    Admit date: 1/25/2025    Requesting Provider: No ref. provider found  Evaluating physician: Ji Edward MD  Reason for Consultation: HIV, mild elevated LFTs related to biliary stone, weight loss, CD4 cell count 23, bowel ulcer?  CMV  Chief Complaint: Weight loss      Subjective   History of present illness:  Patient is a  59 y.o.  Yr old male With a history of weight loss 60 pounds unintentional for the past 3 months since November 2024, loose stools same time period, diabetes mellitus type 2, essential hypertension, hyperlipidemia, depression, anxiety, alcohol use, with a recent outpatient colonoscopy with an ulceration of unclear etiology, who was admitted to [facility] on 1/25/2025.  During the course of his workup patient had an HIV test that was positive, CD4 cell count 23.  Family does not know of his status.  He wanted this Information private.  The patient underwent ERCP with removal of biliary stone on 1/26/2025.  LFTs returned toward normal.  I was consulted on 1/29/2025 for further evaluation and treatment.  Currently with no pain or diarrhea.  No respiratory complaints.  No fevers.    Past Medical History:   Diagnosis Date    Allergic rhinitis     Anxiety     Dermatitis 8/2/2016    Diabetes mellitus     Diverticulitis     Gastroesophageal reflux disease 7/11/2016    GERD (gastroesophageal reflux disease)     History of alcohol abuse     Hypertension     Insomnia 7/11/2016    Visual impairment 7/11/2016    Vitamin D deficiency 7/11/2016       Past Surgical History:   Procedure Laterality Date    APPENDECTOMY  1995    CHOLECYSTECTOMY WITH INTRAOPERATIVE CHOLANGIOGRAM N/A 2/1/2024    Procedure: CHOLECYSTECTOMY LAPAROSCOPIC WITH INTRAOPERATIVE CHOLANGIOGRAM, UMBILICAL HERNIA REPAIR;  Surgeon: Adam Ramos MD;   Location:  [facility];  Service: General;  Laterality: N/A;    ENDOSCOPY N/A 2/2/2024    Procedure: ESOPHAGOGASTRODUODENOSCOPY;  Surgeon: Dominik Chase MD;  Location:  [facility];  Service: Gastroenterology;  Laterality: N/A;    ERCP N/A 2/2/2024    Procedure: ENDOSCOPIC RETROGRADE CHOLANGIOPANCREATOGRAPHY;  Surgeon: Dominik Chase MD;  Location:  [facility];  Service: Gastroenterology;  Laterality: N/A;  Sphincterotomy made to common bile duct (CBD) ampulla. CBD swept with 9-12 mm balloon. ERCP scope removed with plastic cap intact.    ERCP N/A 1/26/2025    Procedure: ENDOSCOPIC RETROGRADE CHOLANGIOPANCREATOGRAPHY;  Surgeon: Brunner, Mark I, MD;  Location:  [facility];  Service: Gastroenterology;  Laterality: N/A;  Sphincterotomy, 9-12mm balloon sweep    HERNIA REPAIR  2010    TONSILLECTOMY  1974       Pediatric History   Patient Parents    Danie Costa (Father)     Other Topics Concern    Not on file   Social History Narrative    Not on file   Previous alcohol, no drug use or current tobacco; homosexual with anonymous encounters over past years, last tested for HIV 10 y ago, negative.    family history includes Alcohol abuse in his father; Diabetes in his paternal grandmother; Hypertension in his father, maternal grandfather, maternal grandmother, mother, paternal grandfather, and paternal grandmother; Multiple myeloma in his mother; Thyroid disease in an other family member.    Allergies   Allergen Reactions    Shellfish Allergy Anaphylaxis    Shellfish-Derived Products Anaphylaxis    Codeine Nausea Only     Not sure of reaction but thinks it is just nausea       Immunization History   Administered Date(s) Administered    COVID-19 (PFIZER) Purple Cap Monovalent 03/07/2021, 04/04/2021, 10/18/2021    Covid-19 (Pfizer) Gray Cap Monovalent 07/25/2022    Flu Vaccine Quad PF >36MO 12/12/2017    Fluzone (or Fluarix & Flulaval for VFC) >6mos 10/05/2021    Hepatitis A 11/02/2018    Pneumococcal Conjugate  20-Valent (PCV20) 07/25/2022    Pneumococcal Polysaccharide (PPSV23) 08/23/2017    Tdap 12/12/2017       Medication:  @Scheduled Meds:buPROPion XL, 150 mg, Oral, Daily  cefTRIAXone, 2,000 mg, Intravenous, Q24H  citalopram, 20 mg, Oral, Daily  insulin lispro, 2-7 Units, Subcutaneous, 4x Daily AC & at Bedtime  Insulin Lispro, 3 Units, Subcutaneous, TID With Meals  Lidocaine, 1 patch, Transdermal, Q24H  pantoprazole, 40 mg, Oral, Q AM  polyethylene glycol, 17 g, Oral, Daily  senna-docusate sodium, 2 tablet, Oral, BID      Continuous Infusions:   PRN Meds:.  acetaminophen    senna-docusate sodium **AND** polyethylene glycol **AND** bisacodyl **AND** bisacodyl    dextrose    dextrose    glucagon (human recombinant)    Potassium Replacement - Follow Nurse / BPA Driven Protocol    sodium chloride     Please refer to the medical record for a full medication list    Review of Systems:    Constitutional-- No Fever, chills or sweats.  Appetite good, and no malaise. No fatigue.  HEENT-- No new vision, hearing or throat complaints.  No epistaxis or oral sores.  Denies odynophagia or dysphagia.  No odynophagia or dysphagia. No headache, photophobia or neck stiffness.  CV-- No chest pain, palpitation or syncope  Resp-- No SOB/cough/Hemoptysis  GI- No nausea, vomiting, or diarrhea.  No hematochezia, melena, or hematemesis. Denies jaundice or chronic liver disease.  -- No dysuria, hematuria, or flank pain.  Denies hesitancy, urgency.  Lymph- no swollen lymph nodes in neck/axilla or groin.   Heme- No active bruising or bleeding; no Hx of DVT or PE.  MS-- no swelling or pain in the bones or joints of arms/legs.  No new back pain.  Neuro-- No acute focal weakness or numbness in the arms or legs.  No seizures.  Skin--No rashes or lesions    Physical Exam:   Vital Signs   Temp:  [97.6 °F (36.4 °C)-98.3 °F (36.8 °C)] 98.2 °F (36.8 °C)  Heart Rate:  [67-80] 80  Resp:  [16-18] 16  BP: (124-153)/(65-89) 147/83    Blood pressure 147/83,  "pulse 80, temperature 98.2 °F (36.8 °C), temperature source Oral, resp. rate 16, height 160 cm (62.99\"), weight 77.1 kg (170 lb), SpO2 98%.  GENERAL: Awake and alert, in no acute distress. Appears older than stated age.  Resting in bed.  HEENT:  Normocephalic, atraumatic.  Oropharynx without thrush. Dentition in good repair. No cervical adenopathy. No neck masses.  Ears externally normal, Nose externally normal.  EYES: PERRLA. No conjunctival injection. No icterus. EOM full.  LYMPHATICS: No lymphadenopathy of the neck or axillary or inguinal regions.   HEART: No murmur, gallop, or pericardial friction rub. Reg rate rhythm, No JVD at 45 degrees.  LUNGS: Clear to auscultation and percussion. No respiratory distress, no use of accessory muscles.  ABDOMEN: Soft, nontender, nondistended. No appreciable HSM.  Bowel sounds normal.  Obese.  GENITAL: No external lesions, breasts without masses, back straight, no CVAT, rectal external without lesions.  No ulcers.  SKIN: Warm and dry without cutaneous eruptions.  No nodules.    PSYCHIATRIC: Mental status lucid. No confusion.  EXT:  No cellulitic change.  NEURO: Oriented to name, CN 2 to 12 intact, DTR 1 + and symmetric, sensory intact to LT upper and lower extremity, motor 5/5 upper and lower extremity, cerebellar and gait not tested.      Results Review:   I reviewed the patient's new clinical results.  I reviewed the patient's new imaging results and agree with the interpretation.  I reviewed the patient's other test results and agree with the interpretation    Results from last 7 days   Lab Units 01/29/25  0104 01/28/25  0717 01/27/25  1114   WBC 10*3/mm3 2.28* 2.22* 2.3*   HEMOGLOBIN g/dL 9.5* 9.4* 10.1*   HEMATOCRIT % 29.9* 28.7* 30.8*   PLATELETS 10*3/mm3 120* 112* 115*     Results from last 7 days   Lab Units 01/29/25  0104   SODIUM mmol/L 138   POTASSIUM mmol/L 4.5   CHLORIDE mmol/L 105   CO2 mmol/L 24.0   BUN mg/dL 15   CREATININE mg/dL 0.95   GLUCOSE mg/dL 98 " "  CALCIUM mg/dL 8.6     Results from last 7 days   Lab Units 01/29/25  0104 01/28/25  0718 01/27/25  0350   ALK PHOS U/L 272*   < > 290*   BILIRUBIN mg/dL 0.9   < > 2.3*   BILIRUBIN DIRECT mg/dL  --   --  1.8*   ALT (SGPT) U/L 70*   < > 86*   AST (SGOT) U/L 73*   < > 74*    < > = values in this interval not displayed.         Results from last 7 days   Lab Units 01/26/25  0843   CRP mg/dL 11.62*         Results from last 7 days   Lab Units 01/26/25  0843   LACTATE mmol/L 1.0     Estimated Creatinine Clearance: 77 mL/min (by C-G formula based on SCr of 0.95 mg/dL).     Procalitonin Results:      Lab 01/25/25  1510   PROCALCITONIN 7.37*      Brief Urine Lab Results  (Last result in the past 365 days)        Color   Clarity   Blood   Leuk Est   Nitrite   Protein   CREAT   Urine HCG        01/25/25 1456 Dark Yellow   Cloudy   Negative   Negative   Negative   >=300 mg/dL (3+)                  No results found for: \"SITE\", \"ALLENTEST\", \"PHART\", \"ATW9AJL\", \"PO2ART\", \"NPR8JFW\", \"BASEEXCESS\", \"C8YBDHKX\", \"HGBBG\", \"HCTABG\", \"OXYHEMOGLOBI\", \"METHHGBN\", \"CARBOXYHGB\", \"CO2CT\", \"BAROMETRIC\", \"MODALITY\", \"FIO2\"     Microbiology:      Results for orders placed or performed during the hospital encounter of 01/25/25   Blood Culture - Blood, Arm, Right    Specimen: Arm, Right; Blood   Result Value Ref Range    Blood Culture No growth at 3 days               Brief Urine Lab Results  (Last result in the past 365 days)        Color   Clarity   Blood   Leuk Est   Nitrite   Protein   CREAT   Urine HCG        01/25/25 1456 Dark Yellow   Cloudy   Negative   Negative   Negative   >=300 mg/dL (3+)                   Blood Culture   Date Value Ref Range Status   01/25/2025 No growth at 3 days  Preliminary       Blood Culture   Date Value Ref Range Status   01/25/2025 No growth at 3 days  Preliminary   (      Radiology:  Imaging Results (Last 72 Hours)       Procedure Component Value Units Date/Time    US Liver [[id number]] Collected: 01/27/25 " 0820     Updated: 01/27/25 0826    Narrative:      US LIVER    Date of Exam: 1/27/2025 7:40 AM EST    Indication: elevated bilirubin.    Comparison: CT abdomen pelvis 1/25/2025    Technique: Grayscale and color Doppler ultrasound evaluation of the right upper quadrant was performed.      Findings:  Diffuse increased hepatic echogenicity. Mildly heterogeneous echotexture. No solid appearing liver lesions. Nonspecific dilation of main portal vein measuring 1.8 cm with otherwise hepatopetal flow. No visualized ascites.    Cholecystectomy    No intrahepatic duct dilation. The common bile duct measures maximally 3 mm, normal..    The visualized portions of the head and body of the pancreas are grossly unremarkable. The pancreatic tail is not seen due to bowel gas.    Homogeneous cortical echotexture of the right kidney. No hydronephrosis, shadowing echogenicities or solid masses.          Impression:      1. No evidence of biliary cholestasis status post cholecystectomy.  2. Hepatic steatosis, likely marked severity. Mild parenchymal heterogeneity could reflect sequela of chronic hepatocellular dysfunction.  3. Nonspecific dilation of main portal vein with otherwise normal hepatopetal flow.        Electronically Signed: Lul Burroughs MD    1/27/2025 8:23 AM EST    Workstation ID: [id number]    FL ERCP pancreatic and biliary ducts [[id number]] Collected: 01/27/25 0812     Updated: 01/27/25 0816    Narrative:      FL ERCP PANCREATIC AND BILIARY DUCTS    Date of Exam: 1/26/2025 12:03 PM EST    Indication: ENDOSCOPIC RETROGRADE CHOLANGIOPANCREATOGRAPHY.       Comparison: 2/2/2024    Technique:  A series of radiographic digital spot films were obtained in conjunction with an endoscopic catheterization of the biliary and pancreatic ductal system, performed by the gastroenterologist.    Fluoroscopic Time: 38 seconds    Number of Images: 2    Findings:  Dictation is to record 38 seconds of fluoroscopy time during ERCP. 2 images  obtained show endoscope and side cannula placement, contrast injection of the common duct and apparent balloon sweep. Please see the procedure report for full details.      Impression:      Impression:  Fluoroscopy provided during ERCP.      Electronically Signed: Myron Means MD    1/27/2025 8:13 AM EST    Workstation ID: [id number]            IMPRESSION:     HIV/AIDS with CD4 cell count 23.  Risk factor was unprotected male sex.  No current diagnosis of immunocompromised infection.  At risk for CMV, disseminated mycobacteria, pneumocystis, versus other.  Increased LFTs were likely related to biliary tract stone which was removed by ERCP on 1/26.  Hepatitis serologies negative.  RPR negative, chlamydia and GC PCR urine negative.  Bowel ulcer reported on outpatient colonoscopy, but no mention of features consistent with CMV, although could have CMV colitis.  Further studies are still pending as medication for this would also carry some toxicity given his pancytopenia.  No current diarrhea.  Unlikely parasitic.  Increased transaminase ALT 70, AST 73, alkaline phosphatase 272, bilirubin 0.9 on 1/29/2025 with recent ERCP and removal of biliary stone on 1/26.  Hepatitis serologies were negative.  Unlikely CMV, EBV.  Neutropenia with ANC 1.50, WBC 2.22 in 1/28 related to HIV more likely than not.  Urinalysis negative.  Thrombocytopenia, related to above issues.  Nonproductive cough occasional, respiratory PCR -1/25.  At risk for pneumocystis.  CT of the chest 1/25 without abnormality.  Some coronary calcifications.  Resolved cough.  Unintentional weight loss, 60 pounds, since November 2024, likely related to AIDS and other.  CT scan of the abdomen and pelvis 1/25 without significant findings except hepatic steatosis, umbilical hernia.    RECOMMENDATIONS:    Diagnostically, continue to follow patient's physical exam, CBC, CMP, CRP, toxoplasma antibody, RPR, QuantiFERON gold TB assay, and consider stool GI PCR panel, stool O  and P, strongyloides serology, CMV PCR, and viral studies on colon pathology (d/w Dr. Pearson).  Therapeutically, consider starting trimethoprim/sulfamethoxazole double strength p.o. daily for pneumocystis prevention, and hold on empiric CMV therapy pending further studies.  Likely needs to be started on antiretroviral therapy per [facility], including potentially using once a day Biktarvy.  They will start in the near future.  Supportive care.  I discussed the case with GI service.    I discussed the patient's findings and my recommendations with patient, nursing staff, and consulting provider    Thank you for asking me to see Malcolm Costa.  Our group would be pleased to follow this patient over the course of their hospitalization and assist with outpatient antimicrobial therapy, as indicated.  Further recommendations depend on the results of the cultures and clinical course.  Side effects of medications discussed.  The patient is at increased risk for adverse drug reactions, complications of IV access, and readmission.    Ji Edward MD  1/29/2025

## 2025-01-29 NOTE — PROGRESS NOTES
[facility] Medicine Services  PROGRESS NOTE    Patient Name: Malcolm Costa  : 1965  MRN: [id number]    Date of Admission: 2025  Primary Care Physician: Sita Chase APRN    Subjective   Subjective     CC:  F/u generalized weakness    HPI:  Patient resting in bed. Father at the bedside. Informed patient of call from Dr. Mccrary with GI regarding his pathology results from colonoscopy and he gave permission to speak. Informed patient of potential positive CMV and that we will consult ID for IV antivirals. Patient says is is feeling okay, not great. Says he has not had a BM since Friday. Denies abdominal pain, N/V. Abdomen soft with positive BS on exam. He feels his mentation has improved some. A&O x 3.       Objective   Objective     Vital Signs:   Temp:  [97.6 °F (36.4 °C)-98.3 °F (36.8 °C)] 98 °F (36.7 °C)  Heart Rate:  [67-77] 71  Resp:  [16-18] 16  BP: (124-153)/(65-89) 153/82     Physical Exam:  Constitutional: No acute distress, awake, alert  HENT: NCAT, mucous membranes moist  Respiratory: Clear to auscultation bilaterally, respiratory effort normal, room air  Cardiovascular: RRR, no murmurs, rubs, or gallops  Gastrointestinal: Positive bowel sounds, soft, nontender, nondistended  Musculoskeletal: No bilateral ankle edema  Psychiatric: Appropriate affect, cooperative  Neurologic: Oriented x 3, moves all extremities, speech clear  Skin: No rashes      Results Reviewed:  LAB RESULTS:      Lab 25  0104 25  0717 25  1114 25  0350 25  0843 25  1626 25  1510 25  1942 25  1832   WBC 2.28* 2.22* 2.3* 2.02* 4.31  --   --  8.48  --  2.93*   HEMOGLOBIN 9.5* 9.4* 10.1* 9.6* 9.9*  --   --  11.1*  --  11.6*   HEMATOCRIT 29.9* 28.7* 30.8* 31.0* 30.6*  --   --  33.0*  --  36.1*   PLATELETS 120* 112* 115* 89* 93*  --   --  110*  --  123*   NEUTROS ABS  --  1.50* 1.7  --  3.09  --   --  6.59  --  1.83   IMMATURE GRANS  (ABS)  --  0.01  --   --  0.02  --   --  0.05  --  0.01   LYMPHS ABS  --  0.50* 0.4*  --  0.79  --   --  1.13  --  0.82   MONOS ABS  --  0.19 0.2  --  0.39  --   --  0.69  --  0.19   EOS ABS  --  0.02 0.0  --  0.01  --   --  0.01  --  0.08   MCV 92.6 92.6 93 95.4 93.0  --   --  89.9  --  91.4   CRP  --   --   --   --  11.62*  --   --   --   --   --    PROCALCITONIN  --   --   --   --   --   --   --  7.37*  --   --    LACTATE  --   --   --   --  1.0 1.1  --  2.7*  --   --    PROTIME  --   --   --   --  14.5  --   --   --   --   --    D DIMER QUANT  --   --   --   --   --   --   --   --   --  0.50   HSTROP T  --   --   --   --   --   --  29* 31* 19 21         Lab 01/29/25 0104 01/28/25  0718 01/27/25  0350 01/26/25  0843 01/25/25  1510   SODIUM 138 141 140 140 137   POTASSIUM 4.5 3.2* 4.2 3.4*  3.3* 3.7   CHLORIDE 105 107 107 106 102   CO2 24.0 22.0 22.0 21.0* 19.0*   ANION GAP 9.0 12.0 11.0 13.0 16.0*   BUN 15 21* 17 14 15   CREATININE 0.95 0.89 0.93 0.88 1.14   EGFR 92.2 98.7 94.6 99.1 74.1   GLUCOSE 98 128* 244* 106* 277*   CALCIUM 8.6 8.5* 8.6 8.3* 8.8   MAGNESIUM 1.6 1.8 2.4  --  1.6   HEMOGLOBIN A1C  --   --   --  6.00*  --    TSH  --   --   --  0.816  --          Lab 01/29/25 0104 01/28/25  0718 01/27/25  0350 01/26/25  0843 01/25/25  1510 01/24/25  1832   TOTAL PROTEIN 6.1 5.9* 6.0 6.1 7.0 7.2   ALBUMIN 3.1* 3.0* 3.1* 3.2* 3.9 3.8   GLOBULIN 3.0 2.9  --  2.9 3.1 3.4   ALT (SGPT) 70* 60* 86* 100* 136* 40   AST (SGOT) 73* 45* 74* 120* 229* 100*   BILIRUBIN 0.9 1.2 2.3* 3.8* 3.5* 0.8   INDIRECT BILIRUBIN  --   --  0.5  --   --   --    BILIRUBIN DIRECT  --   --  1.8* 3.7*  --   --    ALK PHOS 272* 249* 290* 297* 379* 203*   LIPASE  --   --   --   --   --  34         Lab 01/26/25  0843 01/25/25  1626 01/25/25  1510 01/24/25  1942 01/24/25  1832   PROBNP  --   --   --   --  489.0   HSTROP T  --  29* 31* 19 21   PROTIME 14.5  --   --   --   --    INR 1.12  --   --   --   --              Lab 01/26/25  0843   IRON  13*   IRON SATURATION (TSAT) 7*   TIBC 189*   TRANSFERRIN 127*   FERRITIN 2,807.00*   FOLATE 6.75   VITAMIN B 12 791         Brief Urine Lab Results  (Last result in the past 365 days)        Color   Clarity   Blood   Leuk Est   Nitrite   Protein   CREAT   Urine HCG        01/25/25 1456 Dark Yellow   Cloudy   Negative   Negative   Negative   >=300 mg/dL (3+)                   Microbiology Results Abnormal       None            No radiology results from the last 24 hrs    Results for orders placed during the hospital encounter of 01/25/25    Adult Transthoracic Echo Complete W/ Cont if Necessary Per Protocol    Interpretation Summary    Left ventricular systolic function is normal. Calculated left ventricular EF = 57.5% Left ventricular ejection fraction appears to be 56 - 60%.    Left ventricular wall thickness is consistent with borderline concentric hypertrophy. Left ventricular diastolic function was normal.    The right ventricular cavity is borderline dilated with normal systolic function.    No hemodynamically significant valvular dysfunction.      Current medications:  Scheduled Meds:buPROPion XL, 150 mg, Oral, Daily  cefTRIAXone, 2,000 mg, Intravenous, Q24H  citalopram, 20 mg, Oral, Daily  insulin lispro, 2-7 Units, Subcutaneous, 4x Daily AC & at Bedtime  Insulin Lispro, 3 Units, Subcutaneous, TID With Meals  Lidocaine, 1 patch, Transdermal, Q24H  pantoprazole, 40 mg, Oral, Q AM  polyethylene glycol, 17 g, Oral, Daily  senna-docusate sodium, 2 tablet, Oral, BID      Continuous Infusions:   PRN Meds:.  acetaminophen    senna-docusate sodium **AND** polyethylene glycol **AND** bisacodyl **AND** bisacodyl    dextrose    dextrose    glucagon (human recombinant)    Potassium Replacement - Follow Nurse / BPA Driven Protocol    sodium chloride    Assessment & Plan   Assessment & Plan     Active Hospital Problems    Diagnosis  POA    **Elevated LFTs [R79.89]  Yes    Severe protein-calorie malnutrition [E43]  Yes       Resolved Hospital Problems   No resolved problems to display.        Brief Hospital Course to date:  Malcolm Costa is a 59 y.o. male with hemochromatosis, type 2 diabetes, hyperlipidemia, hypertension, GERD, depression and anxiety who presented to [facility] ED due to generalized weakness.  Patient has had problems with weakness and overall malaise for several months, but his symptoms worsened over the last week. Patient has also had 60-lb unintentional weight loss, nausea, and loose stools x 3 months. He also noted chronic cough.     This patient's problems and plans were partially entered by my partner and updated as appropriate by me 01/29/25.      HIV, newly diagnosed  Generalized weakness  Unintentional weight loss   -Patient's HIV antibody test came back positive today  -Patient MSM; however, family does not know and patient does not want them told yet  -Patient denies any IV drug use, needlesticks, new sexual partners.  His fatigue and weight loss have been going on for several months.  He is unsure when he was exposed  -Patient does have a history of syphilis that was treated back in 2014.  No other known STIs  -STI panel negative.CD4 and absolute CD4 low at 5.7 and 23, respectively.   -No respiratory issues and no history of swallowing problems or known oral ulcers.  -Partner spoke to infectious disease. Due to his insurance, he will have to follow at .  Will get this set up at discharge with the help of case management. Ambulatory referral received at .  -PT and OT following. Rec for home with outpatient PT.  -AM CBC w/ diff    ?CMV vs herpes colitis  -positive for cytomegalovirus on pathology taken during outpatient colonoscopy on 1/23 with Dr. Mccrary. Dr. Mccrary recommends ID consult for IV antivirals.  -CMV stains pending  -ID consult  -AM CBC w/ diff    Elevated LFTs  Thrombocytopenia  -Prior cholecystectomy 2/2024  -GI consulted. Concern for infection given elevated inflammatory markers, elevated procal,  and symptoms in spite of normal WBC and no fever on presentation. Patient now known to be immunocompromised which could explain lack of leukocytosis or true fever.   -ERCP 1/26 by Dr. Brunner with removal of stone, which was likely cause of symptoms. Patient improving.  Given presumed source control now, will plan on 5 days of antibiotic therapy for presumed cholangitis.  -Bili has normalized. LFTs improving.  -AM CMP     Hemachromatosis  -pt has homozygous H63D mutation  - MRI abdomen 12/18/24 showed mild iron deposition in the liver with suggestion of hepatic steatosis.  Mild splenomegaly also noted.  -pt followed by Dr Myers with hem/onc.  - unfortunately pt has low iron saturation and iron as well as anemia, so he has been unable to undergo phlebotomy to lower his ferritin until December  -iron studies still show low iron, low iron sat and high ferritin   -worsening ferritin elevation this admit likely due to infection    -Echo with EF 56-60%, borderline LV concentric hypertrophy  -Patient would like to defer brain MRI at this time, this is reasonable given above reason for fatigue  -treat infection as above   -Pt has upcoming outpatient appointment with Dr. Myers on 2/27.     GERD  Colonic inflammation  - EGD 1/23/25 and colonoscopy 1/23/25 showed erythematous stomach mucosa and congested mucosa with ulcerations on the ileocecal valve  - continue Protonix daily, avoid NSAIDs     DMII  HTN  - patient states he was taken off his diabetes and antihypertensive medications one month ago by his PCP  -A1c 6  - SSI + 3u prandial, adjust as needed     Depression and anxiety  - continue citalopram  - father concerned patient recently took oxycodone from his mother's home supply.  Patient says he had one dose earlier this week and it was only because of generalized pain.   -pt denies SI    Tobacco use  -NRT  -Pt requesting rx for nicotine gum at discharge as insurance will cover it if it is prescribed    Expected  Discharge Location and Transportation: Home  Expected Discharge   Expected Discharge Date: 2/3/2025; Expected Discharge Time:      VTE Prophylaxis:  Mechanical VTE prophylaxis orders are present.         AM-PAC 6 Clicks Score (PT): 18 (01/28/25 2102)    CODE STATUS:   There are no questions and answers to display.       Rosalba Burnham, APRN  01/29/25

## 2025-01-29 NOTE — CASE MANAGEMENT/SOCIAL WORK
Continued Stay Note  [facility]     Patient Name: Malcolm Costa  MRN: [id number]  Today's Date: 1/29/2025    Admit Date: 1/25/2025    Plan: Home   Discharge Plan       Row Name 01/29/25 1406       Plan    Plan Home    Plan Comments Per discussion in MDR, ALT/AST are worse. ID has been consulted for antivirals. He is on room air. Plan is home with outpatient PT (order provided). CM will continue to follow for medical readiness.    Final Discharge Disposition Code 01 - home or self-care                   Discharge Codes    No documentation.                 Expected Discharge Date and Time       Expected Discharge Date Expected Discharge Time    Feb 3, 2025               Mikayla Costa RN

## 2025-01-29 NOTE — PLAN OF CARE
The patient underwent colonoscopy a couple of days prior to admission with finding of clean-based ulcer on the ileocecal valve.  Pathology is nonspecific with differential including NSAID ulceration, infectious or other etiologies.    Given HIV/immunocompromised, will obtain CMV and HSV stains on ulcer biopsy tissue.    LFTs have improved after removal of common bile duct stone by ERCP.  Residual borderline transaminase elevations are compatible with HIV infection, and not consistent with CMV or HSV hepatitis.

## 2025-01-30 LAB
ADV 40+41 DNA STL QL NAA+NON-PROBE: NOT DETECTED
ALBUMIN SERPL-MCNC: 3.5 G/DL (ref 3.5–5.2)
ALBUMIN/GLOB SERPL: 1.3 G/DL
ALP SERPL-CCNC: 271 U/L (ref 39–117)
ALT SERPL W P-5'-P-CCNC: 92 U/L (ref 1–41)
ANION GAP SERPL CALCULATED.3IONS-SCNC: 12 MMOL/L (ref 5–15)
AST SERPL-CCNC: 92 U/L (ref 1–40)
ASTRO TYP 1-8 RNA STL QL NAA+NON-PROBE: NOT DETECTED
BASOPHILS # BLD AUTO: 0.02 10*3/MM3 (ref 0–0.2)
BASOPHILS NFR BLD AUTO: 0.5 % (ref 0–1.5)
BILIRUB SERPL-MCNC: 0.8 MG/DL (ref 0–1.2)
BUN SERPL-MCNC: 14 MG/DL (ref 6–20)
BUN/CREAT SERPL: 12.7 (ref 7–25)
C CAYETANENSIS DNA STL QL NAA+NON-PROBE: NOT DETECTED
C COLI+JEJ+UPSA DNA STL QL NAA+NON-PROBE: DETECTED
CALCIUM SPEC-SCNC: 8.9 MG/DL (ref 8.6–10.5)
CHLORIDE SERPL-SCNC: 100 MMOL/L (ref 98–107)
CO2 SERPL-SCNC: 22 MMOL/L (ref 22–29)
CREAT SERPL-MCNC: 1.1 MG/DL (ref 0.76–1.27)
CRYPTOC AG CSF QL: NEGATIVE
CRYPTOSP DNA STL QL NAA+NON-PROBE: NOT DETECTED
CYTO UR: NORMAL
DEPRECATED RDW RBC AUTO: 54.9 FL (ref 37–54)
E HISTOLYT DNA STL QL NAA+NON-PROBE: NOT DETECTED
EAEC PAA PLAS AGGR+AATA ST NAA+NON-PRB: NOT DETECTED
EC STX1+STX2 GENES STL QL NAA+NON-PROBE: NOT DETECTED
EGFRCR SERPLBLD CKD-EPI 2021: 77.3 ML/MIN/1.73
EOSINOPHIL # BLD AUTO: 0.08 10*3/MM3 (ref 0–0.4)
EOSINOPHIL NFR BLD AUTO: 2 % (ref 0.3–6.2)
EPEC EAE GENE STL QL NAA+NON-PROBE: NOT DETECTED
ERYTHROCYTE [DISTWIDTH] IN BLOOD BY AUTOMATED COUNT: 16.2 % (ref 12.3–15.4)
ETEC LTA+ST1A+ST1B TOX ST NAA+NON-PROBE: NOT DETECTED
G LAMBLIA DNA STL QL NAA+NON-PROBE: NOT DETECTED
GLOBULIN UR ELPH-MCNC: 2.6 GM/DL
GLUCOSE BLDC GLUCOMTR-MCNC: 100 MG/DL (ref 70–130)
GLUCOSE BLDC GLUCOMTR-MCNC: 113 MG/DL (ref 70–130)
GLUCOSE BLDC GLUCOMTR-MCNC: 122 MG/DL (ref 70–130)
GLUCOSE BLDC GLUCOMTR-MCNC: 82 MG/DL (ref 70–130)
GLUCOSE SERPL-MCNC: 117 MG/DL (ref 65–99)
HCT VFR BLD AUTO: 32 % (ref 37.5–51)
HGB BLD-MCNC: 10.2 G/DL (ref 13–17.7)
IMM GRANULOCYTES # BLD AUTO: 0.04 10*3/MM3 (ref 0–0.05)
IMM GRANULOCYTES NFR BLD AUTO: 1 % (ref 0–0.5)
LAB AP CASE REPORT: NORMAL
LAB AP CLINICAL INFORMATION: NORMAL
LAB AP DIAGNOSIS COMMENT: NORMAL
LYMPHOCYTES # BLD AUTO: 0.93 10*3/MM3 (ref 0.7–3.1)
LYMPHOCYTES NFR BLD AUTO: 23 % (ref 19.6–45.3)
MCH RBC QN AUTO: 29.7 PG (ref 26.6–33)
MCHC RBC AUTO-ENTMCNC: 31.9 G/DL (ref 31.5–35.7)
MCV RBC AUTO: 93.3 FL (ref 79–97)
MONOCYTES # BLD AUTO: 0.31 10*3/MM3 (ref 0.1–0.9)
MONOCYTES NFR BLD AUTO: 7.7 % (ref 5–12)
NEUTROPHILS NFR BLD AUTO: 2.67 10*3/MM3 (ref 1.7–7)
NEUTROPHILS NFR BLD AUTO: 65.8 % (ref 42.7–76)
NOROVIRUS GI+II RNA STL QL NAA+NON-PROBE: NOT DETECTED
NRBC BLD AUTO-RTO: 0 /100 WBC (ref 0–0.2)
P SHIGELLOIDES DNA STL QL NAA+NON-PROBE: NOT DETECTED
PATH REPORT.ADDENDUM SPEC: NORMAL
PATH REPORT.FINAL DX SPEC: NORMAL
PATH REPORT.GROSS SPEC: NORMAL
PLATELET # BLD AUTO: 144 10*3/MM3 (ref 140–450)
PMV BLD AUTO: 10.5 FL (ref 6–12)
POTASSIUM SERPL-SCNC: 3.9 MMOL/L (ref 3.5–5.2)
PROT SERPL-MCNC: 6.1 G/DL (ref 6–8.5)
QT INTERVAL: 400 MS
QT INTERVAL: 424 MS
QTC INTERVAL: 440 MS
QTC INTERVAL: 467 MS
RBC # BLD AUTO: 3.43 10*6/MM3 (ref 4.14–5.8)
RVA RNA STL QL NAA+NON-PROBE: NOT DETECTED
S ENT+BONG DNA STL QL NAA+NON-PROBE: NOT DETECTED
SAPO I+II+IV+V RNA STL QL NAA+NON-PROBE: NOT DETECTED
SHIGELLA SP+EIEC IPAH ST NAA+NON-PROBE: NOT DETECTED
SODIUM SERPL-SCNC: 134 MMOL/L (ref 136–145)
V CHOL+PARA+VUL DNA STL QL NAA+NON-PROBE: NOT DETECTED
V CHOLERAE DNA STL QL NAA+NON-PROBE: NOT DETECTED
WBC NRBC COR # BLD AUTO: 4.05 10*3/MM3 (ref 3.4–10.8)
Y ENTEROCOL DNA STL QL NAA+NON-PROBE: NOT DETECTED

## 2025-01-30 PROCEDURE — 80053 COMPREHEN METABOLIC PANEL: CPT | Performed by: NURSE PRACTITIONER

## 2025-01-30 PROCEDURE — 87209 SMEAR COMPLEX STAIN: CPT | Performed by: INTERNAL MEDICINE

## 2025-01-30 PROCEDURE — 99232 SBSQ HOSP IP/OBS MODERATE 35: CPT | Performed by: NURSE PRACTITIONER

## 2025-01-30 PROCEDURE — 85025 COMPLETE CBC W/AUTO DIFF WBC: CPT | Performed by: NURSE PRACTITIONER

## 2025-01-30 PROCEDURE — 87177 OVA AND PARASITES SMEARS: CPT | Performed by: INTERNAL MEDICINE

## 2025-01-30 PROCEDURE — 82948 REAGENT STRIP/BLOOD GLUCOSE: CPT

## 2025-01-30 PROCEDURE — 87507 IADNA-DNA/RNA PROBE TQ 12-25: CPT | Performed by: INTERNAL MEDICINE

## 2025-01-30 PROCEDURE — 63710000001 INSULIN LISPRO (HUMAN) PER 5 UNITS: Performed by: STUDENT IN AN ORGANIZED HEALTH CARE EDUCATION/TRAINING PROGRAM

## 2025-01-30 RX ORDER — VALGANCICLOVIR 450 MG/1
900 TABLET, FILM COATED ORAL EVERY 12 HOURS SCHEDULED
Status: DISCONTINUED | OUTPATIENT
Start: 2025-01-30 | End: 2025-02-01 | Stop reason: HOSPADM

## 2025-01-30 RX ADMIN — VALGANCICLOVIR 900 MG: 450 TABLET, FILM COATED ORAL at 16:08

## 2025-01-30 RX ADMIN — Medication 10 ML: at 08:20

## 2025-01-30 RX ADMIN — PANTOPRAZOLE SODIUM 40 MG: 40 TABLET, DELAYED RELEASE ORAL at 05:36

## 2025-01-30 RX ADMIN — INSULIN LISPRO 3 UNITS: 100 INJECTION, SOLUTION INTRAVENOUS; SUBCUTANEOUS at 17:09

## 2025-01-30 RX ADMIN — SULFAMETHOXAZOLE AND TRIMETHOPRIM 1 TABLET: 800; 160 TABLET ORAL at 08:22

## 2025-01-30 RX ADMIN — CITALOPRAM HYDROBROMIDE 20 MG: 20 TABLET ORAL at 08:21

## 2025-01-30 RX ADMIN — BUPROPION HYDROCHLORIDE 150 MG: 150 TABLET, EXTENDED RELEASE ORAL at 08:22

## 2025-01-30 RX ADMIN — INSULIN LISPRO 3 UNITS: 100 INJECTION, SOLUTION INTRAVENOUS; SUBCUTANEOUS at 12:11

## 2025-01-30 RX ADMIN — INSULIN LISPRO 3 UNITS: 100 INJECTION, SOLUTION INTRAVENOUS; SUBCUTANEOUS at 08:21

## 2025-01-30 RX ADMIN — Medication 10 ML: at 20:11

## 2025-01-30 NOTE — PROGRESS NOTES
[facility] Medicine Services  PROGRESS NOTE    Patient Name: Malcolm Costa  : 1965  MRN: [id number]    Date of Admission: 2025  Primary Care Physician: Sita Chase APRN    Subjective   Subjective     CC:  F/u generalized weakness    HPI:   Patient resting in bed.  Father at bedside.  Formed patient that CMV on colonoscopy pathology came back positive.  Will defer to ID on treatment.  ID following.  Patient denies concerns.  NAD.      Objective   Objective     Vital Signs:   Temp:  [98 °F (36.7 °C)-98.6 °F (37 °C)] 98.6 °F (37 °C)  Heart Rate:  [71-80] 75  Resp:  [16-18] 16  BP: (114-164)/() 127/80     Physical Exam:  Constitutional: No acute distress, awake, alert  HENT: NCAT, mucous membranes moist  Respiratory: Clear to auscultation bilaterally, respiratory effort normal, room air  Cardiovascular: RRR, no murmurs, rubs, or gallops  Gastrointestinal: Positive bowel sounds, soft, nontender, nondistended  Musculoskeletal: No bilateral ankle edema  Psychiatric: Appropriate affect, cooperative  Neurologic: Oriented x 3, moves all extremities, speech clear  Skin: No rashes      Results Reviewed:  LAB RESULTS:      Lab 25  0104 25  0717 25  1114 25  0350 25  0843 25  1626 25  1510 25  1942 25  1832   WBC 2.28* 2.22* 2.3* 2.02* 4.31  --   --  8.48  --  2.93*   HEMOGLOBIN 9.5* 9.4* 10.1* 9.6* 9.9*  --   --  11.1*  --  11.6*   HEMATOCRIT 29.9* 28.7* 30.8* 31.0* 30.6*  --   --  33.0*  --  36.1*   PLATELETS 120* 112* 115* 89* 93*  --   --  110*  --  123*   NEUTROS ABS  --  1.50* 1.7  --  3.09  --   --  6.59  --  1.83   IMMATURE GRANS (ABS)  --  0.01  --   --  0.02  --   --  0.05  --  0.01   LYMPHS ABS  --  0.50* 0.4*  --  0.79  --   --  1.13  --  0.82   MONOS ABS  --  0.19 0.2  --  0.39  --   --  0.69  --  0.19   EOS ABS  --  0.02 0.0  --  0.01  --   --  0.01  --  0.08   MCV 92.6 92.6 93 95.4 93.0  --   --   89.9  --  91.4   CRP  --   --   --   --  11.62*  --   --   --   --   --    PROCALCITONIN  --   --   --   --   --   --   --  7.37*  --   --    LACTATE  --   --   --   --  1.0 1.1  --  2.7*  --   --    PROTIME  --   --   --   --  14.5  --   --   --   --   --    D DIMER QUANT  --   --   --   --   --   --   --   --   --  0.50   HSTROP T  --   --   --   --   --   --  29* 31* 19 21         Lab 01/29/25  0104 01/28/25  0718 01/27/25  0350 01/26/25  0843 01/25/25  1510   SODIUM 138 141 140 140 137   POTASSIUM 4.5 3.2* 4.2 3.4*  3.3* 3.7   CHLORIDE 105 107 107 106 102   CO2 24.0 22.0 22.0 21.0* 19.0*   ANION GAP 9.0 12.0 11.0 13.0 16.0*   BUN 15 21* 17 14 15   CREATININE 0.95 0.89 0.93 0.88 1.14   EGFR 92.2 98.7 94.6 99.1 74.1   GLUCOSE 98 128* 244* 106* 277*   CALCIUM 8.6 8.5* 8.6 8.3* 8.8   MAGNESIUM 1.6 1.8 2.4  --  1.6   HEMOGLOBIN A1C  --   --   --  6.00*  --    TSH  --   --   --  0.816  --          Lab 01/29/25 0104 01/28/25 0718 01/27/25  0350 01/26/25  0843 01/25/25  1510 01/24/25  1832   TOTAL PROTEIN 6.1 5.9* 6.0 6.1 7.0 7.2   ALBUMIN 3.1* 3.0* 3.1* 3.2* 3.9 3.8   GLOBULIN 3.0 2.9  --  2.9 3.1 3.4   ALT (SGPT) 70* 60* 86* 100* 136* 40   AST (SGOT) 73* 45* 74* 120* 229* 100*   BILIRUBIN 0.9 1.2 2.3* 3.8* 3.5* 0.8   INDIRECT BILIRUBIN  --   --  0.5  --   --   --    BILIRUBIN DIRECT  --   --  1.8* 3.7*  --   --    ALK PHOS 272* 249* 290* 297* 379* 203*   LIPASE  --   --   --   --   --  34         Lab 01/26/25  0843 01/25/25  1626 01/25/25  1510 01/24/25  1942 01/24/25  1832   PROBNP  --   --   --   --  489.0   HSTROP T  --  29* 31* 19 21   PROTIME 14.5  --   --   --   --    INR 1.12  --   --   --   --              Lab 01/26/25  0843   IRON 13*   IRON SATURATION (TSAT) 7*   TIBC 189*   TRANSFERRIN 127*   FERRITIN 2,807.00*   FOLATE 6.75   VITAMIN B 12 791         Brief Urine Lab Results  (Last result in the past 365 days)        Color   Clarity   Blood   Leuk Est   Nitrite   Protein   CREAT   Urine HCG         01/25/25 1456 Dark Yellow   Cloudy   Negative   Negative   Negative   >=300 mg/dL (3+)                   Microbiology Results Abnormal       None            No radiology results from the last 24 hrs    Results for orders placed during the hospital encounter of 01/25/25    Adult Transthoracic Echo Complete W/ Cont if Necessary Per Protocol    Interpretation Summary    Left ventricular systolic function is normal. Calculated left ventricular EF = 57.5% Left ventricular ejection fraction appears to be 56 - 60%.    Left ventricular wall thickness is consistent with borderline concentric hypertrophy. Left ventricular diastolic function was normal.    The right ventricular cavity is borderline dilated with normal systolic function.    No hemodynamically significant valvular dysfunction.      Current medications:  Scheduled Meds:buPROPion XL, 150 mg, Oral, Daily  citalopram, 20 mg, Oral, Daily  insulin lispro, 2-7 Units, Subcutaneous, 4x Daily AC & at Bedtime  Insulin Lispro, 3 Units, Subcutaneous, TID With Meals  Lidocaine, 1 patch, Transdermal, Q24H  pantoprazole, 40 mg, Oral, Q AM  polyethylene glycol, 17 g, Oral, Daily  senna-docusate sodium, 2 tablet, Oral, BID  sulfamethoxazole-trimethoprim, 1 tablet, Oral, Q24H      Continuous Infusions:   PRN Meds:.  acetaminophen    senna-docusate sodium **AND** polyethylene glycol **AND** bisacodyl **AND** bisacodyl    dextrose    dextrose    glucagon (human recombinant)    Potassium Replacement - Follow Nurse / BPA Driven Protocol    sodium chloride    Assessment & Plan   Assessment & Plan     Active Hospital Problems    Diagnosis  POA    **Elevated LFTs [R79.89]  Yes    Severe protein-calorie malnutrition [E43]  Yes      Resolved Hospital Problems   No resolved problems to display.        Brief Hospital Course to date:  Malcolm Costa is a 59 y.o. male with hemochromatosis, type 2 diabetes, hyperlipidemia, hypertension, GERD, depression and anxiety who presented to [facility] ED due to  generalized weakness.  Patient has had problems with weakness and overall malaise for several months, but his symptoms worsened over the last week. Patient has also had 60-lb unintentional weight loss, nausea, and loose stools x 3 months. He also noted chronic cough.     This patient's problems and plans were partially entered by my partner and updated as appropriate by me 01/30/25.      HIV, newly diagnosed  Generalized weakness  Unintentional weight loss   -Patient's HIV antibody test came back positive today  -Patient MSM; however, family does not know and patient does not want them told yet  -Patient denies any IV drug use, needlesticks, new sexual partners.  His fatigue and weight loss have been going on for several months.  He is unsure when he was exposed  -Patient does have a history of syphilis that was treated back in 2014.  No other known STIs  -STI panel negative.CD4 and absolute CD4 low at 5.7 and 23, respectively.   -No respiratory issues and no history of swallowing problems or known oral ulcers.  -Partner spoke to infectious disease. Due to his insurance, he will have to follow at .  Will get this set up at discharge with the help of case management. Ambulatory referral received at .  -ID consulted given concern for pathology after outpt colonoscopy. See below. Recommendation for Bactrim double strength PO daily to PCP prevention. Will need antiretroviral therapy per  HIV clinic.  -Crypto Ag negative  -Histoplasma Ag pending  -PT and OT following. Rec for home with outpatient PT.    ?CMV vs herpes colitis  Colonic inflammation  - EGD 1/23/25 and colonoscopy 1/23/25 showed erythematous stomach mucosa and congested mucosa with ulcerations on the ileocecal valve  -concern for cytomegalovirus on pathology taken during outpatient colonoscopy on 1/23 with Dr. Mccrary. Dr. Mccrary recommends ID consult for IV antivirals/treatment prior to starting HIV therapy.  -CMV confirmed + per discussion with   Hermann, HSV stain pending  -ID consulted. Dr. Edward following.   -Stool studies and GI panel pending    Elevated LFTs  Thrombocytopenia  -Prior cholecystectomy 2/2024  -GI consulted. Concern for infection given elevated inflammatory markers, elevated procal, and symptoms in spite of normal WBC and no fever on presentation. Patient now known to be immunocompromised which could explain lack of leukocytosis or true fever.   -ERCP 1/26 by Dr. Brunner with removal of stone, which was likely cause of symptoms. Patient improving.    -Completed 5 days of antibiotic therapy for presumed cholangitis on 1/29  -Bili has normalized. Per GI, residual elevated transaminases r/t HIV. AST and ALT both 92 today, 1/30.     Hemachromatosis  -pt has homozygous H63D mutation  - MRI abdomen 12/18/24 showed mild iron deposition in the liver with suggestion of hepatic steatosis.  Mild splenomegaly also noted.  -pt followed by Dr Myers with hem/onc.  - unfortunately pt has low iron saturation and iron as well as anemia, so he has been unable to undergo phlebotomy to lower his ferritin until December  -iron studies still show low iron, low iron sat and high ferritin   -worsening ferritin elevation this admit likely due to infection    -Echo with EF 56-60%, borderline LV concentric hypertrophy  -Patient would like to defer brain MRI at this time, this is reasonable given above reason for fatigue  -treat infection as above   -Pt has upcoming outpatient appointment with Dr. Myers on 2/27     GERD  - continue Protonix daily, avoid NSAIDs     DMII  HTN  - patient states he was taken off his diabetes and antihypertensive medications one month ago by his PCP  -A1c 6  - SSI + 3 units TID with meals, adjust as needed     Depression and anxiety  - continue citalopram  - father concerned patient recently took oxycodone from his mother's home supply.  Patient says he had one dose earlier this week and it was only because of generalized pain.   -pt  denies SI    Tobacco use  -NRT  -Pt requesting rx for nicotine gum at discharge as insurance will cover it if it is prescribed    Expected Discharge Location and Transportation: Home  Expected Discharge   Expected Discharge Date: 2/3/2025; Expected Discharge Time:      VTE Prophylaxis:  Mechanical VTE prophylaxis orders are present.         AM-PAC 6 Clicks Score (PT): 19 (01/29/25 2043)    CODE STATUS:   There are no questions and answers to display.       Rosalba Burnham, APRN  01/30/25

## 2025-01-30 NOTE — CASE MANAGEMENT/SOCIAL WORK
Continued Stay Note  [facility]     Patient Name: Malcolm Csota  MRN: [id number]  Today's Date: 1/30/2025    Admit Date: 1/25/2025    Plan: Home   Discharge Plan       Row Name 01/30/25 1250       Plan    Plan Home    Plan Comments Pt is receiving IV Abx and is on room air. Plan is home with outpatient PT. An appointment has been scheduled with Dr Brice with  Opthalmology ([phone]) on 2/5 @ 0845 at the [facility] location: [address] [place] [address], [address]. Records were faxed, at their request, to [phone]. An appointment has been scheduled with  HIV clinic for 2/10 @ 0914. Appointment information is included on AVS. Update provided to Pt and RN. CM will continue to follow for medical readiness.    Final Discharge Disposition Code 01 - home or self-care                   Discharge Codes    No documentation.                 Expected Discharge Date and Time       Expected Discharge Date Expected Discharge Time    Feb 3, 2025               Mikayla Costa RN

## 2025-01-30 NOTE — DISCHARGE INSTR - OTHER ORDERS
1/30 @ 1217 An urgent request has been called to Lori kohler/ Ophthalmology for an appointment to be scheduled within 1 week. The office will call patient and  with an appointment date and time. If no call is received by Monday, please call [phone] to follow up.

## 2025-01-30 NOTE — PLAN OF CARE
Goal Outcome Evaluation: On RA. NSR. Urine sample sent, histoplasma pending. Waiting on pt to produce stool sample for GI panel, parasite screen. Tylenol given prn x1 for pain. Continue plan of care.

## 2025-01-30 NOTE — PLAN OF CARE
Goal Outcome Evaluation:  Up to BR w 1 asst.  Stool sample sent to lab today.    VSS on RA.  NSR on tele.     Appt made for HIV clinic and Ophthalmology at [facility].     IV AB completed.

## 2025-01-30 NOTE — PROGRESS NOTES
[facility]    INFECTIOUS DISEASE PROGRESS NOTE    Malcolm Costa  1965  [id number]    Date of consult: 1/29/2025    Admit date: 1/25/2025    Requesting Provider: No ref. provider found  Evaluating physician: Ji Edward MD  Reason for Consultation: HIV, mild elevated LFTs related to biliary stone, weight loss, CD4 cell count 23, bowel ulcer?  CMV  Chief Complaint: Weight loss      Subjective   History of present illness:  Patient is a  59 y.o.  Yr old male With a history of weight loss 60 pounds unintentional for the past 3 months since November 2024, loose stools same time period, diabetes mellitus type 2, essential hypertension, hyperlipidemia, depression, anxiety, alcohol use, with a recent outpatient colonoscopy with an ulceration of unclear etiology, who was admitted to [facility] on 1/25/2025.  During the course of his workup patient had an HIV test that was positive, CD4 cell count 23.  Family does not know of his status.  He wanted this Information private.  The patient underwent ERCP with removal of biliary stone on 1/26/2025.  LFTs returned toward normal.  I was consulted on 1/29/2025 for further evaluation and treatment.  Currently with no pain or diarrhea.  No respiratory complaints.  No fevers.    1/30/2025 history reviewed.  No diarrhea.  Tolerating Bactrim for HIV prevention.  Awaiting CMV stains from outpatient colon biopsy.  No high fever.  Awaits eventual outpatient HIV clinic appointment with .  CMV test on pathology reported positive today.    Past Medical History:   Diagnosis Date    Allergic rhinitis     Anxiety     Dermatitis 8/2/2016    Diabetes mellitus     Diverticulitis     Gastroesophageal reflux disease 7/11/2016    GERD (gastroesophageal reflux disease)     History of alcohol abuse     Hypertension     Insomnia 7/11/2016    Visual impairment 7/11/2016    Vitamin D deficiency 7/11/2016       Past Surgical History:   Procedure  Laterality Date    APPENDECTOMY  1995    CHOLECYSTECTOMY WITH INTRAOPERATIVE CHOLANGIOGRAM N/A 2/1/2024    Procedure: CHOLECYSTECTOMY LAPAROSCOPIC WITH INTRAOPERATIVE CHOLANGIOGRAM, UMBILICAL HERNIA REPAIR;  Surgeon: Adam Ramos MD;  Location:  [facility];  Service: General;  Laterality: N/A;    ENDOSCOPY N/A 2/2/2024    Procedure: ESOPHAGOGASTRODUODENOSCOPY;  Surgeon: Dominik Chase MD;  Location:  [facility];  Service: Gastroenterology;  Laterality: N/A;    ERCP N/A 2/2/2024    Procedure: ENDOSCOPIC RETROGRADE CHOLANGIOPANCREATOGRAPHY;  Surgeon: Dominik Chase MD;  Location:  [facility];  Service: Gastroenterology;  Laterality: N/A;  Sphincterotomy made to common bile duct (CBD) ampulla. CBD swept with 9-12 mm balloon. ERCP scope removed with plastic cap intact.    ERCP N/A 1/26/2025    Procedure: ENDOSCOPIC RETROGRADE CHOLANGIOPANCREATOGRAPHY;  Surgeon: Brunner, Mark I, MD;  Location:  [facility];  Service: Gastroenterology;  Laterality: N/A;  Sphincterotomy, 9-12mm balloon sweep    HERNIA REPAIR  2010    TONSILLECTOMY  1974       Pediatric History   Patient Parents    Danie Costa (Father)     Other Topics Concern    Not on file   Social History Narrative    Not on file   Previous alcohol, no drug use or current tobacco; homosexual with anonymous encounters over past years, last tested for HIV 10 y ago, negative.    family history includes Alcohol abuse in his father; Diabetes in his paternal grandmother; Hypertension in his father, maternal grandfather, maternal grandmother, mother, paternal grandfather, and paternal grandmother; Multiple myeloma in his mother; Thyroid disease in an other family member.    Allergies   Allergen Reactions    Shellfish Allergy Anaphylaxis    Shellfish-Derived Products Anaphylaxis    Codeine Nausea Only     Not sure of reaction but thinks it is just nausea       Immunization History   Administered Date(s) Administered    COVID-19 (PFIZER) Purple Cap Monovalent  03/07/2021, 04/04/2021, 10/18/2021    Covid-19 (Pfizer) Gray Cap Monovalent 07/25/2022    Flu Vaccine Quad PF >36MO 12/12/2017    Fluzone (or Fluarix & Flulaval for VFC) >6mos 10/05/2021    Hepatitis A 11/02/2018    Pneumococcal Conjugate 20-Valent (PCV20) 07/25/2022    Pneumococcal Polysaccharide (PPSV23) 08/23/2017    Tdap 12/12/2017       Medication:  @Scheduled Meds:buPROPion XL, 150 mg, Oral, Daily  citalopram, 20 mg, Oral, Daily  insulin lispro, 2-7 Units, Subcutaneous, 4x Daily AC & at Bedtime  Insulin Lispro, 3 Units, Subcutaneous, TID With Meals  Lidocaine, 1 patch, Transdermal, Q24H  pantoprazole, 40 mg, Oral, Q AM  polyethylene glycol, 17 g, Oral, Daily  senna-docusate sodium, 2 tablet, Oral, BID  sulfamethoxazole-trimethoprim, 1 tablet, Oral, Q24H      Continuous Infusions:   PRN Meds:.  acetaminophen    senna-docusate sodium **AND** polyethylene glycol **AND** bisacodyl **AND** bisacodyl    dextrose    dextrose    glucagon (human recombinant)    Potassium Replacement - Follow Nurse / BPA Driven Protocol    sodium chloride     Please refer to the medical record for a full medication list    Review of Systems:    Constitutional-- No Fever, chills or sweats.  Appetite good, and no malaise. No fatigue.  HEENT-- No new vision, hearing or throat complaints.  No epistaxis or oral sores.  Denies odynophagia or dysphagia.  No odynophagia or dysphagia. No headache, photophobia or neck stiffness.  CV-- No chest pain, palpitation or syncope  Resp-- No SOB/cough/Hemoptysis  GI- No nausea, vomiting, or diarrhea.  No hematochezia, melena, or hematemesis. Denies jaundice or chronic liver disease.  -- No dysuria, hematuria, or flank pain.  Denies hesitancy, urgency.  Lymph- no swollen lymph nodes in neck/axilla or groin.   Heme- No active bruising or bleeding; no Hx of DVT or PE.  MS-- no swelling or pain in the bones or joints of arms/legs.  No new back pain.  Neuro-- No acute focal weakness or numbness in the arms  "or legs.  No seizures.  Skin--No rashes or lesions, no nodules    Physical Exam:   Vital Signs   Temp:  [98 °F (36.7 °C)-98.6 °F (37 °C)] 98.6 °F (37 °C)  Heart Rate:  [71-80] 75  Resp:  [16-18] 16  BP: (114-164)/() 127/80    Blood pressure 127/80, pulse 75, temperature 98.6 °F (37 °C), temperature source Oral, resp. rate 16, height 160 cm (62.99\"), weight 77.1 kg (170 lb), SpO2 100%.  GENERAL: Awake and alert, in minimal distress. Appears older than stated age.  Resting in bed.  HEENT:  Normocephalic, atraumatic.  Oropharynx without thrush. Dentition in good repair. No cervical adenopathy. No neck masses.  Ears externally normal, Nose externally normal.  EYES:  No conjunctival injection. No icterus. EOM full.  LYMPHATICS: No lymphadenopathy of the neck or axillary or inguinal regions.   HEART: No murmur, gallop, or pericardial friction rub. Reg rate rhythm, No JVD at 45 degrees.  LUNGS: Clear to auscultation and percussion. No respiratory distress, no use of accessory muscles.  ABDOMEN: Soft, nontender, nondistended. No appreciable HSM.  Bowel sounds normal.  Obese.  SKIN: Warm and dry without cutaneous eruptions.  No nodules.    PSYCHIATRIC: Mental status lucid. No confusion.  EXT:  No cellulitic change.  NEURO: Oriented to name, nonfocal.      Results Review:   I reviewed the patient's new clinical results.  I reviewed the patient's new imaging results and agree with the interpretation.  I reviewed the patient's other test results and agree with the interpretation    Results from last 7 days   Lab Units 01/29/25  0104 01/28/25  0717 01/27/25  1114   WBC 10*3/mm3 2.28* 2.22* 2.3*   HEMOGLOBIN g/dL 9.5* 9.4* 10.1*   HEMATOCRIT % 29.9* 28.7* 30.8*   PLATELETS 10*3/mm3 120* 112* 115*     Results from last 7 days   Lab Units 01/29/25  0104   SODIUM mmol/L 138   POTASSIUM mmol/L 4.5   CHLORIDE mmol/L 105   CO2 mmol/L 24.0   BUN mg/dL 15   CREATININE mg/dL 0.95   GLUCOSE mg/dL 98   CALCIUM mg/dL 8.6     Results " "from last 7 days   Lab Units 01/29/25  0104 01/28/25  0718 01/27/25  0350   ALK PHOS U/L 272*   < > 290*   BILIRUBIN mg/dL 0.9   < > 2.3*   BILIRUBIN DIRECT mg/dL  --   --  1.8*   ALT (SGPT) U/L 70*   < > 86*   AST (SGOT) U/L 73*   < > 74*    < > = values in this interval not displayed.         Results from last 7 days   Lab Units 01/26/25  0843   CRP mg/dL 11.62*         Results from last 7 days   Lab Units 01/26/25  0843   LACTATE mmol/L 1.0     Estimated Creatinine Clearance: 77 mL/min (by C-G formula based on SCr of 0.95 mg/dL).     Procalitonin Results:      Lab 01/25/25  1510   PROCALCITONIN 7.37*      Brief Urine Lab Results  (Last result in the past 365 days)        Color   Clarity   Blood   Leuk Est   Nitrite   Protein   CREAT   Urine HCG        01/25/25 1456 Dark Yellow   Cloudy   Negative   Negative   Negative   >=300 mg/dL (3+)                  No results found for: \"SITE\", \"ALLENTEST\", \"PHART\", \"QOH7UXO\", \"PO2ART\", \"TUL0ZPS\", \"BASEEXCESS\", \"C1BXUITD\", \"HGBBG\", \"HCTABG\", \"OXYHEMOGLOBI\", \"METHHGBN\", \"CARBOXYHGB\", \"CO2CT\", \"BAROMETRIC\", \"MODALITY\", \"FIO2\"     Microbiology:      Results for orders placed or performed during the hospital encounter of 01/25/25   Blood Culture - Blood, Arm, Right    Specimen: Arm, Right; Blood   Result Value Ref Range    Blood Culture No growth at 4 days               Brief Urine Lab Results  (Last result in the past 365 days)        Color   Clarity   Blood   Leuk Est   Nitrite   Protein   CREAT   Urine HCG        01/25/25 1456 Dark Yellow   Cloudy   Negative   Negative   Negative   >=300 mg/dL (3+)                   Blood Culture   Date Value Ref Range Status   01/25/2025 No growth at 3 days  Preliminary       Blood Culture   Date Value Ref Range Status   01/25/2025 No growth at 3 days  Preliminary   (      Radiology:  Imaging Results (Last 72 Hours)       ** No results found for the last 72 hours. **            IMPRESSION:     HIV/AIDS with CD4 cell count 23.  Risk factor " was unprotected male sex.  No current diagnosis of immunocompromised infection.  At risk for CMV, disseminated mycobacteria, pneumocystis, versus other.  Increased LFTs were likely related to biliary tract stone which was removed by ERCP on 1/26.  Hepatitis serologies negative.  RPR negative, chlamydia and GC PCR urine negative.  Awaits appointment at the [facility] HIV clinic.  Bowel ulcer reported on outpatient colonoscopy, but no mention of features consistent with CMV, although could have CMV colitis.  Further studies are still pending as medication for this would also carry some toxicity given his pancytopenia.  No current diarrhea.  Unlikely parasitic.  Viral stains pending.  Increased transaminase ALT 70, AST 73, alkaline phosphatase 272, bilirubin 0.9 on 1/29/2025 with recent ERCP and removal of biliary stone on 1/26.  Hepatitis serologies were negative.  Unlikely CMV, EBV.  Neutropenia with ANC 1.50, WBC 2.22 in 1/28 related to HIV more likely than not.  Urinalysis negative.  Thrombocytopenia, related to above issues.  Nonproductive cough occasional, respiratory PCR -1/25.  At risk for pneumocystis.  CT of the chest 1/25 without abnormality.  Some coronary calcifications.  Resolved cough.  Unintentional weight loss, 60 pounds, since November 2024, likely related to AIDS and other.  CT scan of the abdomen and pelvis 1/25 without significant findings except hepatic steatosis, umbilical hernia.    PLAN:    Diagnostically, continue to follow patient's physical exam, CBC, CMP, CRP, toxoplasma antibody, RPR, QuantiFERON gold TB assay, and consider stool GI PCR panel, stool O and P, strongyloides serology, CMV PCR, and viral studies on colon pathology (d/w Dr. Pearson).  Radiographs as needed.  Needs a ophthalmology evaluation for retinitis.  Therapeutically, continue trimethoprim/sulfamethoxazole double strength p.o. daily for pneumocystis prevention (CD4 cell count less than 200), and hold on empiric  CMV therapy pending further studies.  Needs to be started on antiretroviral therapy per  HIV clinic, including potentially using once a day Biktarvy.  They will start in the near future.  Will start valganciclovir 900 mg p.o. twice daily induction, to be followed at  HIV clinic.  Timing of retroviral therapy will depend on [facility].  If patient has retinal disease, can have immune reconstitution inflammatory syndrome.  Supportive care.  I discussed the case with GI service.    I discussed the patient's findings and my recommendations with patient, nursing staff, and consulting provider    Thank you for asking me to see Malcolm Costa.  Our group would be pleased to follow this patient over the course of their hospitalization and assist with outpatient antimicrobial therapy, as indicated.  Further recommendations depend on the results of the cultures and clinical course.  Side effects of medications discussed.  The patient is at increased risk for adverse drug reactions, complications of IV access, and readmission.  Time > 50 min.    This visit included the following complex service elements:  Complex medical decision-making associated with antimicrobial prescribing.  In-depth chart review with high level synthesis for complex diagnoses.  Managed infection treatment protocol associated with transitions of care for this complex patient.  Counseled patients, family members, and/or caregivers regarding antimicrobial stewardship and antibiotic resistance.  Counseled patients, family members and/or caregivers regarding infection prevention.      Ji Edward MD  1/30/2025

## 2025-01-30 NOTE — PLAN OF CARE
Goal Outcome Evaluation:  Plan of Care Reviewed With: patient        Progress: improving  Outcome Evaluation: No complains of pain all shift, vital signs stable, normal sinus on tele.

## 2025-01-31 LAB
BACTERIA SPEC AEROBE CULT: NORMAL
BACTERIA SPEC AEROBE CULT: NORMAL
CMV IGG SERPL IA-ACNC: >10 U/ML (ref 0–0.59)
CMV IGM SERPL IA-ACNC: 62.6 AU/ML (ref 0–29.9)
EBV NA IGG SER IA-ACNC: 244 U/ML (ref 0–17.9)
EBV VCA IGG SER IA-ACNC: 446 U/ML (ref 0–17.9)
EBV VCA IGM SER IA-ACNC: <36 U/ML (ref 0–35.9)
GLUCOSE BLDC GLUCOMTR-MCNC: 107 MG/DL (ref 70–130)
GLUCOSE BLDC GLUCOMTR-MCNC: 107 MG/DL (ref 70–130)
GLUCOSE BLDC GLUCOMTR-MCNC: 110 MG/DL (ref 70–130)
GLUCOSE BLDC GLUCOMTR-MCNC: 114 MG/DL (ref 70–130)
GLUCOSE BLDC GLUCOMTR-MCNC: 134 MG/DL (ref 70–130)
LABORATORY COMMENT REPORT: NORMAL
RPR SER-TITR: NON REACTIVE TITER
SERVICE CMNT-IMP: ABNORMAL
T GONDII IGG SERPL IA-ACNC: <3 IU/ML (ref 0–7.1)
T GONDII IGM SER IA-ACNC: <3 AU/ML (ref 0–7.9)

## 2025-01-31 PROCEDURE — 82948 REAGENT STRIP/BLOOD GLUCOSE: CPT

## 2025-01-31 PROCEDURE — 99232 SBSQ HOSP IP/OBS MODERATE 35: CPT | Performed by: NURSE PRACTITIONER

## 2025-01-31 PROCEDURE — 63710000001 INSULIN LISPRO (HUMAN) PER 5 UNITS: Performed by: STUDENT IN AN ORGANIZED HEALTH CARE EDUCATION/TRAINING PROGRAM

## 2025-01-31 PROCEDURE — 97530 THERAPEUTIC ACTIVITIES: CPT

## 2025-01-31 RX ORDER — LIDOCAINE 4 G/G
1 PATCH TOPICAL
Status: DISCONTINUED | OUTPATIENT
Start: 2025-01-31 | End: 2025-02-01 | Stop reason: HOSPADM

## 2025-01-31 RX ORDER — AZITHROMYCIN 250 MG/1
500 TABLET, FILM COATED ORAL
Status: DISCONTINUED | OUTPATIENT
Start: 2025-01-31 | End: 2025-02-01 | Stop reason: HOSPADM

## 2025-01-31 RX ADMIN — BUPROPION HYDROCHLORIDE 150 MG: 150 TABLET, EXTENDED RELEASE ORAL at 08:17

## 2025-01-31 RX ADMIN — VALGANCICLOVIR 900 MG: 450 TABLET, FILM COATED ORAL at 17:03

## 2025-01-31 RX ADMIN — AZITHROMYCIN 500 MG: 250 TABLET, FILM COATED ORAL at 15:09

## 2025-01-31 RX ADMIN — INSULIN LISPRO 3 UNITS: 100 INJECTION, SOLUTION INTRAVENOUS; SUBCUTANEOUS at 17:03

## 2025-01-31 RX ADMIN — LIDOCAINE 1 PATCH: 4 PATCH TOPICAL at 10:41

## 2025-01-31 RX ADMIN — VALGANCICLOVIR 900 MG: 450 TABLET, FILM COATED ORAL at 05:59

## 2025-01-31 RX ADMIN — INSULIN LISPRO 3 UNITS: 100 INJECTION, SOLUTION INTRAVENOUS; SUBCUTANEOUS at 08:16

## 2025-01-31 RX ADMIN — INSULIN LISPRO 3 UNITS: 100 INJECTION, SOLUTION INTRAVENOUS; SUBCUTANEOUS at 11:55

## 2025-01-31 RX ADMIN — SULFAMETHOXAZOLE AND TRIMETHOPRIM 1 TABLET: 800; 160 TABLET ORAL at 08:17

## 2025-01-31 RX ADMIN — CITALOPRAM HYDROBROMIDE 20 MG: 20 TABLET ORAL at 08:17

## 2025-01-31 RX ADMIN — SENNOSIDES AND DOCUSATE SODIUM 2 TABLET: 50; 8.6 TABLET ORAL at 19:53

## 2025-01-31 RX ADMIN — PANTOPRAZOLE SODIUM 40 MG: 40 TABLET, DELAYED RELEASE ORAL at 05:59

## 2025-01-31 NOTE — PROGRESS NOTES
[facility]    INFECTIOUS DISEASE PROGRESS NOTE    Malcolm Costa  1965  [id number]    Date of consult: 1/29/2025    Admit date: 1/25/2025    Requesting Provider: No ref. provider found  Evaluating physician: Ji Edward MD  Reason for Consultation: HIV, mild elevated LFTs related to biliary stone, weight loss, CD4 cell count 23, bowel ulcer?  CMV  Chief Complaint: Weight loss      Subjective   History of present illness:  Patient is a  59 y.o.  Yr old male With a history of weight loss 60 pounds unintentional for the past 3 months since November 2024, loose stools same time period, diabetes mellitus type 2, essential hypertension, hyperlipidemia, depression, anxiety, alcohol use, with a recent outpatient colonoscopy with an ulceration of unclear etiology, who was admitted to [facility] on 1/25/2025.  During the course of his workup patient had an HIV test that was positive, CD4 cell count 23.  Family does not know of his status.  He wanted this Information private.  The patient underwent ERCP with removal of biliary stone on 1/26/2025.  LFTs returned toward normal.  I was consulted on 1/29/2025 for further evaluation and treatment.  Currently with no pain or diarrhea.  No respiratory complaints.  No fevers.    1/30/2025 history reviewed.  No diarrhea.  Tolerating Bactrim for HIV prevention.  Awaiting CMV stains from outpatient colon biopsy.  No high fever.  Awaits eventual outpatient HIV clinic appointment with .  CMV test on pathology reported positive today.    1/31/2025 history reviewed.  No GI complaints.  GI PCR was positive for Campylobacter.  Tolerating trimethoprim/sulfamethoxazole double strength once a day, for pneumocystis prevention, and oral valganciclovir 900 mg p.o. twice daily.  No fever.  Waiting on a  ophthalmology appointment 2/5.  Also waiting to see if he can get an earlier appointment than 2/10 for the  HIV clinic.    Past Medical  History:   Diagnosis Date    Allergic rhinitis     Anxiety     Dermatitis 8/2/2016    Diabetes mellitus     Diverticulitis     Gastroesophageal reflux disease 7/11/2016    GERD (gastroesophageal reflux disease)     History of alcohol abuse     Hypertension     Insomnia 7/11/2016    Visual impairment 7/11/2016    Vitamin D deficiency 7/11/2016       Past Surgical History:   Procedure Laterality Date    APPENDECTOMY  1995    CHOLECYSTECTOMY WITH INTRAOPERATIVE CHOLANGIOGRAM N/A 2/1/2024    Procedure: CHOLECYSTECTOMY LAPAROSCOPIC WITH INTRAOPERATIVE CHOLANGIOGRAM, UMBILICAL HERNIA REPAIR;  Surgeon: Adam Ramos MD;  Location:  [facility];  Service: General;  Laterality: N/A;    ENDOSCOPY N/A 2/2/2024    Procedure: ESOPHAGOGASTRODUODENOSCOPY;  Surgeon: Dominik Chase MD;  Location:  [facility];  Service: Gastroenterology;  Laterality: N/A;    ERCP N/A 2/2/2024    Procedure: ENDOSCOPIC RETROGRADE CHOLANGIOPANCREATOGRAPHY;  Surgeon: Dominik Chase MD;  Location:  [facility];  Service: Gastroenterology;  Laterality: N/A;  Sphincterotomy made to common bile duct (CBD) ampulla. CBD swept with 9-12 mm balloon. ERCP scope removed with plastic cap intact.    ERCP N/A 1/26/2025    Procedure: ENDOSCOPIC RETROGRADE CHOLANGIOPANCREATOGRAPHY;  Surgeon: Brunner, Mark I, MD;  Location:  [facility];  Service: Gastroenterology;  Laterality: N/A;  Sphincterotomy, 9-12mm balloon sweep    HERNIA REPAIR  2010    TONSILLECTOMY  1974       Pediatric History   Patient Parents    Danie Costa (Father)     Other Topics Concern    Not on file   Social History Narrative    Not on file   Previous alcohol, no drug use or current tobacco; homosexual with anonymous encounters over past years, last tested for HIV 10 y ago, negative.    family history includes Alcohol abuse in his father; Diabetes in his paternal grandmother; Hypertension in his father, maternal grandfather, maternal grandmother, mother, paternal grandfather, and  paternal grandmother; Multiple myeloma in his mother; Thyroid disease in an other family member.    Allergies   Allergen Reactions    Shellfish Allergy Anaphylaxis    Shellfish-Derived Products Anaphylaxis    Codeine Nausea Only     Not sure of reaction but thinks it is just nausea       Immunization History   Administered Date(s) Administered    COVID-19 (PFIZER) Purple Cap Monovalent 03/07/2021, 04/04/2021, 10/18/2021    Covid-19 (Pfizer) Gray Cap Monovalent 07/25/2022    Flu Vaccine Quad PF >36MO 12/12/2017    Fluzone (or Fluarix & Flulaval for VFC) >6mos 10/05/2021    Hepatitis A 11/02/2018    Pneumococcal Conjugate 20-Valent (PCV20) 07/25/2022    Pneumococcal Polysaccharide (PPSV23) 08/23/2017    Tdap 12/12/2017       Medication:  @Scheduled Meds:buPROPion XL, 150 mg, Oral, Daily  citalopram, 20 mg, Oral, Daily  insulin lispro, 2-7 Units, Subcutaneous, 4x Daily AC & at Bedtime  Insulin Lispro, 3 Units, Subcutaneous, TID With Meals  Lidocaine, 1 patch, Transdermal, Q24H  Lidocaine, 1 patch, Transdermal, Q24H  pantoprazole, 40 mg, Oral, Q AM  polyethylene glycol, 17 g, Oral, Daily  senna-docusate sodium, 2 tablet, Oral, BID  sulfamethoxazole-trimethoprim, 1 tablet, Oral, Q24H  valGANciclovir, 900 mg, Oral, Q12H      Continuous Infusions:   PRN Meds:.  acetaminophen    senna-docusate sodium **AND** polyethylene glycol **AND** bisacodyl **AND** bisacodyl    dextrose    dextrose    glucagon (human recombinant)    Potassium Replacement - Follow Nurse / BPA Driven Protocol    sodium chloride     Please refer to the medical record for a full medication list    Review of Systems:    Constitutional-- No Fever, chills or sweats.  Appetite good, and no malaise. No fatigue.  HEENT-- No new vision, hearing or throat complaints.  No epistaxis or oral sores.  Denies odynophagia or dysphagia.  No odynophagia or dysphagia. No headache, photophobia or neck stiffness.  CV-- No chest pain, palpitation or syncope  Resp-- No  "SOB/cough/Hemoptysis  GI- No nausea, vomiting, or diarrhea.  No hematochezia, melena, or hematemesis. Denies jaundice or chronic liver disease.  -- No dysuria, hematuria, or flank pain.  Denies hesitancy, urgency.  Lymph- no swollen lymph nodes in neck/axilla or groin.   Heme- No active bruising or bleeding; no Hx of DVT or PE.  MS-- no swelling or pain in the bones or joints of arms/legs.  No new back pain.  Neuro-- No acute focal weakness or numbness in the arms or legs.  No seizures.  Skin--No rashes or lesions, no nodules    Physical Exam:   Vital Signs   Temp:  [97.6 °F (36.4 °C)-98.6 °F (37 °C)] 97.6 °F (36.4 °C)  Heart Rate:  [68-84] 84  Resp:  [18] 18  BP: ()/(50-81) 127/81    Blood pressure 127/81, pulse 84, temperature 97.6 °F (36.4 °C), temperature source Oral, resp. rate 18, height 160 cm (62.99\"), weight 77.1 kg (170 lb), SpO2 96%.  GENERAL: Awake and alert, in minimal distress. Appears older than stated age.  Resting in bed.  HEENT:  Normocephalic, atraumatic.  Oropharynx without thrush. Dentition in good repair. No cervical adenopathy. No neck masses.  Ears externally normal, Nose externally normal.  EYES:  No conjunctival injection. No icterus. EOM full.  LYMPHATICS: No lymphadenopathy of the neck or axillary or inguinal regions.   HEART: No murmur, gallop, or pericardial friction rub. Reg rate rhythm, No JVD at 45 degrees.  LUNGS: Clear to auscultation and percussion. No respiratory distress, no use of accessory muscles.  ABDOMEN: Soft, nontender, nondistended. No appreciable HSM.  Bowel sounds normal.  Obese.  SKIN: Warm and dry without cutaneous eruptions.  No nodules.    PSYCHIATRIC: Mental status lucid. No confusion.  EXT:  No cellulitic change.  NEURO: Oriented to name, nonfocal.      Results Review:   I reviewed the patient's new clinical results.  I reviewed the patient's new imaging results and agree with the interpretation.  I reviewed the patient's other test results and agree with " "the interpretation    Results from last 7 days   Lab Units 01/30/25  1342 01/29/25  0104 01/28/25  0717   WBC 10*3/mm3 4.05 2.28* 2.22*   HEMOGLOBIN g/dL 10.2* 9.5* 9.4*   HEMATOCRIT % 32.0* 29.9* 28.7*   PLATELETS 10*3/mm3 144 120* 112*     Results from last 7 days   Lab Units 01/30/25  1342   SODIUM mmol/L 134*   POTASSIUM mmol/L 3.9   CHLORIDE mmol/L 100   CO2 mmol/L 22.0   BUN mg/dL 14   CREATININE mg/dL 1.10   GLUCOSE mg/dL 117*   CALCIUM mg/dL 8.9     Results from last 7 days   Lab Units 01/30/25  1342 01/28/25  0718 01/27/25  0350   ALK PHOS U/L 271*   < > 290*   BILIRUBIN mg/dL 0.8   < > 2.3*   BILIRUBIN DIRECT mg/dL  --   --  1.8*   ALT (SGPT) U/L 92*   < > 86*   AST (SGOT) U/L 92*   < > 74*    < > = values in this interval not displayed.         Results from last 7 days   Lab Units 01/26/25  0843   CRP mg/dL 11.62*         Results from last 7 days   Lab Units 01/26/25  0843   LACTATE mmol/L 1.0     Estimated Creatinine Clearance: 66.5 mL/min (by C-G formula based on SCr of 1.1 mg/dL).     Procalitonin Results:      Lab 01/25/25  1510   PROCALCITONIN 7.37*      Brief Urine Lab Results  (Last result in the past 365 days)        Color   Clarity   Blood   Leuk Est   Nitrite   Protein   CREAT   Urine HCG        01/25/25 1456 Dark Yellow   Cloudy   Negative   Negative   Negative   >=300 mg/dL (3+)                  No results found for: \"SITE\", \"ALLENTEST\", \"PHART\", \"EQU5IGZ\", \"PO2ART\", \"JSJ1IGS\", \"BASEEXCESS\", \"T1QEVQTU\", \"HGBBG\", \"HCTABG\", \"OXYHEMOGLOBI\", \"METHHGBN\", \"CARBOXYHGB\", \"CO2CT\", \"BAROMETRIC\", \"MODALITY\", \"FIO2\"     Microbiology:      Results for orders placed or performed during the hospital encounter of 01/25/25   Blood Culture - Blood, Arm, Right    Specimen: Arm, Right; Blood   Result Value Ref Range    Blood Culture No growth at 5 days               Brief Urine Lab Results  (Last result in the past 365 days)        Color   Clarity   Blood   Leuk Est   Nitrite   Protein   CREAT   Urine HCG        " 01/25/25 1456 Dark Yellow   Cloudy   Negative   Negative   Negative   >=300 mg/dL (3+)                   Blood Culture   Date Value Ref Range Status   01/25/2025 No growth at 3 days  Preliminary       Blood Culture   Date Value Ref Range Status   01/25/2025 No growth at 3 days  Preliminary   (      Radiology:  Imaging Results (Last 72 Hours)       ** No results found for the last 72 hours. **            IMPRESSION:     HIV/AIDS with CD4 cell count 23.  Risk factor was unprotected male sex.  No current diagnosis of immunocompromised infection.  At risk for CMV, disseminated mycobacteria, pneumocystis, versus other.  Increased LFTs were likely related to biliary tract stone which was removed by ERCP on 1/26.  Hepatitis serologies negative.  RPR negative, chlamydia and GC PCR urine negative.  Awaits [facility] at the [facility] clinic.  Bowel ulcer reported on outpatient colonoscopy, but no mention of features consistent with CMV, although could have CMV colitis.  Further studies are still pending as medication for this would also carry some toxicity given his pancytopenia.  No current diarrhea.  Unlikely parasitic.  Viral stains pending.  Increased transaminase ALT 70, AST 73, alkaline phosphatase 272, bilirubin 0.9 on 1/29/2025 with recent ERCP and removal of biliary stone on 1/26.  Hepatitis serologies were negative.  Unlikely CMV, EBV.  Neutropenia with ANC 1.50, WBC 2.22 in 1/28 related to HIV more likely than not.  Urinalysis negative.  Thrombocytopenia, related to above issues.  Nonproductive cough occasional, respiratory PCR -1/25.  At risk for pneumocystis.  CT of the chest 1/25 without abnormality.  Some coronary calcifications.  Resolved cough.  Unintentional weight loss, 60 pounds, since November 2024, likely related to AIDS and other.  CT scan of the abdomen and pelvis 1/25 without significant findings except hepatic steatosis, umbilical hernia.  GI PCR panel positive for Campylobacter.   1/30/2025.  Patient has minimal GI symptoms but will treat nevertheless.    PLAN:    Diagnostically, continue to follow patient's physical exam, CBC, CMP, CRP, toxoplasma antibody, RPR, QuantiFERON gold TB assay, and consider stool GI PCR panel, stool O and P, strongyloides serology, CMV PCR, and viral studies on colon pathology (d/w Dr. Pearson).  Radiographs as needed.  Needs a ophthalmology evaluation for retinitis.  Scheduled for 2/5/2025.  QTc interval 464 ms on 1/25.  Therapeutically, continue trimethoprim/sulfamethoxazole double strength p.o. daily for pneumocystis prevention (CD4 cell count less than 200), and continue valganciclovir.  Needs to be started on antiretroviral therapy per  HIV clinic, including potentially using once a day Biktarvy, but needs to have good ophthalmologic exam prior.  They will start in the near future.  Continue valganciclovir 900 mg p.o. twice daily induction, to be followed at  HIV clinic, started on 1/30/2025.  Timing of retroviral therapy will depend on [facility].  If patient has retinal disease, can have immune reconstitution inflammatory syndrome.  Azithromycin 500 mg p.o. daily started 1/31 x 3 days.  This is for Campylobacter in stool.  Supportive care.  I discussed the case with GI service.    I discussed the patient's findings and my recommendations with patient, nursing staff, and consulting provider    Thank you for asking me to see Malcolm Costa.  Our group would be pleased to follow this patient over the course of their hospitalization and assist with outpatient antimicrobial therapy, as indicated.  Further recommendations depend on the results of the cultures and clinical course.  Side effects of medications discussed.  The patient is at increased risk for adverse drug reactions, complications of IV access, and readmission.  Time > 50 min.  Discussed with JOANA Pablo.  See next on Monday unless discharged.    This visit included the  following complex service elements:  Complex medical decision-making associated with antimicrobial prescribing.  In-depth chart review with high level synthesis for complex diagnoses.  Managed infection treatment protocol associated with transitions of care for this complex patient.  Counseled patients, family members, and/or caregivers regarding antimicrobial stewardship and antibiotic resistance.  Counseled patients, family members and/or caregivers regarding infection prevention.      Ji Edward MD  1/31/2025

## 2025-01-31 NOTE — THERAPY TREATMENT NOTE
Patient Name: Malcolm Costa  : 1965    MRN: [id number]                              Today's Date: 2025       Admit Date: 2025    Visit Dx:     ICD-10-CM ICD-9-CM   1. Weakness  R53.1 780.79   2. Uncontrolled type 2 diabetes mellitus with hyperglycemia  E11.65 250.02   3. Essential hypertension  I10 401.9   4. Pure hypercholesterolemia  E78.00 272.0   5. Nausea and vomiting, unspecified vomiting type  R11.2 787.01   6. Acute cholecystitis  K81.0 575.0   7. Elevated liver enzymes  R74.8 790.5   8. 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 Elevated LFTs  R79.89 790.6   10. 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    Elevated LFTs    Severe protein-calorie malnutrition     Past Medical History:   Diagnosis Date    Allergic rhinitis     Anxiety     Dermatitis 2016    Diabetes mellitus     Diverticulitis     Gastroesophageal reflux disease 2016    GERD (gastroesophageal reflux disease)     History of alcohol abuse     Hypertension     Insomnia 2016    Visual impairment 2016    Vitamin D deficiency 2016     Past Surgical History:   Procedure Laterality Date    APPENDECTOMY      CHOLECYSTECTOMY WITH INTRAOPERATIVE CHOLANGIOGRAM N/A 2024    Procedure: CHOLECYSTECTOMY LAPAROSCOPIC WITH INTRAOPERATIVE CHOLANGIOGRAM, UMBILICAL HERNIA REPAIR;  Surgeon: Adam Ramos MD;  Location: [facility];  Service: General;  Laterality: N/A;    ENDOSCOPY N/A 2024    Procedure: ESOPHAGOGASTRODUODENOSCOPY;  Surgeon: Dominik Chase  MD;  Location:  [facility];  Service: Gastroenterology;  Laterality: N/A;    ERCP N/A 2/2/2024    Procedure: ENDOSCOPIC RETROGRADE CHOLANGIOPANCREATOGRAPHY;  Surgeon: Dominik Chase MD;  Location:  [facility];  Service: Gastroenterology;  Laterality: N/A;  Sphincterotomy made to common bile duct (CBD) ampulla. CBD swept with 9-12 mm balloon. ERCP scope removed with plastic cap intact.    ERCP N/A 1/26/2025    Procedure: ENDOSCOPIC RETROGRADE CHOLANGIOPANCREATOGRAPHY;  Surgeon: Brunner, Mark I, MD;  Location:  [facility];  Service: Gastroenterology;  Laterality: N/A;  Sphincterotomy, 9-12mm balloon sweep    HERNIA REPAIR  2010    TONSILLECTOMY  1974      General Information       Row Name 01/31/25 9282          Physical Therapy Time and Intention    Document Type therapy note (daily note)  -ER     Mode of Treatment physical therapy  -ER       Row Name 01/31/25 6874          General Information    Patient Profile Reviewed yes  -ER     Existing Precautions/Restrictions fall  -ER       Row Name 01/31/25 3162          Cognition    Orientation Status (Cognition) oriented x 3  -ER       Row Name 01/31/25 1291          Safety Issues/Impairments Affecting Functional Mobility    Impairments Affecting Function (Mobility) balance;coordination;endurance/activity tolerance;strength  -ER               User Key  (r) = Recorded By, (t) = Taken By, (c) = Cosigned By      Initials Name Provider Type    ER Kallie Delgadillo, PT Physical Therapist                   Mobility       Row Name 01/31/25 5280          Bed Mobility    Bed Mobility supine-sit;rolling right;sit-supine  -ER     Rolling Right [place] (Bed Mobility) standby assist  -ER     Supine-Sit [place] (Bed Mobility) standby assist  -ER     Sit-Supine [place] (Bed Mobility) standby assist  -ER     Assistive Device (Bed Mobility) bed rails;head of bed elevated  -ER     Comment, (Bed Mobility) extra time required  -ER       Row Name 01/31/25 9629           Bed-Chair Transfer    Comment, (Bed-Chair Transfer) deferred  -ER       Row Name 01/31/25 1338          Sit-Stand Transfer    Sit-Stand [place] (Transfers) contact guard  -ER     Assistive Device (Sit-Stand Transfers) walker, front-wheeled  -ER     Comment, (Sit-Stand Transfer) 1x from bed no significant difficulty  -ER       Row Name 01/31/25 1338          Gait/Stairs (Locomotion)    [place] Level (Gait) contact guard  -ER     Assistive Device (Gait) walker, front-wheeled  -ER     Patient was able to Ambulate yes  -ER     Distance in Feet (Gait) 80  -ER     Deviations/Abnormal Patterns (Gait) serge decreased;festinating/shuffling;gait speed decreased;stride length decreased  -ER     Bilateral Gait Deviations heel strike decreased;forward flexed posture  -ER     Comment, (Gait/Stairs) pt still ambulating with slow pace, he was more limited by fatigue and weakness today. no significant unsteadiness today.  -ER               User Key  (r) = Recorded By, (t) = Taken By, (c) = Cosigned By      Initials Name Provider Type    ER Kallie Delgadillo, PT Physical Therapist                   Obj/Interventions       Row Name 01/31/25 1341          Motor Skills    Motor Skills coordination  -ER     Coordination --  sitting dead bug exercise 10 each side  -ER       Row Name 01/31/25 1341          Balance    Balance Interventions sitting;standing;sit to stand;supported;static;dynamic;minimal challenge  -ER               User Key  (r) = Recorded By, (t) = Taken By, (c) = Cosigned By      Initials Name Provider Type    ER Kallie Delgadillo, PT Physical Therapist                   Goals/Plan    No documentation.                  Clinical Impression       Row Name 01/31/25 1343          Pain    Pretreatment Pain Rating 2/10  -ER     Posttreatment Pain Rating 2/10  -ER     Pain Location back  -ER     Pain Side/Orientation generalized  -ER     Pain Management Interventions exercise or physical activity utilized  -ER      Response to Pain Interventions activity participation with tolerable pain  -ER       Row Name 01/31/25 1343          Plan of Care Review    Plan of Care Reviewed With patient  -ER     Progress improving  -ER     Outcome Evaluation Pt able to participate with therapy today. He was significantly limited by fatigue today decreasing ambulation tolerance. He remains below his functional mobility baseline. Continue to progress as tolerated. Recommend home w OP PT and assist.  -ER       Row Name 01/31/25 1343          Vital Signs    Pre Systolic BP Rehab 127  -ER     Pre Treatment Diastolic BP 81  -ER     Posttreatment Heart Rate (beats/min) 79  -ER     O2 Delivery Pre Treatment room air  -ER     O2 Delivery Intra Treatment room air  -ER     Post SpO2 (%) 98  -ER     O2 Delivery Post Treatment room air  -ER     Pre Patient Position Supine  -ER     Intra Patient Position Standing  -ER     Post Patient Position Supine  -ER       Row Name 01/31/25 1343          Positioning and Restraints    Pre-Treatment Position in bed  -ER     Post Treatment Position bed  -ER     In Bed notified nsg;supine;call light within reach;encouraged to call for assist;exit alarm on;side rails up x2  -ER               User Key  (r) = Recorded By, (t) = Taken By, (c) = Cosigned By      Initials Name Provider Type    ER Kallie Delgadillo, PT Physical Therapist                   Outcome Measures       Row Name 01/31/25 1351          How much help from another person do you currently need...    Turning from your back to your side while in flat bed without using bedrails? 3  -ER     Moving from lying on back to sitting on the side of a flat bed without bedrails? 3  -ER     Moving to and from a bed to a chair (including a wheelchair)? 3  -ER     Standing up from a chair using your arms (e.g., wheelchair, bedside chair)? 4  -ER     Climbing 3-5 steps with a railing? 3  -ER     To walk in hospital room? 3  -ER     AM-PAC 6 Clicks Score (PT) 19  -ER     Highest  Level of Mobility Goal 6 --> Walk 10 steps or more  -ER       Row Name 01/31/25 1351          Functional Assessment    Outcome Measure Options AM-PAC 6 Clicks Basic Mobility (PT)  -ER               User Key  (r) = Recorded By, (t) = Taken By, (c) = Cosigned By      Initials Name Provider Type    ER Kallie Delgadillo, PT Physical Therapist                                 Physical Therapy Education       Title: PT OT SLP Therapies (In Progress)       Topic: Physical Therapy (Done)       Point: Mobility training (Done)       Learning Progress Summary            Patient Acceptance, E, VU by ER at 1/31/2025 1353    Comment: progress, POC, need for PT    Acceptance, E, VU,NR by NS at 1/27/2025 1323                      Point: Home exercise program (Done)       Learning Progress Summary            Patient Acceptance, E, VU by ER at 1/31/2025 1353    Comment: progress, POC, need for PT                      Point: Body mechanics (Done)       Learning Progress Summary            Patient Acceptance, E, VU by ER at 1/31/2025 1353    Comment: progress, POC, need for PT    Acceptance, E, VU,NR by NS at 1/27/2025 1323                      Point: Precautions (Done)       Learning Progress Summary            Patient Acceptance, E, VU by ER at 1/31/2025 1353    Comment: progress, POC, need for PT    Acceptance, E, VU,NR by NS at 1/27/2025 1323                                      User Key       Initials Effective Dates Name Provider Type Discipline    NS 06/16/21 -  Sharmin Miramontes, PT Physical Therapist PT    ER 12/13/24 -  Kallie Delgadillo PT Physical Therapist PT                  PT Recommendation and Plan     Progress: improving  Outcome Evaluation: Pt able to participate with therapy today. He was significantly limited by fatigue today decreasing ambulation tolerance. He remains below his functional mobility baseline. Continue to progress as tolerated. Recommend home w OP PT and assist.     Time Calculation:         PT Charges        Row Name 01/31/25 1354             Time Calculation    Start Time 1325  -ER      PT Received On 01/31/25  -ER      PT Goal Re-Cert Due Date 02/06/25  -ER         Timed Charges    58813 - PT Therapeutic Activity Minutes 13  -ER         Total Minutes    Timed Charges Total Minutes 13  -ER       Total Minutes 13  -ER                User Key  (r) = Recorded By, (t) = Taken By, (c) = Cosigned By      Initials Name Provider Type    ER Kallie Delgadillo, PT Physical Therapist                  Therapy Charges for Today       Code Description Service Date Service Provider Modifiers Qty    37570418691 HC PT THERAPEUTIC ACT EA 15 MIN 1/31/2025 Kallie Delgadillo, PT GP 1            PT G-Codes  Outcome Measure Options: AM-PAC 6 Clicks Basic Mobility (PT)  AM-PAC 6 Clicks Score (PT): 19  AM-PAC 6 Clicks Score (OT): 19  PT Discharge Summary  Anticipated Discharge Disposition (PT): home with assist, home with outpatient therapy services    Kallie Delgadillo, PT  1/31/2025

## 2025-01-31 NOTE — PLAN OF CARE
Goal Outcome Evaluation:  Plan of Care Reviewed With: patient        Progress: no change  Outcome Evaluation: Patient rested well all night, no complaints of pain, antiviral medication started this morning.

## 2025-01-31 NOTE — PLAN OF CARE
Goal Outcome Evaluation:  Plan of Care Reviewed With: patient        Progress: improving  Outcome Evaluation: Pt able to participate with therapy today. He was significantly limited by fatigue today decreasing ambulation tolerance. He remains below his functional mobility baseline. Continue to progress as tolerated. Recommend home w OP PT and assist.    Anticipated Discharge Disposition (PT): home with assist, home with outpatient therapy services

## 2025-01-31 NOTE — PROGRESS NOTES
[facility] Medicine Services  PROGRESS NOTE    Patient Name: Malcolm Costa  : 1965  MRN: [id number]    Date of Admission: 2025  Primary Care Physician: Sita Chase APRN    Subjective   Subjective     CC:  F/u generalized weakness    HPI:   Patient resting in bed.  Family at the bedside.  Patient says he is having some low back pain.  Will try lidocaine patch.  Denies N/V/diarrhea, abdominal pain.  Says he is urinating normally.    Objective   Objective     Vital Signs:   Temp:  [97.8 °F (36.6 °C)-98.6 °F (37 °C)] 98.6 °F (37 °C)  Heart Rate:  [68-81] 75  Resp:  [16-18] 18  BP: ()/(50-80) 118/72     Physical Exam:   Constitutional: No acute distress, awake, alert  HENT: NCAT, mucous membranes moist  Respiratory: Clear to auscultation bilaterally, respiratory effort normal, room air  Cardiovascular: RRR, no murmurs, rubs, or gallops  Gastrointestinal: Positive bowel sounds, soft, nontender, nondistended  Musculoskeletal: No bilateral ankle edema  Psychiatric: Appropriate affect, cooperative  Neurologic: Oriented x 3, moves all extremities, speech clear  Skin: No rashes      Results Reviewed:  LAB RESULTS:      Lab 25  1342 25  0104 25  0717 25  1114 25  0350 25  0843 25  1626 25  1510 25  1942 25  1832   WBC 4.05 2.28* 2.22* 2.3* 2.02* 4.31  --   --  8.48  --  2.93*   HEMOGLOBIN 10.2* 9.5* 9.4* 10.1* 9.6* 9.9*  --   --  11.1*  --  11.6*   HEMATOCRIT 32.0* 29.9* 28.7* 30.8* 31.0* 30.6*  --   --  33.0*  --  36.1*   PLATELETS 144 120* 112* 115* 89* 93*  --   --  110*  --  123*   NEUTROS ABS 2.67  --  1.50* 1.7  --  3.09  --   --  6.59  --  1.83   IMMATURE GRANS (ABS) 0.04  --  0.01  --   --  0.02  --   --  0.05  --  0.01   LYMPHS ABS 0.93  --  0.50* 0.4*  --  0.79  --   --  1.13  --  0.82   MONOS ABS 0.31  --  0.19 0.2  --  0.39  --   --  0.69  --  0.19   EOS ABS 0.08  --  0.02 0.0  --  0.01  --    --  0.01  --  0.08   MCV 93.3 92.6 92.6 93 95.4 93.0  --   --  89.9  --  91.4   CRP  --   --   --   --   --  11.62*  --   --   --   --   --    PROCALCITONIN  --   --   --   --   --   --   --   --  7.37*  --   --    LACTATE  --   --   --   --   --  1.0 1.1  --  2.7*  --   --    PROTIME  --   --   --   --   --  14.5  --   --   --   --   --    D DIMER QUANT  --   --   --   --   --   --   --   --   --   --  0.50   HSTROP T  --   --   --   --   --   --   --  29* 31* 19 21         Lab 01/30/25  1342 01/29/25  0104 01/28/25  0718 01/27/25  0350 01/26/25  0843 01/25/25  1510   SODIUM 134* 138 141 140 140 137   POTASSIUM 3.9 4.5 3.2* 4.2 3.4*  3.3* 3.7   CHLORIDE 100 105 107 107 106 102   CO2 22.0 24.0 22.0 22.0 21.0* 19.0*   ANION GAP 12.0 9.0 12.0 11.0 13.0 16.0*   BUN 14 15 21* 17 14 15   CREATININE 1.10 0.95 0.89 0.93 0.88 1.14   EGFR 77.3 92.2 98.7 94.6 99.1 74.1   GLUCOSE 117* 98 128* 244* 106* 277*   CALCIUM 8.9 8.6 8.5* 8.6 8.3* 8.8   MAGNESIUM  --  1.6 1.8 2.4  --  1.6   HEMOGLOBIN A1C  --   --   --   --  6.00*  --    TSH  --   --   --   --  0.816  --          Lab 01/30/25  1342 01/29/25  0104 01/28/25  0718 01/27/25  0350 01/26/25  0843 01/25/25  1510 01/24/25  1832   TOTAL PROTEIN 6.1 6.1 5.9* 6.0 6.1 7.0 7.2   ALBUMIN 3.5 3.1* 3.0* 3.1* 3.2* 3.9 3.8   GLOBULIN 2.6 3.0 2.9  --  2.9 3.1 3.4   ALT (SGPT) 92* 70* 60* 86* 100* 136* 40   AST (SGOT) 92* 73* 45* 74* 120* 229* 100*   BILIRUBIN 0.8 0.9 1.2 2.3* 3.8* 3.5* 0.8   INDIRECT BILIRUBIN  --   --   --  0.5  --   --   --    BILIRUBIN DIRECT  --   --   --  1.8* 3.7*  --   --    ALK PHOS 271* 272* 249* 290* 297* 379* 203*   LIPASE  --   --   --   --   --   --  34         Lab 01/26/25  0843 01/25/25  1626 01/25/25  1510 01/24/25  1942 01/24/25  1832   PROBNP  --   --   --   --  489.0   HSTROP T  --  29* 31* 19 21   PROTIME 14.5  --   --   --   --    INR 1.12  --   --   --   --              Lab 01/26/25  0843   IRON 13*   IRON SATURATION (TSAT) 7*   TIBC 189*    TRANSFERRIN 127*   FERRITIN 2,807.00*   FOLATE 6.75   VITAMIN B 12 791         Brief Urine Lab Results  (Last result in the past 365 days)        Color   Clarity   Blood   Leuk Est   Nitrite   Protein   CREAT   Urine HCG        01/25/25 1456 Dark Yellow   Cloudy   Negative   Negative   Negative   >=300 mg/dL (3+)                   Microbiology Results Abnormal       Procedure Component Value - Date/Time    Gastrointestinal Panel, PCR - Stool, Per Rectum [[id number]]  (Abnormal) Collected: 01/30/25 1343    Lab Status: Final result Specimen: Stool from Per Rectum Updated: 01/30/25 1521     Campylobacter Detected     Plesiomonas shigelloides Not Detected     Salmonella Not Detected     Vibrio Not Detected     Vibrio cholerae Not Detected     Yersinia enterocolitica Not Detected     Enteroaggregative E. coli (EAEC) Not Detected     Enteropathogenic E. coli (EPEC) Not Detected     Enterotoxigenic E. coli (ETEC) lt/st Not Detected     Shiga-like toxin-producing E. coli (STEC) stx1/stx2 Not Detected     Shigella/Enteroinvasive E. coli (EIEC) Not Detected     Cryptosporidium Not Detected     Cyclospora cayetanensis Not Detected     Entamoeba histolytica Not Detected     Giardia lamblia Not Detected     Adenovirus F40/41 Not Detected     Astrovirus Not Detected     Norovirus GI/GII Not Detected     Rotavirus A Not Detected     Sapovirus (I, II, IV or V) Not Detected            No radiology results from the last 24 hrs    Results for orders placed during the hospital encounter of 01/25/25    Adult Transthoracic Echo Complete W/ Cont if Necessary Per Protocol    Interpretation Summary    Left ventricular systolic function is normal. Calculated left ventricular EF = 57.5% Left ventricular ejection fraction appears to be 56 - 60%.    Left ventricular wall thickness is consistent with borderline concentric hypertrophy. Left ventricular diastolic function was normal.    The right ventricular cavity is borderline dilated with  normal systolic function.    No hemodynamically significant valvular dysfunction.      Current medications:  Scheduled Meds:buPROPion XL, 150 mg, Oral, Daily  citalopram, 20 mg, Oral, Daily  insulin lispro, 2-7 Units, Subcutaneous, 4x Daily AC & at Bedtime  Insulin Lispro, 3 Units, Subcutaneous, TID With Meals  Lidocaine, 1 patch, Transdermal, Q24H  pantoprazole, 40 mg, Oral, Q AM  polyethylene glycol, 17 g, Oral, Daily  senna-docusate sodium, 2 tablet, Oral, BID  sulfamethoxazole-trimethoprim, 1 tablet, Oral, Q24H  valGANciclovir, 900 mg, Oral, Q12H      Continuous Infusions:   PRN Meds:.  acetaminophen    senna-docusate sodium **AND** polyethylene glycol **AND** bisacodyl **AND** bisacodyl    dextrose    dextrose    glucagon (human recombinant)    Potassium Replacement - Follow Nurse / BPA Driven Protocol    sodium chloride    Assessment & Plan   Assessment & Plan     Active Hospital Problems    Diagnosis  POA    **Elevated LFTs [R79.89]  Yes    Severe protein-calorie malnutrition [E43]  Yes      Resolved Hospital Problems   No resolved problems to display.        Brief Hospital Course to date:  Malcolm Costa is a 59 y.o. male with hemochromatosis, type 2 diabetes, hyperlipidemia, hypertension, GERD, depression and anxiety who presented to [facility] ED due to generalized weakness.  Patient has had problems with weakness and overall malaise for several months, but his symptoms worsened over the last week. Patient has also had 60-lb unintentional weight loss, nausea, and loose stools x 3 months. He also noted chronic cough.     Workup for elevated LFTs including HIV testing.  Patient has been newly diagnosed with HIV.  Initial consult ID was deferred as patient's insurance requires that he received treatment at .  However, pathology from patient's recent outpatient colonoscopy was concerning for possible CMV.  ID was consulted for further recommendations to treat CMV, as well as other and infection prophylaxis, prior to  patient being getting LLANOS therapy at  HIV clinic. ID and GI following.     This patient's problems and plans were partially entered by my partner and updated as appropriate by me 01/31/25.      HIV, newly diagnosed  Generalized weakness  Unintentional weight loss   -Patient's HIV antibody test came back positive today  -Patient MSM; however, family does not know and patient does not want them told yet  -Patient denies any IV drug use, needlesticks, new sexual partners.  His fatigue and weight loss have been going on for several months.  He is unsure when he was exposed  -Patient does have a history of syphilis that was treated back in 2014.  No other known STIs  -STI panel negative.  -CD4 and absolute CD4 low at 5.7 and 23, respectively.   -No respiratory issues and no history of swallowing problems or known oral ulcers.  -ID consulted for recommendations after pathology from outpt colonoscopy concerning for CMV. See below. Dr. Edward following.  -Started on Bactrim double strength PO daily for PCP prevention.   -Started on valganciclovir 900 mg PO BID for induction therapy, CMV ppx. Antiretroviral therapy to be started at .  -Discussed with Dr. Edward who would like his HIV clinic appointment moved up given patient's clinical status. Appointment for  HIV Clinic scheduled Monday, 2/3 at 9:30 AM for intake and Tuesday, 2/4 at 8 AM with Dr. Wilson. Appointments listed in AVS.   -Appointment with Dr. Rafael Brice for ophthalmology evaluation for retinitis  scheduled 2/5 @ 8:45  -Crypto Ag negative  -Histoplasma Ag pending  -PT and OT following. Rec for home with outpatient PT.  -AM CBC w/ di    ?CMV vs herpes colitis  Colonic inflammation  - EGD 1/23/25 and colonoscopy 1/23/25 showed erythematous stomach mucosa and congested mucosa with ulcerations on the ileocecal valve  -pathology from outpatient colonoscopy concerning for possible CMV. Dr. Mccrary with [facility] [place] [facility] recommends ID consult for IV  antivirals/treatment prior to starting HIV therapy.  -CMV serologies positive, HSV stain pending  -Per Dr. Edward with ID, he will start valganciclovir for CMV ppx/induction therapy. Holding on additional CMV therapy pending further studies.   -GI panel positive for campylobacter  -Stool studies pending    Elevated LFTs  Thrombocytopenia  -Prior cholecystectomy 2/2024  -GI consulted. Concern for infection given elevated inflammatory markers, elevated procal, and symptoms in spite of normal WBC and no fever on presentation. Patient now known to be immunocompromised which could explain lack of leukocytosis or true fever.   -ERCP 1/26 by Dr. Brunner with removal of stone, which was likely cause of symptoms. Patient improving.    -Completed 5 days of antibiotic therapy for presumed cholangitis on 1/29  -Bili has normalized. Per GI, residual elevated transaminases r/t HIV. AST and ALT both 92 today, 1/30.     Hemachromatosis  -pt has homozygous H63D mutation  - MRI abdomen 12/18/24 showed mild iron deposition in the liver with suggestion of hepatic steatosis.  Mild splenomegaly also noted.  -pt followed by Dr Myers with hem/onc.  - unfortunately pt has low iron saturation and iron as well as anemia, so he has been unable to undergo phlebotomy to lower his ferritin until December  -iron studies still show low iron, low iron sat and high ferritin   -worsening ferritin elevation this admit likely due to infection    -Echo with EF 56-60%, borderline LV concentric hypertrophy  -Patient would like to defer brain MRI at this time, this is reasonable given above reason for fatigue  -treat infection as above   -Pt has upcoming outpatient appointment with Dr. Myers on 2/27     GERD  - continue Protonix daily, avoid NSAIDs     DMII  HTN  - patient states he was taken off his diabetes (glipizide and metformin) and antihypertensive (amlodipine) medications one month ago by his PCP  - A1c 6  - SSI + 3 units TID with meals, adjust as  needed     Depression and anxiety  - continue citalopram  - father concerned patient recently took oxycodone from his mother's home supply.  Patient says he had one dose earlier this week and it was only because of generalized pain.   -pt denies SI    Tobacco use  -NRT  -Pt requesting rx for nicotine gum at discharge as insurance will cover it if it is prescribed    Expected Discharge Location and Transportation: Home  Expected Discharge   Expected Discharge Date: 2/3/2025; Expected Discharge Time:      VTE Prophylaxis:  Mechanical VTE prophylaxis orders are present.         AM-PAC 6 Clicks Score (PT): 19 (01/30/25 2011)    CODE STATUS:   There are no questions and answers to display.       Rosalba Burnham, APRN  01/31/25

## 2025-01-31 NOTE — CASE MANAGEMENT/SOCIAL WORK
Continued Stay Note  [facility]     Patient Name: Malcolm Costa  MRN: [id number]  Today's Date: 1/31/2025    Admit Date: 1/25/2025    Plan: Home   Discharge Plan       Row Name 01/31/25 1549       Plan    Plan Home    Plan Comments Pt c/o back pain and is on room air. He walked 80ft with contact guard and a walker today. Plan is home with outpatient PT (order provided). CM will continue to follow for medical readiness.    Final Discharge Disposition Code 01 - home or self-care                   Discharge Codes    No documentation.                 Expected Discharge Date and Time       Expected Discharge Date Expected Discharge Time    Feb 3, 2025               Mikayla Costa RN

## 2025-01-31 NOTE — PLAN OF CARE
Goal Outcome Evaluation:     Up to BR w 1 asst.  VSS on RA.   NSR on tele.   Azithromycin po started today.   Follow up appt scheduled at  on 2-3, 2-4, 2-5.

## 2025-02-01 ENCOUNTER — READMISSION MANAGEMENT (OUTPATIENT)
Dept: CALL CENTER | Facility: HOSPITAL | Age: 60
End: 2025-02-01
Payer: MEDICAID

## 2025-02-01 VITALS
TEMPERATURE: 98.2 F | WEIGHT: 170 LBS | DIASTOLIC BLOOD PRESSURE: 79 MMHG | BODY MASS INDEX: 30.12 KG/M2 | HEART RATE: 74 BPM | HEIGHT: 63 IN | OXYGEN SATURATION: 99 % | SYSTOLIC BLOOD PRESSURE: 127 MMHG | RESPIRATION RATE: 18 BRPM

## 2025-02-01 LAB
BASOPHILS # BLD AUTO: 0.02 10*3/MM3 (ref 0–0.2)
BASOPHILS NFR BLD AUTO: 0.6 % (ref 0–1.5)
CMV DNA SERPL NAA+PROBE-ACNC: 6820 IU/ML
CMV DNA SERPL NAA+PROBE-LOG IU: 3.83 LOG10 IU/ML
DEPRECATED RDW RBC AUTO: 53.8 FL (ref 37–54)
EOSINOPHIL # BLD AUTO: 0.13 10*3/MM3 (ref 0–0.4)
EOSINOPHIL NFR BLD AUTO: 4.2 % (ref 0.3–6.2)
ERYTHROCYTE [DISTWIDTH] IN BLOOD BY AUTOMATED COUNT: 15.8 % (ref 12.3–15.4)
GAMMA INTERFERON BACKGROUND BLD IA-ACNC: 0.13 IU/ML
GLUCOSE BLDC GLUCOMTR-MCNC: 103 MG/DL (ref 70–130)
HCT VFR BLD AUTO: 30.6 % (ref 37.5–51)
HGB BLD-MCNC: 9.8 G/DL (ref 13–17.7)
IMM GRANULOCYTES # BLD AUTO: 0.03 10*3/MM3 (ref 0–0.05)
IMM GRANULOCYTES NFR BLD AUTO: 1 % (ref 0–0.5)
LYMPHOCYTES # BLD AUTO: 0.73 10*3/MM3 (ref 0.7–3.1)
LYMPHOCYTES NFR BLD AUTO: 23.5 % (ref 19.6–45.3)
M TB IFN-G BLD-IMP: NEGATIVE
M TB IFN-G CD4+ BCKGRND COR BLD-ACNC: 0.13 IU/ML
M TB IFN-G CD4+CD8+ BCKGRND COR BLD-ACNC: 0.13 IU/ML
MCH RBC QN AUTO: 29.9 PG (ref 26.6–33)
MCHC RBC AUTO-ENTMCNC: 32 G/DL (ref 31.5–35.7)
MCV RBC AUTO: 93.3 FL (ref 79–97)
MITOGEN IGNF BCKGRD COR BLD-ACNC: 2.27 IU/ML
MONOCYTES # BLD AUTO: 0.31 10*3/MM3 (ref 0.1–0.9)
MONOCYTES NFR BLD AUTO: 10 % (ref 5–12)
NEUTROPHILS NFR BLD AUTO: 1.88 10*3/MM3 (ref 1.7–7)
NEUTROPHILS NFR BLD AUTO: 60.7 % (ref 42.7–76)
NRBC BLD AUTO-RTO: 0 /100 WBC (ref 0–0.2)
PLATELET # BLD AUTO: 134 10*3/MM3 (ref 140–450)
PMV BLD AUTO: 11.2 FL (ref 6–12)
QUANTIFERON INCUBATION: NORMAL
RBC # BLD AUTO: 3.28 10*6/MM3 (ref 4.14–5.8)
SERVICE CMNT-IMP: NORMAL
WBC NRBC COR # BLD AUTO: 3.1 10*3/MM3 (ref 3.4–10.8)

## 2025-02-01 PROCEDURE — 82948 REAGENT STRIP/BLOOD GLUCOSE: CPT

## 2025-02-01 PROCEDURE — 85025 COMPLETE CBC W/AUTO DIFF WBC: CPT | Performed by: NURSE PRACTITIONER

## 2025-02-01 PROCEDURE — 99239 HOSP IP/OBS DSCHRG MGMT >30: CPT | Performed by: NURSE PRACTITIONER

## 2025-02-01 RX ORDER — SULFAMETHOXAZOLE AND TRIMETHOPRIM 800; 160 MG/1; MG/1
1 TABLET ORAL
Qty: 27 TABLET | Refills: 0 | Status: SHIPPED | OUTPATIENT
Start: 2025-02-01 | End: 2025-02-28

## 2025-02-01 RX ORDER — AZITHROMYCIN 500 MG/1
500 TABLET, FILM COATED ORAL
Qty: 2 TABLET | Refills: 0 | Status: SHIPPED | OUTPATIENT
Start: 2025-02-01 | End: 2025-02-03

## 2025-02-01 RX ORDER — POLYETHYLENE GLYCOL 3350 17 G/17G
17 POWDER, FOR SOLUTION ORAL DAILY
Qty: 30 PACKET | Refills: 0 | Status: SHIPPED | OUTPATIENT
Start: 2025-02-01 | End: 2025-03-03

## 2025-02-01 RX ORDER — VALGANCICLOVIR 450 MG/1
900 TABLET, FILM COATED ORAL EVERY 12 HOURS SCHEDULED
Qty: 48 TABLET | Refills: 0 | Status: SHIPPED | OUTPATIENT
Start: 2025-02-01 | End: 2025-02-13

## 2025-02-01 RX ADMIN — AZITHROMYCIN 500 MG: 250 TABLET, FILM COATED ORAL at 10:35

## 2025-02-01 RX ADMIN — BUPROPION HYDROCHLORIDE 150 MG: 150 TABLET, EXTENDED RELEASE ORAL at 10:35

## 2025-02-01 RX ADMIN — PANTOPRAZOLE SODIUM 40 MG: 40 TABLET, DELAYED RELEASE ORAL at 05:39

## 2025-02-01 RX ADMIN — CITALOPRAM HYDROBROMIDE 20 MG: 20 TABLET ORAL at 10:35

## 2025-02-01 RX ADMIN — SULFAMETHOXAZOLE AND TRIMETHOPRIM 1 TABLET: 800; 160 TABLET ORAL at 10:34

## 2025-02-01 RX ADMIN — VALGANCICLOVIR 900 MG: 450 TABLET, FILM COATED ORAL at 05:39

## 2025-02-01 NOTE — PLAN OF CARE
Goal Outcome Evaluation:  Plan of Care Reviewed With: patient        Progress: improving  Outcome Evaluation: Patient rested well this shift, no complains of pain.

## 2025-02-01 NOTE — DISCHARGE SUMMARY
[facility] Medicine Services  DISCHARGE SUMMARY    Patient Name: Malcolm Costa  : 1965  MRN: [id number]    Date of Admission: 2025  2:26 PM  Date of Discharge:  2025  Primary Care Physician: Sita Chase APRN    Consults       Date and Time Order Name Status Description    2025 11:08 AM Inpatient Infectious Diseases Consult Completed     2025  5:26 PM Inpatient Gastroenterology Consult Completed             Hospital Course     Presenting Problem: weight loss, diarrhea    Active Hospital Problems    Diagnosis  POA    **Elevated LFTs [R79.89]  Yes    Severe protein-calorie malnutrition [E43]  Yes      Resolved Hospital Problems   No resolved problems to display.          Hospital Course:  Malcolm Costa is a 59 y.o. male with hemochromatosis, type 2 diabetes, hyperlipidemia, hypertension, GERD, depression and anxiety who presented to [facility] ED due to generalized weakness.  Patient has had problems with weakness and overall malaise for several months, but his symptoms worsened over the last week. Patient has also had 60-lb unintentional weight loss, nausea, and loose stools x 3 months. He also noted chronic cough.      Workup for elevated LFTs including HIV testing.  Patient has been newly diagnosed with HIV.  Initial consult ID was deferred as patient's insurance requires that he received treatment at .  However, pathology from patient's recent outpatient colonoscopy was concerning for possible CMV.  ID was consulted for further recommendations to treat CMV, as well as other and infection prophylaxis, prior to patient being getting LLANOS therapy at  HIV clinic. ID and GI following.        HIV, newly diagnosed  Generalized weakness  Unintentional weight loss   -Patient's HIV antibody test came back positive today  -Patient MSM; however, family does not know and patient does not want them told yet  -Patient denies any IV drug use, needlesticks, new sexual partners.   His fatigue and weight loss have been going on for several months.  He is unsure when he was exposed  -Patient does have a history of syphilis that was treated back in 2014.  No other known STIs  -STI panel negative.  -CD4 and absolute CD4 low at 5.7 and 23  -No respiratory issues and no history of swallowing problems or known oral ulcers.  -ID consulted for recommendations after pathology from outpt colonoscopy concerning for CMV. See below. Dr. Edward following.  -Started on Bactrim double strength PO daily for PCP prevention.   -Started on valganciclovir 900 mg PO BID for induction therapy, CMV ppx. Antiretroviral therapy to be started at .  -Discussed with Dr. Edward who would like his HIV clinic appointment moved up given patient's clinical status. Appointment for  HIV Clinic scheduled Monday, 2/3 at 9:30 AM for intake and Tuesday, 2/4 at 8 AM with Dr. Wilson. Appointments listed in AVS.   -Appointment with Dr. Rafael Brice for ophthalmology evaluation for retinitis  scheduled 2/5 @ 8:45  -Crypto Ag negative  -Histoplasma Ag pending  -PT and OT following. Rec for home with outpatient PT.     ?CMV vs herpes colitis  Colonic inflammation  - EGD 1/23/25 and colonoscopy 1/23/25 showed erythematous stomach mucosa and congested mucosa with ulcerations on the ileocecal valve  -pathology from outpatient colonoscopy concerning for possible CMV. Dr. Mccrary with [facility] recommended ID consult for IV antivirals/treatment prior to starting HIV therapy.  -CMV serologies positive, HSV stain pending  -Per Dr. Edward with ID, he will start valganciclovir for CMV ppx/induction therapy. Holding on additional CMV therapy pending further studies.   -GI panel positive for campylobacter- started on azithromycin        Elevated LFTs  Thrombocytopenia  -Prior cholecystectomy 2/2024  -GI consulted. Concern for infection given elevated inflammatory markers, elevated procal, and symptoms in spite of normal WBC and no fever  on presentation. Patient now known to be immunocompromised which could explain lack of leukocytosis or true fever.   -ERCP 1/26 by Dr. Brunner with removal of stone, which was likely cause of symptoms. Patient improving.    -Completed 5 days of antibiotic therapy for presumed cholangitis on 1/29  -Bili has normalized. Per GI, residual elevated transaminases r/t HIV. AST and ALT both 92      Hemachromatosis  -pt has homozygous H63D mutation  - MRI abdomen 12/18/24 showed mild iron deposition in the liver with suggestion of hepatic steatosis.  Mild splenomegaly also noted.  -pt followed by Dr Myers with hem/onc.  - unfortunately pt has low iron saturation and iron as well as anemia, so he has been unable to undergo phlebotomy to lower his ferritin until December  -iron studies still show low iron, low iron sat and high ferritin   -worsening ferritin elevation this admit likely due to infection    -Echo with EF 56-60%, borderline LV concentric hypertrophy  -Patient would like to defer brain MRI at this time, this is reasonable given above reason for fatigue  -treat infection as above   -Pt has upcoming outpatient appointment with Dr. Myers on 2/27     GERD  - continue Protonix daily, avoid NSAIDs     DMII  HTN  - patient states he was taken off his diabetes (glipizide and metformin) and antihypertensive (amlodipine) medications one month ago by his PCP  - A1c 6  - diabetic diet recommended at [place]     Depression and anxiety  - continue citalopram/ wellbutrin  - father concerned patient recently took oxycodone from his mother's home supply.  Patient says he had one dose earlier this week and it was only because of generalized pain.   -pt denies SI     Tobacco use  -NRT  -RX gum at dc      Discharge Follow Up Recommendations for outpatient labs/diagnostics:   PCP follow up 1-2 weeks  Follow up [facility] 2/3 930am and 2/4 8am  Follow up [facility] 0845 2/5  Dr Myers follow up on 2/27    Day of Discharge     HPI:    Tolerating diet, no further diarrhea. Normal BM today  Denies pain.    Review of Systems  Gen- No fevers, chills  CV- No chest pain, palpitations  Resp- No cough, dyspnea  GI- No N/V/D, abd pain      Vital Signs:   Temp:  [97 °F (36.1 °C)-98.7 °F (37.1 °C)] 98.2 °F (36.8 °C)  Heart Rate:  [73-84] 74  Resp:  [16-18] 18  BP: (114-138)/(52-81) 127/79      Physical Exam:  Constitutional: No acute distress, awake, alert, chronically ill appearing  HENT: NCAT, mucous membranes moist  Respiratory: Clear to auscultation bilaterally, respiratory effort normal   Cardiovascular: RRR, no murmurs, rubs, or gallops  Gastrointestinal: Positive bowel sounds, soft, nontender, nondistended  Musculoskeletal: No bilateral ankle edema  Psychiatric: Appropriate affect, cooperative  Neurologic: Oriented x 3, strength symmetric in all extremities, Cranial Nerves grossly intact to confrontation, speech clear  Skin: No rashes      Pertinent  and/or Most Recent Results     LAB RESULTS:      Lab 02/01/25  0500 01/30/25  1342 01/29/25  0104 01/28/25  0717 01/27/25  1114 01/27/25  0350 01/26/25  0843 01/25/25 2011 01/25/25  1510   WBC 3.10* 4.05 2.28* 2.22* 2.3*   < > 4.31  --  8.48   HEMOGLOBIN 9.8* 10.2* 9.5* 9.4* 10.1*   < > 9.9*  --  11.1*   HEMATOCRIT 30.6* 32.0* 29.9* 28.7* 30.8*   < > 30.6*  --  33.0*   PLATELETS 134* 144 120* 112* 115*   < > 93*  --  110*   NEUTROS ABS 1.88 2.67  --  1.50* 1.7  --  3.09  --  6.59   IMMATURE GRANS (ABS) 0.03 0.04  --  0.01  --   --  0.02  --  0.05   LYMPHS ABS 0.73 0.93  --  0.50* 0.4*  --  0.79  --  1.13   MONOS ABS 0.31 0.31  --  0.19 0.2  --  0.39  --  0.69   EOS ABS 0.13 0.08  --  0.02 0.0  --  0.01  --  0.01   MCV 93.3 93.3 92.6 92.6 93   < > 93.0  --  89.9   CRP  --   --   --   --   --   --  11.62*  --   --    PROCALCITONIN  --   --   --   --   --   --   --   --  7.37*   LACTATE  --   --   --   --   --   --  1.0 1.1 2.7*   PROTIME  --   --   --   --   --   --  14.5  --   --     < > = values in  this interval not displayed.         Lab 01/30/25  1342 01/29/25  0104 01/28/25  0718 01/27/25  0350 01/26/25  0843 01/25/25  1510   SODIUM 134* 138 141 140 140 137   POTASSIUM 3.9 4.5 3.2* 4.2 3.4*  3.3* 3.7   CHLORIDE 100 105 107 107 106 102   CO2 22.0 24.0 22.0 22.0 21.0* 19.0*   ANION GAP 12.0 9.0 12.0 11.0 13.0 16.0*   BUN 14 15 21* 17 14 15   CREATININE 1.10 0.95 0.89 0.93 0.88 1.14   EGFR 77.3 92.2 98.7 94.6 99.1 74.1   GLUCOSE 117* 98 128* 244* 106* 277*   CALCIUM 8.9 8.6 8.5* 8.6 8.3* 8.8   MAGNESIUM  --  1.6 1.8 2.4  --  1.6   HEMOGLOBIN A1C  --   --   --   --  6.00*  --    TSH  --   --   --   --  0.816  --          Lab 01/30/25  1342 01/29/25  0104 01/28/25  0718 01/27/25  0350 01/26/25  0843 01/25/25  1510   TOTAL PROTEIN 6.1 6.1 5.9* 6.0 6.1 7.0   ALBUMIN 3.5 3.1* 3.0* 3.1* 3.2* 3.9   GLOBULIN 2.6 3.0 2.9  --  2.9 3.1   ALT (SGPT) 92* 70* 60* 86* 100* 136*   AST (SGOT) 92* 73* 45* 74* 120* 229*   BILIRUBIN 0.8 0.9 1.2 2.3* 3.8* 3.5*   INDIRECT BILIRUBIN  --   --   --  0.5  --   --    BILIRUBIN DIRECT  --   --   --  1.8* 3.7*  --    ALK PHOS 271* 272* 249* 290* 297* 379*         Lab 01/26/25  0843 01/25/25  1626 01/25/25  1510   HSTROP T  --  29* 31*   PROTIME 14.5  --   --    INR 1.12  --   --              Lab 01/26/25  0843   IRON 13*   IRON SATURATION (TSAT) 7*   TIBC 189*   TRANSFERRIN 127*   FERRITIN 2,807.00*   FOLATE 6.75   VITAMIN B 12 791         Brief Urine Lab Results  (Last result in the past 365 days)        Color   Clarity   Blood   Leuk Est   Nitrite   Protein   CREAT   Urine HCG        01/25/25 1456 Dark Yellow   Cloudy   Negative   Negative   Negative   >=300 mg/dL (3+)                 Microbiology Results (last 10 days)       Procedure Component Value - Date/Time    Gastrointestinal Panel, PCR - Stool, Per Rectum [[id number]]  (Abnormal) Collected: 01/30/25 1343    Lab Status: Final result Specimen: Stool from Per Rectum Updated: 01/30/25 1521     Campylobacter Detected     Plesiomonas  shigelloides Not Detected     Salmonella Not Detected     Vibrio Not Detected     Vibrio cholerae Not Detected     Yersinia enterocolitica Not Detected     Enteroaggregative E. coli (EAEC) Not Detected     Enteropathogenic E. coli (EPEC) Not Detected     Enterotoxigenic E. coli (ETEC) lt/st Not Detected     Shiga-like toxin-producing E. coli (STEC) stx1/stx2 Not Detected     Shigella/Enteroinvasive E. coli (EIEC) Not Detected     Cryptosporidium Not Detected     Cyclospora cayetanensis Not Detected     Entamoeba histolytica Not Detected     Giardia lamblia Not Detected     Adenovirus F40/41 Not Detected     Astrovirus Not Detected     Norovirus GI/GII Not Detected     Rotavirus A Not Detected     Sapovirus (I, II, IV or V) Not Detected    Chlamydia trachomatis, Neisseria gonorrhoeae, PCR - Urine, Urine, Clean Catch [[id number]] Collected: 01/27/25 1110    Lab Status: Final result Specimen: Urine, Clean Catch Updated: 01/29/25 0610     Chlamydia trachomatis, VIKI Negative     Neisseria gonorrhoeae, VIKI Negative    Narrative:      Performed at:  [address] [place] [address]  [id number]  : Jade Gomes MD, Phone:  [phone]    Blood Culture - Blood, Arm, Left [[id number]]  (Normal) Collected: 01/25/25 2012    Lab Status: Final result Specimen: Blood from Arm, Left Updated: 01/31/25 0646     Blood Culture No growth at 5 days    Blood Culture - Blood, Arm, Right [[id number]]  (Normal) Collected: 01/25/25 2005    Lab Status: Final result Specimen: Blood from Arm, Right Updated: 01/31/25 0646     Blood Culture No growth at 5 days    Respiratory Panel PCR w/COVID-19(SARS-CoV-2) [facility]/JEMMA/PAD/COR/ALYCIA In-House, NP Swab in UTM/VTM, 2 HR TAT - Swab, Nasopharynx [[id number]]  (Normal) Collected: 01/25/25 1506    Lab Status: Final result Specimen: Swab from Nasopharynx Updated: 01/25/25 1615     ADENOVIRUS, PCR Not Detected     Coronavirus 229E Not Detected     Coronavirus HKU1 Not Detected      Coronavirus NL63 Not Detected     Coronavirus OC43 Not Detected     COVID19 Not Detected     Human Metapneumovirus Not Detected     Human Rhinovirus/Enterovirus Not Detected     Influenza A PCR Not Detected     Influenza B PCR Not Detected     Parainfluenza Virus 1 Not Detected     Parainfluenza Virus 2 Not Detected     Parainfluenza Virus 3 Not Detected     Parainfluenza Virus 4 Not Detected     RSV, PCR Not Detected     Bordetella pertussis pcr Not Detected     Bordetella parapertussis PCR Not Detected     Chlamydophila pneumoniae PCR Not Detected     Mycoplasma pneumo by PCR Not Detected    Narrative:      In the setting of a positive respiratory panel with a viral infection PLUS a negative procalcitonin without other underlying concern for bacterial infection, consider observing off antibiotics or discontinuation of antibiotics and continue supportive care. If the respiratory panel is positive for atypical bacterial infection (Bordetella pertussis, Chlamydophila pneumoniae, or Mycoplasma pneumoniae), consider antibiotic de-escalation to target atypical bacterial infection.            US Liver    Result Date: 1/27/2025  US LIVER Date of Exam: 1/27/2025 7:40 AM EST Indication: elevated bilirubin. Comparison: CT abdomen pelvis 1/25/2025 Technique: Grayscale and color Doppler ultrasound evaluation of the right upper quadrant was performed. Findings: Diffuse increased hepatic echogenicity. Mildly heterogeneous echotexture. No solid appearing liver lesions. Nonspecific dilation of main portal vein measuring 1.8 cm with otherwise hepatopetal flow. No visualized ascites. Cholecystectomy No intrahepatic duct dilation. The common bile duct measures maximally 3 mm, normal.. The visualized portions of the head and body of the pancreas are grossly unremarkable. The pancreatic tail is not seen due to bowel gas. Homogeneous cortical echotexture of the right kidney. No hydronephrosis, shadowing echogenicities or solid  masses.     1. No evidence of biliary cholestasis status post cholecystectomy. 2. Hepatic steatosis, likely marked severity. Mild parenchymal heterogeneity could reflect sequela of chronic hepatocellular dysfunction. 3. Nonspecific dilation of main portal vein with otherwise normal hepatopetal flow. Electronically Signed: Lul Burroughs MD  1/27/2025 8:23 AM EST  Workstation ID: [id number]    FL ERCP pancreatic and biliary ducts    Result Date: 1/27/2025  FL ERCP PANCREATIC AND BILIARY DUCTS Date of Exam: 1/26/2025 12:03 PM EST Indication: ENDOSCOPIC RETROGRADE CHOLANGIOPANCREATOGRAPHY.   Comparison: 2/2/2024 Technique:  A series of radiographic digital spot films were obtained in conjunction with an endoscopic catheterization of the biliary and pancreatic ductal system, performed by the gastroenterologist. Fluoroscopic Time: 38 seconds Number of Images: 2 Findings: Dictation is to record 38 seconds of fluoroscopy time during ERCP. 2 images obtained show endoscope and side cannula placement, contrast injection of the common duct and apparent balloon sweep. Please see the procedure report for full details.     Impression: Fluoroscopy provided during ERCP. Electronically Signed: Myron Means MD  1/27/2025 8:13 AM EST  Workstation ID: [id number]    CT Chest Without Contrast Diagnostic    Result Date: 1/25/2025  CT CHEST WO CONTRAST DIAGNOSTIC Date of Exam: 1/25/2025 3:53 PM EST Indication: ams. Comparison: None available. Technique: Axial CT images were obtained of the chest without contrast administration.  Reconstructed coronal and sagittal images were also obtained. Automated exposure control and iterative construction methods were used. FINDINGS: Scattered atelectasis is noted. No well-defined consolidations or pleural effusions are observed. Granulomas are noted in the left lower lobe. No suspicious pulmonary nodules or abnormal pulmonary masses are seen. No significant hilar, mediastinal, or axillary lymphadenopathy  is observed. Calcified left hilar lymph nodes are noted. A normal aortic arch branching pattern is identified. Coronary artery calcifications are identified. The thyroid gland is unremarkable. The esophagus is unremarkable. The limited evaluation of the upper abdomen demonstrates no evidence for acute abnormality. There is evidence for diffuse hepatic fatty infiltration with associated hepatosplenomegaly. No acute osseous abnormalities are observed.     1.No evidence for acute intrathoracic abnormality. 2.Coronary artery calcifications are noted. Electronically Signed: Andrew Tillman MD  1/25/2025 4:16 PM EST  Workstation ID: [id number]    CT Abdomen Pelvis Without Contrast    Result Date: 1/25/2025  CT ABDOMEN PELVIS WO CONTRAST Date of Exam: 1/25/2025 3:53 PM EST Indication: ams. Comparison: None available. Technique: Axial CT images were obtained of the abdomen and pelvis without the administration of contrast. Reconstructed coronal and sagittal images were also obtained. Automated exposure control and iterative construction methods were used. FINDINGS: Lung bases: Calcified left hilar lymph nodes. Left lower lobe calcified granulomata. Atheromatous disease of the coronary vessels. Liver: Geographic areas of decreased attenuation within the liver suggesting hepatic steatosis. Spleen: Splenic granulomata Pancreas:No pancreatic masses. No evidence of pancreatitis. Gallbladder and common bile duct: Previous cholecystectomy. Adrenal glands:No adrenal masses Kidneys and ureters:No kidney stones. No renal masses.No calculi present within the ureters. Normal caliber ureters. Urinary bladder:No urinary bladder wall thickening. No bladder masses. Small bowel:Normal caliber small bowel. Large bowel:No diverticulosis or diverticulitis. No large bowel masses are appreciated Appendix: Not seen however there is no evidence of appendicitis GENITOURINARY: Normal prostate Ascites or pneumoperitoneum:None. Adenopathy:None present  Osseous structures: The proximal femurs are intact. The pubic bones are intact. The sacrum and sacroiliac joints are normal. The spinous and transverse processes are intact. No rib fractures. Other findings: Fat-containing umbilical hernias.     1.No acute findings in the abdomen or pelvis. 2.Hepatic steatosis. 3.Fat-containing umbilical hernias. Electronically Signed: Stefano Garcia MD  1/25/2025 4:11 PM EST  Workstation ID: [id number]    CT Head Without Contrast    Result Date: 1/25/2025  CT HEAD WO CONTRAST Date of Exam: 1/25/2025 3:53 PM EST Indication: ams. Comparison: 12/15/2014 Technique: Axial CT images were obtained of the head without contrast administration.  Automated exposure control and iterative construction methods were used. Findings: Gray-white matter differentiation is maintained without evidence of an acute infarction. No intracranial mass or mass effect. No extra-axial mass or collection. The ventricles and sulci are normal in size and configuration. The posterior fossa appears normal. Sellar and suprasellar structures are normal. Orbital and paranasal soft tissues are normal. Opacification of the left maxillary sinus with mucoperiosteal reactive changes consistent with chronic sinusitis. The bony calvarium appears intact. No acute fractures. No lytic or blastic bony diseases.     Impression: No acute intracranial pathology. Electronically Signed: Stefano Garcia MD  1/25/2025 4:07 PM EST  Workstation ID: [id number]    XR Chest 1 View    Result Date: 1/24/2025  XR CHEST 1 VW Date of Exam: 1/24/2025 6:38 PM EST Indication: Chest Pain Triage Protocol Comparison: 1/10/2025 Findings: 3 cm lordotic projection. Heart size is at the upper limits of normal. Pulmonary vessels are normal. Lungs are clear. No pleural effusion. No pneumothorax.     Impression: 1. No acute cardiopulmonary disease. Electronically Signed: Elijah Kraft MD  1/24/2025 7:09 PM EST  Workstation ID: [id number]             Results for orders  placed during the hospital encounter of 01/25/25    Adult Transthoracic Echo Complete W/ Cont if Necessary Per Protocol    Interpretation Summary    Left ventricular systolic function is normal. Calculated left ventricular EF = 57.5% Left ventricular ejection fraction appears to be 56 - 60%.    Left ventricular wall thickness is consistent with borderline concentric hypertrophy. Left ventricular diastolic function was normal.    The right ventricular cavity is borderline dilated with normal systolic function.    No hemodynamically significant valvular dysfunction.      Plan for Follow-up of Pending Labs/Results:   Pending Labs       Order Current Status    Ova & Parasite Examination - Stool, Per Rectum Collected (01/31/25 0743)    CMV DNA, Quantitative, PCR In process    Fungitell B-D Glucan In process    Histoplasma Ag Ur - Urine, Urinary Bladder In process    Ova & Parasite Examination - Stool, Per Rectum In process    Phosphatidylethanol In process    Stool Culture, Targeted - Stool, Per Rectum In process    Strongyloides Antibody IgG, MARTELL In process          Discharge Details        Discharge Medications        New Medications        Instructions Start Date   azithromycin 500 MG tablet  Commonly known as: ZITHROMAX   500 mg, Oral, Every 24 Hours Scheduled      nicotine polacrilex 2 MG gum  Commonly known as: NICORETTE   2 mg, Mouth/Throat, As Needed      polyethylene glycol 17 g packet  Commonly known as: MIRALAX   17 g, Oral, Daily      sulfamethoxazole-trimethoprim 800-160 MG per tablet  Commonly known as: BACTRIM DS,SEPTRA DS   1 tablet, Oral, Every 24 Hours Scheduled      valGANciclovir 450 MG tablet  Commonly known as: VALCYTE   900 mg, Oral, Every 12 Hours Scheduled             Continue These Medications        Instructions Start Date   accu-chek soft touch lancets   Used to test blood sugar for Diabetes E11.65 test up to twice a day      buPROPion  MG 24 hr tablet  Commonly known as: WELLBUTRIN  "XL   150 mg, Oral, Daily      citalopram 20 MG tablet  Commonly known as: CeleXA   20 mg, Oral, Daily      FreeStyle Dagoberto 2 Howard device   1 Device, Not Applicable, Take As Directed      FreeStyle Dagoberto 2 Sensor misc   1 Device, Not Applicable, Every 14 Days      glucose blood test strip   Use as instructed      Insulin Syringe-Needle U-100 28G X 5/16\" 1 ML misc   1 Device, Not Applicable, 2 Times Daily      RELION INSULIN SYRINGE 1ML/31G 31G X 5/16\" 1 ML misc  Generic drug: Insulin Syringe-Needle U-100   2 Times Daily      omeprazole 20 MG capsule  Commonly known as: priLOSEC   20 mg, Oral, Daily             Stop These Medications      metFORMIN  MG 24 hr tablet  Commonly known as: GLUCOPHAGE-XR              Allergies   Allergen Reactions    Shellfish Allergy Anaphylaxis    Shellfish-Derived Products Anaphylaxis    Codeine Nausea Only     Not sure of reaction but thinks it is just nausea         Discharge Disposition:  Home or Self Care    Diet:  Hospital:  Diet Order   Procedures    Diet: Diabetic, Gastrointestinal; Consistent Carbohydrate; Fat-Restricted; Fluid Consistency: Thin (IDDSI 0)       Diet Instructions       Diet: Diabetic Diets; Consistent Carbohydrate; Thin (IDDSI 0)      Discharge Diet: Diabetic Diets    Diabetic Diet: Consistent Carbohydrate    Fluid Consistency: Thin (IDDSI 0)             Activity:  Activity Instructions       Activity as Tolerated              Restrictions or Other Recommendations:         CODE STATUS:    There are no questions and answers to display.       Future Appointments   Date Time Provider Department Center   2/5/2025  4:15 PM Sita Chase APRN [facility]   2/27/2025 11:30 AM Robert Myers MD [facility]       Additional Instructions for the Follow-ups that You Need to Schedule       Ambulatory Referral to Physical Therapy for Evaluation & Treatment   As directed      Specialty needed: Evaluate and treat   Follow-up needed: Yes        Discharge " Follow-up with PCP   As directed       Currently Documented PCP:    Sita Chase APRN    PCP Phone Number:    [phone]     Follow Up Details: 1--2 weels        Discharge Follow-up with Specified Provider:  Monday 8 am and 930a tuesday   As directed      To:  Monday 8 am and 930a tuesday        Discharge Follow-up with Specified Provider: dr. mares 2/27   As directed      To: dr. mares 2/27                      JOANA Caballero  02/01/25      Time Spent on Discharge:  I spent  40  minutes on this discharge activity which included: face-to-face encounter with the patient, reviewing the data in the system, coordination of the care with the nursing staff as well as consultants, documentation, and entering orders.        Electronically signed by JOANA Caballero, 02/01/25, 10:10 AM EST.

## 2025-02-01 NOTE — OUTREACH NOTE
Prep Survey      Flowsheet Row Responses   [facility] patient discharged from? [place]   Is LACE score < 7 ? No   Eligibility [facility]   Date of Admission 01/25/25   Date of Discharge 02/01/25   Discharge Disposition Home or Self Care   Discharge diagnosis Elevated LFTs   Does the patient have one of the following disease processes/diagnoses(primary or secondary)? Other   Does the patient have Home health ordered? No   Is there a DME ordered? No   Comments regarding appointments Discharge Follow-up with Specified Provider:  Monday 8 am and 930a tuesday.  Discharge Follow-up with Specified Provider: dr. mares 2/27. Ambulatory Referral to Physical Therapy   Medication alerts for this patient see AVS   Prep survey completed? Yes            Veronica DUBOIS - Registered Nurse

## 2025-02-03 ENCOUNTER — TRANSITIONAL CARE MANAGEMENT TELEPHONE ENCOUNTER (OUTPATIENT)
Dept: CALL CENTER | Facility: HOSPITAL | Age: 60
End: 2025-02-03
Payer: MEDICAID

## 2025-02-03 LAB
DPYD GENE MUT ANL BLD/T: POSITIVE
FUNGITELL VALUE: ABNORMAL PG/ML
IMP & REVIEW OF LAB RESULTS: ABNORMAL
Lab: ABNORMAL
Lab: ABNORMAL
PATHOLOGIST NAME: ABNORMAL
PETH BLD QL SCN: NEGATIVE
PETH BLD-MCNC: NEGATIVE NG/ML
REFERENCE VALUE: ABNORMAL

## 2025-02-03 NOTE — OUTREACH NOTE
Call Center TCM Note      Flowsheet Row Responses   [facility] patient discharged from? [place]   Does the patient have one of the following disease processes/diagnoses(primary or secondary)? Other   TCM attempt successful? No   Unsuccessful attempts Attempt 1   Call Status Left message            Goldy Loepz RN    2/3/2025, 08:57 EST

## 2025-02-03 NOTE — OUTREACH NOTE
Call Center TCM Note      Flowsheet Row Responses   [facility] patient discharged from? [place]   Does the patient have one of the following disease processes/diagnoses(primary or secondary)? Other   TCM attempt successful? No   Unsuccessful attempts Attempt 2            Goldy Lopez RN    2/3/2025, 12:09 EST

## 2025-02-04 ENCOUNTER — TRANSITIONAL CARE MANAGEMENT TELEPHONE ENCOUNTER (OUTPATIENT)
Dept: CALL CENTER | Facility: HOSPITAL | Age: 60
End: 2025-02-04
Payer: MEDICAID

## 2025-02-04 LAB — STRONGYLOIDES IGG SER QL IA: NEGATIVE

## 2025-02-04 NOTE — OUTREACH NOTE
Call Center TCM Note      Flowsheet Row Responses   [facility] patient discharged from? [place]   Does the patient have one of the following disease processes/diagnoses(primary or secondary)? Other   TCM attempt successful? No   Unsuccessful attempts Attempt 3            Mini Lamb RN    2/4/2025, 13:08 EST

## 2025-02-05 ENCOUNTER — OFFICE VISIT (OUTPATIENT)
Dept: INTERNAL MEDICINE | Facility: CLINIC | Age: 60
End: 2025-02-05
Payer: MEDICAID

## 2025-02-05 VITALS
OXYGEN SATURATION: 99 % | RESPIRATION RATE: 12 BRPM | BODY MASS INDEX: 30.12 KG/M2 | SYSTOLIC BLOOD PRESSURE: 118 MMHG | TEMPERATURE: 96.9 F | WEIGHT: 170 LBS | DIASTOLIC BLOOD PRESSURE: 70 MMHG | HEIGHT: 63 IN | HEART RATE: 96 BPM

## 2025-02-05 DIAGNOSIS — E11.65 TYPE 2 DIABETES MELLITUS WITH HYPERGLYCEMIA, WITH LONG-TERM CURRENT USE OF INSULIN: ICD-10-CM

## 2025-02-05 DIAGNOSIS — Z21 HIV INFECTION, UNSPECIFIED SYMPTOM STATUS: ICD-10-CM

## 2025-02-05 DIAGNOSIS — F41.9 ANXIETY: ICD-10-CM

## 2025-02-05 DIAGNOSIS — I95.9 HYPOTENSION, UNSPECIFIED HYPOTENSION TYPE: Primary | ICD-10-CM

## 2025-02-05 DIAGNOSIS — Z79.4 TYPE 2 DIABETES MELLITUS WITH HYPERGLYCEMIA, WITH LONG-TERM CURRENT USE OF INSULIN: ICD-10-CM

## 2025-02-05 PROCEDURE — 1160F RVW MEDS BY RX/DR IN RCRD: CPT | Performed by: NURSE PRACTITIONER

## 2025-02-05 PROCEDURE — 3074F SYST BP LT 130 MM HG: CPT | Performed by: NURSE PRACTITIONER

## 2025-02-05 PROCEDURE — 1159F MED LIST DOCD IN RCRD: CPT | Performed by: NURSE PRACTITIONER

## 2025-02-05 PROCEDURE — 3078F DIAST BP <80 MM HG: CPT | Performed by: NURSE PRACTITIONER

## 2025-02-05 PROCEDURE — 1126F AMNT PAIN NOTED NONE PRSNT: CPT | Performed by: NURSE PRACTITIONER

## 2025-02-05 PROCEDURE — 3044F HG A1C LEVEL LT 7.0%: CPT | Performed by: NURSE PRACTITIONER

## 2025-02-05 PROCEDURE — 99214 OFFICE O/P EST MOD 30 MIN: CPT | Performed by: NURSE PRACTITIONER

## 2025-02-05 NOTE — PROGRESS NOTES
Subjective   Malcolm Costa is a 60 y.o. male.     Chief Complaint   Patient presents with    Hypotension       PCP: Sita Chase APRN    History of Present Illness     History of Present Illness    2-year-old male who is here to follow-up on hypotension and his last visit his antihypertensives were stopped due to hypotension  Since that visit he has been diagnosed with HIV he is seeing infectious diseases for management.  Next appointment is 2/18/2025 at  he reports that he was diagnosed with CMV of the right eye and is seeing ophthalmology for this as well  .    Anxiety.  He reports that he is.  He wishes to continue with this regimen.  Doing ok with celexa and wellbutrin    He does have type 2 diabetes.  His last hemoglobin A1c was 5.9%.  Denies any hypoglycemia.      Results      Lab Results   Component Value Date    WBC 3.87 02/03/2025    HGB 10.5 (L) 02/03/2025    HCT 33.6 (L) 02/03/2025    MCV 94 02/03/2025     02/03/2025     Lab Results   Component Value Date    GLUCOSE 117 (H) 01/30/2025    BUN 14 01/30/2025    CREATININE 1.10 01/30/2025    EGFR 77.3 01/30/2025    BCR 12.7 01/30/2025    K 3.9 01/30/2025    CO2 22.0 01/30/2025    CALCIUM 8.9 01/30/2025    ALBUMIN 3.5 01/30/2025    BILITOT 0.8 01/30/2025    AST 92 (H) 01/30/2025    ALT 92 (H) 01/30/2025     Lab Results   Component Value Date    CHOL 179 07/31/2024    CHLPL 192 11/03/2015    TRIG 249 (H) 07/31/2024    HDL 39 (L) 07/31/2024    LDL 98 07/31/2024     Lab Results   Component Value Date    TSH 0.816 01/26/2025     A1C Last 3 Results          1/10/2025    14:30 1/26/2025    08:43 2/3/2025    10:28   HGBA1C Last 3 Results   Hemoglobin A1C 6.7  6.00  5.9          Details          This result is from an external source.               The following portions of the patient's history were reviewed and updated as appropriate: allergies, current medications, past family history, past medical history, past social history, past surgical history and  "problem list.              No outpatient medications have been marked as taking for the 2/5/25 encounter (Office Visit) with Luzma ChaseJOANA Terry.     Allergies   Allergen Reactions    Shellfish Allergy Anaphylaxis    Shellfish-Derived Products Anaphylaxis    Codeine Nausea Only     Not sure of reaction but thinks it is just nausea           Objective     Vitals:    02/05/25 1555   BP: 118/70   BP Location: Right arm   Patient Position: Sitting   Cuff Size: Adult   Pulse: 96   Resp: 12   Temp: 96.9 °F (36.1 °C)   TempSrc: Infrared   SpO2: 99%   Weight: 77.1 kg (170 lb)   Height: 160 cm (62.99\")     Body mass index is 30.12 kg/m².  Wt Readings from Last 3 Encounters:   02/05/25 77.1 kg (170 lb)   01/27/25 77.1 kg (170 lb)   01/24/25 76 kg (167 lb 9.6 oz)       Physical Exam  Vitals and nursing note reviewed.   Constitutional:       Appearance: Normal appearance. He is well-developed.   HENT:      Head: Normocephalic and atraumatic.   Eyes:      Conjunctiva/sclera: Conjunctivae normal.   Cardiovascular:      Rate and Rhythm: Normal rate and regular rhythm.      Heart sounds: Normal heart sounds.   Pulmonary:      Effort: Pulmonary effort is normal. No respiratory distress.      Breath sounds: Normal breath sounds.   Abdominal:      General: Bowel sounds are normal. There is no distension.      Palpations: Abdomen is soft.      Tenderness: There is no abdominal tenderness.   Musculoskeletal:      Cervical back: Normal range of motion.   Skin:     General: Skin is warm and dry.   Neurological:      Mental Status: He is alert and oriented to person, place, and time.   Psychiatric:         Speech: Speech normal.         Behavior: Behavior normal.         Thought Content: Thought content normal.         Judgment: Judgment normal.               Assessment & Plan   Diagnoses and all orders for this visit:    1. Hypotension, unspecified hypotension type (Primary)  He was previously hypertensive and on meds.  BP is low so " antihypertensives were stopped.  BP is now stable.  Continue to remain off of antihypertensives for now unless BP greater than 130/80.  2. Anxiety  Well-controlled.  Continue the Celexa and the bupropion at current doses well-controlled.  3. Type 2 diabetes mellitus with hyperglycemia, with long-term current use of insulin  Continue current regimen.  4. HIV  Continue to follow with infectious diseases at  for management of HIV.          Return in about 3 months (around 5/5/2025) for chronic condition follow up.    I discussed my findings,recommendations, and plan of care was with the patient. They verbalized understanding and agreement.  Patient was encouraged to keep me informed of any acute changes, lack of improvement, or any new concerning symptoms.

## 2025-02-06 LAB — H CAPSUL AG UR QL IA: NEGATIVE

## 2025-02-11 LAB
O+P SPEC MICRO: NORMAL
O+P STL TRI STN: NORMAL
QT INTERVAL: 334 MS
QTC INTERVAL: 464 MS

## 2025-02-12 LAB — REF LAB TEST METHOD: NORMAL

## 2025-02-17 PROBLEM — Z21 HIV INFECTION: Status: ACTIVE | Noted: 2025-02-17

## (undated) DEVICE — LUBE JELLY FOIL PACK 1.4 OZ: Brand: MEDLINE INDUSTRIES, INC.

## (undated) DEVICE — SYR LL TP 10ML STRL

## (undated) DEVICE — ADHS SKIN PREMIERPRO EXOFIN TOPICAL HI/VISC .5ML

## (undated) DEVICE — PK LAP LASR CHOLE 10

## (undated) DEVICE — Device

## (undated) DEVICE — SAFELINER SUCTION CANISTER 1000CC: Brand: DEROYAL

## (undated) DEVICE — SYR LUERLOK 30CC

## (undated) DEVICE — BLD SCLPL BP SS SZ11

## (undated) DEVICE — APPL CHLORAPREP TINTED 26ML TEAL

## (undated) DEVICE — SUT VIC PLS 0 CTD ANTIB BR UNDYE 27IN

## (undated) DEVICE — SYR LUERLOK 20CC BX/50

## (undated) DEVICE — SYS VISABILITY LAP/SCPE LAPAROVUE CLN WARM 2/PRT 3TO12MM

## (undated) DEVICE — DRP SURG UTIL W/TPE 2/LAYR 15X26IN DISP

## (undated) DEVICE — KT BEDSD ENZYM PRECLN 500ML

## (undated) DEVICE — GLV SURG BIOGEL LTX PF 7

## (undated) DEVICE — SOLIDIFIER LIQ PREMISORB 1500CC

## (undated) DEVICE — TUBING, SUCTION, 1/4" X 10', STRAIGHT: Brand: MEDLINE

## (undated) DEVICE — KT ORCA ORCAPOD DISP STRL

## (undated) DEVICE — LUBE JELLY ENDO 1.4OZ

## (undated) DEVICE — CANSTR SXN CRYSTALINE SEMI RIGID W/1ELBW 1000CC SURG

## (undated) DEVICE — SYS FILT LAP LAPAROSHIELD EVAC SMOKE STRL BX/10

## (undated) DEVICE — TROC BLADLES XCEL/OPTI SLV THRD 5X100

## (undated) DEVICE — TROC BLADLES XCEL/OPTI SLV THRD 12X100

## (undated) DEVICE — FIRST STEP BEDSIDE ADD WATER KIT - RESEALABLE STAND-UP POUCH, ENDOSCOPIC CLEANING PAD - 1 POUCH: Brand: FIRST STEP BEDSIDE ADD WATER KIT - RESEALABLE STAND-UP POUCH, ENDOSCOPIC CLEANIN

## (undated) DEVICE — UNDRGLV SURG BIOGEL PUNCTUREINDICATION SZ7 PF STRL

## (undated) DEVICE — SUT PDS 0 ENDOLP 18IN DYED EZ10G

## (undated) DEVICE — THE BITE BLOCK MAXI, LATEX FREE STRAP IS USED TO PROTECT THE ENDOSCOPE INSERTION TUBE FROM BEING BITTEN BY THE PATIENT.

## (undated) DEVICE — INTRO ACCSR BLNT TP

## (undated) DEVICE — CANNULATING SPHINCTEROTOME: Brand: JAGTOME™ REVOLUTION RX

## (undated) DEVICE — SPHINCTEROTOME: Brand: DREAMTOME™ RX 44

## (undated) DEVICE — CONTN GRAD MEAS TRIANG 32OZ BLK

## (undated) DEVICE — SLV TROC ENDOPATHXCEL THRD 5X100MM CB5LT

## (undated) DEVICE — BOWL PLSTC MD 16OZ BLU STRL

## (undated) DEVICE — NDL VERRES PNEUMO 120MM PN120 LF

## (undated) DEVICE — LUBE GEL ENDOGLIDE 1.1OZ

## (undated) DEVICE — SYS IRRISEPT JET LAVG CHG .05PCT

## (undated) DEVICE — FRCP BX RADJAW4 NDL 2.8 240CM LG OG BX80

## (undated) DEVICE — SUT MNCRYL PLS ANTIB UD 4/0 PS2 18IN

## (undated) DEVICE — SPNG GZ WOVN 4X4IN 12PLY 10/BX STRL

## (undated) DEVICE — BLD SCLPL BP SS SZ15

## (undated) DEVICE — BLANKT WARM UPPR/BDY ARM/OUT 57X196CM

## (undated) DEVICE — ADAPT CLN LUM OLYMP AIR/H20

## (undated) DEVICE — TBG CO2 CLEVERCAP FOR OLYMPUS SCOPES

## (undated) DEVICE — DEV LK WIREGUIDE FUSN OLYMP SCP

## (undated) DEVICE — TBSET INSUFF HEATED W RTP FOR PNEUMO

## (undated) DEVICE — DRP IOBAN ANTIMICRO 23X17IN

## (undated) DEVICE — CATH CHOLANG 7.5F18IN BLU

## (undated) DEVICE — HYBRID CO2 TUBING/CAP SET FOR OLYMPUS® SCOPES & CO2 SOURCE: Brand: ERBE

## (undated) DEVICE — BITEBLOCK MAXIBITE W STRAP 60FLF

## (undated) DEVICE — CVR DIST SCPE/TIP EVIS/EXERA/III TJF/Q190V 1P/U

## (undated) DEVICE — TBG PENCL TELESCP MEGADYNE SMOKE EVAC 10FT

## (undated) DEVICE — RETRIEVAL BALLOON CATHETER: Brand: EXTRACTOR™ PRO RX

## (undated) DEVICE — SPNG ENDO BEDSIDE TUB ENZYM

## (undated) DEVICE — BOWL UTIL STRL 32OZ

## (undated) DEVICE — TBG SXN CONN/F UNIV 1/4IN 10FT LF STRL

## (undated) DEVICE — SYR LUERLOK 50ML

## (undated) DEVICE — PAD PLT GRND ELECTRD NESSY W/CORD DISP

## (undated) DEVICE — PAD GRND E/S MEGADYNE MONOPLR 2/PLT W/CORD A/ DISP

## (undated) DEVICE — SPHINCT DREAMTOME/RX44 .035 10X20MM 260CM

## (undated) DEVICE — BALN EXTRCTPRO RX RETRV 3L DIST 9TO12MM 200CM

## (undated) DEVICE — SOL IRR H2O BO 1000ML STRL

## (undated) DEVICE — SUT VIC 0 UR6 27IN VCP603H

## (undated) DEVICE — BNDR ABD PREMIUM/UNIV 10IN 27TO48IN

## (undated) DEVICE — SOL IRR H2O BTL 1000ML STRL